# Patient Record
Sex: FEMALE | Race: ASIAN | NOT HISPANIC OR LATINO | Employment: UNEMPLOYED | URBAN - METROPOLITAN AREA
[De-identification: names, ages, dates, MRNs, and addresses within clinical notes are randomized per-mention and may not be internally consistent; named-entity substitution may affect disease eponyms.]

---

## 2017-01-01 ENCOUNTER — MEDICAL CORRESPONDENCE (OUTPATIENT)
Dept: HEALTH INFORMATION MANAGEMENT | Facility: CLINIC | Age: 52
End: 2017-01-01

## 2017-01-09 DIAGNOSIS — I10 ESSENTIAL HYPERTENSION, BENIGN: Primary | ICD-10-CM

## 2017-01-09 DIAGNOSIS — E11.9 TYPE 2 DIABETES MELLITUS WITHOUT COMPLICATION, WITHOUT LONG-TERM CURRENT USE OF INSULIN (H): ICD-10-CM

## 2017-01-09 DIAGNOSIS — R80.9 MICROALBUMINURIA: ICD-10-CM

## 2017-01-12 ENCOUNTER — OFFICE VISIT (OUTPATIENT)
Dept: PSYCHOLOGY | Facility: CLINIC | Age: 52
End: 2017-01-12

## 2017-01-12 ENCOUNTER — OFFICE VISIT (OUTPATIENT)
Dept: FAMILY MEDICINE | Facility: CLINIC | Age: 52
End: 2017-01-12

## 2017-01-12 VITALS
DIASTOLIC BLOOD PRESSURE: 75 MMHG | SYSTOLIC BLOOD PRESSURE: 124 MMHG | TEMPERATURE: 98.3 F | BODY MASS INDEX: 29.96 KG/M2 | HEART RATE: 68 BPM | OXYGEN SATURATION: 99 % | WEIGHT: 141 LBS

## 2017-01-12 DIAGNOSIS — R80.9 MICROALBUMINURIA: ICD-10-CM

## 2017-01-12 DIAGNOSIS — F33.1 MODERATE EPISODE OF RECURRENT MAJOR DEPRESSIVE DISORDER (H): Primary | ICD-10-CM

## 2017-01-12 DIAGNOSIS — E55.9 VITAMIN D DEFICIENCY: ICD-10-CM

## 2017-01-12 DIAGNOSIS — F43.10 PTSD (POST-TRAUMATIC STRESS DISORDER): ICD-10-CM

## 2017-01-12 DIAGNOSIS — E11.9 TYPE 2 DIABETES MELLITUS WITHOUT COMPLICATION, WITHOUT LONG-TERM CURRENT USE OF INSULIN (H): ICD-10-CM

## 2017-01-12 DIAGNOSIS — K21.9 GASTROESOPHAGEAL REFLUX DISEASE WITHOUT ESOPHAGITIS: ICD-10-CM

## 2017-01-12 DIAGNOSIS — Z12.11 ENCOUNTER FOR SCREENING COLONOSCOPY: Primary | ICD-10-CM

## 2017-01-12 DIAGNOSIS — I10 ESSENTIAL HYPERTENSION, BENIGN: ICD-10-CM

## 2017-01-12 DIAGNOSIS — F33.1 MODERATE EPISODE OF RECURRENT MAJOR DEPRESSIVE DISORDER (H): ICD-10-CM

## 2017-01-12 DIAGNOSIS — E11.65 TYPE 2 DIABETES MELLITUS WITH HYPERGLYCEMIA, WITHOUT LONG-TERM CURRENT USE OF INSULIN (H): ICD-10-CM

## 2017-01-12 LAB
BUN SERPL-MCNC: 16.9 MG/DL (ref 7–19)
CALCIUM SERPL-MCNC: 9.3 MG/DL (ref 8.5–10.1)
CHLORIDE SERPLBLD-SCNC: 104.2 MMOL/L (ref 98–110)
CHOLEST SERPL-MCNC: 124 MG/DL (ref 0–200)
CHOLEST/HDLC SERPL: 3.3 {RATIO} (ref 0–5)
CO2 SERPL-SCNC: 27.2 MMOL/L (ref 20–32)
CREAT SERPL-MCNC: 0.8 MG/DL (ref 0.5–1)
CREAT UR-MCNC: 61.5 MG/DL
GFR SERPL CREATININE-BSD FRML MDRD: 79.7 ML/MIN/1.7 M2
GLUCOSE SERPL-MCNC: 205.1 MG'DL (ref 70–99)
HBA1C MFR BLD: 6.1 % (ref 4.1–5.7)
HDLC SERPL-MCNC: 37.9 MG/DL
LDLC SERPL CALC-MCNC: 57 MG/DL (ref 0–129)
MICROALBUMIN UR-MCNC: 4.31 MG/DL (ref 0–1.99)
MICROALBUMIN/CREAT UR: 70.1 MG/G
POTASSIUM SERPL-SCNC: 3.5 MMOL/DL (ref 3.2–4.6)
SODIUM SERPL-SCNC: 139 MMOL/L (ref 132–142)
TRIGL SERPL-MCNC: 143.8 MG/DL (ref 0–150)
VLDL CHOLESTEROL: 28.8 MG/DL (ref 7–32)

## 2017-01-12 RX ORDER — METFORMIN HCL 500 MG
1000 TABLET, EXTENDED RELEASE 24 HR ORAL 2 TIMES DAILY WITH MEALS
Qty: 180 TABLET | Refills: 3 | Status: SHIPPED | OUTPATIENT
Start: 2017-01-12 | End: 2017-06-14

## 2017-01-12 RX ORDER — ATORVASTATIN CALCIUM 80 MG/1
80 TABLET, FILM COATED ORAL DAILY
Qty: 90 TABLET | Refills: 3 | Status: SHIPPED | OUTPATIENT
Start: 2017-01-12 | End: 2017-07-14

## 2017-01-12 RX ORDER — PIOGLITAZONEHYDROCHLORIDE 30 MG/1
30 TABLET ORAL DAILY
Qty: 1 TABLET | Refills: 0 | Status: SHIPPED | OUTPATIENT
Start: 2017-01-12 | End: 2017-02-27

## 2017-01-12 RX ORDER — VENLAFAXINE HYDROCHLORIDE 75 MG/1
75 CAPSULE, EXTENDED RELEASE ORAL DAILY
Qty: 90 CAPSULE | Refills: 0 | Status: SHIPPED | OUTPATIENT
Start: 2017-01-12 | End: 2017-03-17

## 2017-01-12 RX ORDER — GLIPIZIDE 10 MG/1
20 TABLET, FILM COATED, EXTENDED RELEASE ORAL DAILY
Qty: 180 TABLET | Refills: 3 | Status: SHIPPED | OUTPATIENT
Start: 2017-01-12 | End: 2017-07-14

## 2017-01-12 RX ORDER — LISINOPRIL 10 MG/1
10 TABLET ORAL DAILY
Qty: 90 TABLET | Refills: 3 | Status: SHIPPED | OUTPATIENT
Start: 2017-01-12 | End: 2017-02-27

## 2017-01-12 RX ORDER — PRAZOSIN HYDROCHLORIDE 2 MG/1
2 CAPSULE ORAL AT BEDTIME
Qty: 90 CAPSULE | Refills: 3 | Status: SHIPPED | OUTPATIENT
Start: 2017-01-12 | End: 2017-03-01

## 2017-01-12 NOTE — PATIENT INSTRUCTIONS
Stop taking the Pitoglitazone  Decrease the Effexor to 75mg.  Check blood sugars once per week when home nurse comes.      Come back in 6 weeks for a recheck.

## 2017-01-12 NOTE — MR AVS SNAPSHOT
After Visit Summary   1/12/2017    Rachael Ch    MRN: [id number]           Patient Information     Date Of Birth          1965        Visit Information        Provider Department      1/12/2017 9:00 AM Amrita Carballo MD [facility]        Today's Diagnoses     Encounter for screening colonoscopy    -  1     Essential hypertension, benign         Type 2 diabetes mellitus without complication, without long-term current use of insulin (H)         Microalbuminuria         Moderate episode of recurrent major depressive disorder (H)         Vitamin D deficiency         PTSD (post-traumatic stress disorder)         Gastroesophageal reflux disease without esophagitis         Type 2 diabetes mellitus with hyperglycemia, without long-term current use of insulin (H)           Care Instructions    Stop taking the Pitoglitazone  Decrease the Effexor to 75mg.  Check blood sugars once per week when home nurse comes.      Come back in 6 weeks for a recheck.          Follow-ups after your visit        Future tests that were ordered for you today     Open Future Orders        Priority Expected Expires Ordered    Fecal Occult Blood - FIT, iFOB ([facility]) Routine  1/19/2018 1/12/2017            Who to contact     Please call your clinic at [phone] to:    Ask questions about your health    Make or cancel appointments    Discuss your medicines    Learn about your test results    Speak to your doctor   If you have compliments or concerns about an experience at your clinic, or if you wish to file a complaint, please contact [facility] Patient Relations at [phone] or email us at [email or web address]         Additional Information About Your Visit        MyChart Information     [facility] is an electronic gateway that provides easy, online access to your medical records. With [facility], you can request a clinic appointment, read your test results, renew a prescription or  communicate with your care team.     To sign up for [facility] visit the website at [email or web address]   You will be asked to enter the access code listed below, as well as some personal information. Please follow the directions to create your username and password.     Your access code is: [email or web address]  Expires: 2017  7:44 AM     Your access code will  in 90 days. If you need help or a new code, please contact your [facility] or call [phone] for assistance.        Care EveryWhere ID     This is your Care EveryWhere ID. This could be used by other organizations to access your [place] medical records  [id number]        Your Vitals Were     Pulse Temperature Pulse Oximetry             68 98.3  F (36.8  C) (Oral) 99%          Blood Pressure from Last 3 Encounters:   17 124/75   16 115/67   10/18/16 92/61    Weight from Last 3 Encounters:   17 141 lb (63.957 kg)   16 137 lb 9.6 oz (62.415 kg)   10/18/16 135 lb 9.6 oz (61.508 kg)              We Performed the Following     Basic Metabolic Panel ([place])     Hemoglobin A1c ([facility])     Lipid Panel ([place])     Microalbumin Creatinine Ratio Random Ur ([facility])          Today's Medication Changes          These changes are accurate as of: 17 10:12 AM.  If you have any questions, ask your nurse or doctor.               These medicines have changed or have updated prescriptions.        Dose/Directions    cholecalciferol 2000 UNITS tablet   This may have changed:    - medication strength  - how much to take  - when to take this   Used for:  Vitamin D deficiency   Changed by:  Amrita Carballo MD        Dose:  2000 Units   Take 2,000 Units by mouth daily   Quantity:  100 tablet   Refills:  3       glipiZIDE 10 MG 24 hr tablet   Commonly known as:  GLUCOTROL XL   This may have changed:    - how much to take  - when to take this   Used for:  Type 2 diabetes mellitus without  complication, without long-term current use of insulin (H)   Changed by:  Amrita Carballo MD        Dose:  20 mg   Take 2 tablets (20 mg) by mouth daily   Quantity:  180 tablet   Refills:  3       venlafaxine 75 MG 24 hr capsule   Commonly known as:  EFFEXOR-XR   This may have changed:    - medication strength  - how much to take   Used for:  Moderate episode of recurrent major depressive disorder (H)   Changed by:  Amrita Carballo MD        Dose:  75 mg   Take 1 capsule (75 mg) by mouth daily   Quantity:  90 capsule   Refills:  0         Stop taking these medicines if you haven't already. Please contact your care team if you have questions.     order for DME   Stopped by:  Amrita Carballo MD                Where to get your medicines      These medications were sent to [facility] - [place], [place] - [address], [place] [address]     Phone:  [phone]    - atorvastatin 80 MG tablet  - cholecalciferol 2000 UNITS tablet  - glipiZIDE 10 MG 24 hr tablet  - lisinopril 10 MG tablet  - metFORMIN 500 MG 24 hr tablet  - pioglitazone 30 MG tablet  - prazosin 2 MG capsule  - ranitidine 300 MG tablet  - sitagliptin 100 MG tablet  - venlafaxine 75 MG 24 hr capsule             Primary Care Provider Office Phone # Fax #    Amrita Carballo [phone]       [facility] [address] [place] [address]        Thank you!     Thank you for choosing [facility]  for your care. Our goal is always to provide you with excellent care. Hearing back from our patients is one way we can continue to improve our services. Please take a few minutes to complete the written survey that you may receive in the mail after your visit with us. Thank you!             Your Updated Medication List - Protect others around you: Learn how to safely use, store and throw away your medicines at www.disposemymeds.org.          This list is accurate as of: 1/12/17 10:12 AM.  Always  use your most recent med list.                   Brand Name Dispense Instructions for use    atorvastatin 80 MG tablet    LIPITOR    90 tablet    Take 1 tablet (80 mg) by mouth daily       blood glucose lancets standard    no brand specified    1 Box    Use to test blood sugar 1 times daily or as directed.       blood glucose monitoring lancets     2 Box    1 each by In Vitro route daily       blood glucose monitoring meter device kit    no brand specified    1 kit    Use to test blood sugar 1 times daily or as directed.       blood glucose monitoring test strip    no brand specified    1 Box    Use to test blood sugars 1 times daily or as directed       calcium carbonate 500 MG chewable tablet    TUMS    100 tablet    Take 1 tablet (500 mg) by mouth 2 times daily       cholecalciferol 2000 UNITS tablet     100 tablet    Take 2,000 Units by mouth daily       glipiZIDE 10 MG 24 hr tablet    GLUCOTROL XL    180 tablet    Take 2 tablets (20 mg) by mouth daily       lisinopril 10 MG tablet    PRINIVIL/ZESTRIL    90 tablet    Take 1 tablet (10 mg) by mouth daily       metFORMIN 500 MG 24 hr tablet    GLUCOPHAGE-XR    180 tablet    Take 2 tablets (1,000 mg) by mouth 2 times daily (with meals)       pioglitazone 30 MG tablet    ACTOS    1 tablet    Take 1 tablet (30 mg) by mouth daily       prazosin 2 MG capsule    MINIPRESS    90 capsule    Take 1 capsule (2 mg) by mouth At Bedtime       ranitidine 300 MG tablet    ZANTAC    90 tablet    Take 1 tablet (300 mg) by mouth At Bedtime       sitagliptin 100 MG tablet    JANUVIA    90 tablet    Take 1 tablet (100 mg) by mouth daily       venlafaxine 75 MG 24 hr capsule    EFFEXOR-XR    90 capsule    Take 1 capsule (75 mg) by mouth daily

## 2017-01-12 NOTE — NURSING NOTE
name: Silvino (Cat) Darren  Language: Radha  Agency: [employer]  Phone number: [phone]

## 2017-01-12 NOTE — Clinical Note
January 13, 2017      Rachael [facility]  [address] [address]        Dear Rachael,    Please see below for your test results.    Resulted Orders   Basic Metabolic Panel ([place])   Result Value Ref Range    Urea Nitrogen 16.9 7.0 - 19.0 mg/dL    Calcium 9.3 8.5 - 10.1 mg/dL    Chloride 104.2 98.0 - 110.0 mmol/L    Carbon Dioxide 27.2 20.0 - 32.0 mmol/L    Creatinine 0.8 0.5 - 1.0 mg/dL    Glucose 205.1 (H) 70.0 - 99.0 mg'dL    Potassium 3.5 3.2 - 4.6 mmol/dL    Sodium 139.0 132.0 - 142.0 mmol/L    GFR Estimate 79.7 mL/min/1.7 m2    GFR Estimate If Black 96.5 mL/min/1.7 m2   Hemoglobin A1c ([facility])   Result Value Ref Range    Hemoglobin A1C 6.1 (H) 4.1 - 5.7 %   Lipid Panel ([place])   Result Value Ref Range    Cholesterol 124.0 0.0 - 200.0 mg/dL    Cholesterol/HDL Ratio 3.3 0.0 - 5.0    HDL Cholesterol 37.9 (L) >40.0 mg/dL    LDL Cholesterol Calculated 57 0 - 129 mg/dL    Triglycerides 143.8 0.0 - 150.0 mg/dL    VLDL Cholesterol 28.8 7.0 - 32.0 mg/dL   Microalbumin Creatinine Ratio Random Ur ([facility])   Result Value Ref Range    Microalbumin, Urine 4.31 (H) 0.00 - 1.99 mg/dL    Creatinine, Urine 61.5 mg/dL    Albumin Urine mg/g Cr 70.1 (H) <=19.9 mg/g    Narrative    Test performed by:  [facility]  [address] [place] [address]  Microalbumin, Random Urine  <2.0 mg/dL . . . . . . . . Normal  3.0-30.0 mg/dL . . . . . . Microalbuminuria  >30.0 mg/dL . . . . . .  . Clinical Proteinuria  Microalbumin/Creatinine Ratio, Random Urine  <20 mg/g . . . . .. . . . Normal   mg/g . . . . . . . Microalbuminuria  >300 mg/g . . . . . . . . Clinical Proteinuria       Microalbumin is still elevated.      Amrita Carballo

## 2017-01-12 NOTE — PROGRESS NOTES
Nursing Notes:   Khalif Valdivia, [facility]  1/12/2017  9:35 AM  Signed   name: Silvino Banks (Htoo)  Language: Radha  Agency: [employer]  Phone number: [phone]  Chief Complaint   Patient presents with     Diabetes     Depression     Recheck Medication     Blood pressure 124/75, pulse 68, temperature 98.3  F (36.8  C), temperature source Oral, weight 141 lb (63.957 kg), SpO2 99 %, not currently breastfeeding.    SUBJECTIVE:  This is a 51-year-old female, well-known to me, with past medical history significant for type II diabetes, hypertension, microalbuminuria, major depression, PTSD, and GERD, who presents today for followup visit.  Unfortunately, her insurance lapsed for a period of time, but now she is able to follow up.  She reports overall doing very well.  She continues to go to group therapy here with the [facility] group and finds quite a bit of benefit from that.  She said that she shares her thoughts in group and she feels better.  She feels that her energy is good and that overall things are going well.     She feels dizzy on occasion and sometimes it travels up into her head.  This happens most frequently when she isn't feeling well.  She takes her medications regularly and feels like things are going well with the home nurse, who comes to help set up medications.  They also check blood pressure and she reports that this is going well.  She doesn't check blood sugars at all.  She has occasional times when she feels that she gets sweaty and clammy, which she describes as hot flashes.  She denies any chest pain, shortness of breath, or headaches.  No lower extremity edema.  No numbness and tingling in the feet.       We discussed lab results from today.  This includes A1C of 6.1.  BMP showed normal renal function, with creatinine of 0.8 and potassium of 3.5.  LDL cholesterol is 57.  Random glucose is 205.     OBJECTIVE:   VITAL SIGNS:  Reviewed.  They are within the normal range.  Her blood pressure is  124/75.  Pulse is 68.  Weight is 141 pounds, which is up slightly from last visit.   GENERAL:  Comfortable-appearing, Radha female in no acute distress.   CARDIAC:  Heart has regular rate and rhythm with no murmurs, rubs, or gallops.   RESPIRATORY:  Lungs are clear to auscultation bilaterally with no crackles or wheezes.   EXTREMITIES:  Warm and well-perfused.  Pulses are strong and symmetric.  No lower extremity edema.  Monofilament testing was completed and was normal.   PSYCHIATRIC:  Mood and affect are bright.  She is engaged and answers questions appropriately.  Her PHQ-9 was completed today and showed total score of 10.  She continues to endorse some difficulty at home.       LABS:  Labs were discussed above.   Results for orders placed or performed in visit on 01/12/17   Basic Metabolic Panel ([facility])   Result Value Ref Range    Urea Nitrogen 16.9 7.0 - 19.0 mg/dL    Calcium 9.3 8.5 - 10.1 mg/dL    Chloride 104.2 98.0 - 110.0 mmol/L    Carbon Dioxide 27.2 20.0 - 32.0 mmol/L    Creatinine 0.8 0.5 - 1.0 mg/dL    Glucose 205.1 (H) 70.0 - 99.0 mg'dL    Potassium 3.5 3.2 - 4.6 mmol/dL    Sodium 139.0 132.0 - 142.0 mmol/L    GFR Estimate 79.7 mL/min/1.7 m2    GFR Estimate If Black 96.5 mL/min/1.7 m2   Hemoglobin A1c ([facility])   Result Value Ref Range    Hemoglobin A1C 6.1 (H) 4.1 - 5.7 %   Lipid Panel ([place])   Result Value Ref Range    Cholesterol 124.0 0.0 - 200.0 mg/dL    Cholesterol/HDL Ratio 3.3 0.0 - 5.0    HDL Cholesterol 37.9 (L) >40.0 mg/dL    LDL Cholesterol Calculated 57 0 - 129 mg/dL    Triglycerides 143.8 0.0 - 150.0 mg/dL    VLDL Cholesterol 28.8 7.0 - 32.0 mg/dL   Microalbumin Creatinine Ratio Random Ur ([facility])   Result Value Ref Range    Microalbumin, Urine 4.31 (H) 0.00 - 1.99 mg/dL    Creatinine, Urine 61.5 mg/dL    Albumin Urine mg/g Cr 70.1 (H) <=19.9 mg/g    Narrative    Test performed by:  [facility]  [address] [place] [address]  Microalbumin, Random Urine  <2.0  mg/dL . . . . . . . . Normal  3.0-30.0 mg/dL . . . . . . Microalbuminuria  >30.0 mg/dL . . . . . .  . Clinical Proteinuria  Microalbumin/Creatinine Ratio, Random Urine  <20 mg/g . . . . .. . . . Normal   mg/g . . . . . . . Microalbuminuria  >300 mg/g . . . . . . . . Clinical Proteinuria         ASSESSMENT AND PLAN:  This is a 50-year-old female presenting for followup of multiple issues.   1.  Type II diabetes.  I think the hot flashes are likely episodes of hypoglycemia.  We'll discontinue Actos.  We'll continue glipizide, Januvia, and metformin.  I congratulated her on the improvement in blood sugars.  I discussed that she should check blood sugars at minimum when nurse comes on weekly basis and if she is having symptoms.    2.  Hypertension.  Blood pressure is well-controlled.  We'll continue the small dose of lisinopril.   3.  Microalbuminuria.  Again, she is on lisinopril.   4.  Hyperlipidemia.  This is at goal.  We'll continue atorvastatin.   5.  Major depression and PTSD.  PHQ-9 is improving and she appears much brighter and more engaged today.  It is unclear whether or not she continues to take prazosin and Effexor.  Because of this, I'm going to decrease the dose from 150 mg of Effexor to 75 mg.  If she isn't taking it, this shouldn't be too great a jump.  Hopefully, this will prevent orthostatic or withdrawal symptoms.  I'll continue to adjust downward and eventually off if she continues to feel well.  She has had good benefit previouosly from prazosin, so we'll continue that for now.  She'll continue with group therapy here in the clinic.     I congratulated her on the excellent results today.  She'll follow up with me in six weeks for recheck.     Amrita Carballo        Patient Instructions   Stop taking the Pitoglitazone  Decrease the Effexor to 75mg.  Check blood sugars once per week when home nurse comes.      Come back in 6 weeks for a recheck.

## 2017-01-13 NOTE — PROGRESS NOTES
"[facility] Group Therapy Note    Meeting was: Scheduled  Others present: , Jyotsna Noguera ([facility], [phone])  Meeting lasted: 60 minutes (patient joined after first hour of group because of conflicting PCP appointment)  Patient was: On time  Mode of Treatment: Group Therapy  Group Session Number: 6  Number of Attendees: 5    Complexity: An  is used not only to interpret language, since the group members do not speak English, but also to help with the complexity of understandings across cultures, since the members are not well integrated in the larger [place] culture.    Topics Discussed: Helpful English Phrases and Using Local Resources    Check-in:  Group members had an opportunity to share updates from their life over the past month since last group session.    Presentation/Discussion: Discussed language barriers and elicited examples of how language has prevented group members from accessing care in the past (i.e., difficulties with arranging transportation, not understanding phone calls from providers/clinics, missing appointments).  Group members were introduced to 5 useful English phrases includin. I don't speak English  2. I speak Radha  3. I need a Radha   4. I don't understand  5. Thank you    Each of the phrases was explained in Radha. Group members took several minutes to practice saying these phrases out loud. Group members were also offered the opportunity to ask about other phrases they would like to learn in English (i.e., \"I have a problem with...\" \"Please help me with...\").     Group members were then educated about how to read and dial phone numbers and how to read and locate addresses.     They were then given a resource handout and provided overview of the domains of  for which assistance is available. Group members denied specific resource needs at this time. They were instructed to request help from clinic staff if needs were to arise in " the future.      Subjective: Ms. Ch shared that her mood has been quite good for the past month. When discussing barriers to medical treatment, Ms. Ch noted that her vision is affected by cataracts. She is fearful of getting the surgery because she has a friend who lost her vision after the surgery. Discussed how patients may have comorbid conditions that affect outcomes. She acknowledged that most people's vision probably improves after surgery. She is delaying making the decision because her vision is still good enough.     Objective: Ms. Ch appeared awake and alert for today's visit.     Assessment: Ms. Ch was an active, engaged participant throughout the meeting today. Ms. Ch appeared receptive to feedback and discussion during the visit.    Diagnosis: 296.32 Major Depressive Disorder, Recurrent Episode, Moderate  309.81 (F43.10) Posttraumatic Stress Disorder     Treatment Plan: Next treatment plan update due 2/3/17    Plan:    1. Attend next therapy group in 2 weeks.   2. Practice phrases learned in today's group.   3. Utilize crisis services if needed.      Jackie Kirkpatrick, PHD  Behavioral Health Postdoctoral Fellow

## 2017-01-14 NOTE — PROGRESS NOTES
I have reviewed and agree with the behavioral health fellow's documentation for this visit.  I did not personally see the patient.  Maria Moon, PhD., LP

## 2017-01-18 DIAGNOSIS — Z12.11 ENCOUNTER FOR SCREENING COLONOSCOPY: ICD-10-CM

## 2017-01-18 LAB — HEMOCCULT STL QL IA: NEGATIVE

## 2017-01-18 NOTE — PROGRESS NOTES
Quick Note:    Rachael Ch-    Your FIT testing is negative. This is good news. We should repeat it in 1 year.     Amrita Carballo    ______

## 2017-01-18 NOTE — Clinical Note
January 18, 2017      [facility]  [address] [address]        Dear Rachael,    Please see below for your test results.  Your FIT testing is negative.  This is good news.  We should repeat it in 1 year.      Resulted Orders   Fecal Occult Blood - FIT, iFOB ([facility])   Result Value Ref Range    Occult Blood Scn FIT NEGATIVE Negative       If you have any questions, please call the clinic to make an appointment.    Sincerely,    Amrita Carballo MD

## 2017-01-26 ENCOUNTER — OFFICE VISIT (OUTPATIENT)
Dept: PSYCHOLOGY | Facility: CLINIC | Age: 52
End: 2017-01-26

## 2017-01-26 DIAGNOSIS — F33.1 MODERATE EPISODE OF RECURRENT MAJOR DEPRESSIVE DISORDER (H): Primary | ICD-10-CM

## 2017-01-26 DIAGNOSIS — F43.10 PTSD (POST-TRAUMATIC STRESS DISORDER): ICD-10-CM

## 2017-01-26 NOTE — PROGRESS NOTES
[facility] Group Therapy Note    Meeting was: Scheduled  Others present: ,  Jyotsna Noguera (Radha, [facility], [phone])  Meeting lasted: 120 minutes  Patient was: on time  Mode of Treatment: Group Therapy  Group Session Number: 7  Number of Attendees: 4    Complexity: An  is used not only to interpret language, since the group members do not speak English, but also to help with the complexity of understandings across cultures, since the members are not well integrated in the larger [place] culture.      Topics Discussed: Long-Term Effects of War Stress, Part 1    Check-in: Elicited patient concerns that they wished to discuss with the group. Generally, we discussed methods to stay active in the winter, accessing resources for housing concerns, and how to elicit  help.    Psychoeducation/Discussion:     Elicited examples of traumatic events from group members, including: fleeing from enemies, life-threatening situations, and scary.    Provided psychoeducation about the fight/flight/freeze response and the physiological functions of the human body in the face of trauma. Provided information about the trauma response cycle.    Completed body map exercise where patients identified body areas in which they feel stress. Discussed commonality between their body maps.    Closing: Completed a present-moment mindfulness exercise in which group members each identified sounds and sights in the room. Encouraged them to try this exercise when fight/flight/freeze response activated.    Subjective: Rachael shared that she has been feeling happier during the past couple weeks. She enjoys walking and wishes she could walk outside more in the winter, but cold and ice are barriers. Group members offered the suggestions of attending a class at the local [employer] center or walking in an indoor mall to get more exercise in the winter. Rachael shared that she manages stress and body pain by drinking water and taking  medications.    Objective: Ms. Ch appeared awake and alert for today's visit.     Assessment: Ms. Ch was an active, engaged participant throughout the meeting today. Ms. Ch appeared receptive to feedback and discussion during the visit.    Diagnosis: 296.32 Major Depressive Disorder, Recurrent Episode, Moderate  309.81 (F43.10) Posttraumatic Stress Disorder     Treatment Plan: Next treatment plan update due 2/3/17    Plan: Attend next therapy group in 2 weeks.       Jackie Kirkpatrick, PHD  Behavioral Health Postdoctoral Fellow

## 2017-02-09 ENCOUNTER — OFFICE VISIT (OUTPATIENT)
Dept: PSYCHOLOGY | Facility: CLINIC | Age: 52
End: 2017-02-09

## 2017-02-09 DIAGNOSIS — F43.10 PTSD (POST-TRAUMATIC STRESS DISORDER): ICD-10-CM

## 2017-02-09 DIAGNOSIS — F33.1 MODERATE EPISODE OF RECURRENT MAJOR DEPRESSIVE DISORDER (H): Primary | ICD-10-CM

## 2017-02-09 NOTE — MR AVS SNAPSHOT
After Visit Summary   2017    Rachael Ch    MRN: [id number]           Patient Information     Date Of Birth          1965        Visit Information        Provider Department      2017 9:30 AM Jackie Kirkpatrick, PhD [facility]        Today's Diagnoses     Moderate episode of recurrent major depressive disorder (H)    -  1    PTSD (post-traumatic stress disorder)           Follow-ups after your visit        Follow-up notes from your care team     Return in about 2 weeks (around 2017).      Your next 10 appointments already scheduled     2017  8:00 AM CST   Return Visit with Amrita Carballo MD   [facility] ([facility])    [address] [place] [place] [address]   [phone]              Who to contact     Please call your clinic at [phone] to:    Ask questions about your health    Make or cancel appointments    Discuss your medicines    Learn about your test results    Speak to your doctor   If you have compliments or concerns about an experience at your clinic, or if you wish to file a complaint, please contact [facility] Patient Relations at [phone] or email us at [email or web address]         Additional Information About Your Visit        MyChart Information     [employer] Labshart is an electronic gateway that provides easy, online access to your medical records. With Notizza, you can request a clinic appointment, read your test results, renew a prescription or communicate with your care team.     To sign up for Camelot Information Systemst visit the website at [email or web address]   You will be asked to enter the access code listed below, as well as some personal information. Please follow the directions to create your username and password.     Your access code is: [email or web address]  Expires: 5/15/2017  7:15 AM     Your access code will  in 90 days. If you need help or a new code, please contact your [facility]  Clinic or call [phone] for assistance.        Care EveryWhere ID     This is your Care EveryWhere ID. This could be used by other organizations to access your [place] medical records  [id number]         Blood Pressure from Last 3 Encounters:   01/12/17 124/75   11/02/16 115/67   10/18/16 92/61    Weight from Last 3 Encounters:   01/12/17 141 lb (64 kg)   11/02/16 137 lb 9.6 oz (62.4 kg)   10/18/16 135 lb 9.6 oz (61.5 kg)              We Performed the Following     Interactive Complexity add-on (31889)        Primary Care Provider Office Phone # Fax #    Amrita Carballo [phone]       [facility] [address] [place] [address]        Thank you!     Thank you for choosing [facility]  for your care. Our goal is always to provide you with excellent care. Hearing back from our patients is one way we can continue to improve our services. Please take a few minutes to complete the written survey that you may receive in the mail after your visit with us. Thank you!             Your Updated Medication List - Protect others around you: Learn how to safely use, store and throw away your medicines at www.disposemymeds.org.          This list is accurate as of: 2/9/17 11:59 PM.  Always use your most recent med list.                   Brand Name Dispense Instructions for use    atorvastatin 80 MG tablet    LIPITOR    90 tablet    Take 1 tablet (80 mg) by mouth daily       blood glucose lancets standard    no brand specified    1 Box    Use to test blood sugar 1 times daily or as directed.       blood glucose monitoring lancets     2 Box    1 each by In Vitro route daily       blood glucose monitoring meter device kit    no brand specified    1 kit    Use to test blood sugar 1 times daily or as directed.       blood glucose monitoring test strip    no brand specified    1 Box    Use to test blood sugars 1 times daily or as directed       calcium carbonate 500 MG chewable tablet    TUMS     100 tablet    Take 1 tablet (500 mg) by mouth 2 times daily       cholecalciferol 2000 UNITS tablet     100 tablet    Take 2,000 Units by mouth daily       glipiZIDE 10 MG 24 hr tablet    GLUCOTROL XL    180 tablet    Take 2 tablets (20 mg) by mouth daily       lisinopril 10 MG tablet    PRINIVIL/ZESTRIL    90 tablet    Take 1 tablet (10 mg) by mouth daily       metFORMIN 500 MG 24 hr tablet    GLUCOPHAGE-XR    180 tablet    Take 2 tablets (1,000 mg) by mouth 2 times daily (with meals)       pioglitazone 30 MG tablet    ACTOS    1 tablet    Take 1 tablet (30 mg) by mouth daily       prazosin 2 MG capsule    MINIPRESS    90 capsule    Take 1 capsule (2 mg) by mouth At Bedtime       ranitidine 300 MG tablet    ZANTAC    90 tablet    Take 1 tablet (300 mg) by mouth At Bedtime       sitagliptin 100 MG tablet    JANUVIA    90 tablet    Take 1 tablet (100 mg) by mouth daily       venlafaxine 75 MG 24 hr capsule    EFFEXOR-XR    90 capsule    Take 1 capsule (75 mg) by mouth daily

## 2017-02-13 NOTE — PROGRESS NOTES
[facility] Group Therapy Note    Meeting was: scheduled  Others present: , Jyotsna Noguera (CAROL Garcia, [phone]); Pharmacy student, Pauline, observed the group with group's permission.  Meeting lasted: 120 minutes  Patient was: on time  Mode of Treatment: Group Therapy  Group Session Number: 8  Number of Attendees: 6  Complexity: An  is used not only to interpret language, since the group members do not speak English, but also to help with the complexity of understandings across cultures, since the members are not well integrated in the larger [place] culture.      Topics Discussed: Long-Term Effects of War Stress, Part 2    New Group member: One new group members joined the group today. Each group member introduced themselves to one another. Incorporated one new group member into bundle of sticks exercise. One group member who was present for the first group described the symbolism behind the bundle of sticks. Group members shared which refugee camp they were from and reflected on similarities in their background.    Presentation/Discussion: Provided psychoeducation about the symptoms of depression and PTSD and discussed these mental health conditions as outcomes of long term war stress.   Also provided information about recent immigration ban and ongoing legal disputes regarding this action.     Closing: This visit was one group member's last group, and the group members reflected on the supportive connection they developed during the last 7 sessions. They exchanged phone numbers to stay in touch, and the group member took pictures of the group to remind her once she is gone. Presented her with closing gift of small bundle of sticks as remembrance of her participation in group.          Subjective: Rachael provided feedback, encouragement, and support to another group member about fear related to citizenship. Rachael related that she needed to interview twice before she was approved, and another family  member of hers also had to repeat interviews. She also shared her own fear during the interviews and encouraged the group member to continue with the process despite that fear.     Objective: Ms. Ch appeared awake and alert for today's visit.     PHQ-9 Data:   PHQ-9 SCORE 6/22/2016 7/19/2016 11/3/2016   Total Score - - -   Total Score 7 9 15       Assessment: Ms. Ch was an active, engaged participant throughout the meeting today. Ms. Ch appeared receptive to feedback and discussion during the visit.    Diagnoses: 296.32 Major Depressive Disorder, Recurrent Episode, Moderate  309.81 (F43.10) Posttraumatic Stress Disorder     Treatment Plan: The TP dated 11/3/16 was reviewed with the patient at today s visit. The TP remains current based on the patient s status and progress to date. Next treatment plan update due 5/9/17    Plan: Attend next therapy group in 2 weeks.       Jackie Kirkpatrick, PhD  Behavioral Health Postdoctoral Fellow

## 2017-02-23 ENCOUNTER — OFFICE VISIT (OUTPATIENT)
Dept: PSYCHOLOGY | Facility: CLINIC | Age: 52
End: 2017-02-23

## 2017-02-23 DIAGNOSIS — F33.1 MODERATE EPISODE OF RECURRENT MAJOR DEPRESSIVE DISORDER (H): Primary | ICD-10-CM

## 2017-02-23 DIAGNOSIS — F43.10 PTSD (POST-TRAUMATIC STRESS DISORDER): ICD-10-CM

## 2017-02-23 DIAGNOSIS — E11.65 TYPE 2 DIABETES MELLITUS WITH HYPERGLYCEMIA, WITHOUT LONG-TERM CURRENT USE OF INSULIN (H): ICD-10-CM

## 2017-02-23 NOTE — PROGRESS NOTES
"[facility] Group Therapy Note    Meeting was: scheduled  Others present: , Jyotsna Noguera (Radha, [facility], [phone]), Pharmacy student Danika attended as an observer, with the group's permission  Meeting lasted: 120 minutes  Patient was: on time  Mode of Treatment: Group Therapy  Group Session Number: 9  Number of Attendees: 2    Complexity: An  is used not only to interpret language, since the group members do not speak English, but also to help with the complexity of understandings across cultures, since the members are not well integrated in the larger [place] culture.      Topics Discussed: Fear and Other Upsetting Emotions    Discussed examples of different unpleasant emotions. Elicited group members' current strategies to manage unpleasant emotions, including walking around, distraction, talking with friends and family, massage, and taking medications. Discussed how these strategies are often helpful, but sometimes unpleasant emotions remains.     Introduced the concepts of acceptance and letting go as alternatives to \"fighting with\" our emotions. Provided metaphor of strong emotions as waves in a broader ocean, and encouraged shifting focus to the ocean to promote acceptance. Provided exercise of Observe, Breathe, Expand, and Allow as method to practice acceptance.     Group members identified important values in their lives and whether they are capable of living by those values despite the painful emotions they experience. Group members agreed that this is possible and a good practice to follow.    Closing: Read \"The Guest House\" poem by Suzy to illustrate acceptance and hope.          Subjective: During the check-in portion of the group, Rachael shared that she has been feeling happy lately and wanting to visit friends more often. She offered help to other group member regarding citizenship process for her children. During topic discussion for group, Rachael shared that sometimes her hands and feet " become very cold and she is unable to stop feeling unpleasant emotions. She would like to return to work but does not feel able to with her current mental health symptoms. She noted that she is able to accept her feelings sometimes, but other times is harder.     Objective: Ms. Ch appeared awake and alert for today's visit.     Assessment: Ms. Ch was an active, engaged participant throughout the meeting today. Ms. Ch appeared receptive to feedback and discussion during the visit.    Diagnosis: 296.32 Major Depressive Disorder, Recurrent Episode, Moderate  309.81 (F43.10) Posttraumatic Stress Disorder     Treatment Plan: Next treatment plan update due 5/9/17    Plan:     Attend next therapy group in 2 weeks.    Practice acceptance principles learned in today's group      Jackie Kirkpatrick, PhD,   Behavioral Health Postdoctoral Fellow

## 2017-02-23 NOTE — MR AVS SNAPSHOT
After Visit Summary   2/23/2017    Rachael Ch    MRN: [id number]           Patient Information     Date Of Birth          1965        Visit Information        Provider Department      2/23/2017 9:30 AM Jackie Kirkpatrick, PhD [facility]        Today's Diagnoses     Moderate episode of recurrent major depressive disorder (H)    -  1    PTSD (post-traumatic stress disorder)           Follow-ups after your visit        Follow-up notes from your care team     Return in about 2 weeks (around 3/9/2017).      Your next 10 appointments already scheduled     Feb 27, 2017  8:00 AM CST   Return Visit with Anali Worthington MD   [facility] ([facility])    [address] [place] [address]   [phone]            Mar 09, 2017  9:30 AM CST   Group Education with Jackie Kirkpatrick PhD   [facility] ([facility])    [address] [place] [address]   [phone]              Who to contact     Please call your clinic at [phone] to:    Ask questions about your health    Make or cancel appointments    Discuss your medicines    Learn about your test results    Speak to your doctor   If you have compliments or concerns about an experience at your clinic, or if you wish to file a complaint, please contact [facility] Patient Relations at [phone] or email us at [email or web address]         Additional Information About Your Visit        MyChart Information     Appetas is an electronic gateway that provides easy, online access to your medical records. With Appetas, you can request a clinic appointment, read your test results, renew a prescription or communicate with your care team.     To sign up for PictureMenut visit the website at [email or web address]   You will be asked to enter the access code listed below, as well as some personal information. Please follow the directions to create your username and password.     Your access code is:  [email or web address]  Expires: 5/15/2017  7:15 AM     Your access code will  in 90 days. If you need help or a new code, please contact your [facility] or call [phone] for assistance.        Care EveryWhere ID     This is your Care EveryWhere ID. This could be used by other organizations to access your [place] medical records  [id number]         Blood Pressure from Last 3 Encounters:   17 124/75   16 115/67   10/18/16 92/61    Weight from Last 3 Encounters:   17 141 lb (64 kg)   16 137 lb 9.6 oz (62.4 kg)   10/18/16 135 lb 9.6 oz (61.5 kg)              We Performed the Following     Interactive Complexity add-on (64302)          Where to get your medicines      These medications were sent to [facility] - [place], [place] - [address], [place] [address]     Phone:  [phone]     sitagliptin 100 MG tablet          Primary Care Provider Office Phone # Fax #    Amrita Carballo [phone]       [facility] [address] [place] [address]        Thank you!     Thank you for choosing [facility]  for your care. Our goal is always to provide you with excellent care. Hearing back from our patients is one way we can continue to improve our services. Please take a few minutes to complete the written survey that you may receive in the mail after your visit with us. Thank you!             Your Updated Medication List - Protect others around you: Learn how to safely use, store and throw away your medicines at www.disposemymeds.org.          This list is accurate as of: 17 11:59 PM.  Always use your most recent med list.                   Brand Name Dispense Instructions for use    atorvastatin 80 MG tablet    LIPITOR    90 tablet    Take 1 tablet (80 mg) by mouth daily       blood glucose lancets standard    no brand specified    1 Box    Use to test blood sugar 1 times daily or as directed.        blood glucose monitoring lancets     2 Box    1 each by In Vitro route daily       blood glucose monitoring meter device kit    no brand specified    1 kit    Use to test blood sugar 1 times daily or as directed.       blood glucose monitoring test strip    no brand specified    1 Box    Use to test blood sugars 1 times daily or as directed       calcium carbonate 500 MG chewable tablet    TUMS    100 tablet    Take 1 tablet (500 mg) by mouth 2 times daily       cholecalciferol 2000 UNITS tablet     100 tablet    Take 2,000 Units by mouth daily       glipiZIDE 10 MG 24 hr tablet    GLUCOTROL XL    180 tablet    Take 2 tablets (20 mg) by mouth daily       lisinopril 10 MG tablet    PRINIVIL/ZESTRIL    90 tablet    Take 1 tablet (10 mg) by mouth daily       metFORMIN 500 MG 24 hr tablet    GLUCOPHAGE-XR    180 tablet    Take 2 tablets (1,000 mg) by mouth 2 times daily (with meals)       pioglitazone 30 MG tablet    ACTOS    1 tablet    Take 1 tablet (30 mg) by mouth daily       prazosin 2 MG capsule    MINIPRESS    90 capsule    Take 1 capsule (2 mg) by mouth At Bedtime       ranitidine 300 MG tablet    ZANTAC    90 tablet    Take 1 tablet (300 mg) by mouth At Bedtime       sitagliptin 100 MG tablet    JANUVIA    90 tablet    Take 1 tablet (100 mg) by mouth daily       venlafaxine 75 MG 24 hr capsule    EFFEXOR-XR    90 capsule    Take 1 capsule (75 mg) by mouth daily

## 2017-02-27 ENCOUNTER — OFFICE VISIT (OUTPATIENT)
Dept: FAMILY MEDICINE | Facility: CLINIC | Age: 52
End: 2017-02-27

## 2017-02-27 VITALS
SYSTOLIC BLOOD PRESSURE: 99 MMHG | DIASTOLIC BLOOD PRESSURE: 62 MMHG | TEMPERATURE: 98.4 F | BODY MASS INDEX: 29.39 KG/M2 | OXYGEN SATURATION: 98 % | WEIGHT: 138.2 LBS | HEART RATE: 81 BPM

## 2017-02-27 DIAGNOSIS — R80.9 MICROALBUMINURIA: ICD-10-CM

## 2017-02-27 DIAGNOSIS — E11.65 TYPE 2 DIABETES MELLITUS WITH HYPERGLYCEMIA, WITHOUT LONG-TERM CURRENT USE OF INSULIN (H): ICD-10-CM

## 2017-02-27 DIAGNOSIS — I95.9 HYPOTENSION, UNSPECIFIED HYPOTENSION TYPE: ICD-10-CM

## 2017-02-27 DIAGNOSIS — F33.1 MODERATE EPISODE OF RECURRENT MAJOR DEPRESSIVE DISORDER (H): ICD-10-CM

## 2017-02-27 DIAGNOSIS — F43.10 PTSD (POST-TRAUMATIC STRESS DISORDER): Primary | ICD-10-CM

## 2017-02-27 DIAGNOSIS — Z13.31 SCREENING FOR DEPRESSION: ICD-10-CM

## 2017-02-27 DIAGNOSIS — E11.9 TYPE 2 DIABETES MELLITUS WITHOUT COMPLICATION, WITHOUT LONG-TERM CURRENT USE OF INSULIN (H): ICD-10-CM

## 2017-02-27 RX ORDER — LISINOPRIL 5 MG/1
10 TABLET ORAL DAILY
Qty: 90 TABLET | Refills: 3 | Status: SHIPPED | OUTPATIENT
Start: 2017-02-27 | End: 2017-03-17

## 2017-02-27 RX ORDER — PRAZOSIN HYDROCHLORIDE 1 MG/1
1 CAPSULE ORAL AT BEDTIME
Qty: 90 CAPSULE | Refills: 3 | Status: SHIPPED | OUTPATIENT
Start: 2017-02-27 | End: 2017-03-01

## 2017-02-27 NOTE — Clinical Note
Tien Carballo,  I wanted to let you know I saw Rachael Ch today. She didn't have any specific complaints today. It looks like her BP was lower than usual so I decreased her Lisinopril. It was unclear if she had been taking Prazosin or Effexor b/c she didn't have them with her. Aundrea is going to call [facility] once they open today to see when the last refill was. I did re-order Prazosin b/c she still has frequent and bothersome nightmares but I am hoping the combination of the decreased Lisinopril with the small addition of Prazosin will not decrease her BP any more.

## 2017-02-27 NOTE — PATIENT INSTRUCTIONS
You should decrease the dose of Lisinopril that you take for blood pressure from 10 mg per day to 5 mg per day.    You should start taking Prazosin for nightmares. A new prescription will be at the pharmacy.    Our pharmacist will be checking if and when you refilled the medications for your depression because you were unsure if you had been taking it. I will not write a new prescription for this today because your mood seems to be stable from the last time you met with Dr. Carballo.    Please make an appointment with Dr. Carballo in 2-3 weeks to recheck your symptoms and medications.

## 2017-02-27 NOTE — PROGRESS NOTES
Preceptor attestation:  Patient seen and discussed with the resident. Assessment and plan reviewed with resident and agreed upon.  Supervising physician: Vitaliy Serrano  [facility]

## 2017-02-27 NOTE — PROGRESS NOTES
SUBJECTIVE       Rachael Ch is a 51 year old  female with a PMH significant for:     Patient Active Problem List   Diagnosis     Essential hypertension, benign     Diabetes mellitus, type 2 (H)     Hyperlipidemia     Health Care Home     Helicobacter pylori gastritis     PTSD (post-traumatic stress disorder)     Moderate episode of recurrent major depressive disorder (H)     Cognitive complaints     Screening for depression     Microalbuminuria     She presents for routine follow-up. Patient states she does not know why she is here and  states this was a regulalry scheduled appointment for follow-up of her medications and chronic medical conditions.    Medications were reviewed and patient is taking medications which she brought with her. A home nurse comes to set up her medications every week. She denies missing any of her medications. Reviewed her medication list and she is taking Lisinopril, Januvia, Glipizide, Metformin, Atorvastatin, Ranitidine, and vitamin D which are on her medication list. Prazosin 2 mg and Effexor 75 mg are on her list but she does not have any bottles for these medications with her. Actos is also listed but was discontinued at the last visit per review of notes. Effexor was decreased from 150mg to 75 mg d/t being unsure if patient was taking at the last visit. Patient's pharmacy is not yet open today to confirm the last filled prescription for the Prazosin and Effexor.    Patient reports that her mood is stable. She has been having nightmares nearly every night which keep her awake. She attends the [employer] women's group which she thinks is helpful but she wishes that there were more women in attendance because she feels it is more helpful when there are more people and she likes group interactions better than individual ones. She is not sure if she is taking any medications for her mood. She thinks her mood is aobut the same as it was at the last visit. She denies SI/HI.  Last visit, her PHQ-9 was reported as 10.     Patient denies problems with diabetes. Has not been having sweaty or weak episodes. Reviewed last labs and her A1c was 6.1, microalbumin was 4.31. Normal creatinine and GFR. Has been taking only her oral medications for this.     PMH, Medications and Allergies were reviewed and updated as needed.          OBJECTIVE     Vitals:    02/27/17 0814   BP: 99/62   Pulse: 81   Temp: 98.4  F (36.9  C)   TempSrc: Oral   SpO2: 98%   Weight: 138 lb 3.2 oz (62.7 kg)     Body mass index is 29.39 kg/(m^2).    General: Calm appearing female in no apparent distress  HEENT: Normocephalic, atraumatic, PERRL, sclera anicteric and not injected  Cardio: RRR, no murmurs, rubs or gallop  Respiratory: Clear breath sounds bilaterally without wheezes or rhonchi  Psych: PHQ-9 was 11, denies SI/HI. Makes eye contact with provider and  throughout exam and appears engaged in conversation.    Repeat manual BP reading 105/60    The ASCVD Risk score (Lance KOLTON Jr., et al., 2013) failed to calculate for the following reasons:    The valid total cholesterol range is 130 to 320 mg/dL   Manually calculated ASCVD risk score using total cholesterol of 130 (patient's last total cholesterol was 124) and risk was 2.1% in 10 years.     PHQ-9 SCORE 6/22/2016 7/19/2016 11/3/2016   Total Score - - -   Total Score 7 9 15    PHQ-9: 11 today      ASSESSMENT AND PLAN     1. Microalbuminuria: Currently taking 10 mg Lisinopril daily. Last urine microalbumin was January 2017 and was 4.31. Will decrease Lisinopril to 5 mg daily due to hypotension and additionally patient would like to be taking Prazosin again for PTSD. It is unclear if she has been taking this regularly.   - lisinopril (PRINIVIL/ZESTRIL) 5 MG tablet; Take 2 tablets (10 mg) by mouth daily  Dispense: 90 tablet; Refill: 3    2. PTSD (post-traumatic stress disorder): Patient still having nightmares nearly every night and does not sleep well because of  this. She has Prazosin on her medication list but did not bring this with her today and does not think she has other medications at home. Pharmacy not yet open. Pharmacy team here will contact her pharmacy to check on last refill of Prazosin and Effexor so that when patient follows up with Dr. Carballo in 2-3 weeks we can make a more informed decision about doses of both Prazosin and Effexor  - prazosin (MINIPRESS) 1 MG capsule; Take 1 capsule (1 mg) by mouth At Bedtime  Dispense: 90 capsule; Refill: 3    3. Type 2 diabetes mellitus without complication, without long-term current use of insulin (H): Reports no concerns today and needs test strips refilled. Last A1c was 6.2 BP is under goal, No Aspirin d/t ASCVD score <10% and no additional risk factors or known CVD, Currently taking Statin  - blood glucose monitoring (NO BRAND SPECIFIED) test strip; Use to test blood sugars 1 times daily or as directed  Dispense: 1 Box; Refill: prn    4. Moderate episode of recurrent major depressive disorder (H): PHQ-9 is 11 today which is about the same as her last visit with Dr. Carballo which was 10 per review of the note. Patient does not have Effexor with her and denies having other medication bottles at home. Pharmacy team will contact her pharmacy today to determine when her last refill was. Will not re-order this as her mood appears stable from last visit. Continue [facility], next visit is scheduled for next week.     5. Hypotension, unspecified hypotension type: Hypotensive initially and confirmed on recheck. Asymptomatic from this. See above, will decrease Lisinopril and re-ordered Prazosin at 1 mg dose. Asked patient to come back in 2-3 weeks for blood pressure check.      Anali Worthington MD - PGY-3  [facility]    Patient seen and plan of care discussed with preceptor, Dr. Serrano

## 2017-02-27 NOTE — MR AVS SNAPSHOT
After Visit Summary   2/27/2017    Rachael Ch    MRN: [id number]           Patient Information     Date Of Birth          1965        Visit Information        Provider Department      2/27/2017 8:00 AM Anali Worthington MD [facility]        Today's Diagnoses     PTSD (post-traumatic stress disorder)    -  1    Microalbuminuria          Care Instructions    You should decrease the dose of Lisinopril that you take for blood pressure from 10 mg per day to 5 mg per day.    You should start taking Prazosin for nightmares. A new prescription will be at the pharmacy.    Our pharmacist will be checking if and when you refilled the medications for your depression because you were unsure if you had been taking it. I will not write a new prescription for this today because your mood seems to be stable from the last time you met with Dr. Carballo.    Please make an appointment with Dr. Carballo in 2-3 weeks to recheck your symptoms and medications.        Follow-ups after your visit        Your next 10 appointments already scheduled     Mar 09, 2017  9:30 AM CST   Group Education with Jackie Kirkpatrick PhD   [facility] ([facility])    [address] [place] [address]   [phone]              Who to contact     Please call your clinic at [phone] to:    Ask questions about your health    Make or cancel appointments    Discuss your medicines    Learn about your test results    Speak to your doctor   If you have compliments or concerns about an experience at your clinic, or if you wish to file a complaint, please contact [facility] Patient Relations at [phone] or email us at [email or web address]         Additional Information About Your Visit        MyChart Information     The News Lens is an electronic gateway that provides easy, online access to your medical records. With The News Lens, you can request a clinic appointment, read your test results, renew a  prescription or communicate with your care team.     To sign up for [email or web address] visit the website at [email or web address]   You will be asked to enter the access code listed below, as well as some personal information. Please follow the directions to create your username and password.     Your access code is: [email or web address]  Expires: 5/15/2017  7:15 AM     Your access code will  in 90 days. If you need help or a new code, please contact your [facility] or call [phone] for assistance.        Care EveryWhere ID     This is your Care EveryWhere ID. This could be used by other organizations to access your [facility] medical records  [id number]        Your Vitals Were     Pulse Temperature Pulse Oximetry BMI (Body Mass Index)          81 98.4  F (36.9  C) (Oral) 98% 29.39 kg/m2         Blood Pressure from Last 3 Encounters:   17 99/62   17 124/75   16 115/67    Weight from Last 3 Encounters:   17 138 lb 3.2 oz (62.7 kg)   17 141 lb (64 kg)   16 137 lb 9.6 oz (62.4 kg)              Today, you had the following     No orders found for display         Today's Medication Changes          These changes are accurate as of: 17  8:45 AM.  If you have any questions, ask your nurse or doctor.               These medicines have changed or have updated prescriptions.        Dose/Directions    lisinopril 5 MG tablet   Commonly known as:  PRINIVIL/ZESTRIL   This may have changed:  medication strength   Used for:  Microalbuminuria   Changed by:  Anali Worthington MD        Dose:  10 mg   Take 2 tablets (10 mg) by mouth daily   Quantity:  90 tablet   Refills:  3       * prazosin 2 MG capsule   Commonly known as:  MINIPRESS   This may have changed:  Another medication with the same name was added. Make sure you understand how and when to take each.   Used for:  PTSD (post-traumatic stress disorder)   Changed by:  Amrita Carballo MD        Dose:  2 mg    Take 1 capsule (2 mg) by mouth At Bedtime   Quantity:  90 capsule   Refills:  3       * prazosin 1 MG capsule   Commonly known as:  MINIPRESS   This may have changed:  You were already taking a medication with the same name, and this prescription was added. Make sure you understand how and when to take each.   Used for:  PTSD (post-traumatic stress disorder)   Changed by:  Anali Worthington MD        Dose:  1 mg   Take 1 capsule (1 mg) by mouth At Bedtime   Quantity:  90 capsule   Refills:  3       * Notice:  This list has 2 medication(s) that are the same as other medications prescribed for you. Read the directions carefully, and ask your doctor or other care provider to review them with you.         Where to get your medicines      These medications were sent to [facility] - [place], [place] - [address], [place] [address]     Phone:  [phone]     lisinopril 5 MG tablet    prazosin 1 MG capsule                Primary Care Provider Office Phone # Fax #    Amrita Carballo [phone]       [facility] [address] [place] [address]        Thank you!     Thank you for choosing [facility]  for your care. Our goal is always to provide you with excellent care. Hearing back from our patients is one way we can continue to improve our services. Please take a few minutes to complete the written survey that you may receive in the mail after your visit with us. Thank you!             Your Updated Medication List - Protect others around you: Learn how to safely use, store and throw away your medicines at www.disposemymeds.org.          This list is accurate as of: 2/27/17  8:45 AM.  Always use your most recent med list.                   Brand Name Dispense Instructions for use    atorvastatin 80 MG tablet    LIPITOR    90 tablet    Take 1 tablet (80 mg) by mouth daily       blood glucose lancets standard    no brand specified    1 Box    Use to test  blood sugar 1 times daily or as directed.       blood glucose monitoring lancets     2 Box    1 each by In Vitro route daily       blood glucose monitoring meter device kit    no brand specified    1 kit    Use to test blood sugar 1 times daily or as directed.       blood glucose monitoring test strip    no brand specified    1 Box    Use to test blood sugars 1 times daily or as directed       calcium carbonate 500 MG chewable tablet    TUMS    100 tablet    Take 1 tablet (500 mg) by mouth 2 times daily       cholecalciferol 2000 UNITS tablet     100 tablet    Take 2,000 Units by mouth daily       glipiZIDE 10 MG 24 hr tablet    GLUCOTROL XL    180 tablet    Take 2 tablets (20 mg) by mouth daily       lisinopril 5 MG tablet    PRINIVIL/ZESTRIL    90 tablet    Take 2 tablets (10 mg) by mouth daily       metFORMIN 500 MG 24 hr tablet    GLUCOPHAGE-XR    180 tablet    Take 2 tablets (1,000 mg) by mouth 2 times daily (with meals)       pioglitazone 30 MG tablet    ACTOS    1 tablet    Take 1 tablet (30 mg) by mouth daily       * prazosin 2 MG capsule    MINIPRESS    90 capsule    Take 1 capsule (2 mg) by mouth At Bedtime       * prazosin 1 MG capsule    MINIPRESS    90 capsule    Take 1 capsule (1 mg) by mouth At Bedtime       ranitidine 300 MG tablet    ZANTAC    90 tablet    Take 1 tablet (300 mg) by mouth At Bedtime       sitagliptin 100 MG tablet    JANUVIA    90 tablet    Take 1 tablet (100 mg) by mouth daily       venlafaxine 75 MG 24 hr capsule    EFFEXOR-XR    90 capsule    Take 1 capsule (75 mg) by mouth daily       * Notice:  This list has 2 medication(s) that are the same as other medications prescribed for you. Read the directions carefully, and ask your doctor or other care provider to review them with you.

## 2017-02-28 ASSESSMENT — PATIENT HEALTH QUESTIONNAIRE - PHQ9: SUM OF ALL RESPONSES TO PHQ QUESTIONS 1-9: 11

## 2017-03-01 ENCOUNTER — TELEPHONE (OUTPATIENT)
Dept: FAMILY MEDICINE | Facility: CLINIC | Age: 52
End: 2017-03-01

## 2017-03-01 DIAGNOSIS — Z79.899 MEDICATION MANAGEMENT: Primary | ICD-10-CM

## 2017-03-01 DIAGNOSIS — F43.10 PTSD (POST-TRAUMATIC STRESS DISORDER): ICD-10-CM

## 2017-03-01 RX ORDER — PRAZOSIN HYDROCHLORIDE 2 MG/1
2 CAPSULE ORAL AT BEDTIME
Qty: 90 CAPSULE | Refills: 3 | Status: SHIPPED | OUTPATIENT
Start: 2017-03-01 | End: 2017-07-14

## 2017-03-01 RX ORDER — PRAZOSIN HYDROCHLORIDE 1 MG/1
1 CAPSULE ORAL AT BEDTIME
Qty: 90 CAPSULE | Refills: 3 | OUTPATIENT
Start: 2017-03-01

## 2017-03-01 NOTE — TELEPHONE ENCOUNTER
Attempted calling [facility], the patient's home care  to clarify patient's current medications.  Patient presented to clinic on Monday, 2/27, and was unsure if she was taking prazosin or venlafaxine.  Patient's medication list consisted of prazosin 2mg capsules and venlafaxine XR 75 mg.  Prazosin 2mg and venlafaxine XR 75 mg were last dispensed for 30 day supply per [facility] pharmacy on 1/12/17.      Patient was complaining of PTSD symptoms at 2/27 OV, so prazosin 1mg was started.  Left message x2 with home care  [facility] on 2/27 and 3/1 with no return call.  Trying to clarify what patient is taking at home.      Danika Estrada, PharmD student.    I have reviewed and verified the student s documentation and found it to be correct and complete.  Aundrea Bah, Pharm.D. Resident

## 2017-03-01 NOTE — TELEPHONE ENCOUNTER
Prescription sent to [facility] for 2mg prazosin only, with instructions to discontinue 1 mg tab.      I did send a message about actos discontinuation to [facility] at her last visit.  Can you call them and make sure they have discontinued all future refills.      Thank you!    Amrita Carballo    Routed to [facility]

## 2017-03-01 NOTE — TELEPHONE ENCOUNTER
Thanks for reaching out to try and clarify.  Perhaps we can confirm that we have the correct name and number when she comes in to see Dr. Kaya Garcia group on 3/9.      Amrita Carballo    ROuted to Aundrea Smith, Jackie Kirkpatrick

## 2017-03-01 NOTE — TELEPHONE ENCOUNTER
Please call [facility] to clarify if Peenu is taking 1mg and 2mg to equal 3 mg.  Also, the home nurse states Peenu is still taking Actos, but it was discontinued in our system.   Please advise [facility] if it should be filled.  Ginger

## 2017-03-02 ENCOUNTER — MEDICAL CORRESPONDENCE (OUTPATIENT)
Dept: HEALTH INFORMATION MANAGEMENT | Facility: CLINIC | Age: 52
End: 2017-03-02

## 2017-03-08 NOTE — TELEPHONE ENCOUNTER
I saw Gardeniayann today in group and she confirmed that her home care  is named [facility] and would be visiting her today at 1:30. I did not confirm the phone number because Gardeniayann was unable to locate it during our visit.    Jackie Kirkpatrick, PhD

## 2017-03-09 ENCOUNTER — OFFICE VISIT (OUTPATIENT)
Dept: PSYCHOLOGY | Facility: CLINIC | Age: 52
End: 2017-03-09

## 2017-03-09 DIAGNOSIS — F33.1 MODERATE EPISODE OF RECURRENT MAJOR DEPRESSIVE DISORDER (H): Primary | ICD-10-CM

## 2017-03-09 DIAGNOSIS — F43.10 PTSD (POST-TRAUMATIC STRESS DISORDER): ICD-10-CM

## 2017-03-09 NOTE — PROGRESS NOTES
[facility] Therapy Note    Meeting was: scheduled  Others present: , Jyotsna Noguera (Radha CAROL, [phone]) and Colt Henning of [facility]  Meeting lasted: 120 minutes  Patient was: on time  Mode of Treatment: Community Engagement Activity   Group Session Number: 10  Number of Attendees: 3      Topics Discussed: [employer] Field Trip    Check-in:   1. Reminded group members the plan for today was to participate in a community engagement field trip led by [employer] representative, Colt Juvencio.  2. Briefly reviewed the route and answered the group's questions.  3. Reviewed the release of liability [employer] form. Group members reviewed the form with the help of the  and signed the form.      Presentation/Discussion:   Colt Juvencio from [facility] [employer] gave a brief presentation to the group about how to read the map and provided each member with a ticket voucher. The group walked to the nearest train station and Mr. Henning educated the members on how to use the bus and train system in the [place]. He reviewed rules, routes, riding etiquette, services and amenities, payment, and safety. Members were invited to ask questions and were encouraged to share the information they learned with their family members.     Closing: Upon returning to the clinic, briefly discussed the members' reactions to the trip. Group members all agreed that the field trip was very helpful. Some group members stated their intention to ask their children assist them with navigating the map in order to travel to a new location.      Group members were reminded that the next group meeting would be in two weeks.    Subjective: Ms. Ch was very attentive during Mr. Henning's instruction about how to use the bus system and reported wanting to ride the bus with her daughter soon. She said that her children often drive her to appointments and she is not in need of alternative transportation options most of the time,  but thought it was helpful to know.    Objective: Ms. Ch appeared awake and alert for today's visit.     Assessment: Ms. Ch was an active, engaged participant throughout the meeting today. Ms. Ch appeared receptive to feedback and discussion during the visit.    Diagnosis: 296.32 Major Depressive Disorder, Recurrent Episode, Moderate  309.81 (F43.10) Posttraumatic Stress Disorder     Plan:    1. Attend next therapy group in 2 weeks.   2. Members were encouraged to practice riding the bus using the second ticket voucher provided to them by Mr. Henning.      Jackie Kirkpatrick, PhD  Behavioral Health Postdoctoral Fellow

## 2017-03-09 NOTE — MR AVS SNAPSHOT
After Visit Summary   3/9/2017    Rachael Ch    MRN: [id number]           Patient Information     Date Of Birth          1965        Visit Information        Provider Department      3/9/2017 9:30 AM Jackie Kirkpatrick, PhD [facility]        Today's Diagnoses     Moderate episode of recurrent major depressive disorder (H)    -  1    PTSD (post-traumatic stress disorder)           Follow-ups after your visit        Your next 10 appointments already scheduled     Mar 17, 2017  9:00 AM CDT   Return Visit with Amrita Carballo MD   [facility] ([facility])    [address] [place] [address]   [phone]              Who to contact     Please call your clinic at [phone] to:    Ask questions about your health    Make or cancel appointments    Discuss your medicines    Learn about your test results    Speak to your doctor   If you have compliments or concerns about an experience at your clinic, or if you wish to file a complaint, please contact [facility] Patient Relations at [phone] or email us at [email or web address]         Additional Information About Your Visit        MyChart Information     DVS Intelestream is an electronic gateway that provides easy, online access to your medical records. With [employer]ream, you can request a clinic appointment, read your test results, renew a prescription or communicate with your care team.     To sign up for DVS Intelestream visit the website at [email or web address]   You will be asked to enter the access code listed below, as well as some personal information. Please follow the directions to create your username and password.     Your access code is: [email or web address]  Expires: 5/15/2017  7:15 AM     Your access code will  in 90 days. If you need help or a new code, please contact your [facility] or call [phone] for assistance.        Care EveryWhere ID     This is your Care  EveryWhere ID. This could be used by other organizations to access your [facility] medical records  [id number]         Blood Pressure from Last 3 Encounters:   02/27/17 99/62   01/12/17 124/75   11/02/16 115/67    Weight from Last 3 Encounters:   02/27/17 138 lb 3.2 oz (62.7 kg)   01/12/17 141 lb (64 kg)   11/02/16 137 lb 9.6 oz (62.4 kg)              Today, you had the following     No orders found for display       Primary Care Provider Office Phone # Fax #    Amrita Carballo [phone]       [facility] [address] [place] [address]        Thank you!     Thank you for choosing [facility]  for your care. Our goal is always to provide you with excellent care. Hearing back from our patients is one way we can continue to improve our services. Please take a few minutes to complete the written survey that you may receive in the mail after your visit with us. Thank you!             Your Updated Medication List - Protect others around you: Learn how to safely use, store and throw away your medicines at www.disposemymeds.org.          This list is accurate as of: 3/9/17 11:59 PM.  Always use your most recent med list.                   Brand Name Dispense Instructions for use    atorvastatin 80 MG tablet    LIPITOR    90 tablet    Take 1 tablet (80 mg) by mouth daily       blood glucose lancets standard    no brand specified    1 Box    Use to test blood sugar 1 times daily or as directed.       blood glucose monitoring lancets     2 Box    1 each by In Vitro route daily       blood glucose monitoring meter device kit    no brand specified    1 kit    Use to test blood sugar 1 times daily or as directed.       blood glucose monitoring test strip    no brand specified    1 Box    Use to test blood sugars 1 times daily or as directed       calcium carbonate 500 MG chewable tablet    TUMS    100 tablet    Take 1 tablet (500 mg) by mouth 2 times daily       cholecalciferol 2000 UNITS  tablet     100 tablet    Take 2,000 Units by mouth daily       glipiZIDE 10 MG 24 hr tablet    GLUCOTROL XL    180 tablet    Take 2 tablets (20 mg) by mouth daily       lisinopril 5 MG tablet    PRINIVIL/ZESTRIL    90 tablet    Take 2 tablets (10 mg) by mouth daily       metFORMIN 500 MG 24 hr tablet    GLUCOPHAGE-XR    180 tablet    Take 2 tablets (1,000 mg) by mouth 2 times daily (with meals)       prazosin 2 MG capsule    MINIPRESS    90 capsule    Take 1 capsule (2 mg) by mouth At Bedtime       ranitidine 300 MG tablet    ZANTAC    90 tablet    Take 1 tablet (300 mg) by mouth At Bedtime       sitagliptin 100 MG tablet    JANUVIA    90 tablet    Take 1 tablet (100 mg) by mouth daily       venlafaxine 75 MG 24 hr capsule    EFFEXOR-XR    90 capsule    Take 1 capsule (75 mg) by mouth daily

## 2017-03-17 ENCOUNTER — OFFICE VISIT (OUTPATIENT)
Dept: FAMILY MEDICINE | Facility: CLINIC | Age: 52
End: 2017-03-17

## 2017-03-17 VITALS
BODY MASS INDEX: 29.22 KG/M2 | SYSTOLIC BLOOD PRESSURE: 114 MMHG | DIASTOLIC BLOOD PRESSURE: 73 MMHG | OXYGEN SATURATION: 98 % | TEMPERATURE: 98.2 F | HEART RATE: 86 BPM | WEIGHT: 137.4 LBS

## 2017-03-17 DIAGNOSIS — R80.9 MICROALBUMINURIA: ICD-10-CM

## 2017-03-17 DIAGNOSIS — F33.1 MODERATE EPISODE OF RECURRENT MAJOR DEPRESSIVE DISORDER (H): ICD-10-CM

## 2017-03-17 RX ORDER — VENLAFAXINE HYDROCHLORIDE 75 MG/1
75 CAPSULE, EXTENDED RELEASE ORAL DAILY
Qty: 1 CAPSULE | Refills: 0 | Status: SHIPPED | OUTPATIENT
Start: 2017-03-17 | End: 2017-07-14

## 2017-03-17 RX ORDER — LISINOPRIL 5 MG/1
5 TABLET ORAL DAILY
Qty: 90 TABLET | Refills: 3 | Status: SHIPPED | OUTPATIENT
Start: 2017-03-17 | End: 2017-07-14

## 2017-03-17 NOTE — PATIENT INSTRUCTIONS
Continue taking the lisinopril 5 mg  Patient should be taking 3mg daily of Prazosin  Discontinue the Venlafaxine  Check blood sugar and blood pressure with home nurse visit and write down for patient to bring in.   2 months.

## 2017-03-17 NOTE — PROGRESS NOTES
Nursing Notes:   Valdivia Khalif PALMER, [facility]  3/17/2017  9:30 AM  Signed   name: Silvino Banks (Htoo)  Language: Radha  Agency: [employer]  Phone number: [phone]    Chief Complaint   Patient presents with     Hypertension     Recheck Medication     Blood pressure 114/73, pulse 86, temperature 98.2  F (36.8  C), temperature source Oral, weight 137 lb 6.4 oz (62.3 kg), SpO2 98 %, not currently breastfeeding.    SUBJECTIVE:  This is a 51-year-old female presenting for followup today.  She has past medical history significant for PTSD, depression w/difficulty with memory, hypertension, hyperlipidemia, and type II diabetes.  She reports overall feeling well.  She has identified that venlafaxine makes her feel worse.  She reports side effects, such as feeling that her ear is outside when she is sleeping at night.  She notices that it affects her appetite and she isn't eating as well which takes the medication in that it causes her abdominal discomfort as well, as changes in her stools.  She has now been off of the medication for a while, and her appetite has started to come back.  She feels good and she doesn't want to take the medication any longer.  Overall, she reports that her mood has been going well.  She has been able to get outside and walk which is helpful.  She benefits from [facility].  She continues to take prazosin regularly at night (it looks like both the two mg capsule and the one mg capsule) and she finds that this is quite helpful.  She denies significant dizziness, lightheadedness, or symptoms of hypotension.     A nurse continues to come out to check her medications.  She reports that her last blood sugar was 100 in the mid-afternoon when the nurse came most recently.  She is eating better now that she stopped the venlafaxine, but she recalls some times when she doesn't eat for a while, and she'll get foggy and uncomfortable, and this is likely to hypoglycemia.  Again, this  has been better since discontinuing venlafaxine.     OBJECTIVE:   VITAL SIGNS:  Reviewed.  Her blood pressure is good.  Weight is stable.  She is afebrile.   GENERAL:  This is a comfortable-appearing, Radha female in no acute distress.  No further examination was completed today.   PSYCHE:   PHQ-9 was completed with total score of 9, which is at her baseline.     ASSESSMENT AND PLAN:  A 51-year-old female presenting for followup.   1.  Hypertension w/recent hypotensive episodes.  We'll continue lisinopril five mg.  We'll continue prazosin two mg at night, as she gets good relief from this.   2.  Major depression/PTSD.  We'll stop Effexor, as it causes side effects.  Mood overall is good.  We'll continue prazosin at night, as this is quite helpful for her.  She is finishing up with Radha dunne, so we'll need to find other options for her to get out and be active.   3.  Type II diabetes.  It appears that her sugars are overall well-controlled.  She is due in two months for a diabetes check.  We'll continue medications as they are.  I discussed signs of hypoglycemia and what things should be done.     Amrita Carballo      Patient Instructions   Continue taking the lisinopril 5 mg  Patient should be taking 3mg daily of Prazosin  Discontinue the Venlafaxine  Check blood sugar and blood pressure with home nurse visit and write down for patient to bring in.   2 months.

## 2017-03-17 NOTE — NURSING NOTE
name: Silvino (Cat) Darren  Language: Radha  Agency: [employer]  Phone number: [phone]

## 2017-03-17 NOTE — MR AVS SNAPSHOT
After Visit Summary   3/17/2017    Rachael Ch    MRN: [id number]           Patient Information     Date Of Birth          1965        Visit Information        Provider Department      3/17/2017 9:00 AM Amrita Carballo MD [facility]        Today's Diagnoses     Microalbuminuria        Moderate episode of recurrent major depressive disorder (H)          Care Instructions    Continue taking the lisinopril 5 mg  Patient should be taking 3mg daily of Prazosin  Discontinue the Venlafaxine  Check blood sugar and blood pressure with home nurse visit and write down for patient to bring in.   2 months.           Follow-ups after your visit        Your next 10 appointments already scheduled     Mar 23, 2017  9:30 AM CDT   Group Education with Jackie Kirkpatrick, PhD   [facility] ([facility])    [address] [place] [address]   [phone]              Who to contact     Please call your clinic at [phone] to:    Ask questions about your health    Make or cancel appointments    Discuss your medicines    Learn about your test results    Speak to your doctor   If you have compliments or concerns about an experience at your clinic, or if you wish to file a complaint, please contact [facility] Patient Relations at [phone] or email us at [email or web address]         Additional Information About Your Visit        MyChart Information     Silverskyt is an electronic gateway that provides easy, online access to your medical records. With imgix, you can request a clinic appointment, read your test results, renew a prescription or communicate with your care team.     To sign up for Silverskyt visit the website at [email or web address]   You will be asked to enter the access code listed below, as well as some personal information. Please follow the directions to create your username and password.     Your access code is: [email or web address]  Expires: 5/15/2017   8:15 AM     Your access code will  in 90 days. If you need help or a new code, please contact your [facility] or call [phone] for assistance.        Care EveryWhere ID     This is your Care EveryWhere ID. This could be used by other organizations to access your [facility] medical records  [id number]        Your Vitals Were     Pulse Temperature Pulse Oximetry BMI (Body Mass Index)          86 98.2  F (36.8  C) (Oral) 98% 29.22 kg/m2         Blood Pressure from Last 3 Encounters:   17 114/73   17 99/62   17 124/75    Weight from Last 3 Encounters:   17 137 lb 6.4 oz (62.3 kg)   17 138 lb 3.2 oz (62.7 kg)   17 141 lb (64 kg)              Today, you had the following     No orders found for display         Today's Medication Changes          These changes are accurate as of: 3/17/17  9:53 AM.  If you have any questions, ask your nurse or doctor.               These medicines have changed or have updated prescriptions.        Dose/Directions    lisinopril 5 MG tablet   Commonly known as:  PRINIVIL/ZESTRIL   This may have changed:  how much to take   Used for:  Microalbuminuria   Changed by:  Amrita Carballo MD        Dose:  5 mg   Take 1 tablet (5 mg) by mouth daily   Quantity:  90 tablet   Refills:  3            Where to get your medicines      These medications were sent to [facility] - [place], [place] - [address], [place] [address]     Phone:  [phone]     lisinopril 5 MG tablet    venlafaxine 75 MG 24 hr capsule                Primary Care Provider Office Phone # Fax #    Amrita Carballo [phone]       [facility] [address] [place] [address]        Thank you!     Thank you for choosing [facility]  for your care. Our goal is always to provide you with excellent care. Hearing back from our patients is one way we can continue to improve our services. Please  take a few minutes to complete the written survey that you may receive in the mail after your visit with us. Thank you!             Your Updated Medication List - Protect others around you: Learn how to safely use, store and throw away your medicines at www.disposemymeds.org.          This list is accurate as of: 3/17/17  9:53 AM.  Always use your most recent med list.                   Brand Name Dispense Instructions for use    atorvastatin 80 MG tablet    LIPITOR    90 tablet    Take 1 tablet (80 mg) by mouth daily       blood glucose lancets standard    no brand specified    1 Box    Use to test blood sugar 1 times daily or as directed.       blood glucose monitoring lancets     2 Box    1 each by In Vitro route daily       blood glucose monitoring meter device kit    no brand specified    1 kit    Use to test blood sugar 1 times daily or as directed.       blood glucose monitoring test strip    no brand specified    1 Box    Use to test blood sugars 1 times daily or as directed       calcium carbonate 500 MG chewable tablet    TUMS    100 tablet    Take 1 tablet (500 mg) by mouth 2 times daily       cholecalciferol 2000 UNITS tablet     100 tablet    Take 2,000 Units by mouth daily       glipiZIDE 10 MG 24 hr tablet    GLUCOTROL XL    180 tablet    Take 2 tablets (20 mg) by mouth daily       lisinopril 5 MG tablet    PRINIVIL/ZESTRIL    90 tablet    Take 1 tablet (5 mg) by mouth daily       metFORMIN 500 MG 24 hr tablet    GLUCOPHAGE-XR    180 tablet    Take 2 tablets (1,000 mg) by mouth 2 times daily (with meals)       prazosin 2 MG capsule    MINIPRESS    90 capsule    Take 1 capsule (2 mg) by mouth At Bedtime       ranitidine 300 MG tablet    ZANTAC    90 tablet    Take 1 tablet (300 mg) by mouth At Bedtime       sitagliptin 100 MG tablet    JANUVIA    90 tablet    Take 1 tablet (100 mg) by mouth daily       venlafaxine 75 MG 24 hr capsule    EFFEXOR-XR    1 capsule    Take 1 capsule (75 mg) by mouth daily

## 2017-03-18 ASSESSMENT — PATIENT HEALTH QUESTIONNAIRE - PHQ9: SUM OF ALL RESPONSES TO PHQ QUESTIONS 1-9: 10

## 2017-03-23 ENCOUNTER — OFFICE VISIT (OUTPATIENT)
Dept: PSYCHOLOGY | Facility: CLINIC | Age: 52
End: 2017-03-23

## 2017-03-23 DIAGNOSIS — F33.1 MODERATE EPISODE OF RECURRENT MAJOR DEPRESSIVE DISORDER (H): Primary | ICD-10-CM

## 2017-03-23 DIAGNOSIS — F43.10 PTSD (POST-TRAUMATIC STRESS DISORDER): ICD-10-CM

## 2017-03-23 NOTE — MR AVS SNAPSHOT
After Visit Summary   3/23/2017    Rachael Ch    MRN: [id number]           Patient Information     Date Of Birth          1965        Visit Information        Provider Department      3/23/2017 9:30 AM Jackie Kirkpatrick, PhD [facility]        Today's Diagnoses     Moderate episode of recurrent major depressive disorder (H)    -  1    PTSD (post-traumatic stress disorder)           Follow-ups after your visit        Follow-up notes from your care team     Return in about 2 weeks (around 2017).      Who to contact     Please call your clinic at [phone] to:    Ask questions about your health    Make or cancel appointments    Discuss your medicines    Learn about your test results    Speak to your doctor   If you have compliments or concerns about an experience at your clinic, or if you wish to file a complaint, please contact [facility] Patient Relations at [phone] or email us at [email or web address]         Additional Information About Your Visit        MyChart Information     Game Blisterst is an electronic gateway that provides easy, online access to your medical records. With Mode Analytics, you can request a clinic appointment, read your test results, renew a prescription or communicate with your care team.     To sign up for Game Blisterst visit the website at [email or web address]   You will be asked to enter the access code listed below, as well as some personal information. Please follow the directions to create your username and password.     Your access code is: [email or web address]  Expires: 5/15/2017  8:15 AM     Your access code will  in 90 days. If you need help or a new code, please contact your [facility] or call [phone] for assistance.        Care EveryWhere ID     This is your Care EveryWhere ID. This could be used by other organizations to access your [facility] medical records  [id number]         Blood  Pressure from Last 3 Encounters:   03/17/17 114/73   02/27/17 99/62   01/12/17 124/75    Weight from Last 3 Encounters:   03/17/17 137 lb 6.4 oz (62.3 kg)   02/27/17 138 lb 3.2 oz (62.7 kg)   01/12/17 141 lb (64 kg)              We Performed the Following     Interactive Complexity add-on ([id number])        Primary Care Provider Office Phone # Fax #    Amrita Carballo [phone]       [facility] [address] [place] [address]        Thank you!     Thank you for choosing [facility]  for your care. Our goal is always to provide you with excellent care. Hearing back from our patients is one way we can continue to improve our services. Please take a few minutes to complete the written survey that you may receive in the mail after your visit with us. Thank you!             Your Updated Medication List - Protect others around you: Learn how to safely use, store and throw away your medicines at www.disposemymeds.org.          This list is accurate as of: 3/23/17 11:59 PM.  Always use your most recent med list.                   Brand Name Dispense Instructions for use    atorvastatin 80 MG tablet    LIPITOR    90 tablet    Take 1 tablet (80 mg) by mouth daily       blood glucose lancets standard    no brand specified    1 Box    Use to test blood sugar 1 times daily or as directed.       blood glucose monitoring lancets     2 Box    1 each by In Vitro route daily       blood glucose monitoring meter device kit    no brand specified    1 kit    Use to test blood sugar 1 times daily or as directed.       blood glucose monitoring test strip    no brand specified    1 Box    Use to test blood sugars 1 times daily or as directed       calcium carbonate 500 MG chewable tablet    TUMS    100 tablet    Take 1 tablet (500 mg) by mouth 2 times daily       cholecalciferol 2000 UNITS tablet     100 tablet    Take 2,000 Units by mouth daily       glipiZIDE 10 MG 24 hr tablet    GLUCOTROL XL    180  tablet    Take 2 tablets (20 mg) by mouth daily       lisinopril 5 MG tablet    PRINIVIL/ZESTRIL    90 tablet    Take 1 tablet (5 mg) by mouth daily       metFORMIN 500 MG 24 hr tablet    GLUCOPHAGE-XR    180 tablet    Take 2 tablets (1,000 mg) by mouth 2 times daily (with meals)       prazosin 2 MG capsule    MINIPRESS    90 capsule    Take 1 capsule (2 mg) by mouth At Bedtime       ranitidine 300 MG tablet    ZANTAC    90 tablet    Take 1 tablet (300 mg) by mouth At Bedtime       sitagliptin 100 MG tablet    JANUVIA    90 tablet    Take 1 tablet (100 mg) by mouth daily       venlafaxine 75 MG 24 hr capsule    EFFEXOR-XR    1 capsule    Take 1 capsule (75 mg) by mouth daily

## 2017-03-24 NOTE — PROGRESS NOTES
Radha Group Therapy Note    Meeting was: scheduled  Others present: , Jyotsna Noguera (Radha, [facility], [phone])  Meeting lasted: 120 minutes  Patient was: on time  Mode of Treatment: Group Therapy  Group Session Number: 11  Number of Attendees: 5  Complexity: An  is used not only to interpret language, since the group members do not speak English, but also to help with the complexity of understandings across cultures, since the members are not well integrated in the larger [place] culture.      Topics Discussed: Community resources and healthy coping with medical and mental health symptoms    Check-in: Group members shared their reactions to last week's community engagement activity of taking [employer] bus and train. All participants in the activity felt that they were not sure how to independently navigate public transportation because of the language barrier, but would feel more comfortable trying it if accompanied by English-speaking children. A few group members shared experiences of getting lost while on public busses, and we discussed how to identify your location with street signs and addresses so that they can call for assistance from friends and family.     Community resource connection: One group member shared her experience of attending a community weaving group (possibly [employer]?). 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 Involvement in the group is based on an individual's skills, interests, and goals -- for example, one could attend just for the social connection and did not need to be a skilled collins to participate. All group members agreed that gathering with friends is very helpful for mental health, and that this group could be a way to continue this after our group finishes at the next session.     Healthy coping skills: A couple group  members discussed symptoms of pain, anxiety, and depression. We discussed positive coping strategies of reporting all new or worsening symptoms to their PCP, going for walks outside, and talking with friends about symptoms.     Closing: Reminded group members that next week will be our last group meeting. Encouraged group members to bring any community resources they had to assist in our discussion about how to connect with ongoing support networks.    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 Rachael may be interested in joining that group in the future. Rachael has noticed increased body pain and changes in her vision and she wonders if it is related to her blood sugar. We discussed healthy management of symptoms: taking medications, taking walks, and following up with her doctor about symptoms.    Objective: Ms. Ch appeared awake and alert for today's visit. Mood appeared to be good with congruent, full-range affect.     Assessment: Ms. Ch was an active, engaged participant throughout the meeting today. Ms. Ch appeared receptive to feedback and discussion during the visit.    Diagnosis: 296.32 Major Depressive Disorder, Recurrent Episode, Moderate  309.81 (F43.10) Posttraumatic Stress Disorder     Plan:     Attend next and final therapy group in 2 weeks.    Related Rachael's current symptoms to her PCP for increased continuity of care.    Utilize crisis services as needed.      Jackie Kirkpatrick, PhD  Behavioral Health Postdoctoral Fellow

## 2017-04-20 DIAGNOSIS — K21.9 GASTROESOPHAGEAL REFLUX DISEASE, ESOPHAGITIS PRESENCE NOT SPECIFIED: ICD-10-CM

## 2017-04-20 DIAGNOSIS — E11.9 DIABETES MELLITUS (H): Primary | ICD-10-CM

## 2017-04-20 RX ORDER — LANCETS
1 EACH MISCELLANEOUS DAILY
Qty: 100 EACH | Refills: 11 | Status: SHIPPED | OUTPATIENT
Start: 2017-04-20 | End: 2019-01-16

## 2017-04-21 RX ORDER — CALCIUM CARBONATE 500 MG/1
1 TABLET, CHEWABLE ORAL 2 TIMES DAILY
Qty: 100 TABLET | Refills: 3 | Status: SHIPPED | OUTPATIENT
Start: 2017-04-21 | End: 2017-12-04

## 2017-04-28 ENCOUNTER — MEDICAL CORRESPONDENCE (OUTPATIENT)
Dept: HEALTH INFORMATION MANAGEMENT | Facility: CLINIC | Age: 52
End: 2017-04-28

## 2017-05-10 DIAGNOSIS — I10 ESSENTIAL HYPERTENSION, BENIGN: ICD-10-CM

## 2017-05-10 DIAGNOSIS — E11.9 TYPE 2 DIABETES MELLITUS WITHOUT COMPLICATION, WITHOUT LONG-TERM CURRENT USE OF INSULIN (H): Primary | ICD-10-CM

## 2017-05-11 ENCOUNTER — OFFICE VISIT (OUTPATIENT)
Dept: FAMILY MEDICINE | Facility: CLINIC | Age: 52
End: 2017-05-11

## 2017-05-11 VITALS
TEMPERATURE: 98.1 F | WEIGHT: 136 LBS | DIASTOLIC BLOOD PRESSURE: 75 MMHG | BODY MASS INDEX: 29.34 KG/M2 | OXYGEN SATURATION: 98 % | HEIGHT: 57 IN | HEART RATE: 75 BPM | SYSTOLIC BLOOD PRESSURE: 116 MMHG

## 2017-05-11 DIAGNOSIS — E87.6 HYPOKALEMIA: Primary | ICD-10-CM

## 2017-05-11 DIAGNOSIS — E11.9 TYPE 2 DIABETES MELLITUS WITHOUT COMPLICATION, WITHOUT LONG-TERM CURRENT USE OF INSULIN (H): ICD-10-CM

## 2017-05-11 DIAGNOSIS — F33.9 RECURRENT MAJOR DEPRESSIVE DISORDER, REMISSION STATUS UNSPECIFIED (H): ICD-10-CM

## 2017-05-11 DIAGNOSIS — F43.10 PTSD (POST-TRAUMATIC STRESS DISORDER): ICD-10-CM

## 2017-05-11 DIAGNOSIS — I10 ESSENTIAL HYPERTENSION, BENIGN: ICD-10-CM

## 2017-05-11 DIAGNOSIS — R41.3 MEMORY LOSS: ICD-10-CM

## 2017-05-11 LAB
BASOPHILS # BLD AUTO: 0 THOU/UL (ref 0–0.2)
BASOPHILS NFR BLD AUTO: 0 % (ref 0–2)
BUN SERPL-MCNC: 10.6 MG/DL (ref 7–19)
CALCIUM SERPL-MCNC: 9.2 MG/DL (ref 8.5–10.1)
CHLORIDE SERPLBLD-SCNC: 104.2 MMOL/L (ref 98–110)
CO2 SERPL-SCNC: 21.8 MMOL/L (ref 20–32)
CREAT SERPL-MCNC: 0.6 MG/DL (ref 0.5–1)
EOSINOPHIL # BLD AUTO: 0.1 THOU/UL (ref 0–0.4)
EOSINOPHIL NFR BLD AUTO: 2 % (ref 0–6)
ERYTHROCYTE [DISTWIDTH] IN BLOOD BY AUTOMATED COUNT: 13 % (ref 11–14.5)
FOLATE SERPL-MCNC: 4.2 NG/ML
GFR SERPL CREATININE-BSD FRML MDRD: >90 ML/MIN/1.7 M2
GLUCOSE SERPL-MCNC: 312.3 MG'DL (ref 70–99)
HBA1C MFR BLD: 7.3 % (ref 4.1–5.7)
HCT VFR BLD AUTO: 36.2 % (ref 35–47)
HGB BLD-MCNC: 11.9 G/DL (ref 12–16)
LYMPHOCYTES # BLD AUTO: 1.5 THOU/UL (ref 0.8–4.4)
LYMPHOCYTES NFR BLD AUTO: 24 % (ref 20–40)
MAGNESIUM SERPL-MCNC: 1.9 MG/DL (ref 1.8–2.6)
MCH RBC QN AUTO: 28.2 PG (ref 27–34)
MCHC RBC AUTO-ENTMCNC: 32.9 G/DL (ref 32–36)
MCV RBC AUTO: 86 FL (ref 80–100)
MONOCYTES # BLD AUTO: 0.3 THOU/UL (ref 0–0.9)
MONOCYTES NFR BLD AUTO: 5 % (ref 2–10)
NEUTROPHILS # BLD AUTO: 4.2 THOU/UL (ref 2–7.7)
NEUTROPHILS NFR BLD AUTO: 69 % (ref 50–70)
PLATELET # BLD AUTO: 220 THOU/UL (ref 140–440)
PMV BLD AUTO: 10.7 FL (ref 8.5–12.5)
POTASSIUM SERPL-SCNC: 2.9 MMOL/DL (ref 3.2–4.6)
RBC # BLD AUTO: 4.22 MILL/UL (ref 3.8–5.4)
SODIUM SERPL-SCNC: 135.8 MMOL/L (ref 132–142)
TSH SERPL DL<=0.05 MIU/L-ACNC: 1.12 UIU/ML (ref 0.3–5)
VIT B12 SERPL-MCNC: 349 PG/ML (ref 213–816)
WBC # BLD AUTO: 6.2 THOU/UL (ref 4–11)

## 2017-05-11 RX ORDER — POTASSIUM CHLORIDE 1500 MG/1
20 TABLET, EXTENDED RELEASE ORAL 2 TIMES DAILY
Qty: 180 TABLET | Refills: 0 | Status: SHIPPED | OUTPATIENT
Start: 2017-05-11 | End: 2017-06-13

## 2017-05-11 ASSESSMENT — ANXIETY QUESTIONNAIRES
IF YOU CHECKED OFF ANY PROBLEMS ON THIS QUESTIONNAIRE, HOW DIFFICULT HAVE THESE PROBLEMS MADE IT FOR YOU TO DO YOUR WORK, TAKE CARE OF THINGS AT HOME, OR GET ALONG WITH OTHER PEOPLE: SOMEWHAT DIFFICULT
5. BEING SO RESTLESS THAT IT IS HARD TO SIT STILL: NOT AT ALL
GAD7 TOTAL SCORE: 6
3. WORRYING TOO MUCH ABOUT DIFFERENT THINGS: SEVERAL DAYS
7. FEELING AFRAID AS IF SOMETHING AWFUL MIGHT HAPPEN: SEVERAL DAYS
2. NOT BEING ABLE TO STOP OR CONTROL WORRYING: SEVERAL DAYS
1. FEELING NERVOUS, ANXIOUS, OR ON EDGE: SEVERAL DAYS
6. BECOMING EASILY ANNOYED OR IRRITABLE: SEVERAL DAYS

## 2017-05-11 ASSESSMENT — PATIENT HEALTH QUESTIONNAIRE - PHQ9: 5. POOR APPETITE OR OVEREATING: SEVERAL DAYS

## 2017-05-11 NOTE — MR AVS SNAPSHOT
After Visit Summary   2017    Rachael Ch    MRN: [id number]           Patient Information     Date Of Birth          1965        Visit Information        Provider Department      2017 9:40 AM Amrita Carballo MD [facility]        Today's Diagnoses     Hypokalemia    -  1    Type 2 diabetes mellitus without complication, without long-term current use of insulin (H)        Essential hypertension, benign        Memory loss          Care Instructions    Add on tests for memory.    Write down what type of things you are forgetting at home  Start taking the potassium, 1 pill 2x per day.    Follow up in 3 weeks.          Follow-ups after your visit        Who to contact     Please call your clinic at [phone] to:    Ask questions about your health    Make or cancel appointments    Discuss your medicines    Learn about your test results    Speak to your doctor   If you have compliments or concerns about an experience at your clinic, or if you wish to file a complaint, please contact [facility] Patient Relations at [phone] or email us at [email or web address]         Additional Information About Your Visit        MyChart Information     Riverfield is an electronic gateway that provides easy, online access to your medical records. With Riverfield, you can request a clinic appointment, read your test results, renew a prescription or communicate with your care team.     To sign up for Riverfield visit the website at [email or web address]   You will be asked to enter the access code listed below, as well as some personal information. Please follow the directions to create your username and password.     Your access code is: [email or web address]  Expires: 5/15/2017  8:15 AM     Your access code will  in 90 days. If you need help or a new code, please contact your [facility] or call [phone] for assistance.        Care  "EveryWhere ID     This is your Care EveryWhere ID. This could be used by other organizations to access your [facility] medical records  [id number]        Your Vitals Were     Pulse Temperature Height Pulse Oximetry Breastfeeding? BMI (Body Mass Index)    75 98.1  F (36.7  C) 4' 9\" (144.8 cm) 98% Yes 29.43 kg/m2       Blood Pressure from Last 3 Encounters:   05/11/17 116/75   03/17/17 114/73   02/27/17 99/62    Weight from Last 3 Encounters:   05/11/17 136 lb (61.7 kg)   03/17/17 137 lb 6.4 oz (62.3 kg)   02/27/17 138 lb 3.2 oz (62.7 kg)              We Performed the Following     Basic Metabolic Panel ([place])     CBC w/ Diff and Plt ([facility])     Folate  Serum ([facility])     Hemoglobin A1c ([facility])     Magnesium ([facility])     Thyroid Marin ([facility])     Vitamin B12 ([facility])     Vitamin D 25-Hydroxy ([facility])          Today's Medication Changes          These changes are accurate as of: 5/11/17 10:31 AM.  If you have any questions, ask your nurse or doctor.               Start taking these medicines.        Dose/Directions    potassium chloride SA 20 MEQ CR tablet   Commonly known as:  potassium chloride   Used for:  Hypokalemia   Started by:  Amrita Carballo MD        Dose:  20 mEq   Take 1 tablet (20 mEq) by mouth 2 times daily   Quantity:  180 tablet   Refills:  0            Where to get your medicines      These medications were sent to [facility] - [place], [place] - [address], [place] [address]     Phone:  [phone]     potassium chloride SA 20 MEQ CR tablet                Primary Care Provider Office Phone # Fax #    Amrita Carballo -[phone]       [facility] [address] [place] [address]        Thank you!     Thank you for choosing [facility]  for your care. Our goal is always to provide you with excellent care. Hearing back from our patients is one way we can continue to improve our services. Please take " a few minutes to complete the written survey that you may receive in the mail after your visit with us. Thank you!             Your Updated Medication List - Protect others around you: Learn how to safely use, store and throw away your medicines at www.disposemymeds.org.          This list is accurate as of: 5/11/17 10:31 AM.  Always use your most recent med list.                   Brand Name Dispense Instructions for use    atorvastatin 80 MG tablet    LIPITOR    90 tablet    Take 1 tablet (80 mg) by mouth daily       blood glucose lancets standard    no brand specified    1 Box    Use to test blood sugar 1 times daily or as directed.       * blood glucose monitoring lancets     2 Box    1 each by In Vitro route daily       * MICROLET LANCETS Misc     100 each    1 Box daily       blood glucose monitoring meter device kit    no brand specified    1 kit    Use to test blood sugar 1 times daily or as directed.       blood glucose monitoring test strip    no brand specified    1 Box    Use to test blood sugars 1 times daily or as directed       calcium carbonate 500 MG chewable tablet    TUMS    100 tablet    Take 1 tablet (500 mg) by mouth 2 times daily       cholecalciferol 2000 UNITS tablet     100 tablet    Take 2,000 Units by mouth daily       glipiZIDE 10 MG 24 hr tablet    GLUCOTROL XL    180 tablet    Take 2 tablets (20 mg) by mouth daily       lisinopril 5 MG tablet    PRINIVIL/ZESTRIL    90 tablet    Take 1 tablet (5 mg) by mouth daily       metFORMIN 500 MG 24 hr tablet    GLUCOPHAGE-XR    180 tablet    Take 2 tablets (1,000 mg) by mouth 2 times daily (with meals)       potassium chloride SA 20 MEQ CR tablet    potassium chloride    180 tablet    Take 1 tablet (20 mEq) by mouth 2 times daily       prazosin 2 MG capsule    MINIPRESS    90 capsule    Take 1 capsule (2 mg) by mouth At Bedtime       ranitidine 300 MG tablet    ZANTAC    90 tablet    Take 1 tablet (300 mg) by mouth At Bedtime       sitagliptin  100 MG tablet    JANUVIA    90 tablet    Take 1 tablet (100 mg) by mouth daily       venlafaxine 75 MG 24 hr capsule    EFFEXOR-XR    1 capsule    Take 1 capsule (75 mg) by mouth daily       * Notice:  This list has 2 medication(s) that are the same as other medications prescribed for you. Read the directions carefully, and ask your doctor or other care provider to review them with you.

## 2017-05-11 NOTE — LETTER
May 16, 2017      [address] [address]    Please see below for your test results.    Resulted Orders   Basic Metabolic Panel ([facility])   Result Value Ref Range    Urea Nitrogen 10.6 7.0 - 19.0 mg/dL    Calcium 9.2 8.5 - 10.1 mg/dL    Chloride 104.2 98.0 - 110.0 mmol/L    Carbon Dioxide 21.8 20.0 - 32.0 mmol/L    Creatinine 0.6 0.5 - 1.0 mg/dL    Glucose 312.3 (H) 70.0 - 99.0 mg'dL    Potassium 2.9 (LL) 3.2 - 4.6 mmol/dL    Sodium 135.8 132.0 - 142.0 mmol/L    GFR Estimate >90 >60.0 mL/min/1.7 m2    GFR Estimate If Black >90 >60.0 mL/min/1.7 m2    Narrative    Panic Value reported and repeated back at 5/11/2017 10:16 AM to SNEHAL Lewis  .by   CARTER  .   Hemoglobin A1c ([facility])   Result Value Ref Range    Hemoglobin A1C 7.3 (H) 4.1 - 5.7 %   Magnesium ([facility])   Result Value Ref Range    Magnesium 1.9 1.8 - 2.6 mg/dL    Narrative    Test performed by:  [facility] [address] [place] [address]   Thyroid Warren ([facility])   Result Value Ref Range    TSH 1.12 0.30 - 5.00 uIU/mL    Narrative    Test performed by:  [facility] [address] [place] [address]   CBC w/ Diff and Plt ([facility])   Result Value Ref Range    WBC 6.2 4.0 - 11.0 thou/uL    RBC 4.22 3.80 - 5.40 mill/uL    Hemoglobin 11.9 (L) 12.0 - 16.0 g/dL    Hematocrit 36.2 35.0 - 47.0 %    MCV 86 80 - 100 fL    MCH 28.2 27.0 - 34.0 pg    MCHC 32.9 32.0 - 36.0 g/dL    RDW 13.0 11.0 - 14.5 %    Platelets 220 140 - 440 thou/uL    Mean Platelet Volume 10.7 8.5 - 12.5 fL    % Neutrophils 69 50 - 70 %    % Lymphocytes 24 20 - 40 %    % Monocytes 5 2 - 10 %    % Eosinophils 2 0 - 6 %    % Basophils 0 0 - 2 %    Neutrophils (Absolute) 4.2 2.0 - 7.7 thou/uL    Lymphs (Absolute) 1.5 0.8 - 4.4 thou/uL    Monocytes(Absolute) 0.3 0.0 - 0.9 thou/uL    Eos (Absolute) 0.1 0.0 - 0.4 thou/uL    Baso (Absolute) 0.0 0.0 - 0.2 thou/uL    Narrative    Test performed by:  [address] [place] [address]   Vitamin B12 ([facility])   Result Value Ref Range    Vitamin B12 349 213 - 816 pg/mL    Narrative    Test performed by:  [facility] [address] [place] [address]   Folate  Serum ([facility])   Result Value Ref Range    Folate 4.2 >=3.5 ng/mL    Narrative    Test performed by:  [facility] [address] [place] [address]   Vitamin D 25-Hydroxy ([facility])   Result Value Ref Range    Vitamin D,25-Hydroxy 27.8 (L) 30.0 - 80.0 ng/mL    Narrative    Test performed by:  [facility] [address] [place] [address]  Deficiency <10.0 ng/mL  Insufficiency 10.0-29.9 ng/mL  Sufficiency 30.0-80.0 ng/mL  Toxicity (possible) >100.0 ng/mL                     Rachael Miller-    Here is a copy of your lab results.  As we discuss in clinic, your potassium level is low.  It is important that you take the potassium supplement regularly and follow up in clinic in 2-3 weeks to recheck your potassium levels.  Otherwise, your testing looks good.  Vitamin D level is good.  Folate and B12 are god.  Thyroid levels are good.  Blood counts are good.  Magnesium level is normal.  We'll discuss all of this more at your follow up visit.  Please call the clinic at [phone] if you have any questions.      Amrita Carballo

## 2017-05-11 NOTE — NURSING NOTE
name: Silvino (Cat) Darren  Language: Radha  Agency: [employer]  Phone number: [phone]

## 2017-05-11 NOTE — PATIENT INSTRUCTIONS
Add on tests for memory.    Write down what type of things you are forgetting at home  Start taking the potassium, 1 pill 2x per day.    Follow up in 3 weeks.      Documentation encounter sent to [facility] to advise.   Alexandria  5/22/17 05/30/17  Received a message from Dr. Moon that we will refer to the Social Healing Program.   I have sent this referral and they will contact patient to schedule.    Alexandria

## 2017-05-12 LAB — 25(OH)D3 SERPL-MCNC: 27.8 NG/ML (ref 30–80)

## 2017-05-12 NOTE — PROGRESS NOTES
"  Nursing Notes:   Ginger Leija CMA  5/11/2017 10:02 AM  Signed   name: Silvino Banks (Htoo)  Language: Radha  Agency: [employer]  Phone number: [phone]      SUBJECTIVE  Rachael Ch is a 51 year old female with past medical history significant for    Patient Active Problem List   Diagnosis     Essential hypertension, benign     Diabetes mellitus, type 2 (H)     Hyperlipidemia     Health Care Home     Helicobacter pylori gastritis     PTSD (post-traumatic stress disorder)     Moderate episode of recurrent major depressive disorder (H)     Cognitive complaints     Screening for depression     Microalbuminuria     Others present at the visit:   Silvino Banks.      Presents for   Chief Complaint   Patient presents with     Recheck Medication     follow up     MOOD CHANGES     follow up     1)  Biggest concern is memory.  Finds herself very forgetful.  Likes to cook, and recently forgot that she left the stove on and everything burned.  Her  jokes that sometimes she even \"forgets about him.\"  Has to take her grandchildren with her on walks because she is worried that she won't be able to find her way back.  Feels like this is getting worse.      Feels like mood is okay outside of memory.  Has completed Radha group here at the clinic--enjoyed this.  Does not want to do individual counseling--says she does not like to talk about herself that much and gets too shy.  But she is interested in continued group therapy, and enjoys spending time with others.  Would be interested in going to [place] for social healing.      2)  Diabetes.  Checks sugars only when her home nurse comes.  Cannot remember the numbers.  Says that occasionally she feels low, but it gets better when she drinks coffee with some cream and sugar.  LIkes instant coffee.  Appetite has been a little down, and she has lost some weight.  Feels better when she is able to get outside and walk, so she tries to do this regularly.  " "      OBJECTIVE:  Vitals: /75  Pulse 75  Temp 98.1  F (36.7  C)  Ht 4' 9\" (144.8 cm)  Wt 136 lb (61.7 kg)  SpO2 98%  Breastfeeding? Yes  BMI 29.43 kg/m2  BMI= Body mass index is 29.43 kg/(m^2).  Vitals:  Vitals are reviewed and are within the normal range  Gen:  Alert, pleasant, no acute distress  Cardiac:  Regular rate and rhythm, no murmurs, rubs or gallops  Respiratory:  Lungs clear to auscultation bilaterally  Abdomen:  Soft, non-tender, non-distended, bowel sounds positive  Extremities:  Warm, well-perfused, pulses 2+/4, no lower extremity edema.  Monofilament testing is normal.    Psyche:  Mood and affect are bright.  Tested memory with Animal naming.  She was able to name 6 animals in 1 minute.      PHQ-9 SCORE 11/3/2016 2/27/2017 3/17/2017   Total Score - - -   Total Score 15 11 10       Results for orders placed or performed in visit on 05/11/17   Basic Metabolic Panel ([facility])   Result Value Ref Range    Urea Nitrogen 10.6 7.0 - 19.0 mg/dL    Calcium 9.2 8.5 - 10.1 mg/dL    Chloride 104.2 98.0 - 110.0 mmol/L    Carbon Dioxide 21.8 20.0 - 32.0 mmol/L    Creatinine 0.6 0.5 - 1.0 mg/dL    Glucose 312.3 (H) 70.0 - 99.0 mg'dL    Potassium 2.9 (LL) 3.2 - 4.6 mmol/dL    Sodium 135.8 132.0 - 142.0 mmol/L    GFR Estimate >90 >60.0 mL/min/1.7 m2    GFR Estimate If Black >90 >60.0 mL/min/1.7 m2    Narrative    Panic Value reported and repeated back at 5/11/2017 10:16 AM to SNEHAL Lewis  .by   CARTER  .   Hemoglobin A1c ([facility])   Result Value Ref Range    Hemoglobin A1C 7.3 (H) 4.1 - 5.7 %   Magnesium ([facility])   Result Value Ref Range    Magnesium 1.9 1.8 - 2.6 mg/dL    Narrative    Test performed by:  [facility] [address] [place] [address]   Thyroid [place] ([facility])   Result Value Ref Range    TSH 1.12 0.30 - 5.00 uIU/mL    Narrative    Test performed by:  [facility] [address] [place] [address]   CBC w/ Diff and Plt ([facility])   Result Value Ref " Range    WBC 6.2 4.0 - 11.0 thou/uL    RBC 4.22 3.80 - 5.40 mill/uL    Hemoglobin 11.9 (L) 12.0 - 16.0 g/dL    Hematocrit 36.2 35.0 - 47.0 %    MCV 86 80 - 100 fL    MCH 28.2 27.0 - 34.0 pg    MCHC 32.9 32.0 - 36.0 g/dL    RDW 13.0 11.0 - 14.5 %    Platelets 220 140 - 440 thou/uL    Mean Platelet Volume 10.7 8.5 - 12.5 fL    % Neutrophils 69 50 - 70 %    % Lymphocytes 24 20 - 40 %    % Monocytes 5 2 - 10 %    % Eosinophils 2 0 - 6 %    % Basophils 0 0 - 2 %    Neutrophils (Absolute) 4.2 2.0 - 7.7 thou/uL    Lymphs (Absolute) 1.5 0.8 - 4.4 thou/uL    Monocytes(Absolute) 0.3 0.0 - 0.9 thou/uL    Eos (Absolute) 0.1 0.0 - 0.4 thou/uL    Baso (Absolute) 0.0 0.0 - 0.2 thou/uL    Narrative    Test performed by:  [facility] [address] [place] [address]   Vitamin B12 ([facility])   Result Value Ref Range    Vitamin B12 349 213 - 816 pg/mL    Narrative    Test performed by:  [facility] [address] [place] [address]   Folate  Serum ([facility])   Result Value Ref Range    Folate 4.2 >=3.5 ng/mL    Narrative    Test performed by:  [facility] [address] [place] [address]   Vitamin D 25-Hydroxy ([facility])   Result Value Ref Range    Vitamin D,25-Hydroxy 27.8 (L) 30.0 - 80.0 ng/mL    Narrative    Test performed by:  [facility] [address] [place] [address]  Deficiency <10.0 ng/mL  Insufficiency 10.0-29.9 ng/mL  Sufficiency 30.0-80.0 ng/mL  Toxicity (possible) >100.0 ng/mL       ASSESSMENT AND PLAN:      Rachael was seen today for recheck medication and mood changes.  Has had cognitive complaints for a while.  Seem to get better when she is engaged in more activities outside the home, r even when she can be more active and walk.  May benefit from further OT testing and support.  I think she would be a great fit for the center for social healing at [place] as well.  HypK culd be making this worse, so will replace.  Previously on venlafaxine for  mood, but this made her feel funny.  Could consider another antidepressant in the future.        Diagnoses and all orders for this visit:    Hypokalemia.  Quite low.  Will start replacement.  Recheck in 3 weeks.    -     Magnesium ([facility])  -     potassium chloride SA (POTASSIUM CHLORIDE) 20 MEQ CR tablet; Take 1 tablet (20 mEq) by mouth 2 times daily    Type 2 diabetes mellitus without complication, without long-term current use of insulin (H).  Well controlled.  No change in medications.    -     Basic Metabolic Panel ([facility])  -     Hemoglobin A1c ([facility] )    Essential hypertension, benign.  Well controlled.    -     Basic Metabolic Panel ([facility])  -     Hemoglobin A1c ([facility] )    Memory loss.  Check lab tests.  Consider OT evaluation in the future.  Referral for further mental health support- pt requests group, perhaps this is available through [facility]?      -     Thyroid [place] ([facility])  -     [facility]/ Diff and Plt [facility])  -     Vitamin B12 ([facility])  -     Folate  Serum ([facility])  -     Vitamin D 25-Hydroxy ([facility])      Patient Instructions   Add on tests for memory.    Write down what type of things you are forgetting at home  Start taking the potassium, 1 pill 2x per day.    Follow up in 3 weeks.        Follow up in 3 weeks for recheck potassium, mood, and memory.      Amrita Carballo

## 2017-05-16 NOTE — PROGRESS NOTES
Rachael Bhatt-    Here is a copy of your lab results.  As we discuss in clinic, your potassium level is low.  It is important that you take the potassium supplement regularly and follow up in clinic in 2-3 weeks to recheck your potassium levels.  Otherwise, your testing looks good.  Vitamin D level is good.  Folate and B12 are god.  Thyroid levels are good.  Blood counts are good.  Magnesium level is normal.  We'll discuss all of this more at your follow up visit.  Please call the clinic at [phone] if you have any questions.      Amrita Carballo    Please send results to patient.

## 2017-05-22 ENCOUNTER — DOCUMENTATION ONLY (OUTPATIENT)
Dept: PSYCHOLOGY | Facility: CLINIC | Age: 52
End: 2017-05-22

## 2017-05-22 NOTE — PROGRESS NOTES
Dr. Moon,    Patient has a referral for mental health. Please advise on appropriateness of options for her.   Thank you!  Alexandria  05/22/17

## 2017-05-24 ASSESSMENT — PATIENT HEALTH QUESTIONNAIRE - PHQ9: SUM OF ALL RESPONSES TO PHQ QUESTIONS 1-9: 9

## 2017-05-24 ASSESSMENT — ANXIETY QUESTIONNAIRES: GAD7 TOTAL SCORE: 6

## 2017-05-25 NOTE — PROGRESS NOTES
This is a referral to [facility] [place] [facility].  Please submit referral to [facility], but let patient know it may be several weeks to one month until they are able to set up intake for this (per last report from their ).  Let us know if any additional bridging services are needed in the interim.    [facility]  [address] [place] [id number]  [phone]    Thanks!  Maria Moon, Ph.D., LP

## 2017-05-30 NOTE — PROGRESS NOTES
Thank you for the follow up.     I have sent the referral to [facility] for [facility]. They will reach out the the patient and help her set up a time.     Alexandria  05/30/17

## 2017-06-12 DIAGNOSIS — E87.6 HYPOKALEMIA: Primary | ICD-10-CM

## 2017-06-13 ENCOUNTER — OFFICE VISIT (OUTPATIENT)
Dept: FAMILY MEDICINE | Facility: CLINIC | Age: 52
End: 2017-06-13

## 2017-06-13 VITALS
WEIGHT: 134.2 LBS | HEART RATE: 74 BPM | BODY MASS INDEX: 28.95 KG/M2 | TEMPERATURE: 98.4 F | OXYGEN SATURATION: 99 % | SYSTOLIC BLOOD PRESSURE: 138 MMHG | DIASTOLIC BLOOD PRESSURE: 81 MMHG | HEIGHT: 57 IN

## 2017-06-13 DIAGNOSIS — E87.6 HYPOKALEMIA: ICD-10-CM

## 2017-06-13 LAB
BUN SERPL-MCNC: 10.9 MG/DL (ref 7–19)
CALCIUM SERPL-MCNC: 9.3 MG/DL (ref 8.5–10.1)
CHLORIDE SERPLBLD-SCNC: 107 MMOL/L (ref 98–110)
CO2 SERPL-SCNC: 20.8 MMOL/L (ref 20–32)
CREAT SERPL-MCNC: 0.6 MG/DL (ref 0.5–1)
GFR SERPL CREATININE-BSD FRML MDRD: >90 ML/MIN/1.7 M2
GLUCOSE SERPL-MCNC: 349.7 MG'DL (ref 70–99)
POTASSIUM SERPL-SCNC: 3 MMOL/DL (ref 3.2–4.6)
SODIUM SERPL-SCNC: 137.2 MMOL/L (ref 132–142)

## 2017-06-13 RX ORDER — POTASSIUM CHLORIDE 1500 MG/1
20 TABLET, EXTENDED RELEASE ORAL 2 TIMES DAILY
Qty: 180 TABLET | Refills: 0 | Status: SHIPPED | OUTPATIENT
Start: 2017-06-13 | End: 2017-12-04

## 2017-06-13 ASSESSMENT — ANXIETY QUESTIONNAIRES
2. NOT BEING ABLE TO STOP OR CONTROL WORRYING: MORE THAN HALF THE DAYS
5. BEING SO RESTLESS THAT IT IS HARD TO SIT STILL: SEVERAL DAYS
7. FEELING AFRAID AS IF SOMETHING AWFUL MIGHT HAPPEN: NOT AT ALL
3. WORRYING TOO MUCH ABOUT DIFFERENT THINGS: SEVERAL DAYS
1. FEELING NERVOUS, ANXIOUS, OR ON EDGE: SEVERAL DAYS
GAD7 TOTAL SCORE: 8
IF YOU CHECKED OFF ANY PROBLEMS ON THIS QUESTIONNAIRE, HOW DIFFICULT HAVE THESE PROBLEMS MADE IT FOR YOU TO DO YOUR WORK, TAKE CARE OF THINGS AT HOME, OR GET ALONG WITH OTHER PEOPLE: SOMEWHAT DIFFICULT
6. BECOMING EASILY ANNOYED OR IRRITABLE: MORE THAN HALF THE DAYS

## 2017-06-13 ASSESSMENT — PATIENT HEALTH QUESTIONNAIRE - PHQ9: 5. POOR APPETITE OR OVEREATING: SEVERAL DAYS

## 2017-06-13 NOTE — PROGRESS NOTES
Touched base with Dr. Carballo regarding this referral during clinic ICC shift today.  Per patient, she has not yet heard from [facility] about connection to [place] for Social Healing program.  We were wondering if our referral team could reach out to [facility] to see where she might be on the wait list for this or to see if perhaps they have tried to contact her and been unsuccessful so that we can try to help facilitate this connection if needed.    Could you call [facility] to follow up on this?  Let me know if there are questions or if you need additional guidance from me on this.  Thanks!  Maria Moon, Ph.D.,LP

## 2017-06-13 NOTE — PATIENT INSTRUCTIONS
Take 1 potassium tab in morning and at night everyday to help improve levels and memory.    All other medications stay the same.

## 2017-06-13 NOTE — MR AVS SNAPSHOT
After Visit Summary   2017    Rachael Ch    MRN: [id number]           Patient Information     Date Of Birth          1965        Visit Information        Provider Department      2017 9:00 AM Amrita Carballo MD [facility]        Today's Diagnoses     Hypokalemia          Care Instructions    Take 1 potassium tab in morning and at night everyday to help improve levels and memory.    All other medications stay the same.            Follow-ups after your visit        Follow-up notes from your care team     Return in about 4 weeks (around 2017).      Who to contact     Please call your clinic at [phone] to:    Ask questions about your health    Make or cancel appointments    Discuss your medicines    Learn about your test results    Speak to your doctor   If you have compliments or concerns about an experience at your clinic, or if you wish to file a complaint, please contact [facility] Patient Relations at [phone] or email us at [email or web address]         Additional Information About Your Visit        MyChart Information     [employer] is an electronic gateway that provides easy, online access to your medical records. With Theorem, you can request a clinic appointment, read your test results, renew a prescription or communicate with your care team.     To sign up for Theorem visit the website at [email or web address]   You will be asked to enter the access code listed below, as well as some personal information. Please follow the directions to create your username and password.     Your access code is: [email or web address]  Expires: 2017 10:31 AM     Your access code will  in 90 days. If you need help or a new code, please contact your [facility] or call [phone] for assistance.        Care EveryWhere ID     This is your Care EveryWhere ID. This could be used by other organizations to  "access your [place] medical records  [id number]        Your Vitals Were     Pulse Temperature Height Pulse Oximetry BMI (Body Mass Index)       74 98.4  F (36.9  C) 4' 9.28\" (145.5 cm) 99% 28.75 kg/m2        Blood Pressure from Last 3 Encounters:   06/13/17 138/81   05/11/17 116/75   03/17/17 114/73    Weight from Last 3 Encounters:   06/13/17 134 lb 3.2 oz (60.9 kg)   05/11/17 136 lb (61.7 kg)   03/17/17 137 lb 6.4 oz (62.3 kg)              We Performed the Following     Basic Metabolic Panel ([place])          Where to get your medicines      These medications were sent to [facility] - [place], [place] - [address], [place] [address]     Phone:  [phone]     potassium chloride SA 20 MEQ CR tablet          Primary Care Provider Office Phone # Fax #    Amrita Carballo [phone]       [facility] [address] [place] [address]        Thank you!     Thank you for choosing [facility]  for your care. Our goal is always to provide you with excellent care. Hearing back from our patients is one way we can continue to improve our services. Please take a few minutes to complete the written survey that you may receive in the mail after your visit with us. Thank you!             Your Updated Medication List - Protect others around you: Learn how to safely use, store and throw away your medicines at www.disposemymeds.org.          This list is accurate as of: 6/13/17 10:31 AM.  Always use your most recent med list.                   Brand Name Dispense Instructions for use    atorvastatin 80 MG tablet    LIPITOR    90 tablet    Take 1 tablet (80 mg) by mouth daily       blood glucose lancets standard    no brand specified    1 Box    Use to test blood sugar 1 times daily or as directed.       * blood glucose monitoring lancets     2 Box    1 each by In Vitro route daily       * MICROLET LANCETS Misc     100 each    1 Box daily       blood glucose " monitoring meter device kit    no brand specified    1 kit    Use to test blood sugar 1 times daily or as directed.       blood glucose monitoring test strip    no brand specified    1 Box    Use to test blood sugars 1 times daily or as directed       calcium carbonate 500 MG chewable tablet    TUMS    100 tablet    Take 1 tablet (500 mg) by mouth 2 times daily       cholecalciferol 2000 UNITS tablet     100 tablet    Take 2,000 Units by mouth daily       glipiZIDE 10 MG 24 hr tablet    GLUCOTROL XL    180 tablet    Take 2 tablets (20 mg) by mouth daily       lisinopril 5 MG tablet    PRINIVIL/ZESTRIL    90 tablet    Take 1 tablet (5 mg) by mouth daily       metFORMIN 500 MG 24 hr tablet    GLUCOPHAGE-XR    180 tablet    Take 2 tablets (1,000 mg) by mouth 2 times daily (with meals)       potassium chloride SA 20 MEQ CR tablet    potassium chloride    180 tablet    Take 1 tablet (20 mEq) by mouth 2 times daily       prazosin 2 MG capsule    MINIPRESS    90 capsule    Take 1 capsule (2 mg) by mouth At Bedtime       ranitidine 300 MG tablet    ZANTAC    90 tablet    Take 1 tablet (300 mg) by mouth At Bedtime       sitagliptin 100 MG tablet    JANUVIA    90 tablet    Take 1 tablet (100 mg) by mouth daily       venlafaxine 75 MG 24 hr capsule    EFFEXOR-XR    1 capsule    Take 1 capsule (75 mg) by mouth daily       * Notice:  This list has 2 medication(s) that are the same as other medications prescribed for you. Read the directions carefully, and ask your doctor or other care provider to review them with you.

## 2017-06-13 NOTE — PROGRESS NOTES
Potassium is still low.  Patient had not picked up medication and is not taking it.  Discussed with patient in clinic, and will go down to pharmacy to  medication.      Amrita Carballo    No need to send.

## 2017-06-13 NOTE — PROGRESS NOTES
"    Nursing Notes:   Ginger Leija CMA  6/13/2017  9:36 AM  Signed   name: Silvino Banks (Htoo)  Language: Radha  Agency: [employer]  Phone number: [phone]    Chief Complaint   Patient presents with     MOOD CHANGES     follow up     Recheck Medication     Blood pressure 138/81, pulse 74, temperature 98.4  F (36.9  C), height 4' 9.28\" (145.5 cm), weight 134 lb 3.2 oz (60.9 kg), SpO2 99 %, currently breastfeeding.    SUBJECTIVE:  This is a 52-year-old female, well-known to me, w/past medical history significant for hypertension, type II diabetes, hyperlipidemia, PTSD, and major depression w/memory difficulties secondary to this.  She reports that she has been feeling a little bit down lately.  She is less energetic and excited about things.  No known recent triggers.  She was DCed from Radha group therapy back in March when the group  ended.  We've been waiting to try to get her into group therapy at [place], but she hasn't heard back from anyone about this. [sentence is unclear-bad audio]  Sometimes she gets lonely and sad when she stays at home, but she doesn't always like going out with her family because they stay out for too long and she gets worn out.  She does like to go walking, but she can't go too far because she sometimes get lost.  She doesn't always like to be doing other things at home; she does not enjoy reading or listening to music.  She did benefit from the group and she would like to return to that type of supportive environment.     She has her medications with her today.  She feels that she currently is taking too many of them.  She wonders if she needs all of them. Denies any side effects from the medications.  Of note, she was supposed to be taking potassium that was prescribed at last visit, but this isn't present and she hasn't taken it.  She does have a home nurse that sets up her medications.     Labs from last visit were reviewed with her.  These showed an A1C of 7.3.  " Potassium is 2.9.  Creatinine   is 0.6.  Magnesium is 1.9.  Vitamin D is 27.8.  Glucose is 350.  Recheck of potassium today is 3.0, but this isn't unexpected, as she hasn't been taking potassium supplements.     OBJECTIVE:   VITAL SIGNS:  Reviewed.  Blood pressure is well-controlled at 138/81.  Pulse is 74.  Weight is 134 pounds and stable.   GENERAL:  Comfortable-appearing, Radha female in no acute distress.   PSYCHIATRIC:  Mood and affect are appropriate.  She answers questions thoughtfully, but has difficulty making eye contact.     ASSESSMENT/PLAN:  This is a 52-year-old female presenting for followup today.   1.  Major depression, PTSD, and cognitive complaints.  She continues to have difficulty with memory.  This isn't new.  It does seem to improve when she is less depressed.  We'll check the status of her [facility] group.  She has had some side effects from SSRIs in the past, but if things aren't getting better by next visit we'll start one of those at that time.  She is taking prazosin and found that helpful.   2.  Hypertension.  Overall, it is well-controlled.   3.  Diabetes.  She isn't checking sugars right now.  She isn't due for A1C today, so we'll wait until our next visit to check that.   4.  Hypokalemia.  We'll restart potassium supplements as previously prescribed at 20 mg b.i.d.     She'll follow up with me in one month to recheck potassium.  We'll call to check on the status of her [facility] group.     Amrita Carballo    Addendum:  Spoke with Dr. Moon, and she will check on status of evaluations for [facility].      Results for orders placed or performed in visit on 06/13/17   Basic Metabolic Panel ([facility])   Result Value Ref Range    Urea Nitrogen 10.9 7.0 - 19.0 mg/dL    Calcium 9.3 8.5 - 10.1 mg/dL    Chloride 107.0 98.0 - 110.0 mmol/L    Carbon Dioxide 20.8 20.0 - 32.0 mmol/L    Creatinine 0.6 0.5 - 1.0 mg/dL    Glucose 349.7 (H) 70.0 - 99.0 mg'dL    Potassium 3.0 (L) 3.2 - 4.6  mmol/dL    Sodium 137.2 132.0 - 142.0 mmol/L    GFR Estimate >90 >60.0 mL/min/1.7 m2    GFR Estimate If Black >90 >60.0 mL/min/1.7 m2       Patient Instructions   Take 1 potassium tab in morning and at night everyday to help improve levels and memory.    All other medications stay the same.

## 2017-06-13 NOTE — NURSING NOTE
name: Silvino (Cat) Darren  Language: Radha  Agency: [employer]  Phone number: [phone]

## 2017-06-14 DIAGNOSIS — E11.9 TYPE 2 DIABETES MELLITUS WITHOUT COMPLICATION, WITHOUT LONG-TERM CURRENT USE OF INSULIN (H): ICD-10-CM

## 2017-06-14 RX ORDER — METFORMIN HCL 500 MG
1000 TABLET, EXTENDED RELEASE 24 HR ORAL 2 TIMES DAILY WITH MEALS
Qty: 180 TABLET | Refills: 3 | Status: SHIPPED | OUTPATIENT
Start: 2017-06-14 | End: 2017-07-14

## 2017-06-14 ASSESSMENT — PATIENT HEALTH QUESTIONNAIRE - PHQ9: SUM OF ALL RESPONSES TO PHQ QUESTIONS 1-9: 17

## 2017-06-14 ASSESSMENT — ANXIETY QUESTIONNAIRES: GAD7 TOTAL SCORE: 8

## 2017-06-14 NOTE — PROGRESS NOTES
I have called and left a message for [facility] [place] [facility] and asked them to call me back to follow up on the referral.   [place]and[place]  06/14/17

## 2017-06-19 NOTE — PROGRESS NOTES
We will be starting a new Radha group in September this year. Rachael has already participated in 2 rounds of the group, but if we have openings for the group we could think about offering this to her again. I'll route to Drs. Lombardi and [facility] for their input too.    Jackie Kirkpatrick, PhD, LP

## 2017-06-19 NOTE — PROGRESS NOTES
"I have received a message from [facility] about the Social Health Program. He says they are currently at capacity for this program and the wait list is \"very long\".     I am wondering what your thoughts are on this, shall we see if we can get her in for another service that would be more timely?   "

## 2017-06-27 NOTE — PROGRESS NOTES
I think it would be fine to offer this patient a spot in our Radha group at [place] if she is not connected to [facility] prior to that time and she is interested in doing this.  Alternatively, we could look into other community options for group therapy.  For example, I am wondering if [facility] may have a group that would be appropriate for her at their community venue.  Not sure if she would meet criteria for participation there however and this would need to be assessed by [facility].  Will route this message to Jacquie Ortiz to inquire about this possibility and perhaps to get recommendations about other Radha groups in the community (if neither [facility] or [facility] are currently options for her).      Thanks!  Maria

## 2017-06-30 ENCOUNTER — MEDICAL CORRESPONDENCE (OUTPATIENT)
Dept: HEALTH INFORMATION MANAGEMENT | Facility: CLINIC | Age: 52
End: 2017-06-30

## 2017-07-10 NOTE — PROGRESS NOTES
"Thanks to all who have been contributing to this conversation.  I see that the patient has an appointment scheduled with me on Friday, so I can talk through different options with her at that time.  What she has consistently shares with me is the followin)  She feels better when she gets out of the house.    2)  She feels better when she is around others who also struggle with sadness, memory issues, and understand where she is coming from.    3)  She doesn't like it to be focused on herself--better when she is able to engage with others than when she is just talking about herself all the time.      Her symptoms ebb and flow, and she has periods when she does well--especially in the summer time when family and friends can come and go easily, or if she can go on walks outside.      She may be well served by a community based program.  I wish she was old enough to qualify for adult day services--this would be great.  Is the \"Be Well\" programing currently offering any group sessions--this might be a good substitute too.  I could also reach out to [facility] and see if they have options for adults.  I suspect that transportation may be a barrier for some of these program, but [facility], so they may be able to help with this.      Dr. Kirkpatrick, what do you think?  I would love to have a couple of options to discuss with Rachael on Friday.      Thanks!    Dr. Carballo    Routed to referrals, Dr. Kirkpatrick, Dr. Moon  "

## 2017-07-10 NOTE — PROGRESS NOTES
I am in favor of offering her enrollment in the [facility] group again. Rachael seems to benefit from the relationships with other group members and being able to actively offer support to others. Even though she has participated in the group before, this new group will offer an opportunity to form new relationships (broadening her social support and [place] of friends), and the group content covered will serve as a good reminder for self-care.    Being well is no longer an option in the community, unfortunately. I am aware of one weaving group through [employer] -- it meets every Wednesday at [facility] ([address] [place] [id number]) from 9am - 1 pm. It is facilitated by Giovanny Shelton of [employer]. From my memory, Rachael does not weave as a pastime, so this may not be a great fit for her, but it is worth offering to her as an option if she declines enrollment with [facility] group (or in addition to [facility] group).

## 2017-07-11 NOTE — PROGRESS NOTES
Ok - Sounds like the best plan is for Dr. Carballo to discuss option of re-enrolling in [place] [facility] with Dr. Lombardi starting in September.  If she says yes to this, let's have her set up individual meeting to introduce themselves and update any diagnostic/treatment plan issues prior to start of group.  We can also see if she is interested in [facility] weaving group via [facility] - could always do both.  Will keep on the alert for other opportunities for community involvement as we become aware of them.  I don't recall her spiritual beliefs - is she involved in a [employer] or other community organization that may have less formal opportunities for socializing that we could capitalize on (e.g., classes, volunteer opportunities, etc?).  That could be something else to consider.    Thanks team!  Maria

## 2017-07-13 DIAGNOSIS — E87.6 HYPOKALEMIA: ICD-10-CM

## 2017-07-13 DIAGNOSIS — E11.9 TYPE 2 DIABETES MELLITUS WITHOUT COMPLICATION, WITHOUT LONG-TERM CURRENT USE OF INSULIN (H): Primary | ICD-10-CM

## 2017-07-14 ENCOUNTER — OFFICE VISIT (OUTPATIENT)
Dept: FAMILY MEDICINE | Facility: CLINIC | Age: 52
End: 2017-07-14

## 2017-07-14 VITALS
HEART RATE: 69 BPM | HEIGHT: 58 IN | TEMPERATURE: 97.9 F | BODY MASS INDEX: 27.58 KG/M2 | DIASTOLIC BLOOD PRESSURE: 78 MMHG | SYSTOLIC BLOOD PRESSURE: 122 MMHG | WEIGHT: 131.4 LBS

## 2017-07-14 DIAGNOSIS — E11.9 TYPE 2 DIABETES MELLITUS WITHOUT COMPLICATION, WITHOUT LONG-TERM CURRENT USE OF INSULIN (H): ICD-10-CM

## 2017-07-14 DIAGNOSIS — E87.6 HYPOKALEMIA: ICD-10-CM

## 2017-07-14 DIAGNOSIS — E55.9 VITAMIN D DEFICIENCY: ICD-10-CM

## 2017-07-14 DIAGNOSIS — F43.10 PTSD (POST-TRAUMATIC STRESS DISORDER): ICD-10-CM

## 2017-07-14 DIAGNOSIS — E11.65 TYPE 2 DIABETES MELLITUS WITH HYPERGLYCEMIA, WITHOUT LONG-TERM CURRENT USE OF INSULIN (H): ICD-10-CM

## 2017-07-14 DIAGNOSIS — K21.9 GASTROESOPHAGEAL REFLUX DISEASE WITHOUT ESOPHAGITIS: ICD-10-CM

## 2017-07-14 DIAGNOSIS — R80.9 MICROALBUMINURIA: ICD-10-CM

## 2017-07-14 DIAGNOSIS — F33.1 MODERATE EPISODE OF RECURRENT MAJOR DEPRESSIVE DISORDER (H): Primary | ICD-10-CM

## 2017-07-14 LAB
ANION GAP SERPL CALCULATED.3IONS-SCNC: 13 MMOL/L (ref 5–18)
BUN SERPL-MCNC: 9 MG/DL (ref 8–22)
CALCIUM SERPL-MCNC: 9 MG/DL (ref 8.5–10.5)
CHLORIDE SERPL-SCNC: 107 MMOL/L (ref 98–107)
CO2 SERPL-SCNC: 21 MMOL/L (ref 22–31)
CREAT SERPL-MCNC: 0.83 MG/DL (ref 0.6–1.1)
GLUCOSE SERPL-MCNC: 284 MG/DL (ref 70–125)
HBA1C MFR BLD: 8.4 % (ref 4.1–5.7)
POTASSIUM SERPL-SCNC: 3.7 MMOL/L (ref 3.5–5)
SODIUM SERPL-SCNC: 141 MMOL/L (ref 136–145)

## 2017-07-14 RX ORDER — LISINOPRIL 5 MG/1
5 TABLET ORAL DAILY
Qty: 90 TABLET | Refills: 3 | Status: SHIPPED | OUTPATIENT
Start: 2017-07-14 | End: 2018-01-22

## 2017-07-14 RX ORDER — METFORMIN HCL 500 MG
1000 TABLET, EXTENDED RELEASE 24 HR ORAL 2 TIMES DAILY WITH MEALS
Qty: 360 TABLET | Refills: 3 | Status: SHIPPED | OUTPATIENT
Start: 2017-07-14 | End: 2018-06-27

## 2017-07-14 RX ORDER — PRAZOSIN HYDROCHLORIDE 2 MG/1
2 CAPSULE ORAL AT BEDTIME
Qty: 90 CAPSULE | Refills: 3 | Status: SHIPPED | OUTPATIENT
Start: 2017-07-14 | End: 2018-06-27

## 2017-07-14 RX ORDER — PIOGLITAZONEHYDROCHLORIDE 15 MG/1
15 TABLET ORAL DAILY
Qty: 90 TABLET | Refills: 1 | Status: SHIPPED | OUTPATIENT
Start: 2017-07-14 | End: 2017-09-12

## 2017-07-14 RX ORDER — GLIPIZIDE 10 MG/1
10 TABLET, FILM COATED, EXTENDED RELEASE ORAL DAILY
Qty: 90 TABLET | Refills: 1 | Status: SHIPPED | OUTPATIENT
Start: 2017-07-14 | End: 2017-09-12

## 2017-07-14 RX ORDER — ATORVASTATIN CALCIUM 80 MG/1
80 TABLET, FILM COATED ORAL DAILY
Qty: 90 TABLET | Refills: 3 | Status: SHIPPED | OUTPATIENT
Start: 2017-07-14 | End: 2018-06-27

## 2017-07-14 NOTE — LETTER
July 17, 2017      [facility]  [address] [address]        Dear Rachael,    Please see below for your test results.    Resulted Orders   Hemoglobin A1c ([facility])   Result Value Ref Range    Hemoglobin A1C 8.4 (H) 4.1 - 5.7 %   Basic Metabolic Profile ([facility])   Result Value Ref Range    Sodium 141 136 - 145 mmol/L    Potassium 3.7 3.5 - 5.0 mmol/L    Chloride 107 98 - 107 mmol/L    CO2, Total 21 (L) 22 - 31 mmol/L    Anion Gap 13 5 - 18 mmol/L    Glucose 284 (H) 70 - 125 mg/dL    Calcium 9.0 8.5 - 10.5 mg/dL    Urea Nitrogen 9 8 - 22 mg/dL    Creatinine 0.83 0.60 - 1.10 mg/dL    GFR Estimate If Black >60 >60 mL/min/1.73m2    GFR Estimate >60 >60 mL/min/1.73m2    Narrative    Test performed by:  [facility] [address] [place] [address]  Fasting Glucose reference range is 70-99 mg/dL per  American Diabetes Association (ADA) guidelines.         Rachael Bhatt-    Your potassium is back to normal levels.   This is good!  I would like you to keep taking the potassium until our next visit to keep levels up.  Please call the clinic at [phone] if you have any questions.      Amrita Carballo

## 2017-07-14 NOTE — MR AVS SNAPSHOT
After Visit Summary   7/14/2017    Rachael hC    MRN: [id number]           Patient Information     Date Of Birth          1965        Visit Information        Provider Department      7/14/2017 9:00 AM Amrita Carballo MD [facility]        Today's Diagnoses     Moderate episode of recurrent major depressive disorder (H)    -  1    Hypokalemia        Type 2 diabetes mellitus without complication, without long-term current use of insulin (H)        PTSD (post-traumatic stress disorder)        Vitamin D deficiency        Type 2 diabetes mellitus with hyperglycemia, without long-term current use of insulin (H)        Gastroesophageal reflux disease without esophagitis        Microalbuminuria          Care Instructions    New Medications:    Take 1 pill of Actos daily  Take 1 pill of Sertraline daily    Decrease the dose of glipizide to 1 pill per day.  (10mg total)    Check blood sugars at home with home nurse and 1x per week fasting.      We will call to schedule the group therapy    Follow up in 6 weeks.      Pcik up medications from pharmacy            Follow-ups after your visit        Who to contact     Please call your clinic at [phone] to:    Ask questions about your health    Make or cancel appointments    Discuss your medicines    Learn about your test results    Speak to your doctor   If you have compliments or concerns about an experience at your clinic, or if you wish to file a complaint, please contact [facility] Patient Relations at [phone] or email us at [email or web address]         Additional Information About Your Visit        [employer] Information     [employer] is an electronic gateway that provides easy, online access to your medical records. With [employer], you can request a clinic appointment, read your test results, renew a prescription or communicate with your care team.     To sign up for [employer] visit the website at  "[email or web address]   You will be asked to enter the access code listed below, as well as some personal information. Please follow the directions to create your username and password.     Your access code is: [email or web address]  Expires: 2017 10:31 AM     Your access code will  in 90 days. If you need help or a new code, please contact your [facility] or call [phone] for assistance.        Care EveryWhere ID     This is your Care EveryWhere ID. This could be used by other organizations to access your [place] medical records  [id number]        Your Vitals Were     Pulse Temperature Height BMI (Body Mass Index)          69 97.9  F (36.6  C) (Oral) 4' 9.5\" (146.1 cm) 27.94 kg/m2         Blood Pressure from Last 3 Encounters:   17 122/78   17 138/81   17 116/75    Weight from Last 3 Encounters:   17 131 lb 6.4 oz (59.6 kg)   17 134 lb 3.2 oz (60.9 kg)   17 136 lb (61.7 kg)              We Performed the Following     Basic Metabolic Panel ([facility])     Hemoglobin A1c ([facility])          Today's Medication Changes          These changes are accurate as of: 17 10:14 AM.  If you have any questions, ask your nurse or doctor.               Start taking these medicines.        Dose/Directions    pioglitazone 15 MG tablet   Commonly known as:  ACTOS   Used for:  Type 2 diabetes mellitus without complication, without long-term current use of insulin (H)   Started by:  Amrita Carballo MD        Dose:  15 mg   Take 1 tablet (15 mg) by mouth daily   Quantity:  90 tablet   Refills:  1       sertraline 50 MG tablet   Commonly known as:  ZOLOFT   Used for:  Moderate episode of recurrent major depressive disorder (H)   Started by:  Amrita Carballo MD        Dose:  50 mg   Take 1 tablet (50 mg) by mouth daily   Quantity:  30 tablet   Refills:  1         These medicines have changed or have updated prescriptions.        Dose/Directions    " glipiZIDE 10 MG 24 hr tablet   Commonly known as:  GLUCOTROL XL   This may have changed:  how much to take   Used for:  Type 2 diabetes mellitus without complication, without long-term current use of insulin (H)   Changed by:  Amrita Carballo MD        Dose:  10 mg   Take 1 tablet (10 mg) by mouth daily   Quantity:  90 tablet   Refills:  1         Stop taking these medicines if you haven't already. Please contact your care team if you have questions.     venlafaxine 75 MG 24 hr capsule   Commonly known as:  EFFEXOR-XR   Stopped by:  Amrita Carballo MD                Where to get your medicines      These medications were sent to [facility] - [place], [place] - [address], [place] [address]     Phone:  [phone]     atorvastatin 80 MG tablet    cholecalciferol 2000 UNITS tablet    glipiZIDE 10 MG 24 hr tablet    lisinopril 5 MG tablet    metFORMIN 500 MG 24 hr tablet    pioglitazone 15 MG tablet    prazosin 2 MG capsule    ranitidine 300 MG tablet    sertraline 50 MG tablet    sitagliptin 100 MG tablet                Primary Care Provider Office Phone # Fax #    Amrita Carballo [phone]       [facility] [address] [place] [address]        Equal Access to Services     RIMMA COLE AH: Hadii sander ku hadasho Soomaali, waaxda luqadaha, qaybta kaalmada adeegyada, waxay idiin hayroxannan darshan hall. So [facility] [phone].    ATENCIÓN: Si habla español, tiene a farah disposición servicios gratuitos de asistencia lingüística. Llame al [phone].    We comply with applicable federal civil rights laws and [place] laws. We do not discriminate on the basis of race, color, national origin, age, disability sex, sexual orientation or gender identity.            Thank you!     Thank you for choosing [facility]  for your care. Our goal is always to provide you with excellent care. Hearing back from our patients is one way we can continue to  improve our services. Please take a few minutes to complete the written survey that you may receive in the mail after your visit with us. Thank you!             Your Updated Medication List - Protect others around you: Learn how to safely use, store and throw away your medicines at www.disposemymeds.org.          This list is accurate as of: 7/14/17 10:14 AM.  Always use your most recent med list.                   Brand Name Dispense Instructions for use Diagnosis    atorvastatin 80 MG tablet    LIPITOR    90 tablet    Take 1 tablet (80 mg) by mouth daily    Type 2 diabetes mellitus without complication, without long-term current use of insulin (H)       blood glucose lancets standard    no brand specified    1 Box    Use to test blood sugar 1 times daily or as directed.    Type 2 diabetes mellitus with hyperglycemia, without long-term current use of insulin (H)       * blood glucose monitoring lancets     2 Box    1 each by In Vitro route daily    Diabetes mellitus, type 2 (H)       * MICROLET LANCETS Misc     100 each    1 Box daily    Diabetes mellitus (H)       blood glucose monitoring meter device kit    no brand specified    1 kit    Use to test blood sugar 1 times daily or as directed.    Type 2 diabetes mellitus with hyperglycemia, without long-term current use of insulin (H)       blood glucose monitoring test strip    no brand specified    1 Box    Use to test blood sugars 1 times daily or as directed    Type 2 diabetes mellitus with hyperglycemia, without long-term current use of insulin (H)       calcium carbonate 500 MG chewable tablet    TUMS    100 tablet    Take 1 tablet (500 mg) by mouth 2 times daily    Gastroesophageal reflux disease, esophagitis presence not specified       cholecalciferol 2000 UNITS tablet     100 tablet    Take 2,000 Units by mouth daily    Vitamin D deficiency       glipiZIDE 10 MG 24 hr tablet    GLUCOTROL XL    90 tablet    Take 1 tablet (10 mg) by mouth daily    Type 2  diabetes mellitus without complication, without long-term current use of insulin (H)       lisinopril 5 MG tablet    PRINIVIL/ZESTRIL    90 tablet    Take 1 tablet (5 mg) by mouth daily    Microalbuminuria       metFORMIN 500 MG 24 hr tablet    GLUCOPHAGE-XR    360 tablet    Take 2 tablets (1,000 mg) by mouth 2 times daily (with meals)    Type 2 diabetes mellitus without complication, without long-term current use of insulin (H)       pioglitazone 15 MG tablet    ACTOS    90 tablet    Take 1 tablet (15 mg) by mouth daily    Type 2 diabetes mellitus without complication, without long-term current use of insulin (H)       potassium chloride SA 20 MEQ CR tablet    potassium chloride    180 tablet    Take 1 tablet (20 mEq) by mouth 2 times daily    Hypokalemia       prazosin 2 MG capsule    MINIPRESS    90 capsule    Take 1 capsule (2 mg) by mouth At Bedtime    PTSD (post-traumatic stress disorder)       ranitidine 300 MG tablet    ZANTAC    90 tablet    Take 1 tablet (300 mg) by mouth At Bedtime    Gastroesophageal reflux disease without esophagitis       sertraline 50 MG tablet    ZOLOFT    30 tablet    Take 1 tablet (50 mg) by mouth daily    Moderate episode of recurrent major depressive disorder (H)       sitagliptin 100 MG tablet    JANUVIA    90 tablet    Take 1 tablet (100 mg) by mouth daily    Type 2 diabetes mellitus with hyperglycemia, without long-term current use of insulin (H)       * Notice:  This list has 2 medication(s) that are the same as other medications prescribed for you. Read the directions carefully, and ask your doctor or other care provider to review them with you.

## 2017-07-14 NOTE — PATIENT INSTRUCTIONS
New Medications:    Take 1 pill of Actos daily  Take 1 pill of Sertraline daily    Decrease the dose of glipizide to 1 pill per day.  (10mg total)    Check blood sugars at home with home nurse and 1x per week fasting.      We will call to schedule the group therapy    Follow up in 6 weeks.      Pcik up medications from pharmacy      Appointment with Dr. Lombardi has been scheduled on:  8/1/17  9:30am  KTTS has been requested.   Gave info to Ronda Ibrahim  07/17/17

## 2017-07-15 NOTE — PROGRESS NOTES
There are no exam notes on file for this visit.    SUBJECTIVE  Rachael Ch is a 52 year old female with past medical history significant for    Patient Active Problem List   Diagnosis     Essential hypertension, benign     Diabetes mellitus, type 2 (H)     Hyperlipidemia     Health Care Home     Helicobacter pylori gastritis     PTSD (post-traumatic stress disorder)     Moderate episode of recurrent major depressive disorder (H)     Cognitive complaints     Screening for depression     Microalbuminuria     Others present at the visit:   Silvino Banks, medical student [employer].      Presents for   Chief Complaint   Patient presents with     RECHECK     Patient here to follow up mood and meds.      Rachael Ch is here for follow-up on her chronic medical problems.    1)  Diabetes.  Appetite has been good. Taking medications as set up by her home nurse. She is unsure about which medications to she's taking. Takes medications twice daily. Checks her sugars only when the home nurse comes by. Usually this is nonfasting. Typically has values in the 200s. Last A1c was 7.3 but today it is 8.4. Per review of the chart she is taking 2000 mg metformin 100 mg of Januvia and 20 mg of appetite. Is open to changing her medications but would like to limit the number if possible.    2)  Mood.  Feeling more down as of late. Would like to find another option for group sessions. Feels intimidated by talking to people one-on-one but enjoys a large enough group atmosphere. We discussed that [facility] [place]. Likely will not be openings for a while. Discussed trying the weaving group versus a group at her [employer] and she is not interested in either of these.  Is interested in re-joining the [facility] women's group this fall with Dr. Lombardi. Feels good about being able to share her experience with others. Is open to medications. Had side effects on Effexor. Is unsure if she's been on other  "medications.  Comments that her prazosin is a new color today. Does feel like this medicine continues to help her with sleep.    3)  We reviewed her lab results and potassium.  She recognizes the pill. Has been taking it daily.  It is a large pill and sometimes she has trouble swallowing it.      OBJECTIVE:  Vitals: /78  Pulse 69  Temp 97.9  F (36.6  C) (Oral)  Ht 4' 9.5\" (146.1 cm)  Wt 131 lb 6.4 oz (59.6 kg)  BMI 27.94 kg/m2  BMI= Body mass index is 27.94 kg/(m^2).  Vitals:  Vitals are reviewed and are within the normal range  Gen:  Alert, pleasant, no acute distress  Cardiac:  Regular rate and rhythm, no murmurs, rubs or gallops  Respiratory:  Lungs clear to auscultation bilaterally    Results for orders placed or performed in visit on 07/14/17   Hemoglobin A1c ([facility])   Result Value Ref Range    Hemoglobin A1C 8.4 (H) 4.1 - 5.7 %   Basic Metabolic Profile ([facility])   Result Value Ref Range    Sodium 141 136 - 145 mmol/L    Potassium 3.7 3.5 - 5.0 mmol/L    Chloride 107 98 - 107 mmol/L    CO2, Total 21 (L) 22 - 31 mmol/L    Anion Gap 13 5 - 18 mmol/L    Glucose 284 (H) 70 - 125 mg/dL    Calcium 9.0 8.5 - 10.5 mg/dL    Urea Nitrogen 9 8 - 22 mg/dL    Creatinine 0.83 0.60 - 1.10 mg/dL    GFR Estimate If Black >60 >60 mL/min/1.73m2    GFR Estimate >60 >60 mL/min/1.73m2    Narrative    Test performed by:  [facility]  [address] [place] [address]  Fasting Glucose reference range is 70-99 mg/dL per  American Diabetes Association (ADA) guidelines.         ASSESSMENT AND PLAN:      Rachael was seen today for recheck.    Diagnoses and all orders for this visit:    Moderate episode of recurrent major depressive disorder (H).  She is open to starting medications today. Will try Zoloft. Start with 50 mg daily. Follow-up in one month. Will place referral for [facility] women's group this fall.  -     sertraline (ZOLOFT) 50 MG tablet; Take 1 tablet (50 mg) by mouth daily    Hypokalemia.  Potassium " improved but still borderline low. We'll continue this supplementation for 1 more month. Unclear about etiology.  -     Cancel: Basic Metabolic Panel ([facility])  -     Basic Metabolic Profile ([facility])    Type 2 diabetes mellitus without complication, without long-term current use of insulin (H).  Will adjust medications today. I'm worried about her going low on 20 mg of glipizide. Will decrease to 10 mg of glipizide daily and start Actos again at 15 mg daily. Patient is willing to check her blood sugars occasionally and will check once in the morning per week fasting.  -     Hemoglobin A1c ([facility])  -     pioglitazone (ACTOS) 15 MG tablet; Take 1 tablet (15 mg) by mouth daily  -     glipiZIDE (GLUCOTROL XL) 10 MG 24 hr tablet; Take 1 tablet (10 mg) by mouth daily  -     metFORMIN (GLUCOPHAGE-XR) 500 MG 24 hr tablet; Take 2 tablets (1,000 mg) by mouth 2 times daily (with meals)  -     atorvastatin (LIPITOR) 80 MG tablet; Take 1 tablet (80 mg) by mouth daily    PTSD (post-traumatic stress disorder).  Discussed that this is the same medication even if it is a different color.  -     prazosin (MINIPRESS) 2 MG capsule; Take 1 capsule (2 mg) by mouth At Bedtime    Vitamin D deficiency.  Refilled.  -     cholecalciferol 2000 UNITS tablet; Take 2,000 Units by mouth daily    Type 2 diabetes mellitus with hyperglycemia, without long-term current use of insulin (H)  -     sitagliptin (JANUVIA) 100 MG tablet; Take 1 tablet (100 mg) by mouth daily    Gastroesophageal reflux disease without esophagitis  -     ranitidine (ZANTAC) 300 MG tablet; Take 1 tablet (300 mg) by mouth At Bedtime    Microalbuminuria. This was not in patients med box. No low BPs recently in clinic. We'll plan to restart this.  -     lisinopril (PRINIVIL/ZESTRIL) 5 MG tablet; Take 1 tablet (5 mg) by mouth daily        Patient Instructions   New Medications:    Take 1 pill of Actos daily  Take 1 pill of Sertraline daily    Decrease the dose of glipizide to  1 pill per day.  (10mg total)    Check blood sugars at home with home nurse and 1x per week fasting.      We will call to schedule the group therapy    Follow up in 6 weeks.      Pcik up medications from pharmacy        Follow up in 6 weeks. Recheck potassium again at that time.    Amrita Carballo

## 2017-07-15 NOTE — PROGRESS NOTES
Rachael Ch-    Your potassium is back to normal levels.   This is good!  I would like you to keep taking the potassium until our next visit to keep levels up.  Please call the clinic at [phone] if you have any questions.      Amrita Carballo    Please send results to patient.

## 2017-07-16 NOTE — PROGRESS NOTES
Spoke with patient Friday in clinic.  She would like to rejoin the [facility] Group this fall.  I have placed a new mental health referral.  Patient has looked into groups at 2 churches and has not found anything.  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      Amrita Carballo

## 2017-07-17 NOTE — PROGRESS NOTES
Appointment with Dr. Lombardi has been scheduled on:  8/1/17  9:30am  KTTS has been requested.   Gave info to Ronda Ibrahim  07/17/17

## 2017-07-17 NOTE — PROGRESS NOTES
Thanks for the follow up Dr. Carballo!    [place] - can you reach out to this patient to set up an initial visit with Dr. Lombardi for enrollment in the [facility] group which will be starting up in September. Thanks!  Maria

## 2017-08-01 ENCOUNTER — OFFICE VISIT (OUTPATIENT)
Dept: PSYCHOLOGY | Facility: CLINIC | Age: 52
End: 2017-08-01

## 2017-08-01 DIAGNOSIS — F33.1 MODERATE EPISODE OF RECURRENT MAJOR DEPRESSIVE DISORDER (H): Primary | ICD-10-CM

## 2017-08-01 DIAGNOSIS — F43.10 PTSD (POST-TRAUMATIC STRESS DISORDER): ICD-10-CM

## 2017-08-01 DIAGNOSIS — R41.9 COGNITIVE COMPLAINTS: ICD-10-CM

## 2017-08-01 ASSESSMENT — ANXIETY QUESTIONNAIRES
3. WORRYING TOO MUCH ABOUT DIFFERENT THINGS: MORE THAN HALF THE DAYS
2. NOT BEING ABLE TO STOP OR CONTROL WORRYING: NEARLY EVERY DAY
7. FEELING AFRAID AS IF SOMETHING AWFUL MIGHT HAPPEN: MORE THAN HALF THE DAYS
1. FEELING NERVOUS, ANXIOUS, OR ON EDGE: MORE THAN HALF THE DAYS
IF YOU CHECKED OFF ANY PROBLEMS ON THIS QUESTIONNAIRE, HOW DIFFICULT HAVE THESE PROBLEMS MADE IT FOR YOU TO DO YOUR WORK, TAKE CARE OF THINGS AT HOME, OR GET ALONG WITH OTHER PEOPLE: VERY DIFFICULT
5. BEING SO RESTLESS THAT IT IS HARD TO SIT STILL: MORE THAN HALF THE DAYS
GAD7 TOTAL SCORE: 16
6. BECOMING EASILY ANNOYED OR IRRITABLE: NEARLY EVERY DAY

## 2017-08-01 ASSESSMENT — PATIENT HEALTH QUESTIONNAIRE - PHQ9: 5. POOR APPETITE OR OVEREATING: MORE THAN HALF THE DAYS

## 2017-08-01 NOTE — MR AVS SNAPSHOT
After Visit Summary   8/1/2017    Rachael Ch    MRN: [id number]           Patient Information     Date Of Birth          1965        Visit Information        Provider Department      8/1/2017 9:30 AM Lombardi, Nathaniel, PhD [facility]        Care Instructions    Treatment Plan    Client's Name: Rachael Ch  YOB: 1965  Today's Date: 8/1/2017  Date Diagnostic Update Due: 8/1/2018    DSM-V Diagnoses:   296.32 Major Depressive Disorder, Recurrent Episode, Moderate  309.81 Posttraumatic Stress Disorder  799.59 Unspecified Neurocognitive Disorder    Psychosocial / Contextual Factors:   The patient feels well-supported by family and friends.  The patient is uninterested in getting established with individual psychotherapy at this time.    WHODAS 2.0 TOTAL SCORES 11/3/2016   Total Score 48     PC-PTSD: 0/4  CAGE AID: 0/4    PHQ-9 SCORE 3/17/2017 5/11/2017 6/13/2017   Total Score - - -   Total Score 10 9 17     LAYNE-7 SCORE 11/3/2016 5/11/2017 6/13/2017   Total Score - - -   Total Score 6 6 8     Collaboration: Amrita Carballo  (PCP)    Referral:  No referrals planned at this time.    Anticipated treatment duration: Unknown  Agreed upon meeting frequency: Biweekly    Long Term Treatment Goal(s) related to diagnosis/functional impairment(s)   Goal 1: Rachael will work to increase social support, increase self-care and functional independence, and maintain safety.    Steps we will take to achieve your goal:    Rachael will participate in group therapy via the [facility] at [facility].    Intervention(s)  Therapist will provide support, psychoeducation and homework assignments as needed.    If you need additional support and care during times that your therapist or PCP are not available, here are some additional resources for you:    Crisis Lines:    [facility] Adult Crisis:  [phone]  Crisis Connection:  [phone]  [facility] Multilingual Crisis Line:  [phone]    You can also consider going  to the [facility] at the following address.  Walk ins are welcome:    [address] [place] [address]   [phone] (for 24 hour crisis consultation)    Monday - Friday 8:00am - 7:00pm  Saturday:  11:00am - 3:00pm  Sunday and Holidays Closed    If you feel at risk of immediate harm, go directly to the Emergency Department.    Client has reviewed and agreed to the above plan.    Nathaniel Lombardi, PhD  August 1, 2017  Behavioral Health Fellow      ______________________________    ________  Patient Signature       Date    ______________________________    ________  Provider Signature       Date    ______________________________                ________  Supervisor Co-Signature      Date            Follow-ups after your visit        Your next 10 appointments already scheduled     Aug 14, 2017  9:00 AM CDT   Return Visit with Amrita Carballo MD   [facility])    [address] [place], [place] [id number] [phone]              Who to contact     Please call your clinic at [phone] to:    Ask questions about your health    Make or cancel appointments    Discuss your medicines    Learn about your test results    Speak to your doctor   If you have compliments or concerns about an experience at your clinic, or if you wish to file a complaint, please contact [facility] Patient Relations at [phone] or email us at [email or web address]         Additional Information About Your Visit        Vastarihart Information     Mengcao is an electronic gateway that provides easy, online access to your medical records. With Mengcao, you can request a clinic appointment, read your test results, renew a prescription or communicate with your care team.     To sign up for Mengcao visit the website at [email or web address]   You will be asked to enter the access code listed below, as well as some personal information. Please  follow the directions to create your username and password.     Your access code is: [email or web address]  Expires: 2017 10:31 AM     Your access code will  in 90 days. If you need help or a new code, please contact your [facility] or call [phone] for assistance.        Care EveryWhere ID     This is your Care EveryWhere ID. This could be used by other organizations to access your [facility] medical records  [id number]         Blood Pressure from Last 3 Encounters:   17 122/78   17 138/81   17 116/75    Weight from Last 3 Encounters:   17 131 lb 6.4 oz (59.6 kg)   17 134 lb 3.2 oz (60.9 kg)   17 136 lb (61.7 kg)              Today, you had the following     No orders found for display       Primary Care Provider Office Phone # Fax #    Amrita Carballo [phone]       [facility] [address] [place] [address]        Equal Access to Services     KRIS COLE : Hadii aad ku hadasho Soomaali, waaxda luqadaha, qaybta kaalmada adeericyabraeden, vincent hall. So [facility] [phone].    ATENCIÓN: Si habla español, tiene a farah disposición servicios gratuitos de asistencia lingüística. Anastasia al [phone].    We comply with applicable federal civil rights laws and [place] laws. We do not discriminate on the basis of race, color, national origin, age, disability sex, sexual orientation or gender identity.            Thank you!     Thank you for choosing [facility]  for your care. Our goal is always to provide you with excellent care. Hearing back from our patients is one way we can continue to improve our services. Please take a few minutes to complete the written survey that you may receive in the mail after your visit with us. Thank you!             Your Updated Medication List - Protect others around you: Learn how to safely use, store and throw away your medicines at www.disposemymeds.org.           This list is accurate as of: 8/1/17 10:28 AM.  Always use your most recent med list.                   Brand Name Dispense Instructions for use Diagnosis    atorvastatin 80 MG tablet    LIPITOR    90 tablet    Take 1 tablet (80 mg) by mouth daily    Type 2 diabetes mellitus without complication, without long-term current use of insulin (H)       blood glucose lancets standard    no brand specified    1 Box    Use to test blood sugar 1 times daily or as directed.    Type 2 diabetes mellitus with hyperglycemia, without long-term current use of insulin (H)       * blood glucose monitoring lancets     2 Box    1 each by In Vitro route daily    Diabetes mellitus, type 2 (H)       * MICROLET LANCETS Misc     100 each    1 Box daily    Diabetes mellitus (H)       blood glucose monitoring meter device kit    no brand specified    1 kit    Use to test blood sugar 1 times daily or as directed.    Type 2 diabetes mellitus with hyperglycemia, without long-term current use of insulin (H)       blood glucose monitoring test strip    no brand specified    1 Box    Use to test blood sugars 1 times daily or as directed    Type 2 diabetes mellitus with hyperglycemia, without long-term current use of insulin (H)       calcium carbonate 500 MG chewable tablet    TUMS    100 tablet    Take 1 tablet (500 mg) by mouth 2 times daily    Gastroesophageal reflux disease, esophagitis presence not specified       cholecalciferol 2000 UNITS tablet     100 tablet    Take 2,000 Units by mouth daily    Vitamin D deficiency       glipiZIDE 10 MG 24 hr tablet    GLUCOTROL XL    90 tablet    Take 1 tablet (10 mg) by mouth daily    Type 2 diabetes mellitus without complication, without long-term current use of insulin (H)       lisinopril 5 MG tablet    PRINIVIL/ZESTRIL    90 tablet    Take 1 tablet (5 mg) by mouth daily    Microalbuminuria       metFORMIN 500 MG 24 hr tablet    GLUCOPHAGE-XR    360 tablet    Take 2 tablets (1,000 mg) by mouth  2 times daily (with meals)    Type 2 diabetes mellitus without complication, without long-term current use of insulin (H)       pioglitazone 15 MG tablet    ACTOS    90 tablet    Take 1 tablet (15 mg) by mouth daily    Type 2 diabetes mellitus without complication, without long-term current use of insulin (H)       potassium chloride SA 20 MEQ CR tablet    potassium chloride    180 tablet    Take 1 tablet (20 mEq) by mouth 2 times daily    Hypokalemia       prazosin 2 MG capsule    MINIPRESS    90 capsule    Take 1 capsule (2 mg) by mouth At Bedtime    PTSD (post-traumatic stress disorder)       ranitidine 300 MG tablet    ZANTAC    90 tablet    Take 1 tablet (300 mg) by mouth At Bedtime    Gastroesophageal reflux disease without esophagitis       sertraline 50 MG tablet    ZOLOFT    30 tablet    Take 1 tablet (50 mg) by mouth daily    Moderate episode of recurrent major depressive disorder (H)       sitagliptin 100 MG tablet    JANUVIA    90 tablet    Take 1 tablet (100 mg) by mouth daily    Type 2 diabetes mellitus with hyperglycemia, without long-term current use of insulin (H)       * Notice:  This list has 2 medication(s) that are the same as other medications prescribed for you. Read the directions carefully, and ask your doctor or other care provider to review them with you.

## 2017-08-01 NOTE — PROGRESS NOTES
Behavioral Health Diagnostic Assessment Update:  Meeting was: Scheduled  Others present:  (CAROL)  Meeting lasted: 50 minutes  Client was: On time  Date of Initial Diagnostic Assessment: 7/29/2013 (Initial); 3/17/2014 (Update); 11/3/2016 (Update)  Complexity: An  is used not only to interpret language, since the patient does not speak English, but also to help with the complexity of understandings across cultures, since the patient is not well integrated in the larger [place] culture.    Assessment Summary:   Diagnostic assessment update completed today. The patient is a 52 year old [facility] female who was referred for mental health services to get established with the [facility] for help with ongoing depressive and posttraumatic stress symptoms. Based on the patient's report of symptoms and review of the patient's medical record, she continues to meet criteria for Major Depressive Disorder, Recurrent Episode, Moderate, as well as Posttraumatic Stress Disorder. Assessment of cognitive functioning in this interview was based on the self-reported difficulties, and supported the previously given diagnosis of Unspecified Neurocognitive Disorder. The patient's mental health concerns continue to affect her ability to function in interpersonal contexts/settings and have been causing clinically significant distress. She denies safety concerns. Based on the patient's reported symptoms and impact on functioning, as well as the patient's preference to participate solely within a group context/setting (Uninterested in getting established with individual psychotherapy at this time), the plan for the patient is to enroll her in the [facility].     Diagnosis (DSM-5):  296.32 Major Depressive Disorder, Recurrent Episode, Moderate  309.81 Posttraumatic Stress Disorder  799.59 Unspecified Neurocognitive Disorder    Orientation, Recommendations & Plan:     Rights to and limits to confidentiality were reviewed with  "patient.    Integrated care team and shared chart were reviewed with patient and agreed upon.    Role as post-doctoral fellow and supervision were discussed with patient.    Patient was given informational handout on postdoctoral fellow status and was invited to ask questions. Given the language barrier, the handout was reviewed with the patient during the current visit. The patient denied having questions.     Treatment plan updated today. Next treatment plan update due on 11/1/2017    Goals for therapy (Group): Increase social support; Increase self-care and functional independence; Maintain safety    Mental Health Screening Questionnaires:  PHQ-9 SCORE 5/11/2017 6/13/2017 8/1/2017   Total Score - - -   Total Score 9 17 18     LAYNE-7 SCORE 5/11/2017 6/13/2017 8/1/2017   Total Score - - -   Total Score 6 8 16     PTSD-PC: 4/4  CAGE AID: 0/4     WHODAS 2.0 TOTAL SCORES 11/3/2016 8/1/2017   Total Score 48 32   Complete responses are available for review in the flow-sheet.     Current Presenting Problems or Complaints (including patient perception of problem and external factors contributing to current dilemma):   The patient reports ongoing depressive and posttraumatic stress symptoms, and reports her symptoms continue to impact her ability to function in interpersonal contexts/settings and continue to cause clinically significant distress. Of note, regarding depressive symptoms, reports feelings her symptoms have been less severe recently (As compared to one-to-two months ago in early June 2017). Describes her current/recent depressive symptoms as \"a little bit lighter\" in comparison, which she attributes to feeling well-supported and engaged with her children and . The patient also reports continued difficulties with being forgetful, remembering conversations, and maintaining focus/concentration.     Review of Symptoms:  Depression: Depressed mood, Sleep, Anhedonia, Guilt, Energy, Concentration, Appetite, " Psychomotor slowing or agitation, Suicidal ideation, Worthlessness, Ruminations, Irritability  Mariajose: None   Psychosis: None  Anxiety: Worries  Post Traumatic Stress Disorder: Re-experiencing of trauma, Avoidance of traumatic stimuli, Alterations in cognitions/mood, Increased arousal    Updated Life History/Circumstances:   Continues to live with daughter and extended family. No significant changes regarding life circumstances/history.     Current Substance Use:   Denies past/current alcohol or drug use, or misuse of prescription drugs. Reports use of Betel Nut.    The CAGE screening questions (asking whether patients felt they should cut down on drinking, were annoyed by others criticizing her drinking, felt guilty about use, or ever had an eye opener) were asked of the patient to determine possible ETOH or chemical abuse issues.  None of the patient's responses to the CAGE screening were positive, suggesting a negative CAGE score.     Updated Health History/Family Health History:   No changes reported regarding health history of family health history. Continues to work with home health nurse who visits the patient at her residence weekly.    Patient Active Problem List   Diagnosis     Essential hypertension, benign     Diabetes mellitus, type 2 (H)     Hyperlipidemia     Health Care Home     Helicobacter pylori gastritis     PTSD (post-traumatic stress disorder)     Moderate episode of recurrent major depressive disorder (H)     Cognitive complaints     Screening for depression     Microalbuminuria     Current Outpatient Prescriptions   Medication     sertraline (ZOLOFT) 50 MG tablet     pioglitazone (ACTOS) 15 MG tablet     glipiZIDE (GLUCOTROL XL) 10 MG 24 hr tablet     metFORMIN (GLUCOPHAGE-XR) 500 MG 24 hr tablet     prazosin (MINIPRESS) 2 MG capsule     cholecalciferol 2000 UNITS tablet     sitagliptin (JANUVIA) 100 MG tablet     ranitidine (ZANTAC) 300 MG tablet     atorvastatin (LIPITOR) 80 MG tablet      lisinopril (PRINIVIL/ZESTRIL) 5 MG tablet     potassium chloride SA (POTASSIUM CHLORIDE) 20 MEQ CR tablet     calcium carbonate (TUMS) 500 MG chewable tablet     MICROLET LANCETS MISC     blood glucose monitoring (NO BRAND SPECIFIED) test strip     blood glucose monitoring (NO BRAND SPECIFIED) meter device kit     blood glucose (NO BRAND SPECIFIED) lancets standard     blood glucose monitoring (ACCU-CHEK MULTICLIX) lancets     No current facility-administered medications for this visit.      Cultural Factors:   The patient is a [place] refugee who [place] the [place] in [place]. She is interested in participating in the [facility] group.    Mental Status Exam:  Appearance:  No apparent distress, Casually dressed, Well groomed, Dressed appropriately for weather and Appears stated age  Behavior/Relationship to Examiner/Demeanor:  Cooperative, Passive, and Pleasant  Gait: Unremarkable  Psychomotor Activity: Unremarkable  Speech rate: Normal  Speech volume: Normal  Speech coherence: Normal  Speech spontaneity: Normal  Mood (subjective report): Neutral  Affect (objective appearance):  Appropriate/mood-congruent  Eye Contact: fair  Thought Process (Associations):  Logical, Linear, and Goal-oriented  Thought process (Rate):  Somewhat slowed  Abnormal Perception:  None  Sensorium:  Alert, Oriented to person, Oriented to place, Oriented to date/time and Oriented to situation  Attention/Concentration:  Fair  Insight:  Fair  Judgment:  Fair    Suicide Assessment:  Recent suicidal thoughts: Yes (Recent occasional passive suicidal ideation, though none current; Denies current/recent active suicidal ideation/plan/intent)  Past suicidal thoughts: Yes (History of passive suicidal ideation; History of active suicidal ideation with plan/intent [Hanging] with most recent occurrence in early June 2017)  Any attempts in the past: No completed attempts (History of two attempts to hang herself from the rafters in the garage [June 2015; June  2017], the first attempt not completed due to being unable to reach the rafters and the second attempt not completed due to the patient changing her mind after thinking of her daughter)  Any family/friends/loved ones commit suicide: No  Plan or considering various methods: None current/recent (History of active suicidal ideation with plan of hanging)  Access to guns: No; Also reports her family has removed all ropes and other materials that could be used to hang oneself from the residence after learning of the patient's most recent attempt  Protective factors: no current/recent plan or intent, h/o seeking help when needed, none to minimal alcohol use, commitment to family, good social support and stable housing  Verbal contract for safety: Yes    Non-Suicidal Self Injurious Behavior:   None    Violence/Homicide Risk Assessment:  Problems with anger management: No  History of violence: No  History of significant damage to property: No  Threat made to harm or kill someone: No  Verbal contract for safety: N/A    Safety Plan:   Rachael Ch was provided with the phone number for emergency mental health services (Included in the patient's treatment plan) and was encouraged to call these local crisis numbers, 911, or visit a local emergency room if thoughts of suicide or homicide were to arise and/or if she were to be in acute distress. The patient agreed to utilize these services as indicated if the need were to arise. Rachael Ch denied current suicidal ideation, denied current/recent active suicidal ideation/plan/intent, denied past or current homicidal ideation/plan/intent, denied a history of self-harming behaviors, identified her family as her primary protective factor to self-harm or harm to others, and denied any concerns regarding safety at this time. Based on these factors, Rachael Ch is considered to be sustainable as an outpatient at this time.     NOTE: Diagnostic assessment update complete.    Primary Care PTSD  "Screen  In your life, have you ever had any experience that was so frightening, horrible or upsetting that, in the past month, you...    1. Have had nightmares about it or thought about it when you did not want to? Yes  2. Tried hard not to think about it or went out of your way to avoid situations that remind you of it? Yes  3. Were constantly on guard, watchful, or easily startled? Yes  4. Felt numb or detached from others, activities, or your surroundings? Yes    Current research suggests that the results of the PC-PTSD should be considered \"positive\" if a patient answers \"yes\" to any (3) items.      Nathaniel Lombardi, Ph.D.  Behavioral Health Fellow      References    OSVALDO Leslie., MARIBETH Everett., Kimerling, R., LUANN Henriquez., RIKI Harrington., APARNA Flores., OSVALDO Davis., GABBIE Lazo., Peterson, J.I. (2004). The primary care PTSD screen (PC-PTSD): development and operating characteristics. Primary Care Psychiatry, 9, 9-14.  "

## 2017-08-01 NOTE — PATIENT INSTRUCTIONS
Treatment Plan    Client's Name: Rachael Ch  YOB: 1965  Today's Date: 8/1/2017  Date Diagnostic Update Due: 8/1/2018    DSM-V Diagnoses:   296.32 Major Depressive Disorder, Recurrent Episode, Moderate  309.81 Posttraumatic Stress Disorder  799.59 Unspecified Neurocognitive Disorder    Psychosocial / Contextual Factors:   The patient feels well-supported by family and friends.  The patient is uninterested in getting established with individual psychotherapy at this time.    WHODAS 2.0 TOTAL SCORES 11/3/2016   Total Score 48     PC-PTSD: 0/4  CAGE AID: 0/4    PHQ-9 SCORE 3/17/2017 5/11/2017 6/13/2017   Total Score - - -   Total Score 10 9 17     LAYNE-7 SCORE 11/3/2016 5/11/2017 6/13/2017   Total Score - - -   Total Score 6 6 8     Collaboration: Amriat Carballo  (PCP)    Referral:  No referrals planned at this time.    Anticipated treatment duration: Unknown  Agreed upon meeting frequency: Biweekly    Long Term Treatment Goal(s) related to diagnosis/functional impairment(s)   Goal 1: Rachael will work to increase social support, increase self-care and functional independence, and maintain safety.    Steps we will take to achieve your goal:    Rachael will participate in group therapy via the [facility] at [facility].    Intervention(s)  Therapist will provide support, psychoeducation and homework assignments as needed.    If you need additional support and care during times that your therapist or PCP are not available, here are some additional resources for you:    Crisis Lines:    [facility] Adult Crisis:  [phone]  Crisis Connection:  [phone]  [facility] Multilingual Crisis Line:  [phone]    You can also consider going to the [facility]gent [facility] at the following address.  Walk ins are welcome:    [address] [place] [address]   [phone] (for 24 hour crisis consultation)    Monday - Friday 8:00am - 7:00pm  Saturday:  11:00am - 3:00pm  Sunday and Holidays  Closed    If you feel at risk of immediate harm, go directly to the Emergency Department.    Client has reviewed and agreed to the above plan.    Nathaniel Lombardi, PhD  August 1, 2017  Behavioral Health Fellow      ______________________________    ________  Patient Signature       Date    ______________________________    ________  Provider Signature       Date    ______________________________                ________  Supervisor Co-Signature      Date

## 2017-08-02 ASSESSMENT — PATIENT HEALTH QUESTIONNAIRE - PHQ9: SUM OF ALL RESPONSES TO PHQ QUESTIONS 1-9: 18

## 2017-08-02 ASSESSMENT — ANXIETY QUESTIONNAIRES: GAD7 TOTAL SCORE: 16

## 2017-08-14 ENCOUNTER — OFFICE VISIT (OUTPATIENT)
Dept: FAMILY MEDICINE | Facility: CLINIC | Age: 52
End: 2017-08-14

## 2017-08-14 VITALS
WEIGHT: 132.2 LBS | TEMPERATURE: 98.4 F | OXYGEN SATURATION: 99 % | HEIGHT: 58 IN | BODY MASS INDEX: 27.75 KG/M2 | HEART RATE: 70 BPM | DIASTOLIC BLOOD PRESSURE: 76 MMHG | SYSTOLIC BLOOD PRESSURE: 122 MMHG

## 2017-08-14 DIAGNOSIS — R80.9 MICROALBUMINURIA: ICD-10-CM

## 2017-08-14 DIAGNOSIS — Z00.00 PREVENTATIVE HEALTH CARE: ICD-10-CM

## 2017-08-14 DIAGNOSIS — F33.1 MODERATE EPISODE OF RECURRENT MAJOR DEPRESSIVE DISORDER (H): ICD-10-CM

## 2017-08-14 DIAGNOSIS — I10 ESSENTIAL HYPERTENSION, BENIGN: Primary | ICD-10-CM

## 2017-08-14 DIAGNOSIS — E87.6 HYPOKALEMIA: Primary | ICD-10-CM

## 2017-08-14 DIAGNOSIS — R41.9 COGNITIVE COMPLAINTS: ICD-10-CM

## 2017-08-14 DIAGNOSIS — E87.6 HYPOKALEMIA: ICD-10-CM

## 2017-08-14 DIAGNOSIS — E11.9 TYPE 2 DIABETES MELLITUS WITHOUT COMPLICATION, WITHOUT LONG-TERM CURRENT USE OF INSULIN (H): ICD-10-CM

## 2017-08-14 LAB
BUN SERPL-MCNC: 13.5 MG/DL (ref 7–19)
CALCIUM SERPL-MCNC: 9.4 MG/DL (ref 8.5–10.1)
CHLORIDE SERPLBLD-SCNC: 103.7 MMOL/L (ref 98–110)
CO2 SERPL-SCNC: 28.3 MMOL/L (ref 20–32)
CREAT SERPL-MCNC: 0.6 MG/DL (ref 0.5–1)
GFR SERPL CREATININE-BSD FRML MDRD: >90 ML/MIN/1.7 M2
GLUCOSE SERPL-MCNC: 194.5 MG'DL (ref 70–99)
POTASSIUM SERPL-SCNC: 3.5 MMOL/DL (ref 3.2–4.6)
SODIUM SERPL-SCNC: 136.8 MMOL/L (ref 132–142)

## 2017-08-14 NOTE — PROGRESS NOTES
Rachael Ch-    Here is a copy of your lab test.  Everything came back looking good.  Please call the clinic at [phone] if you have any questions.      Amrita Carballo    Please send results to patient.

## 2017-08-14 NOTE — LETTER
August 15, 2017      Rachael [facility] [facility] [id number]        Dear Rachael,    Please see below for your test results.    Here is a copy of your lab test.  Everything came back looking good.  Please call the clinic at [phone] if you have any questions.      Resulted Orders   Basic Metabolic Panel ([place])   Result Value Ref Range    Urea Nitrogen 13.5 7.0 - 19.0 mg/dL    Calcium 9.4 8.5 - 10.1 mg/dL    Chloride 103.7 98.0 - 110.0 mmol/L    Carbon Dioxide 28.3 20.0 - 32.0 mmol/L    Creatinine 0.6 0.5 - 1.0 mg/dL    Glucose 194.5 (H) 70.0 - 99.0 mg'dL    Potassium 3.5 3.2 - 4.6 mmol/dL    Sodium 136.8 132.0 - 142.0 mmol/L    GFR Estimate >90 >60.0 mL/min/1.7 m2    GFR Estimate If Black >90 >60.0 mL/min/1.7 m2       If you have any questions, please call the clinic to make an appointment.    Sincerely,    Amrita Carballo MD

## 2017-08-14 NOTE — MR AVS SNAPSHOT
After Visit Summary   2017    Rachael Ch    MRN: [id number]           Patient Information     Date Of Birth          1965        Visit Information        Provider Department      2017 9:00 AM Amrita Carballo MD [facility]        Today's Diagnoses     Hypokalemia        Preventative health care          Care Instructions    No change in medications today.    STOP taking potassium medications.      Check blood sugar when you feel tired, so I know where your levels are.  Continue to check 2x in the week before you eat.      Come back in 6 weeks.            Follow-ups after your visit        Who to contact     Please call your clinic at [phone] to:    Ask questions about your health    Make or cancel appointments    Discuss your medicines    Learn about your test results    Speak to your doctor   If you have compliments or concerns about an experience at your clinic, or if you wish to file a complaint, please contact [facility] Patient Relations at [phone] or email us at [email or web address]         Additional Information About Your Visit        MyChart Information     AmpliSense is an electronic gateway that provides easy, online access to your medical records. With [employer], you can request a clinic appointment, read your test results, renew a prescription or communicate with your care team.     To sign up for [employer] visit the website at [email or web address]   You will be asked to enter the access code listed below, as well as some personal information. Please follow the directions to create your username and password.     Your access code is: [email or web address]  Expires: 2017 10:31 AM     Your access code will  in 90 days. If you need help or a new code, please contact your [facility] or call [phone] for assistance.        Care EveryWhere ID     This is your Care EveryWhere ID. This could  "be used by other organizations to access your [facility] medical records  [id number]        Your Vitals Were     Pulse Temperature Height Pulse Oximetry BMI (Body Mass Index)       70 98.4  F (36.9  C) (Oral) 4' 9.5\" (146.1 cm) 99% 28.11 kg/m2        Blood Pressure from Last 3 Encounters:   08/14/17 122/76   07/14/17 122/78   06/13/17 138/81    Weight from Last 3 Encounters:   08/14/17 132 lb 3.2 oz (60 kg)   07/14/17 131 lb 6.4 oz (59.6 kg)   06/13/17 134 lb 3.2 oz (60.9 kg)              We Performed the Following     Basic Metabolic Panel ([place])     Hepatitis C Antibody ([facility])        Primary Care Provider Office Phone # Fax #    Amrita Carballo [phone]       [facility] [address] [place] [address]        Equal Access to Services     RIMMA COLE : Hadii aad ku hadasho Soomaali, waaxda luqadaha, qaybta kaalmada adeegyada, waxay idiin hayroxannan darshan jeffrey . So [facility] [phone].    ATENCIÓN: Si ranjanla espnakul, tiene a farah disposición servicios gratuitos de asistencia lingüística. Llame al [phone].    We comply with applicable federal civil rights laws and [place] laws. We do not discriminate on the basis of race, color, national origin, age, disability sex, sexual orientation or gender identity.            Thank you!     Thank you for choosing [facility]  for your care. Our goal is always to provide you with excellent care. Hearing back from our patients is one way we can continue to improve our services. Please take a few minutes to complete the written survey that you may receive in the mail after your visit with us. Thank you!             Your Updated Medication List - Protect others around you: Learn how to safely use, store and throw away your medicines at www.disposemymeds.org.          This list is accurate as of: 8/14/17  9:51 AM.  Always use your most recent med list.                   Brand Name Dispense Instructions for use Diagnosis    " atorvastatin 80 MG tablet    LIPITOR    90 tablet    Take 1 tablet (80 mg) by mouth daily    Type 2 diabetes mellitus without complication, without long-term current use of insulin (H)       blood glucose lancets standard    no brand specified    1 Box    Use to test blood sugar 1 times daily or as directed.    Type 2 diabetes mellitus with hyperglycemia, without long-term current use of insulin (H)       * blood glucose monitoring lancets     2 Box    1 each by In Vitro route daily    Diabetes mellitus, type 2 (H)       * MICROLET LANCETS Misc     100 each    1 Box daily    Diabetes mellitus (H)       blood glucose monitoring meter device kit    no brand specified    1 kit    Use to test blood sugar 1 times daily or as directed.    Type 2 diabetes mellitus with hyperglycemia, without long-term current use of insulin (H)       blood glucose monitoring test strip    no brand specified    1 Box    Use to test blood sugars 1 times daily or as directed    Type 2 diabetes mellitus with hyperglycemia, without long-term current use of insulin (H)       calcium carbonate 500 MG chewable tablet    TUMS    100 tablet    Take 1 tablet (500 mg) by mouth 2 times daily    Gastroesophageal reflux disease, esophagitis presence not specified       cholecalciferol 2000 UNITS tablet     100 tablet    Take 2,000 Units by mouth daily    Vitamin D deficiency       glipiZIDE 10 MG 24 hr tablet    GLUCOTROL XL    90 tablet    Take 1 tablet (10 mg) by mouth daily    Type 2 diabetes mellitus without complication, without long-term current use of insulin (H)       lisinopril 5 MG tablet    PRINIVIL/ZESTRIL    90 tablet    Take 1 tablet (5 mg) by mouth daily    Microalbuminuria       metFORMIN 500 MG 24 hr tablet    GLUCOPHAGE-XR    360 tablet    Take 2 tablets (1,000 mg) by mouth 2 times daily (with meals)    Type 2 diabetes mellitus without complication, without long-term current use of insulin (H)       pioglitazone 15 MG tablet    ACTOS     90 tablet    Take 1 tablet (15 mg) by mouth daily    Type 2 diabetes mellitus without complication, without long-term current use of insulin (H)       potassium chloride SA 20 MEQ CR tablet    potassium chloride    180 tablet    Take 1 tablet (20 mEq) by mouth 2 times daily    Hypokalemia       prazosin 2 MG capsule    MINIPRESS    90 capsule    Take 1 capsule (2 mg) by mouth At Bedtime    PTSD (post-traumatic stress disorder)       ranitidine 300 MG tablet    ZANTAC    90 tablet    Take 1 tablet (300 mg) by mouth At Bedtime    Gastroesophageal reflux disease without esophagitis       sertraline 50 MG tablet    ZOLOFT    30 tablet    Take 1 tablet (50 mg) by mouth daily    Moderate episode of recurrent major depressive disorder (H)       sitagliptin 100 MG tablet    JANUVIA    90 tablet    Take 1 tablet (100 mg) by mouth daily    Type 2 diabetes mellitus with hyperglycemia, without long-term current use of insulin (H)       * Notice:  This list has 2 medication(s) that are the same as other medications prescribed for you. Read the directions carefully, and ask your doctor or other care provider to review them with you.

## 2017-08-14 NOTE — PATIENT INSTRUCTIONS
No change in medications today.    STOP taking potassium medications.      Check blood sugar when you feel tired, so I know where your levels are.  Continue to check 2x in the week before you eat.      Come back in 6 weeks.

## 2017-08-15 LAB — HCV AB SER QL: NEGATIVE

## 2017-08-15 ASSESSMENT — PATIENT HEALTH QUESTIONNAIRE - PHQ9: SUM OF ALL RESPONSES TO PHQ QUESTIONS 1-9: 9

## 2017-08-15 NOTE — PROGRESS NOTES
There are no exam notes on file for this visit.    RAYSA Ch is a 52 year old female with past medical history significant for    Patient Active Problem List   Diagnosis     Essential hypertension, benign     Diabetes mellitus, type 2 (H)     Hyperlipidemia     Health Care Home     Helicobacter pylori gastritis     PTSD (post-traumatic stress disorder)     Moderate episode of recurrent major depressive disorder (H)     Cognitive complaints     Screening for depression     Microalbuminuria     Others present at the visit:   Silvino Banks    Presents for   Chief Complaint   Patient presents with     Recheck Medication     Patient here for medication check and mood check      Patient is here today to follow-up on multiple issues.    1)  major depression.  Patient shares that her mood has been improving. She feels more bright and engaged. Memory is better. Sleep is better. Appetite is better. She still feels tired more often than she would like to. Is taking the Zoloft regularly per her report. The home nurse that helps set up her medications. Has been seen for evaluation for [facility] group and is looking forward to doing this again. She reports however that she will not participate in the group is too small. Is excited that there will be some new participants. Does not like talking in a very small group because she feels more exposed.  We discussed increasing the Zoloft and she would like to stay at the same dose currently. None of the side effects that she had on venlafaxine previously.    2)  she continues to have difficulties with fatigue. Feels like she is eating better but still not eating as well as she should. Describes the tiredness as episodes that last a few minutes couple of times a day. Sometimes she just doesn't feel hungry and will go an entire day without eating. If she is able to force herself to eat she will feel better. Also tries drinking juice and this helps her to feel  "better. She notices increased urination. Gets up 4-5 times at night to go to the bathroom and this has been getting worse. She reports taking her diabetes medications regularly. Wishes she did not have to take so many pills. Pulmonary checks her blood sugar when she comes but Rachael Ch has also been checking it on her own. Blood sugars have typically been between 150 and 200 over the last couple of weeks. The 14 day average is 165. She does not check when she has episodes of tiredness.    Denies chest pain no shortness of breath no lower extremity pain no numbness and tingling.  Reports taking her medications regularly. Again, wishes she didn't have to take so many.    We discussed her labs from last visit including her potassium that was normal, kidney tests that were normal,  and elevated A1c.    OBJECTIVE:  Vitals: /76  Pulse 70  Temp 98.4  F (36.9  C) (Oral)  Ht 4' 9.5\" (146.1 cm)  Wt 132 lb 3.2 oz (60 kg)  SpO2 99%  BMI 28.11 kg/m2  BMI= Body mass index is 28.11 kg/(m^2).  Vitals:  Vitals are reviewed and are within the normal range  Gen:  Alert, pleasant, no acute distress  Cardiac:  Regular rate and rhythm, no murmurs, rubs or gallops  Respiratory:  Lungs clear to auscultation bilaterally  Abdomen:  Soft, non-tender, non-distended, bowel sounds positive  Extremities:  Warm, well-perfused, pulses 2+/4, no lower extremity edema  Psyche:  Mood and affect are bright.  Engaged and participating today.        ASSESSMENT AND PLAN:      Rachael was seen today for recheck medication. Depression has been good. Enrolling in [facility] group. Doing well on Zoloft. Symptoms improving. Will continue with 50 mg of Zoloft.    Diabetes. It is not clear to me whether she is fatigued because she is hyperglycemic or hypoglycemic. Discussed checking blood sugars when she is symptomatic. She will do this. We'll also check twice weekly in the morning fasting. We will continue with the same medications. Ideally she will do well " on insulin and has declined this in the past. May be having episodes of hypoglycemia which are contributing. We will continue on the metformin glipizide Januvia and Actos.    Hypertension. Controlled today. Also lisinopril for microalbuminuria. Potassium is normal. We'll discontinue the supplements as these have been difficult for her to take anyway.    Diagnoses and all orders for this visit:    Essential hypertension, benign    Hypokalemia  -     Basic Metabolic Panel ([place])    Preventative health care  -     Hepatitis C Antibody ([facility])    Type 2 diabetes mellitus without complication, without long-term current use of insulin (H)    Moderate episode of recurrent major depressive disorder (H)    Cognitive complaints    Microalbuminuria      Patient Instructions   No change in medications today.    STOP taking potassium medications.      Check blood sugar when you feel tired, so I know where your levels are.  Continue to check 2x in the week before you eat.      Come back in 6 weeks.        Amrita Carballo

## 2017-08-29 ENCOUNTER — MEDICAL CORRESPONDENCE (OUTPATIENT)
Dept: HEALTH INFORMATION MANAGEMENT | Facility: CLINIC | Age: 52
End: 2017-08-29

## 2017-09-10 ENCOUNTER — HEALTH MAINTENANCE LETTER (OUTPATIENT)
Age: 52
End: 2017-09-10

## 2017-09-11 DIAGNOSIS — E11.8 TYPE 2 DIABETES MELLITUS WITH COMPLICATION, WITHOUT LONG-TERM CURRENT USE OF INSULIN (H): Primary | ICD-10-CM

## 2017-09-12 ENCOUNTER — OFFICE VISIT (OUTPATIENT)
Dept: FAMILY MEDICINE | Facility: CLINIC | Age: 52
End: 2017-09-12

## 2017-09-12 VITALS
TEMPERATURE: 98 F | OXYGEN SATURATION: 99 % | HEART RATE: 70 BPM | HEIGHT: 57 IN | SYSTOLIC BLOOD PRESSURE: 117 MMHG | BODY MASS INDEX: 28.05 KG/M2 | WEIGHT: 130 LBS | DIASTOLIC BLOOD PRESSURE: 77 MMHG

## 2017-09-12 DIAGNOSIS — F43.10 PTSD (POST-TRAUMATIC STRESS DISORDER): ICD-10-CM

## 2017-09-12 DIAGNOSIS — I10 ESSENTIAL HYPERTENSION, BENIGN: ICD-10-CM

## 2017-09-12 DIAGNOSIS — E11.8 TYPE 2 DIABETES MELLITUS WITH COMPLICATION, WITHOUT LONG-TERM CURRENT USE OF INSULIN (H): ICD-10-CM

## 2017-09-12 DIAGNOSIS — E11.9 TYPE 2 DIABETES MELLITUS WITHOUT COMPLICATION, WITHOUT LONG-TERM CURRENT USE OF INSULIN (H): ICD-10-CM

## 2017-09-12 DIAGNOSIS — F33.1 MODERATE EPISODE OF RECURRENT MAJOR DEPRESSIVE DISORDER (H): Primary | ICD-10-CM

## 2017-09-12 LAB
ANION GAP SERPL CALCULATED.3IONS-SCNC: 13 MMOL/L (ref 5–18)
BUN SERPL-MCNC: 11 MG/DL (ref 8–22)
CALCIUM SERPL-MCNC: 9.4 MG/DL (ref 8.5–10.5)
CHLORIDE SERPL-SCNC: 103 MMOL/L (ref 98–107)
CO2 SERPL-SCNC: 21 MMOL/L (ref 22–31)
CREAT SERPL-MCNC: 0.85 MG/DL (ref 0.6–1.1)
GLUCOSE SERPL-MCNC: 328 MG/DL (ref 70–125)
HBA1C MFR BLD: 9.3 % (ref 4.1–5.7)
POTASSIUM SERPL-SCNC: 4.1 MMOL/L (ref 3.5–5)
SODIUM SERPL-SCNC: 137 MMOL/L (ref 136–145)

## 2017-09-12 RX ORDER — PIOGLITAZONEHYDROCHLORIDE 30 MG/1
30 TABLET ORAL DAILY
Qty: 90 TABLET | Refills: 1 | Status: SHIPPED | OUTPATIENT
Start: 2017-09-12 | End: 2017-12-13

## 2017-09-12 RX ORDER — GLIPIZIDE 10 MG/1
10 TABLET, FILM COATED, EXTENDED RELEASE ORAL DAILY
Qty: 90 TABLET | Refills: 1 | Status: SHIPPED | OUTPATIENT
Start: 2017-09-12 | End: 2018-03-02

## 2017-09-12 ASSESSMENT — ANXIETY QUESTIONNAIRES
2. NOT BEING ABLE TO STOP OR CONTROL WORRYING: NOT AT ALL
6. BECOMING EASILY ANNOYED OR IRRITABLE: NOT AT ALL
1. FEELING NERVOUS, ANXIOUS, OR ON EDGE: NOT AT ALL
7. FEELING AFRAID AS IF SOMETHING AWFUL MIGHT HAPPEN: NOT AT ALL
5. BEING SO RESTLESS THAT IT IS HARD TO SIT STILL: NOT AT ALL
3. WORRYING TOO MUCH ABOUT DIFFERENT THINGS: NOT AT ALL
IF YOU CHECKED OFF ANY PROBLEMS ON THIS QUESTIONNAIRE, HOW DIFFICULT HAVE THESE PROBLEMS MADE IT FOR YOU TO DO YOUR WORK, TAKE CARE OF THINGS AT HOME, OR GET ALONG WITH OTHER PEOPLE: NOT DIFFICULT AT ALL
GAD7 TOTAL SCORE: 0

## 2017-09-12 ASSESSMENT — PATIENT HEALTH QUESTIONNAIRE - PHQ9: 5. POOR APPETITE OR OVEREATING: NOT AT ALL

## 2017-09-12 NOTE — Clinical Note
Pt interested in [facility] group but only if enough people.  Let me know if this is still an option and what else needs to be done to get her enrolled.

## 2017-09-12 NOTE — NURSING NOTE
name: Silvino (Cat) Darren  Language: Radha  Agency: "[employer]  Phone number: [phone]

## 2017-09-12 NOTE — MR AVS SNAPSHOT
After Visit Summary   2017    Rachael Ch    MRN: [id number]           Patient Information     Date Of Birth          1965        Visit Information        Provider Department      2017 10:00 AM Amrita Carballo MD [facility]        Today's Diagnoses     Type 2 diabetes mellitus with complication, without long-term current use of insulin (H)        Type 2 diabetes mellitus without complication, without long-term current use of insulin (H)        Moderate episode of recurrent major depressive disorder (H)          Care Instructions    Increase the pioglitazone to 30mg per day.    Continue rest of the medications the same.    Follow up in 6 weeks.            Follow-ups after your visit        Who to contact     Please call your clinic at [phone] to:    Ask questions about your health    Make or cancel appointments    Discuss your medicines    Learn about your test results    Speak to your doctor   If you have compliments or concerns about an experience at your clinic, or if you wish to file a complaint, please contact [facility] Patient Relations at [phone] or email us at [email or web address]         Additional Information About Your Visit        MyChart Information     [employer] is an electronic gateway that provides easy, online access to your medical records. With [employer], you can request a clinic appointment, read your test results, renew a prescription or communicate with your care team.     To sign up for [employer] visit the website at [email or web address]   You will be asked to enter the access code listed below, as well as some personal information. Please follow the directions to create your username and password.     Your access code is: [email or web address]  Expires: 2017 11:22 AM     Your access code will  in 90 days. If you need help or a new code, please contact your [facility] or call  "[phone] for assistance.        Care EveryWhere ID     This is your Care EveryWhere ID. This could be used by other organizations to access your [facility] medical records  [id number]        Your Vitals Were     Pulse Temperature Height Pulse Oximetry BMI (Body Mass Index)       70 98  F (36.7  C) 4' 9.09\" (145 cm) 99% 28.05 kg/m2        Blood Pressure from Last 3 Encounters:   09/12/17 117/77   08/14/17 122/76   07/14/17 122/78    Weight from Last 3 Encounters:   09/12/17 130 lb (59 kg)   08/14/17 132 lb 3.2 oz (60 kg)   07/14/17 131 lb 6.4 oz (59.6 kg)              We Performed the Following     Basic Metabolic Profile ([facility]) - Results > 1 hr     Hemoglobin A1c ([facility])          Today's Medication Changes          These changes are accurate as of: 9/12/17 11:23 AM.  If you have any questions, ask your nurse or doctor.               These medicines have changed or have updated prescriptions.        Dose/Directions    pioglitazone 30 MG tablet   Commonly known as:  ACTOS   This may have changed:    - medication strength  - how much to take   Used for:  Type 2 diabetes mellitus without complication, without long-term current use of insulin (H)   Changed by:  Amrita Carballo MD        Dose:  30 mg   Take 1 tablet (30 mg) by mouth daily   Quantity:  90 tablet   Refills:  1            Where to get your medicines      These medications were sent to [facility] - [place], [place] - [address], [place] [address]     Phone:  [phone]     glipiZIDE 10 MG 24 hr tablet    pioglitazone 30 MG tablet    sertraline 50 MG tablet                Primary Care Provider Office Phone # Fax #    Amrita Carballo [phone]       [facility] [address] [place] [address]        Equal Access to Services     RIMMA COLE AH: Soni hallo Somatt, waaxda luqadaha, qaybta kaalmada adeegyada, vincent hall. So wac " [phone].    ATENCIÓN: Si neena monae, tiene a farah disposición servicios gratuitos de asistencia lingüística. Anastasia al [phone].    We comply with applicable federal civil rights laws and [place] laws. We do not discriminate on the basis of race, color, national origin, age, disability sex, sexual orientation or gender identity.            Thank you!     Thank you for choosing [facility]  for your care. Our goal is always to provide you with excellent care. Hearing back from our patients is one way we can continue to improve our services. Please take a few minutes to complete the written survey that you may receive in the mail after your visit with us. Thank you!             Your Updated Medication List - Protect others around you: Learn how to safely use, store and throw away your medicines at www.disposemymeds.org.          This list is accurate as of: 9/12/17 11:23 AM.  Always use your most recent med list.                   Brand Name Dispense Instructions for use Diagnosis    atorvastatin 80 MG tablet    LIPITOR    90 tablet    Take 1 tablet (80 mg) by mouth daily    Type 2 diabetes mellitus without complication, without long-term current use of insulin (H)       blood glucose lancets standard    no brand specified    1 Box    Use to test blood sugar 1 times daily or as directed.    Type 2 diabetes mellitus with hyperglycemia, without long-term current use of insulin (H)       * blood glucose monitoring lancets     2 Box    1 each by In Vitro route daily    Diabetes mellitus, type 2 (H)       * MICROLET LANCETS Misc     100 each    1 Box daily    Diabetes mellitus (H)       blood glucose monitoring meter device kit    no brand specified    1 kit    Use to test blood sugar 1 times daily or as directed.    Type 2 diabetes mellitus with hyperglycemia, without long-term current use of insulin (H)       blood glucose monitoring test strip    no brand specified    1 Box    Use to test blood sugars 1  times daily or as directed    Type 2 diabetes mellitus with hyperglycemia, without long-term current use of insulin (H)       calcium carbonate 500 MG chewable tablet    TUMS    100 tablet    Take 1 tablet (500 mg) by mouth 2 times daily    Gastroesophageal reflux disease, esophagitis presence not specified       cholecalciferol 2000 UNITS tablet     100 tablet    Take 2,000 Units by mouth daily    Vitamin D deficiency       glipiZIDE 10 MG 24 hr tablet    GLUCOTROL XL    90 tablet    Take 1 tablet (10 mg) by mouth daily    Type 2 diabetes mellitus without complication, without long-term current use of insulin (H)       lisinopril 5 MG tablet    PRINIVIL/ZESTRIL    90 tablet    Take 1 tablet (5 mg) by mouth daily    Microalbuminuria       metFORMIN 500 MG 24 hr tablet    GLUCOPHAGE-XR    360 tablet    Take 2 tablets (1,000 mg) by mouth 2 times daily (with meals)    Type 2 diabetes mellitus without complication, without long-term current use of insulin (H)       pioglitazone 30 MG tablet    ACTOS    90 tablet    Take 1 tablet (30 mg) by mouth daily    Type 2 diabetes mellitus without complication, without long-term current use of insulin (H)       potassium chloride SA 20 MEQ CR tablet    potassium chloride    180 tablet    Take 1 tablet (20 mEq) by mouth 2 times daily    Hypokalemia       prazosin 2 MG capsule    MINIPRESS    90 capsule    Take 1 capsule (2 mg) by mouth At Bedtime    PTSD (post-traumatic stress disorder)       ranitidine 300 MG tablet    ZANTAC    90 tablet    Take 1 tablet (300 mg) by mouth At Bedtime    Gastroesophageal reflux disease without esophagitis       sertraline 50 MG tablet    ZOLOFT    90 tablet    Take 1 tablet (50 mg) by mouth daily    Moderate episode of recurrent major depressive disorder (H)       sitagliptin 100 MG tablet    JANUVIA    90 tablet    Take 1 tablet (100 mg) by mouth daily    Type 2 diabetes mellitus with hyperglycemia, without long-term current use of insulin (H)        * Notice:  This list has 2 medication(s) that are the same as other medications prescribed for you. Read the directions carefully, and ask your doctor or other care provider to review them with you.

## 2017-09-12 NOTE — PATIENT INSTRUCTIONS
Increase the pioglitazone to 30mg per day.    Continue rest of the medications the same.    Follow up in 6 weeks.

## 2017-09-14 ASSESSMENT — PATIENT HEALTH QUESTIONNAIRE - PHQ9: SUM OF ALL RESPONSES TO PHQ QUESTIONS 1-9: 7

## 2017-09-14 NOTE — PROGRESS NOTES
"  Nursing Notes:   Ginger Leija CMA  9/12/2017 10:25 AM  Signed   name: Silvino Banks (Htoo)  Language: Radha  Agency: [employer]  Phone number: [phone]      SUBJECTIVE  Rachael Ch is a 52 year old female with past medical history significant for    Patient Active Problem List   Diagnosis     Essential hypertension, benign     Diabetes mellitus, type 2 (H)     Hyperlipidemia     Health Care Home     Helicobacter pylori gastritis     PTSD (post-traumatic stress disorder)     Moderate episode of recurrent major depressive disorder (H)     Cognitive complaints     Screening for depression     Microalbuminuria     Others present at the visit:   Silvino Waldronluis    Presents for   Chief Complaint   Patient presents with     MOOD CHANGES     feels great     Recheck Medication     1)  Mood has been good.  Feeling well.  Thinks the medication is helping.  PHQ-9 improving.  Still interested in [facility] here at the clinic, but only if there are enough people participating (atleast 5 people).  Feels uncomfortable with a smaller group.  Memory is still an issue, but has been better as the mood is better.      2) Medications have been going well.  Home nurse still coming to set them up for her.  Takes meds 2x per day.  Only occasionally checking her blood sugars.  Brings in her meter with 3 BS readings on it--150, 185, 176.  Reports that all of these are fasting in the morning.  Home nurse also helps her check.  No side effects from the medications.  No low blood sugars.  We discussed her A1c which is elevated from last time to 9.3.  Has been trying to eat healthy.  Has lost weight since our last visit.      OBJECTIVE:  Vitals: /77  Pulse 70  Temp 98  F (36.7  C)  Ht 4' 9.09\" (145 cm)  Wt 130 lb (59 kg)  SpO2 99%  BMI 28.05 kg/m2  BMI= Body mass index is 28.05 kg/(m^2).  Vitals:  Vitals are reviewed and are within the normal range  Gen:  Alert, pleasant, no acute distress  Cardiac:  Regular rate " and rhythm, no murmurs, rubs or gallops  Respiratory:  Lungs clear to auscultation bilaterally  Extremities:  Warm, well-perfused, pulses 2+/4, no lower extremity edema.  Monofilament testing normal.      Results for orders placed or performed in visit on 09/12/17   Hemoglobin A1c (P )   Result Value Ref Range    Hemoglobin A1C 9.3 (H) 4.1 - 5.7 %   Basic Metabolic Profile ([facility]) - Results > 1 hr   Result Value Ref Range    Sodium 137 136 - 145 mmol/L    Potassium 4.1 3.5 - 5.0 mmol/L    Chloride 103 98 - 107 mmol/L    CO2, Total 21 (L) 22 - 31 mmol/L    Anion Gap 13 5 - 18 mmol/L    Glucose 328 (H) 70 - 125 mg/dL    Calcium 9.4 8.5 - 10.5 mg/dL    Urea Nitrogen 11 8 - 22 mg/dL    Creatinine 0.85 0.60 - 1.10 mg/dL    GFR Estimate If Black >60 >60 mL/min/1.73m2    GFR Estimate >60 >60 mL/min/1.73m2    Narrative    Test performed by:  [facility] [address] [place] [address]  Fasting Glucose reference range is 70-99 mg/dL per  American Diabetes Association (ADA) guidelines.       ASSESSMENT AND PLAN:      Rachael was seen today for mood changes and recheck medication.    Diagnoses and all orders for this visit:    Moderate episode of recurrent major depressive disorder (H)/PTSD (post-traumatic stress disorder)  -     sertraline (ZOLOFT) 50 MG tablet; Take 1 tablet (50 mg) by mouth daily. COntinue  -   Will send message to behavioral health team to ensure set up for [facility].  Patient wants to join only if there are enough people.      Type 2 diabetes mellitus with complication, without long-term current use of insulin (H).  Sugars okay at home, but A1c elevated.  Weight down.  Will increase Actos to 30mg.  Continue same glipizde, januvia, and metformin.  Will continue to discuss insulin with patient, as she may need to transition to this in the near future.    -     Cancel: Basic Metabolic Panel ([place])  -     Hemoglobin A1c (P FM)  -     Basic Metabolic Profile ([facility]) -  Results > 1 hr  -     pioglitazone (ACTOS) 30 MG tablet; Take 1 tablet (30 mg) by mouth daily  -     glipiZIDE (GLUCOTROL XL) 10 MG 24 hr tablet; Take 1 tablet (10 mg) by mouth daily    Essential hypertension, benign.  Well controlled.       Patient Instructions   Increase the pioglitazone to 30mg per day.    Continue rest of the medications the same.    Follow up in 6 weeks.        Amrita Carballo

## 2017-09-15 ASSESSMENT — ANXIETY QUESTIONNAIRES: GAD7 TOTAL SCORE: 0

## 2017-09-28 ENCOUNTER — TELEPHONE (OUTPATIENT)
Dept: PSYCHOLOGY | Facility: CLINIC | Age: 52
End: 2017-09-28

## 2017-09-28 NOTE — TELEPHONE ENCOUNTER
This provider placed an outreach call to the patient via the [employer] (ID #[id number]) in order to remind the patient the [facility] is scheduled to meet for the initial visit tomorrow (9/29/2017) at 9:30AM. Unable to reach the patient. Left a voicemail message and requested the patient contact the clinic with any questions or concerns.     Nathaniel Lombardi, PhD   Behavioral Health Fellow

## 2017-09-29 ENCOUNTER — OFFICE VISIT (OUTPATIENT)
Dept: PSYCHOLOGY | Facility: CLINIC | Age: 52
End: 2017-09-29

## 2017-09-29 DIAGNOSIS — F43.10 PTSD (POST-TRAUMATIC STRESS DISORDER): ICD-10-CM

## 2017-09-29 DIAGNOSIS — F33.1 MODERATE EPISODE OF RECURRENT MAJOR DEPRESSIVE DISORDER (H): Primary | ICD-10-CM

## 2017-09-29 DIAGNOSIS — R41.9 COGNITIVE COMPLAINTS: ICD-10-CM

## 2017-09-29 NOTE — MR AVS SNAPSHOT
After Visit Summary   2017    Rachael Ch    MRN: [id number]           Patient Information     Date Of Birth          1965        Visit Information        Provider Department      2017 9:30 AM Lombardi, Nathaniel, PhD [facility]        Today's Diagnoses     Moderate episode of recurrent major depressive disorder (H)    -  1    PTSD (post-traumatic stress disorder)        Cognitive complaints           Follow-ups after your visit        Follow-up notes from your care team     Return in about 2 weeks (around 10/13/2017).      Who to contact     Please call your clinic at [phone] to:    Ask questions about your health    Make or cancel appointments    Discuss your medicines    Learn about your test results    Speak to your doctor   If you have compliments or concerns about an experience at your clinic, or if you wish to file a complaint, please contact [facility] Patient Relations at [phone] or email us at [email or web address]         Additional Information About Your Visit        MyChart Information     [employer] is an electronic gateway that provides easy, online access to your medical records. With [employer], you can request a clinic appointment, read your test results, renew a prescription or communicate with your care team.     To sign up for [employer] visit the website at [email or web address]   You will be asked to enter the access code listed below, as well as some personal information. Please follow the directions to create your username and password.     Your access code is: [email or web address]  Expires: 2017 11:22 AM     Your access code will  in 90 days. If you need help or a new code, please contact your [facility] or call [phone] for assistance.        Care EveryWhere ID     This is your Care EveryWhere ID. This could be used by other organizations to access your [facility]  records  [id number]         Blood Pressure from Last 3 Encounters:   09/12/17 117/77   08/14/17 122/76   07/14/17 122/78    Weight from Last 3 Encounters:   09/12/17 130 lb (59 kg)   08/14/17 132 lb 3.2 oz (60 kg)   07/14/17 131 lb 6.4 oz (59.6 kg)              We Performed the Following     Interactive Complexity add-on (42800)        Primary Care Provider Office Phone # Fax #    Amrita Carballo [phone]       [facility] [address] [place] [address]        Equal Access to Services     [facility] DOUGLAS : Hadii aad gabby hadasho Somatt, waaxda luqadaha, qaybta kaalmada adeericyada, vincent hall. So [facility] [phone].    ATENCIÓN: Si habla español, tiene a farah disposición servicios gratuitos de asistencia lingüística. Llame al [phone].    We comply with applicable federal civil rights laws and [place] laws. We do not discriminate on the basis of race, color, national origin, age, disability, sex, sexual orientation, or gender identity.            Thank you!     Thank you for choosing [facility]  for your care. Our goal is always to provide you with excellent care. Hearing back from our patients is one way we can continue to improve our services. Please take a few minutes to complete the written survey that you may receive in the mail after your visit with us. Thank you!             Your Updated Medication List - Protect others around you: Learn how to safely use, store and throw away your medicines at www.disposemymeds.org.          This list is accurate as of: 9/29/17 11:59 PM.  Always use your most recent med list.                   Brand Name Dispense Instructions for use Diagnosis    atorvastatin 80 MG tablet    LIPITOR    90 tablet    Take 1 tablet (80 mg) by mouth daily    Type 2 diabetes mellitus without complication, without long-term current use of insulin (H)       blood glucose lancets standard    no brand specified    1 Box    Use to  test blood sugar 1 times daily or as directed.    Type 2 diabetes mellitus with hyperglycemia, without long-term current use of insulin (H)       * blood glucose monitoring lancets     2 Box    1 each by In Vitro route daily    Diabetes mellitus, type 2 (H)       * MICROLET LANCETS Misc     100 each    1 Box daily    Diabetes mellitus (H)       blood glucose monitoring meter device kit    no brand specified    1 kit    Use to test blood sugar 1 times daily or as directed.    Type 2 diabetes mellitus with hyperglycemia, without long-term current use of insulin (H)       blood glucose monitoring test strip    no brand specified    1 Box    Use to test blood sugars 1 times daily or as directed    Type 2 diabetes mellitus with hyperglycemia, without long-term current use of insulin (H)       calcium carbonate 500 MG chewable tablet    TUMS    100 tablet    Take 1 tablet (500 mg) by mouth 2 times daily    Gastroesophageal reflux disease, esophagitis presence not specified       cholecalciferol 2000 UNITS tablet     100 tablet    Take 2,000 Units by mouth daily    Vitamin D deficiency       glipiZIDE 10 MG 24 hr tablet    GLUCOTROL XL    90 tablet    Take 1 tablet (10 mg) by mouth daily    Type 2 diabetes mellitus without complication, without long-term current use of insulin (H)       lisinopril 5 MG tablet    PRINIVIL/ZESTRIL    90 tablet    Take 1 tablet (5 mg) by mouth daily    Microalbuminuria       metFORMIN 500 MG 24 hr tablet    GLUCOPHAGE-XR    360 tablet    Take 2 tablets (1,000 mg) by mouth 2 times daily (with meals)    Type 2 diabetes mellitus without complication, without long-term current use of insulin (H)       pioglitazone 30 MG tablet    ACTOS    90 tablet    Take 1 tablet (30 mg) by mouth daily    Type 2 diabetes mellitus without complication, without long-term current use of insulin (H)       potassium chloride SA 20 MEQ CR tablet    KLOR-CON    180 tablet    Take 1 tablet (20 mEq) by mouth 2 times  daily    Hypokalemia       prazosin 2 MG capsule    MINIPRESS    90 capsule    Take 1 capsule (2 mg) by mouth At Bedtime    PTSD (post-traumatic stress disorder)       ranitidine 300 MG tablet    ZANTAC    90 tablet    Take 1 tablet (300 mg) by mouth At Bedtime    Gastroesophageal reflux disease without esophagitis       sertraline 50 MG tablet    ZOLOFT    90 tablet    Take 1 tablet (50 mg) by mouth daily    Moderate episode of recurrent major depressive disorder (H)       sitagliptin 100 MG tablet    JANUVIA    90 tablet    Take 1 tablet (100 mg) by mouth daily    Type 2 diabetes mellitus with hyperglycemia, without long-term current use of insulin (H)       * Notice:  This list has 2 medication(s) that are the same as other medications prescribed for you. Read the directions carefully, and ask your doctor or other care provider to review them with you.

## 2017-10-03 NOTE — PROGRESS NOTES
[facility] Therapy Note  Meeting was: Scheduled  Others present: Jessica (Radha, [facility], [phone])  Meeting lasted: 100 minutes  Patient was: On time  Mode of Treatment: Group Therapy  Session Number: 1  Number of Attendees: 3    Topics Discussed: Introduction Session    Welcome and Introductions, including name, how long each person has lived in [place], and hopes for the group.    Reviewed privacy, confidentiality, and use of .    Postdoctoral status and supervision    Ice breaker exercise - bundle of sticks as metaphor for strength in numbers.    Discussion of group goals and objectives, rules, and session topics for each week.     Group members discussed the importance of sharing their experience. We also agreed that sharing is not mandatory, and that all group members are welcome to attend the group and listen to other's stories without the obligation of sharing.    Brief discussion of mental health crisis resources, of calling 911, and/or going to the hospital if having suicidal ideation or feeling in acute distress.     Subjective:   The patient shared that she first immigrated to the [place] in 2007, approximately 10 years ago. Shared that this is her third time participating in the [facility]. Expressed uncertainty about what she is most looking forward to about her participation in the group, though subsequently indicated looking forward to sharing with (and learning from) other group members and their respective experiences. Actively contributed to group discussion, and contributed to group's decision to incorporate the creation of name placards (to aid with the remembering of the names of other group members) as an activity in the next-scheduled meeting.     Risk assessment:   During the current meeting the patient shared a past experience with the group wherein she experienced active suicidal ideation with plan (Hanging via rope) and intent to act on her  ideation. Indicated this occurred several months ago, and that she changed her mind prior to attempting after thinking about her daughter. Today the patient denied current/recent suicidal ideation or concerns regarding safety. She has notable risk factors for self-harm including depression, however risk is mitigated by no current or recent ideation/plan/intent, no history of completed attempts, commitment to family, good social support, history of seeking help when needed, and being future-oriented. Therefore, based on all available evidence including the factors cited above, she does not appear to be at imminent risk for self-harm, does not meet criteria for a 72-hour hold, and therefore involuntary hospitalization will not be pursued at this time. She reported she feels confident in her ability to utilize crisis resources if needed.     Objective: Gardeniayann Bhattdang is awake and alert for today's visit.     Assessment: Gardeniayann Dima was an active and engaged participant throughout the meeting today. Rachael Ch appeared receptive to feedback and goal setting during the visit.    Diagnosis:   296.32 Major Depressive Disorder, Recurrent Episode, Moderate  309.81 Posttraumatic Stress Disorder  799.59 Unspecified Neurocognitive Disorder    Plan:  1. Attend next therapy group in 2 weeks.   2. Utilize crisis resources if needed.       Nathaniel Lombardi, PhD  Behavioral Health Fellow

## 2017-10-28 ENCOUNTER — MEDICAL CORRESPONDENCE (OUTPATIENT)
Dept: HEALTH INFORMATION MANAGEMENT | Facility: CLINIC | Age: 52
End: 2017-10-28

## 2017-11-03 ENCOUNTER — OFFICE VISIT (OUTPATIENT)
Dept: PSYCHOLOGY | Facility: CLINIC | Age: 52
End: 2017-11-03

## 2017-11-03 DIAGNOSIS — F43.10 PTSD (POST-TRAUMATIC STRESS DISORDER): ICD-10-CM

## 2017-11-03 DIAGNOSIS — F33.1 MODERATE EPISODE OF RECURRENT MAJOR DEPRESSIVE DISORDER (H): Primary | ICD-10-CM

## 2017-11-03 DIAGNOSIS — R41.9 COGNITIVE COMPLAINTS: ICD-10-CM

## 2017-11-03 NOTE — MR AVS SNAPSHOT
After Visit Summary   11/3/2017    Rachael Ch    MRN: [id number]           Patient Information     Date Of Birth          1965        Visit Information        Provider Department      11/3/2017 9:30 AM Lombardi, Nathaniel, PhD [facility]        Today's Diagnoses     Moderate episode of recurrent major depressive disorder (H)    -  1    PTSD (post-traumatic stress disorder)        Cognitive complaints           Follow-ups after your visit        Follow-up notes from your care team     Return in about 2 weeks (around 2017).      Who to contact     Please call your clinic at [phone] to:    Ask questions about your health    Make or cancel appointments    Discuss your medicines    Learn about your test results    Speak to your doctor   If you have compliments or concerns about an experience at your clinic, or if you wish to file a complaint, please contact [facility] Patient Relations at [phone] or email us at [email or web address]         Additional Information About Your Visit        MyChart Information     [employer] is an electronic gateway that provides easy, online access to your medical records. With [employer], you can request a clinic appointment, read your test results, renew a prescription or communicate with your care team.     To sign up for [employer] visit the website at [email or web address]   You will be asked to enter the access code listed below, as well as some personal information. Please follow the directions to create your username and password.     Your access code is: [email or web address]  Expires: 2017 10:22 AM     Your access code will  in 90 days. If you need help or a new code, please contact your [facility] or call [phone] for assistance.        Care EveryWhere ID     This is your Care EveryWhere ID. This could be used by other organizations to access your [facility]  records  [id number]         Blood Pressure from Last 3 Encounters:   09/12/17 117/77   08/14/17 122/76   07/14/17 122/78    Weight from Last 3 Encounters:   09/12/17 130 lb (59 kg)   08/14/17 132 lb 3.2 oz (60 kg)   07/14/17 131 lb 6.4 oz (59.6 kg)              We Performed the Following     Interactive Complexity add-on (20490)        Primary Care Provider Office Phone # Fax #    Amrita Carballo [phone]       [facility] [address] [place] [address]        Equal Access to Services     [facility] DOUGLAS : Hadii aad gabby hadasho Somatt, waaxda luqadaha, qaybta kaalmada adeericyada, vincent hall. So [facility] [phone].    ATENCIÓN: Si habla español, tiene a farah disposición servicios gratuitos de asistencia lingüística. Llame al [phone].    We comply with applicable federal civil rights laws and [place] laws. We do not discriminate on the basis of race, color, national origin, age, disability, sex, sexual orientation, or gender identity.            Thank you!     Thank you for choosing [facility]  for your care. Our goal is always to provide you with excellent care. Hearing back from our patients is one way we can continue to improve our services. Please take a few minutes to complete the written survey that you may receive in the mail after your visit with us. Thank you!             Your Updated Medication List - Protect others around you: Learn how to safely use, store and throw away your medicines at www.disposemymeds.org.          This list is accurate as of: 11/3/17 11:59 PM.  Always use your most recent med list.                   Brand Name Dispense Instructions for use Diagnosis    atorvastatin 80 MG tablet    LIPITOR    90 tablet    Take 1 tablet (80 mg) by mouth daily    Type 2 diabetes mellitus without complication, without long-term current use of insulin (H)       blood glucose lancets standard    no brand specified    1 Box    Use to  test blood sugar 1 times daily or as directed.    Type 2 diabetes mellitus with hyperglycemia, without long-term current use of insulin (H)       * blood glucose monitoring lancets     2 Box    1 each by In Vitro route daily    Diabetes mellitus, type 2 (H)       * MICROLET LANCETS Misc     100 each    1 Box daily    Diabetes mellitus (H)       blood glucose monitoring meter device kit    no brand specified    1 kit    Use to test blood sugar 1 times daily or as directed.    Type 2 diabetes mellitus with hyperglycemia, without long-term current use of insulin (H)       blood glucose monitoring test strip    no brand specified    1 Box    Use to test blood sugars 1 times daily or as directed    Type 2 diabetes mellitus with hyperglycemia, without long-term current use of insulin (H)       calcium carbonate 500 MG chewable tablet    TUMS    100 tablet    Take 1 tablet (500 mg) by mouth 2 times daily    Gastroesophageal reflux disease, esophagitis presence not specified       cholecalciferol 2000 UNITS tablet     100 tablet    Take 2,000 Units by mouth daily    Vitamin D deficiency       glipiZIDE 10 MG 24 hr tablet    GLUCOTROL XL    90 tablet    Take 1 tablet (10 mg) by mouth daily    Type 2 diabetes mellitus without complication, without long-term current use of insulin (H)       lisinopril 5 MG tablet    PRINIVIL/ZESTRIL    90 tablet    Take 1 tablet (5 mg) by mouth daily    Microalbuminuria       metFORMIN 500 MG 24 hr tablet    GLUCOPHAGE-XR    360 tablet    Take 2 tablets (1,000 mg) by mouth 2 times daily (with meals)    Type 2 diabetes mellitus without complication, without long-term current use of insulin (H)       pioglitazone 30 MG tablet    ACTOS    90 tablet    Take 1 tablet (30 mg) by mouth daily    Type 2 diabetes mellitus without complication, without long-term current use of insulin (H)       potassium chloride SA 20 MEQ CR tablet    KLOR-CON    180 tablet    Take 1 tablet (20 mEq) by mouth 2 times  daily    Hypokalemia       prazosin 2 MG capsule    MINIPRESS    90 capsule    Take 1 capsule (2 mg) by mouth At Bedtime    PTSD (post-traumatic stress disorder)       ranitidine 300 MG tablet    ZANTAC    90 tablet    Take 1 tablet (300 mg) by mouth At Bedtime    Gastroesophageal reflux disease without esophagitis       sertraline 50 MG tablet    ZOLOFT    90 tablet    Take 1 tablet (50 mg) by mouth daily    Moderate episode of recurrent major depressive disorder (H)       sitagliptin 100 MG tablet    JANUVIA    90 tablet    Take 1 tablet (100 mg) by mouth daily    Type 2 diabetes mellitus with hyperglycemia, without long-term current use of insulin (H)       * Notice:  This list has 2 medication(s) that are the same as other medications prescribed for you. Read the directions carefully, and ask your doctor or other care provider to review them with you.

## 2017-11-07 NOTE — PROGRESS NOTES
[facility] Group Therapy Note  Meeting was: Scheduled  Others present: , Jyotsna Noguera ([facility], [phone]); Medical resident and pharmacy student (Observed with group's permission)  Meeting lasted: 120 minutes  Patient was: On time  Mode of Treatment: Group Therapy  Session Number: 3  Number of Attendees: 5    Complexity: An  is used not only to interpret language, since the group members do not speak English, but also to help with the complexity of understandings across cultures, since the members are not well integrated in the larger [place] culture.    Topics Discussed: Taking Care of Yourself and Your Family    One new group member attended the group today. Introduced myself and provided general summary of the group topics.    Reviewed privacy, rights to/limits to confidentiality, and use of . Reviewed postdoctoral status and supervision. Reviewed group rules.    Each group member introduced themselves to one another and shared how long they had been in the [place].     Incorporated new group member into bundle of sticks exercise. One group member who was present for the first group described the symbolism behind the bundle of sticks and assisted the other member with adding her sticks to the bundle.    Discussed the definition and importance of self-care and how the acculturation process can lead to feeling unsafe.    Emphasized that self-care is one important way of helping cope with stress. Elicited and provided examples of self-care, including socialization, relaxing activities, physical activity, and good nutrition.     Provided psychoeducation regarding diaphragmatic breathing exercise, as well as a mindfulness exercise, as examples of relaxation exercises to aid with stress-management and self-care more broadly. Had group members practice during the current meeting.     Subjective:   The patient identified multiple areas of self-care she feels are of particular importance  "and that contribute to more positive mood, namely doing housework and cleaning her living space as well as participating in enjoyable activities. Additionally, identified socialization as an area of some importance and benefit as well. Noted \"[she likes] sitting with others\" to socialize and connect, even when not actively engaging in an activity (e.g., Fishing) that other people are engaging in at that time. During the discussion, shared that she has cultivated social connections with her neighbors, and identified them as a source of support and as people she can spend time with as a means of self-care. Of note, completed the diaphragmatic breathing and mindfulness exercises with the other group members during the current meeting. Indicated finding the exercises beneficial.      Objective: Ms. Ch appears awake and alert and oriented to time, place and person for today's visit.     Assessment: Ms. Ch was an active participant throughout the meeting today. Ms. Ch appeared receptive to feedback and discussion during the visit.    Diagnosis:   296.32 Major Depressive Disorder, Recurrent Episode, Moderate  309.81 Posttraumatic Stress Disorder  799.59 Unspecified Neurocognitive Disorder    Plan:  1. Attend next therapy group in 2 weeks.   2. Utilize crisis services as needed.  3. Plan to review treatment plan during next group.      Nathaniel Lombardi, PhD  Behavioral Health Fellow      Treatment Plan: Treatment plan to be reviewed/updated at next therapy group.  "

## 2017-11-15 ENCOUNTER — OFFICE VISIT (OUTPATIENT)
Dept: FAMILY MEDICINE | Facility: CLINIC | Age: 52
End: 2017-11-15

## 2017-11-15 VITALS
HEART RATE: 69 BPM | DIASTOLIC BLOOD PRESSURE: 80 MMHG | BODY MASS INDEX: 29.17 KG/M2 | SYSTOLIC BLOOD PRESSURE: 134 MMHG | TEMPERATURE: 98.4 F | WEIGHT: 135.2 LBS

## 2017-11-15 DIAGNOSIS — E11.65 TYPE 2 DIABETES MELLITUS WITH HYPERGLYCEMIA, WITH LONG-TERM CURRENT USE OF INSULIN (H): ICD-10-CM

## 2017-11-15 DIAGNOSIS — Z79.4 TYPE 2 DIABETES MELLITUS WITH HYPERGLYCEMIA, WITH LONG-TERM CURRENT USE OF INSULIN (H): ICD-10-CM

## 2017-11-15 DIAGNOSIS — G89.29 CHRONIC RIGHT SHOULDER PAIN: Primary | ICD-10-CM

## 2017-11-15 DIAGNOSIS — R00.2 PALPITATIONS: ICD-10-CM

## 2017-11-15 DIAGNOSIS — M25.511 CHRONIC RIGHT SHOULDER PAIN: Primary | ICD-10-CM

## 2017-11-15 LAB
% GRANULOCYTES: 69.1 %G (ref 40–75)
BUN SERPL-MCNC: 13.3 MG/DL (ref 7–19)
CALCIUM SERPL-MCNC: 10 MG/DL (ref 8.5–10.1)
CHLORIDE SERPLBLD-SCNC: 104.5 MMOL/L (ref 98–110)
CHOLEST SERPL-MCNC: 191.5 MG/DL (ref 0–200)
CHOLEST/HDLC SERPL: 4 {RATIO} (ref 0–5)
CO2 SERPL-SCNC: 28.2 MMOL/L (ref 20–32)
CREAT SERPL-MCNC: 0.6 MG/DL (ref 0.5–1)
CREAT UR-MCNC: 24.8 MG/DL
GFR SERPL CREATININE-BSD FRML MDRD: >90 ML/MIN/1.7 M2
GLUCOSE SERPL-MCNC: 147.4 MG'DL (ref 70–99)
GRANULOCYTES #: 4.8 K/UL (ref 1.6–8.3)
HBA1C MFR BLD: 8.9 % (ref 4.1–5.7)
HCT VFR BLD AUTO: 39.2 % (ref 35–47)
HDLC SERPL-MCNC: 48.2 MG/DL
HEMOGLOBIN: 12.5 G/DL (ref 11.7–15.7)
LDLC SERPL CALC-MCNC: 93 MG/DL (ref 0–129)
LYMPHOCYTES # BLD AUTO: 1.8 K/UL (ref 0.8–5.3)
LYMPHOCYTES NFR BLD AUTO: 25.8 %L (ref 20–48)
MCH RBC QN AUTO: 27.8 PG (ref 26.5–35)
MCHC RBC AUTO-ENTMCNC: 31.9 G/DL (ref 32–36)
MCV RBC AUTO: 87 FL (ref 78–100)
MICROALBUMIN UR-MCNC: 1.09 MG/DL (ref 0–1.99)
MICROALBUMIN/CREAT UR: 44 MG/G
MID #: 0.4 K/UL (ref 0–2.2)
MID %: 5.1 %M (ref 0–20)
PLATELET # BLD AUTO: 256 K/UL (ref 150–450)
POTASSIUM SERPL-SCNC: 3.5 MMOL/DL (ref 3.2–4.6)
RBC # BLD AUTO: 4.5 M/UL (ref 3.8–5.2)
SODIUM SERPL-SCNC: 142 MMOL/L (ref 132–142)
TRIGL SERPL-MCNC: 251.9 MG/DL (ref 0–150)
TSH SERPL DL<=0.05 MIU/L-ACNC: 1.01 UIU/ML (ref 0.3–5)
VLDL CHOLESTEROL: 50.4 MG/DL (ref 7–32)
WBC # BLD AUTO: 7 K/UL (ref 4–11)

## 2017-11-15 RX ORDER — GABAPENTIN 300 MG/1
300 CAPSULE ORAL AT BEDTIME
Qty: 90 CAPSULE | Refills: 0 | Status: SHIPPED | OUTPATIENT
Start: 2017-11-15 | End: 2017-12-13

## 2017-11-15 RX ORDER — NAPROXEN 500 MG/1
500 TABLET ORAL 2 TIMES DAILY PRN
Qty: 30 TABLET | Refills: 0 | Status: SHIPPED | OUTPATIENT
Start: 2017-11-15 | End: 2017-12-13

## 2017-11-15 ASSESSMENT — PATIENT HEALTH QUESTIONNAIRE - PHQ9: SUM OF ALL RESPONSES TO PHQ QUESTIONS 1-9: 14

## 2017-11-15 NOTE — LETTER
November 17, 2017      Rachael [facility]  [address] [address]        Dear Rachael,  Here is a copy of your lab results.  Your diabetes testing is improved, but still too high.  We'll discuss this at your next visit.  Please call the clinic at [phone] if you have any questions.     Please see below for your test results.    Resulted Orders   Lipid Panel ([place])   Result Value Ref Range    Cholesterol 191.5 0.0 - 200.0 mg/dL    Cholesterol/HDL Ratio 4.0 0.0 - 5.0    HDL Cholesterol 48.2 >40.0 mg/dL    LDL Cholesterol Calculated 93 0 - 129 mg/dL    Triglycerides 251.9 (H) 0.0 - 150.0 mg/dL    VLDL Cholesterol 50.4 (H) 7.0 - 32.0 mg/dL   Basic Metabolic Panel ([place])   Result Value Ref Range    Urea Nitrogen 13.3 7.0 - 19.0 mg/dL    Calcium 10.0 8.5 - 10.1 mg/dL    Chloride 104.5 98.0 - 110.0 mmol/L    Carbon Dioxide 28.2 20.0 - 32.0 mmol/L    Creatinine 0.6 0.5 - 1.0 mg/dL    Glucose 147.4 (H) 70.0 - 99.0 mg'dL    Potassium 3.5 3.2 - 4.6 mmol/dL    Sodium 142.0 132.0 - 142.0 mmol/L    GFR Estimate >90 >60.0 mL/min/1.7 m2    GFR Estimate If Black >90 >60.0 mL/min/1.7 m2   Hemoglobin A1c ([facility])   Result Value Ref Range    Hemoglobin A1C 8.9 (H) 4.1 - 5.7 %   Microalbumin Creatinine Ratio Random Ur ([facility])   Result Value Ref Range    Microalbumin, Urine 1.09 0.00 - 1.99 mg/dL    Creatinine, Urine 24.8 mg/dL    Albumin Urine mg/g Cr 44.0 (H) <=19.9 mg/g    Narrative    Test performed by:  [facility]  [address] [place] [address]  Microalbumin, Random Urine  <2.0 mg/dL . . . . . . . . Normal  3.0-30.0 mg/dL . . . . . . Microalbuminuria  >30.0 mg/dL . . . . . .  . Clinical Proteinuria  Microalbumin/Creatinine Ratio, Random Urine  <20 mg/g . . . . .. . . . Normal   mg/g . . . . . . . Microalbuminuria  >300 mg/g . . . . . . . . Clinical Proteinuria   Thyroid [place] ([facility])   Result Value Ref Range    TSH 1.01 0.30 - 5.00 uIU/mL    Narrative    Test performed by:  [facility]  [address] [place] [address]   CBC with Diff Plt ([facility])   Result Value Ref Range    WBC 7.0 4.0 - 11.0 K/uL    Lymphocytes # 1.8 0.8 - 5.3 K/uL    % Lymphocytes 25.8 20.0 - 48.0 %L    Mid # 0.4 0.0 - 2.2 K/uL    Mid % 5.1 0.0 - 20.0 %M    GRANULOCYTES # 4.8 1.6 - 8.3 K/uL    % Granulocytes 69.1 40.0 - 75.0 %G    RBC 4.5 3.8 - 5.2 M/uL    Hemoglobin 12.5 11.7 - 15.7 g/dL    Hematocrit 39.2 35.0 - 47.0 %    MCV 87.0 78.0 - 100.0 fL    MCH 27.8 26.5 - 35.0    MCHC 31.9 (L) 32.0 - 36.0 g/dL    Platelets 256.0 150.0 - 450.0 K/uL       If you have any questions, please call the clinic to make an appointment.    Sincerely,    Amrita Carballo MD

## 2017-11-15 NOTE — LETTER
November 17, 2017      Rachael [facility]  [address] [address]        Dear Rachael,  Here is a copy of your lab results.  Your diabetes testing is improved, but still too high.  We'll discuss this at your next visit.  Please call the clinic at [phone] if you have any questions.     Please see below for your test results.    Resulted Orders   Lipid Panel ([place])   Result Value Ref Range    Cholesterol 191.5 0.0 - 200.0 mg/dL    Cholesterol/HDL Ratio 4.0 0.0 - 5.0    HDL Cholesterol 48.2 >40.0 mg/dL    LDL Cholesterol Calculated 93 0 - 129 mg/dL    Triglycerides 251.9 (H) 0.0 - 150.0 mg/dL    VLDL Cholesterol 50.4 (H) 7.0 - 32.0 mg/dL   Basic Metabolic Panel ([place])   Result Value Ref Range    Urea Nitrogen 13.3 7.0 - 19.0 mg/dL    Calcium 10.0 8.5 - 10.1 mg/dL    Chloride 104.5 98.0 - 110.0 mmol/L    Carbon Dioxide 28.2 20.0 - 32.0 mmol/L    Creatinine 0.6 0.5 - 1.0 mg/dL    Glucose 147.4 (H) 70.0 - 99.0 mg'dL    Potassium 3.5 3.2 - 4.6 mmol/dL    Sodium 142.0 132.0 - 142.0 mmol/L    GFR Estimate >90 >60.0 mL/min/1.7 m2    GFR Estimate If Black >90 >60.0 mL/min/1.7 m2   Hemoglobin A1c ([facility])   Result Value Ref Range    Hemoglobin A1C 8.9 (H) 4.1 - 5.7 %   Microalbumin Creatinine Ratio Random Ur ([facility])   Result Value Ref Range    Microalbumin, Urine 1.09 0.00 - 1.99 mg/dL    Creatinine, Urine 24.8 mg/dL    Albumin Urine mg/g Cr 44.0 (H) <=19.9 mg/g    Narrative    Test performed by:  [facility]  [address] [place] [address]  Microalbumin, Random Urine  <2.0 mg/dL . . . . . . . . Normal  3.0-30.0 mg/dL . . . . . . Microalbuminuria  >30.0 mg/dL . . . . . .  . Clinical Proteinuria  Microalbumin/Creatinine Ratio, Random Urine  <20 mg/g . . . . .. . . . Normal   mg/g . . . . . . . Microalbuminuria  >300 mg/g . . . . . . . . Clinical Proteinuria   Thyroid [place] ([facility])   Result Value Ref Range    TSH 1.01 0.30 - 5.00 uIU/mL    Narrative    Test performed by:  [facility]  [address] [place] [address]   CBC with Diff Plt ([facility])   Result Value Ref Range    WBC 7.0 4.0 - 11.0 K/uL    Lymphocytes # 1.8 0.8 - 5.3 K/uL    % Lymphocytes 25.8 20.0 - 48.0 %L    Mid # 0.4 0.0 - 2.2 K/uL    Mid % 5.1 0.0 - 20.0 %M    GRANULOCYTES # 4.8 1.6 - 8.3 K/uL    % Granulocytes 69.1 40.0 - 75.0 %G    RBC 4.5 3.8 - 5.2 M/uL    Hemoglobin 12.5 11.7 - 15.7 g/dL    Hematocrit 39.2 35.0 - 47.0 %    MCV 87.0 78.0 - 100.0 fL    MCH 27.8 26.5 - 35.0    MCHC 31.9 (L) 32.0 - 36.0 g/dL    Platelets 256.0 150.0 - 450.0 K/uL       If you have any questions, please call the clinic to make an appointment.    Sincerely,    Amrita Carballo MD

## 2017-11-15 NOTE — PROGRESS NOTES
Nursing Notes:   Veronica Rosas, [facility]  11/15/2017  9:05 AM  Signed   name: Silvino Banks (Htoo)  Language: Radha  Agency: [employer]  Phone number: [phone]  Chief Complaint   Patient presents with     Dizziness     Pt is here for dizziness.     Neck Pain     Pt is here to discuss neck and shoulder pain.     Derm Problem     Pt states she thinks her skin is getting thicker becuase she doesn't feel cold.      Blood pressure 134/80, pulse 69, temperature 98.4  F (36.9  C), temperature source Oral, weight 135 lb 3.2 oz (61.3 kg), currently breastfeeding.      SUBJECTIVE:  This is a 52-year-old Radha female, well-known to me, with history of chronic severe recurrent major depression, PTSD, hypertension, type II diabetes, and hyperlipidemia who presents for a followup visit today as well as further evaluation of  right shoulder and upper back pain.     Her shoulder has been more bothersome for her.  She describes the pain as starting in her left upper shoulder and radiating down the right side of her hand.  Sometimes she sleeps on that side.  She gets numbness and tingling that is present throughout the forearm, extending down into all of her fingers.  She describes a sensation of feeling skin around her neck, as if the area is tight and inflexible.  She comments that it feels good for her go outside in the cold with this.  She has also been doing some massage and ice and it is somewhat helpful.  Additionally,  because she has not been able to get in to see me, she went to the [employer] and bought some capsules.  She said that they were yellow capsules in individual Silver foil packets.  She has been taking 1-2 of these only when the pain is quite severe and finds that they help.  She has also been taking Extra Strength Tylenol and would like a refill on this medication.  She didn't bring any of her medications nor the medication she is getting from the [employer] with her today.  She  may have gotten a phone call about PT.  She has been out of town and is unclear about this.  She would like to take pain medicine to help before she starts eating more regular therapy.       Otherwise, she is having no complications with her meds.  She feels good.  She is eating and drinking well. Her blood pressure is a little higher than normal today.  She is not well-versed on her medications, but there is a home nurse who sets up medications for her.  The home nurse will be coming out tomorrow to set up her pills.       She has noticed increased sensation of palpitations at night.  She describes getting up in the night to go to the bathroom and when she gets back into bed she notices that her heart races.  She feels anxious and worried.  Typically, the symptoms last about 10 minutes.  No chest pain.  No vision changes.  She sometimes tries taking deep breaths per the [facility], but she found them only mildly helpful.  She denies any chest pain or SOB with exertion.  She doesn't have palpitations during the day.  She doesn't check her pulse when this happens.  She has been having nightmares.  Specifically, she dreams that she is flying and then falling.  This seems to make her more anxious during the day.     OBJECTIVE:   VITAL SIGNS:  Reviewed.  Blood pressure is 134/80.  Pulse is 69.  Weight is 135 pounds, which is up some from her last visit.   GENERAL:  Pleasant, comfortable-appearing, Radha female in no acute distress.   HEENT:  Pupils are equal and symmetric.  Normal eye movements.     CARDIAC:  Heart is regular rate and rhythm with no murmurs, rubs, or gallops.  She is nontachycardic.  Pulses are strong and symmetric.  No lower extremity edema.   RESPIRATORY:  Lungs are clear to auscultation bilaterally with no crackles or wheezes.   NECK:  No palpable lymphadenopathy.  No elevated thyroid.     MUSCULOSKELETAL:  Examination of right neck and shoulder reveals no pain with palpation over the  vertebral bodies in her neck and no paraspinous muscle tenderness or paracervical muscle tenderness.  She has tenderness to palpation of her deltoid, extending down over the mid-portion of the clavicle.  She has pain at the AC joint, as well as over the bicipital groove.  No scapular pain.  She has limited ROM with full internal rotation when I place her hand behind her back, but normal external rotation.  She is unable to fully extend her right hip.  She has thumb pain with empty can test.  Negative crossed arm test.  Negative Neer's test.  Positive De-Gal.  Negative apprehension test.     ASSESSMENT/PLAN:  A 51-year-old female presenting today for evaluation of dizziness and palpitations at night, as well as pain in her right neck and shoulder.   1.  Right shoulder pain.  She is tender over the AC joint as well as on her rotator cuff testing.  I think she likely has some rotator cuff tendinopathy, with possible underlying osteoarthritis.  We'll check a shoulder x-ray today.  I recommended PT; however, she would like to wait on that.  I'm unsure about what medication is in the pills she is taking from the [employer] market, so I recommended that she d/c these.  I recommended Naproxen b.i.d. with food and gabapentin at night.  We'll do 300 mg of gabapentin and 500 mg b.i.d. of naproxen.  I recommended using ice, heat, and massage.  She'll return if symptoms aren't improving.   2.  Dizziness at night and palpitations.  Heart rate is regular today.  No murmurs, rubs, or gallops.  Normal pulses.  No edema.  Symptoms aren't associated with exercise.  They seem to happen at night when she is also having nightmares.  I think this is most likely anxiety vs PTSD response.  We'll see if 300 mg of gabapentin at night helps with this.  I also told her to take her pulse when these episodes happen.  We'll consider further cardiac workup if it isn't improving.  I think this is much more behavioral, so we also discussed doing  diaphragmatic breathing when the episodes occur.   3.  Hypertension and diabetes.  We'll recheck an A1C, BMP, lipid panel, and microalbumin today.  We'll discuss these results at her next visit.  Additionally, given symptoms of palpitations and dizziness.  we'll also check a CBC for hemoglobin and TSH to rule out these as possible etiology.     She'll follow up with me in 1 month for a recheck.     Amrita Carballo

## 2017-11-15 NOTE — NURSING NOTE
name: Silvino (Cat) Darren  Language: Radha  Agency: [employer]  Phone number: [phone]

## 2017-11-15 NOTE — MR AVS SNAPSHOT
After Visit Summary   11/15/2017    Rachael Ch    MRN: [id number]           Patient Information     Date Of Birth          1965        Visit Information        Provider Department      11/15/2017 8:40 AM Amrita Craballo MD [facility]        Today's Diagnoses     Chronic right shoulder pain    -  1    Palpitations        Type 2 diabetes mellitus with hyperglycemia, with long-term current use of insulin (H)          Care Instructions    Stop in Lab  Stop in xray  Follow up in 1 month.      Take the new medications:    Gabapentin take 1 pill daily at night.    Naproxen, 1 pill 2x daily with food.                Follow-ups after your visit        Your next 10 appointments already scheduled     Nov 16, 2017  9:30 AM CST   Group Education with Nathaniel Lombardi, PhD   [facility] ([facility])    [address] [place] [address]   [phone]              Future tests that were ordered for you today     Open Future Orders        Priority Expected Expires Ordered    XR Shoulder Right 2 Views Routine 11/15/2017 11/15/2018 11/15/2017            Who to contact     Please call your clinic at [phone] to:    Ask questions about your health    Make or cancel appointments    Discuss your medicines    Learn about your test results    Speak to your doctor   If you have compliments or concerns about an experience at your clinic, or if you wish to file a complaint, please contact [facility] Patient Relations at [phone] or email us at [email or web address]         Additional Information About Your Visit        MyChart Information     Adyent is an electronic gateway that provides easy, online access to your medical records. With Trivnet, you can request a clinic appointment, read your test results, renew a prescription or communicate with your care team.     To sign up for Adyent visit the website at [email or web address]   You will be asked  to enter the access code listed below, as well as some personal information. Please follow the directions to create your username and password.     Your access code is: [email or web address]  Expires: 2017 10:22 AM     Your access code will  in 90 days. If you need help or a new code, please contact your [facility] or call [phone] for assistance.        Care EveryWhere ID     This is your Care EveryWhere ID. This could be used by other organizations to access your [place] medical records  [id number]        Your Vitals Were     Pulse Temperature BMI (Body Mass Index)             69 98.4  F (36.9  C) (Oral) 29.17 kg/m2          Blood Pressure from Last 3 Encounters:   11/15/17 134/80   17 117/77   17 122/76    Weight from Last 3 Encounters:   11/15/17 135 lb 3.2 oz (61.3 kg)   17 130 lb (59 kg)   17 132 lb 3.2 oz (60 kg)              We Performed the Following     Basic Metabolic Panel ([facility])     CBC with Diff Plt ([facility])     Hemoglobin A1c ([facility])     Lipid Panel ([facility])     Microalbumin Creatinine Ratio Random Ur ([facility])     Thyroid Pickett ([facility])          Today's Medication Changes          These changes are accurate as of: 11/15/17  9:42 AM.  If you have any questions, ask your nurse or doctor.               Start taking these medicines.        Dose/Directions    gabapentin 300 MG capsule   Commonly known as:  NEURONTIN   Used for:  Chronic right shoulder pain   Started by:  Amrita Carballo MD        Dose:  300 mg   Take 1 capsule (300 mg) by mouth At Bedtime   Quantity:  90 capsule   Refills:  0       naproxen 500 MG tablet   Commonly known as:  NAPROSYN   Used for:  Chronic right shoulder pain   Started by:  Amrita Carballo MD        Dose:  500 mg   Take 1 tablet (500 mg) by mouth 2 times daily as needed for moderate pain   Quantity:  30 tablet   Refills:  0            Where to get your medicines      These medications  were sent to [facility] - [place], [place] - [address], [place] [address]     Phone:  [phone]     gabapentin 300 MG capsule    naproxen 500 MG tablet                Primary Care Provider Office Phone # Fax #    Amrita Carballo [phone]       [facility] [address] [place] [address]        Equal Access to Services     RIMMA COLE : Hadii aad ku hadasho Soomaali, waaxda luqadaha, qaybta kaalmada adeegyada, waxay idiin hayaan adeeg kharash la'aan ah. So [facility] [phone].    ATENCIÓN: Si neena monae, tiene a farah disposición servicios gratuitos de asistencia lingüística. Llame al [phone].    We comply with applicable federal civil rights laws and [place] laws. We do not discriminate on the basis of race, color, national origin, age, disability, sex, sexual orientation, or gender identity.            Thank you!     Thank you for choosing [facility]  for your care. Our goal is always to provide you with excellent care. Hearing back from our patients is one way we can continue to improve our services. Please take a few minutes to complete the written survey that you may receive in the mail after your visit with us. Thank you!             Your Updated Medication List - Protect others around you: Learn how to safely use, store and throw away your medicines at www.disposemymeds.org.          This list is accurate as of: 11/15/17  9:42 AM.  Always use your most recent med list.                   Brand Name Dispense Instructions for use Diagnosis    atorvastatin 80 MG tablet    LIPITOR    90 tablet    Take 1 tablet (80 mg) by mouth daily    Type 2 diabetes mellitus without complication, without long-term current use of insulin (H)       blood glucose lancets standard    no brand specified    1 Box    Use to test blood sugar 1 times daily or as directed.    Type 2 diabetes mellitus with hyperglycemia, without long-term current use of insulin  (H)       * blood glucose monitoring lancets     2 Box    1 each by In Vitro route daily    Diabetes mellitus, type 2 (H)       * MICROLET LANCETS Misc     100 each    1 Box daily    Diabetes mellitus (H)       blood glucose monitoring meter device kit    no brand specified    1 kit    Use to test blood sugar 1 times daily or as directed.    Type 2 diabetes mellitus with hyperglycemia, without long-term current use of insulin (H)       blood glucose monitoring test strip    no brand specified    1 Box    Use to test blood sugars 1 times daily or as directed    Type 2 diabetes mellitus with hyperglycemia, without long-term current use of insulin (H)       calcium carbonate 500 MG chewable tablet    TUMS    100 tablet    Take 1 tablet (500 mg) by mouth 2 times daily    Gastroesophageal reflux disease, esophagitis presence not specified       cholecalciferol 2000 UNITS tablet     100 tablet    Take 2,000 Units by mouth daily    Vitamin D deficiency       gabapentin 300 MG capsule    NEURONTIN    90 capsule    Take 1 capsule (300 mg) by mouth At Bedtime    Chronic right shoulder pain       glipiZIDE 10 MG 24 hr tablet    GLUCOTROL XL    90 tablet    Take 1 tablet (10 mg) by mouth daily    Type 2 diabetes mellitus without complication, without long-term current use of insulin (H)       lisinopril 5 MG tablet    PRINIVIL/ZESTRIL    90 tablet    Take 1 tablet (5 mg) by mouth daily    Microalbuminuria       metFORMIN 500 MG 24 hr tablet    GLUCOPHAGE-XR    360 tablet    Take 2 tablets (1,000 mg) by mouth 2 times daily (with meals)    Type 2 diabetes mellitus without complication, without long-term current use of insulin (H)       naproxen 500 MG tablet    NAPROSYN    30 tablet    Take 1 tablet (500 mg) by mouth 2 times daily as needed for moderate pain    Chronic right shoulder pain       pioglitazone 30 MG tablet    ACTOS    90 tablet    Take 1 tablet (30 mg) by mouth daily    Type 2 diabetes mellitus without complication,  without long-term current use of insulin (H)       potassium chloride SA 20 MEQ CR tablet    KLOR-CON    180 tablet    Take 1 tablet (20 mEq) by mouth 2 times daily    Hypokalemia       prazosin 2 MG capsule    MINIPRESS    90 capsule    Take 1 capsule (2 mg) by mouth At Bedtime    PTSD (post-traumatic stress disorder)       ranitidine 300 MG tablet    ZANTAC    90 tablet    Take 1 tablet (300 mg) by mouth At Bedtime    Gastroesophageal reflux disease without esophagitis       sertraline 50 MG tablet    ZOLOFT    90 tablet    Take 1 tablet (50 mg) by mouth daily    Moderate episode of recurrent major depressive disorder (H)       sitagliptin 100 MG tablet    JANUVIA    90 tablet    Take 1 tablet (100 mg) by mouth daily    Type 2 diabetes mellitus with hyperglycemia, without long-term current use of insulin (H)       * Notice:  This list has 2 medication(s) that are the same as other medications prescribed for you. Read the directions carefully, and ask your doctor or other care provider to review them with you.

## 2017-11-15 NOTE — PATIENT INSTRUCTIONS
Stop in Lab  Stop in xray  Follow up in 1 month.      Take the new medications:    Gabapentin take 1 pill daily at night.    Naproxen, 1 pill 2x daily with food.

## 2017-11-16 ENCOUNTER — OFFICE VISIT (OUTPATIENT)
Dept: PSYCHOLOGY | Facility: CLINIC | Age: 52
End: 2017-11-16

## 2017-11-16 DIAGNOSIS — F33.1 MODERATE EPISODE OF RECURRENT MAJOR DEPRESSIVE DISORDER (H): Primary | ICD-10-CM

## 2017-11-16 DIAGNOSIS — F43.10 PTSD (POST-TRAUMATIC STRESS DISORDER): ICD-10-CM

## 2017-11-16 DIAGNOSIS — M25.511 CHRONIC RIGHT SHOULDER PAIN: ICD-10-CM

## 2017-11-16 DIAGNOSIS — R41.9 COGNITIVE COMPLAINTS: ICD-10-CM

## 2017-11-16 DIAGNOSIS — G89.29 CHRONIC RIGHT SHOULDER PAIN: ICD-10-CM

## 2017-11-16 NOTE — MR AVS SNAPSHOT
After Visit Summary   2017    Rachael Ch    MRN: [id number]           Patient Information     Date Of Birth          1965        Visit Information        Provider Department      2017 9:30 AM Lombardi, Nathaniel, PhD [facility]        Today's Diagnoses     Moderate episode of recurrent major depressive disorder (H)    -  1    PTSD (post-traumatic stress disorder)        Cognitive complaints           Follow-ups after your visit        Follow-up notes from your care team     Return in about 2 weeks (around 2017).      Your next 10 appointments already scheduled     Dec 13, 2017  8:40 AM CST   Return Visit with Amrita Carballo MD   [facility] ([facility])    [address] [place] [address]   [phone]              Who to contact     Please call your clinic at [phone] to:    Ask questions about your health    Make or cancel appointments    Discuss your medicines    Learn about your test results    Speak to your doctor   If you have compliments or concerns about an experience at your clinic, or if you wish to file a complaint, please contact [facility] Patient Relations at [phone] or email us at [email or web address]         Additional Information About Your Visit        MyChart Information     Trekeahart is an electronic gateway that provides easy, online access to your medical records. With Numerify, you can request a clinic appointment, read your test results, renew a prescription or communicate with your care team.     To sign up for Jelly Button Gamest visit the website at [email or web address]   You will be asked to enter the access code listed below, as well as some personal information. Please follow the directions to create your username and password.     Your access code is: [email or web address]  Expires: 2017 10:22 AM     Your access code will  in 90 days. If you need help or a new code, please contact your  [facility] or call [phone] for assistance.        Care EveryWhere ID     This is your Care EveryWhere ID. This could be used by other organizations to access your [place] medical records  [id number]         Blood Pressure from Last 3 Encounters:   11/15/17 134/80   09/12/17 117/77   08/14/17 122/76    Weight from Last 3 Encounters:   11/15/17 135 lb 3.2 oz (61.3 kg)   09/12/17 130 lb (59 kg)   08/14/17 132 lb 3.2 oz (60 kg)              We Performed the Following     Interactive Complexity add-on (20578)        Primary Care Provider Office Phone # Fax #    Amrita Carballo -[phone]       [facility] [address] [place] [address]        Equal Access to Services     RIMMA COLE : Hadii sander ku hadasho Soomaali, waaxda luqadaha, qaybta kaalmada adeegyada, vincent figueroa hayannie jeffrey . So [facility] [phone].    ATENCIÓN: Si habla español, tiene a farah disposición servicios gratuitos de asistencia lingüística. Anastasia al [phone].    We comply with applicable federal civil rights laws and [place] laws. We do not discriminate on the basis of race, color, national origin, age, disability, sex, sexual orientation, or gender identity.            Thank you!     Thank you for choosing [facility]  for your care. Our goal is always to provide you with excellent care. Hearing back from our patients is one way we can continue to improve our services. Please take a few minutes to complete the written survey that you may receive in the mail after your visit with us. Thank you!             Your Updated Medication List - Protect others around you: Learn how to safely use, store and throw away your medicines at www.disposemymeds.org.          This list is accurate as of: 11/16/17 11:59 PM.  Always use your most recent med list.                   Brand Name Dispense Instructions for use Diagnosis    atorvastatin 80 MG tablet    LIPITOR    90  tablet    Take 1 tablet (80 mg) by mouth daily    Type 2 diabetes mellitus without complication, without long-term current use of insulin (H)       blood glucose lancets standard    no brand specified    1 Box    Use to test blood sugar 1 times daily or as directed.    Type 2 diabetes mellitus with hyperglycemia, without long-term current use of insulin (H)       * blood glucose monitoring lancets     2 Box    1 each by In Vitro route daily    Diabetes mellitus, type 2 (H)       * MICROLET LANCETS Misc     100 each    1 Box daily    Diabetes mellitus (H)       blood glucose monitoring meter device kit    no brand specified    1 kit    Use to test blood sugar 1 times daily or as directed.    Type 2 diabetes mellitus with hyperglycemia, without long-term current use of insulin (H)       blood glucose monitoring test strip    no brand specified    1 Box    Use to test blood sugars 1 times daily or as directed    Type 2 diabetes mellitus with hyperglycemia, without long-term current use of insulin (H)       calcium carbonate 500 MG chewable tablet    TUMS    100 tablet    Take 1 tablet (500 mg) by mouth 2 times daily    Gastroesophageal reflux disease, esophagitis presence not specified       cholecalciferol 2000 UNITS tablet     100 tablet    Take 2,000 Units by mouth daily    Vitamin D deficiency       gabapentin 300 MG capsule    NEURONTIN    90 capsule    Take 1 capsule (300 mg) by mouth At Bedtime    Chronic right shoulder pain       glipiZIDE 10 MG 24 hr tablet    GLUCOTROL XL    90 tablet    Take 1 tablet (10 mg) by mouth daily    Type 2 diabetes mellitus without complication, without long-term current use of insulin (H)       lisinopril 5 MG tablet    PRINIVIL/ZESTRIL    90 tablet    Take 1 tablet (5 mg) by mouth daily    Microalbuminuria       metFORMIN 500 MG 24 hr tablet    GLUCOPHAGE-XR    360 tablet    Take 2 tablets (1,000 mg) by mouth 2 times daily (with meals)    Type 2 diabetes mellitus without  complication, without long-term current use of insulin (H)       naproxen 500 MG tablet    NAPROSYN    30 tablet    Take 1 tablet (500 mg) by mouth 2 times daily as needed for moderate pain    Chronic right shoulder pain       pioglitazone 30 MG tablet    ACTOS    90 tablet    Take 1 tablet (30 mg) by mouth daily    Type 2 diabetes mellitus without complication, without long-term current use of insulin (H)       potassium chloride SA 20 MEQ CR tablet    KLOR-CON    180 tablet    Take 1 tablet (20 mEq) by mouth 2 times daily    Hypokalemia       prazosin 2 MG capsule    MINIPRESS    90 capsule    Take 1 capsule (2 mg) by mouth At Bedtime    PTSD (post-traumatic stress disorder)       ranitidine 300 MG tablet    ZANTAC    90 tablet    Take 1 tablet (300 mg) by mouth At Bedtime    Gastroesophageal reflux disease without esophagitis       sertraline 50 MG tablet    ZOLOFT    90 tablet    Take 1 tablet (50 mg) by mouth daily    Moderate episode of recurrent major depressive disorder (H)       sitagliptin 100 MG tablet    JANUVIA    90 tablet    Take 1 tablet (100 mg) by mouth daily    Type 2 diabetes mellitus with hyperglycemia, without long-term current use of insulin (H)       * Notice:  This list has 2 medication(s) that are the same as other medications prescribed for you. Read the directions carefully, and ask your doctor or other care provider to review them with you.

## 2017-11-16 NOTE — LETTER
November 17, 2017      Rachael [facility]  [address] [address]        Dear Rachael,    Please see below for your test results.    Your xray shows a likely rotator cuff injury.  We will talk about this and possible treatments at your next visit.  Please call the clinic at [phone] if you have any questions.       If you have any questions, please call the clinic to make an appointment.    Sincerely,    Amrita Carballo MD

## 2017-11-17 NOTE — PROGRESS NOTES
Rachael Ch-    Your xray shows a likely rotator cuff injury.  We will talk about this and possible treatments at your next visit.  Please call the clinic at [phone] if you have any questions.      Amrita Carballo    Please send results to patient.

## 2017-11-19 NOTE — PROGRESS NOTES
"[facility] Group Therapy Note  Meeting was: Scheduled  Others present: , Jyotsna Noguera (CAROL Garcia, [phone]); Pharmacy student (Observed with group members' permission)  Meeting lasted: 115 minutes  Patient was: On time  Mode of Treatment: Group Therapy  Group Session Number: 4  Number of Attendees: 3    Complexity: An  is used not only to interpret language, since the group members do not speak English, but also to help with the complexity of understandings across cultures, since the members are not well integrated in the larger [place] culture.    Topics Discussed: Memory Problems and Memory Aids    Checked-in with group members. Identified and reflected on self-care strategies used following previous group meeting.    Discussed the definition of memory. Provided education about how memory is stored, how memory works, factors that can affect memory, the impact of forgetting, and what can be done to help with memory. Used example stories to illustrate different causes of memory concerns (such as normative forgetfulness, depression, medication interactions, and dementia).     Identified and reflected on examples of forgetting from group members' lives, as well as strategies used to help them remember.    Identified one self-care strategy group members plan to use in the next two weeks to help with memory and assist with stress/mood management.    Subjective: The patient acknowledged experiencing some difficulties with memory, and noted \"[she keeps] forgetting things.\" Of note, reported she has discussed her difficulties with memory with her PCP. The patient appeared to have some insight into various factors impacting her memory (e.g., Medications, Stress), as well as strategies to aid with memory and stress-management (e.g., Including family and friends, Exercising and eating healthy). During the discussion, also identified keeping her medications in one place as one additional self-care " strategy she plans to use between group meetings. Of note, also reviewed the patient's treatment plan at today's visit (See below).     Objective: Ms. Ch appeared awake and alert and oriented to time, place and person for today's visit.     Assessment: Ms. Ch was an active participant throughout the meeting today. Ms. Ch appeared receptive to feedback and discussion during the visit.    Diagnosis:   296.32 Major Depressive Disorder, Recurrent Episode, Moderate  309.81 Posttraumatic Stress Disorder  799.59 Unspecified Neurocognitive Disorder    Plan:  1. Attend next therapy group in 2 weeks.   2. Practice one skill to help with memory between now and the next group.  3. Utilize crisis services as needed.      Nathaniel Lombardi, PhD  Behavioral Health Fellow      Treatment Plan: The Treatment Plan dated 8/1/2017 was reviewed with the patient at today s visit. The Treatment Plan remains current based on the patient s status and progress to date. Next treatment plan update due 2/16/2018.

## 2017-12-01 ENCOUNTER — MEDICAL CORRESPONDENCE (OUTPATIENT)
Dept: HEALTH INFORMATION MANAGEMENT | Facility: CLINIC | Age: 52
End: 2017-12-01

## 2017-12-01 ENCOUNTER — OFFICE VISIT (OUTPATIENT)
Dept: PSYCHOLOGY | Facility: CLINIC | Age: 52
End: 2017-12-01

## 2017-12-01 DIAGNOSIS — F43.10 PTSD (POST-TRAUMATIC STRESS DISORDER): ICD-10-CM

## 2017-12-01 DIAGNOSIS — F33.1 MODERATE EPISODE OF RECURRENT MAJOR DEPRESSIVE DISORDER (H): Primary | ICD-10-CM

## 2017-12-01 DIAGNOSIS — Z13.31 SCREENING FOR DEPRESSION: ICD-10-CM

## 2017-12-01 NOTE — MR AVS SNAPSHOT
After Visit Summary   12/1/2017    Rachael Ch    MRN: [id number]           Patient Information     Date Of Birth          1965        Visit Information        Provider Department      12/1/2017 9:30 AM Lombardi, Nathaniel, PhD [facility]        Today's Diagnoses     Moderate episode of recurrent major depressive disorder (H)    -  1    PTSD (post-traumatic stress disorder)        Screening for depression           Follow-ups after your visit        Follow-up notes from your care team     Return in about 2 weeks (around 12/15/2017).      Your next 10 appointments already scheduled     Dec 13, 2017  8:40 AM CST   Return Visit with Amrita Carballo MD   [facility] ([facility])    [address] [place] [address]   [phone]            Dec 15, 2017  9:30 AM CST   Group Education with Nathaniel Lombardi, PhD   [facility] ([facility])    [address] [place] [address]   [phone]              Who to contact     Please call your clinic at [phone] to:    Ask questions about your health    Make or cancel appointments    Discuss your medicines    Learn about your test results    Speak to your doctor   If you have compliments or concerns about an experience at your clinic, or if you wish to file a complaint, please contact [facility] Patient Relations at [phone] or email us at [email or web address]         Additional Information About Your Visit        MyChart Information     Global Roaming is an electronic gateway that provides easy, online access to your medical records. With [employer] Ro[employer], you can request a clinic appointment, read your test results, renew a prescription or communicate with your care team.     To sign up for [employer]t visit the website at [email or web address]   You will be asked to enter the access code listed below, as well as some personal information. Please follow the directions to create your username  and password.     Your access code is: [email or web address]  Expires: 2017 10:22 AM     Your access code will  in 90 days. If you need help or a new code, please contact your [facility] or call [phone] for assistance.        Care EveryWhere ID     This is your Care EveryWhere ID. This could be used by other organizations to access your [place] medical records  [id number]         Blood Pressure from Last 3 Encounters:   11/15/17 134/80   17 117/77   17 122/76    Weight from Last 3 Encounters:   11/15/17 135 lb 3.2 oz (61.3 kg)   17 130 lb (59 kg)   17 132 lb 3.2 oz (60 kg)              We Performed the Following     Interactive Complexity add-on (83689)        Primary Care Provider Office Phone # Fax #    Amrita Carballo [phone]       [facility] [address] [place] [address]        Equal Access to Services     [facility]: Hadii aad ku hadasho Soomaali, waaxda luqadaha, qaybta kaalmada adeegyada, waxay idiin hayaan darshan jeffrey . So [facility] [phone].    ATENCIÓN: Si habla español, tiene a farah disposición servicios gratuitos de asistencia lingüística. Llame al [phone].    We comply with applicable federal civil rights laws and [place] laws. We do not discriminate on the basis of race, color, national origin, age, disability, sex, sexual orientation, or gender identity.            Thank you!     Thank you for choosing [facility]  for your care. Our goal is always to provide you with excellent care. Hearing back from our patients is one way we can continue to improve our services. Please take a few minutes to complete the written survey that you may receive in the mail after your visit with us. Thank you!             Your Updated Medication List - Protect others around you: Learn how to safely use, store and throw away your medicines at www.disposemymeds.org.          This list is accurate as of:  12/1/17 11:59 PM.  Always use your most recent med list.                   Brand Name Dispense Instructions for use Diagnosis    atorvastatin 80 MG tablet    LIPITOR    90 tablet    Take 1 tablet (80 mg) by mouth daily    Type 2 diabetes mellitus without complication, without long-term current use of insulin (H)       blood glucose lancets standard    no brand specified    1 Box    Use to test blood sugar 1 times daily or as directed.    Type 2 diabetes mellitus with hyperglycemia, without long-term current use of insulin (H)       * blood glucose monitoring lancets     2 Box    1 each by In Vitro route daily    Diabetes mellitus, type 2 (H)       * MICROLET LANCETS Misc     100 each    1 Box daily    Diabetes mellitus (H)       blood glucose monitoring meter device kit    no brand specified    1 kit    Use to test blood sugar 1 times daily or as directed.    Type 2 diabetes mellitus with hyperglycemia, without long-term current use of insulin (H)       blood glucose monitoring test strip    no brand specified    1 Box    Use to test blood sugars 1 times daily or as directed    Type 2 diabetes mellitus with hyperglycemia, without long-term current use of insulin (H)       cholecalciferol 2000 UNITS tablet     100 tablet    Take 2,000 Units by mouth daily    Vitamin D deficiency       gabapentin 300 MG capsule    NEURONTIN    90 capsule    Take 1 capsule (300 mg) by mouth At Bedtime    Chronic right shoulder pain       glipiZIDE 10 MG 24 hr tablet    GLUCOTROL XL    90 tablet    Take 1 tablet (10 mg) by mouth daily    Type 2 diabetes mellitus without complication, without long-term current use of insulin (H)       lisinopril 5 MG tablet    PRINIVIL/ZESTRIL    90 tablet    Take 1 tablet (5 mg) by mouth daily    Microalbuminuria       metFORMIN 500 MG 24 hr tablet    GLUCOPHAGE-XR    360 tablet    Take 2 tablets (1,000 mg) by mouth 2 times daily (with meals)    Type 2 diabetes mellitus without complication, without  long-term current use of insulin (H)       naproxen 500 MG tablet    NAPROSYN    30 tablet    Take 1 tablet (500 mg) by mouth 2 times daily as needed for moderate pain    Chronic right shoulder pain       pioglitazone 30 MG tablet    ACTOS    90 tablet    Take 1 tablet (30 mg) by mouth daily    Type 2 diabetes mellitus without complication, without long-term current use of insulin (H)       prazosin 2 MG capsule    MINIPRESS    90 capsule    Take 1 capsule (2 mg) by mouth At Bedtime    PTSD (post-traumatic stress disorder)       ranitidine 300 MG tablet    ZANTAC    90 tablet    Take 1 tablet (300 mg) by mouth At Bedtime    Gastroesophageal reflux disease without esophagitis       sertraline 50 MG tablet    ZOLOFT    90 tablet    Take 1 tablet (50 mg) by mouth daily    Moderate episode of recurrent major depressive disorder (H)       sitagliptin 100 MG tablet    JANUVIA    90 tablet    Take 1 tablet (100 mg) by mouth daily    Type 2 diabetes mellitus with hyperglycemia, without long-term current use of insulin (H)       * Notice:  This list has 2 medication(s) that are the same as other medications prescribed for you. Read the directions carefully, and ask your doctor or other care provider to review them with you.

## 2017-12-04 DIAGNOSIS — K21.9 GASTROESOPHAGEAL REFLUX DISEASE, ESOPHAGITIS PRESENCE NOT SPECIFIED: ICD-10-CM

## 2017-12-04 DIAGNOSIS — E87.6 HYPOKALEMIA: ICD-10-CM

## 2017-12-04 RX ORDER — POTASSIUM CHLORIDE 1500 MG/1
20 TABLET, EXTENDED RELEASE ORAL 2 TIMES DAILY
Qty: 180 TABLET | Refills: 0 | Status: SHIPPED | OUTPATIENT
Start: 2017-12-04 | End: 2018-03-16

## 2017-12-04 RX ORDER — CALCIUM CARBONATE 500 MG/1
1 TABLET, CHEWABLE ORAL 2 TIMES DAILY
Qty: 100 TABLET | Refills: 3 | Status: SHIPPED | OUTPATIENT
Start: 2017-12-04 | End: 2018-01-08

## 2017-12-05 NOTE — PROGRESS NOTES
Radha Group Therapy Note  Meeting was: Scheduled  Others present: , Jyotsna Noguera (Radha CAROL, [phone]) and Colt Henning of [facility] [employer]  Meeting lasted: 120 minutes  Patient was: On time  Mode of Treatment: Community Engagement Activity   Group Session Number: 5  Number of Attendees: 4    Complexity: An  is used not only to interpret language, since the group members do not speak English, but also to help with the complexity of understandings across cultures, since the members are not well integrated in the larger [place] culture.    Topics Discussed: [employer] Field Trip    Check-in. Reminded group members that the plan for today was to participate in a community engagement field trip led by [employer] representative, Colt Henning. Briefly reviewed the route and answered the group's questions. Reviewed the release of liability [employer] form. Group members reviewed the form with the help of the  and signed the form.        Brief presentation on [employer] service and subsequent field trip. [facility] gave a brief presentation to the group about how to read the map and provided each member with a ticket voucher. Subsequently initiated experiential component of the meeting aimed at increasing familiarity and decreasing anxious symptoms associated with utilization of [employer] service. The group walked to the nearest train station and Bob Juvencio educated the members on how to use the bus and train system in the [place]. He reviewed rules, routes, riding etiquette, services and amenities, payment, and safety. Bob Juvencio then guided the members on an excursion incorporating both the train and bus systems. Members were invited to ask questions and were encouraged to share the information they learned with their family members.       Closing. Upon returning to the clinic, briefly discussed the members' reactions to the trip. Group members  all agreed that the field trip was very helpful. Some group members indicated they would be interested in continuing to increase the comfort/confidence utilizing the [employer] system moving forward. Current meeting concluded by reminding group members that the next group meeting would be in two weeks.    Subjective: Ms. Ch was attentive throughout Mr. Henning's instruction about how to use both the train and bus systems. She reported that, even though she had observed Mr. Henning's presentation and participated in the [employer] Field Trip previously, participating in the current trip had been beneficial.     Objective: Ms. Ch appeared awake and alert for today's visit.     Assessment: Ms. Ch was an active, engaged participant throughout the meeting today. Ms. Ch appeared receptive to feedback and discussion during the visit.    Diagnosis:  296.32 Major Depressive Disorder, Recurrent Episode, Moderate  309.81 Posttraumatic Stress Disorder  799.59 Unspecified Neurocognitive Disorder    Plan:  1. Attend next therapy group in two weeks.   2. Encouraged to practice riding the bus and/or train, potentially with a family member or friend, using two additional ticket vouchers provided to them by Mr. Henning.  3. Utilize crisis services as needed.      Nathaniel Lombardi, Ph.D.  Behavioral Health Fellow      Treatment Plan: Treatment plan update due 2/16/2018.

## 2017-12-13 ENCOUNTER — OFFICE VISIT (OUTPATIENT)
Dept: FAMILY MEDICINE | Facility: CLINIC | Age: 52
End: 2017-12-13
Payer: COMMERCIAL

## 2017-12-13 VITALS
SYSTOLIC BLOOD PRESSURE: 126 MMHG | HEART RATE: 78 BPM | TEMPERATURE: 98.3 F | WEIGHT: 135.4 LBS | DIASTOLIC BLOOD PRESSURE: 75 MMHG | BODY MASS INDEX: 29.21 KG/M2

## 2017-12-13 DIAGNOSIS — M19.011 PRIMARY OSTEOARTHRITIS OF RIGHT SHOULDER: Primary | ICD-10-CM

## 2017-12-13 DIAGNOSIS — R00.2 PALPITATIONS: ICD-10-CM

## 2017-12-13 DIAGNOSIS — R80.9 MICROALBUMINURIA: ICD-10-CM

## 2017-12-13 DIAGNOSIS — H04.123 DRY EYES: ICD-10-CM

## 2017-12-13 DIAGNOSIS — F33.1 MODERATE EPISODE OF RECURRENT MAJOR DEPRESSIVE DISORDER (H): ICD-10-CM

## 2017-12-13 DIAGNOSIS — E11.9 TYPE 2 DIABETES MELLITUS WITHOUT COMPLICATION, WITHOUT LONG-TERM CURRENT USE OF INSULIN (H): ICD-10-CM

## 2017-12-13 RX ORDER — ACETAMINOPHEN 500 MG
TABLET ORAL
Qty: 100 TABLET | Refills: 3 | Status: SHIPPED | OUTPATIENT
Start: 2017-12-13 | End: 2018-04-30

## 2017-12-13 RX ORDER — PIOGLITAZONEHYDROCHLORIDE 45 MG/1
45 TABLET ORAL DAILY
Qty: 90 TABLET | Refills: 1 | Status: SHIPPED | OUTPATIENT
Start: 2017-12-13 | End: 2018-05-03

## 2017-12-13 ASSESSMENT — PATIENT HEALTH QUESTIONNAIRE - PHQ9: SUM OF ALL RESPONSES TO PHQ QUESTIONS 1-9: 3

## 2017-12-13 NOTE — PATIENT INSTRUCTIONS
Stop taking the gabapentin- we will list this an an allergy.    Take tylenol, 1 pill 2x per day with your other pills, and can take 1 additional pill if needed.    Keep doing daily shoulder exercises--can do injection in the future if still having pain.    Continue to limit sugar!  Move around at home.    Use eye drops up to 3x per day to help with moisture.    Schedule appointment for eye doctor to check eyes and check with diabetes.    Increase dose of pioglitazone to 45mg per day.  New prescription sent.    Follow up in 6 weeks.

## 2017-12-13 NOTE — NURSING NOTE
name: Jyotsna Noguera  Language: Radha  Agency: [facility]  Phone number: [phone]

## 2017-12-13 NOTE — MR AVS SNAPSHOT
After Visit Summary   12/13/2017    Rachael Ch    MRN: [id number]           Patient Information     Date Of Birth          1965        Visit Information        Provider Department      12/13/2017 8:40 AM Amrita Carballo MD [facility]        Today's Diagnoses     Primary osteoarthritis of right shoulder    -  1    Dry eyes        Type 2 diabetes mellitus without complication, without long-term current use of insulin (H)          Care Instructions    Stop taking the gabapentin- we will list this an an allergy.    Take tylenol, 1 pill 2x per day with your other pills, and can take 1 additional pill if needed.    Keep doing daily shoulder exercises--can do injection in the future if still having pain.    Continue to limit sugar!  Move around at home.    Use eye drops up to 3x per day to help with moisture.    Schedule appointment for eye doctor to check eyes and check with diabetes.    Increase dose of pioglitazone to 45mg per day.  New prescription sent.    Follow up in 6 weeks.            Follow-ups after your visit        Your next 10 appointments already scheduled     Dec 15, 2017  9:30 AM CST   Group Education with Nathaniel Lombardi, PhD   [facility] ([facility])    [address] [place] [address]   [phone]              Who to contact     Please call your clinic at [phone] to:    Ask questions about your health    Make or cancel appointments    Discuss your medicines    Learn about your test results    Speak to your doctor   If you have compliments or concerns about an experience at your clinic, or if you wish to file a complaint, please contact [facility] Patient Relations at [phone] or email us at [email or web address]         Additional Information About Your Visit        ArticleAlley Information     ArticleAlley is an electronic gateway that provides easy, online access to your medical records. With [employer]ley, you can request a  clinic appointment, read your test results, renew a prescription or communicate with your care team.     To sign up for [employer] visit the website at [email or web address]   You will be asked to enter the access code listed below, as well as some personal information. Please follow the directions to create your username and password.     Your access code is: [email or web address]  Expires: 3/13/2018  9:40 AM     Your access code will  in 90 days. If you need help or a new code, please contact your [facility] or call [phone] for assistance.        Care EveryWhere ID     This is your Care EveryWhere ID. This could be used by other organizations to access your [place] medical records  [id number]        Your Vitals Were     Pulse Temperature BMI (Body Mass Index)             78 98.3  F (36.8  C) (Oral) 29.21 kg/m2          Blood Pressure from Last 3 Encounters:   17 126/75   11/15/17 134/80   17 117/77    Weight from Last 3 Encounters:   17 135 lb 6.4 oz (61.4 kg)   11/15/17 135 lb 3.2 oz (61.3 kg)   17 130 lb (59 kg)              Today, you had the following     No orders found for display         Today's Medication Changes          These changes are accurate as of: 17  9:40 AM.  If you have any questions, ask your nurse or doctor.               Start taking these medicines.        Dose/Directions    acetaminophen 500 MG tablet   Commonly known as:  TYLENOL   Used for:  Primary osteoarthritis of right shoulder   Started by:  Amrita Carballo MD        Take 1 pill in Am and 1 pill in   Quantity:  100 tablet   Refills:  3       carboxymethylcellul-glycerin 0.5-0.9 % Soln ophthalmic solution   Commonly known as:  OPTIVE/REFRESH OPTIVE   Used for:  Dry eyes   Started by:  Amrita Carballo MD        Dose:  1 drop   Place 1 drop into both eyes 3 times daily as needed   Quantity:  1 Bottle   Refills:  3         These medicines have changed or have  updated prescriptions.        Dose/Directions    pioglitazone 45 MG tablet   Commonly known as:  ACTOS   This may have changed:    - medication strength  - how much to take   Used for:  Type 2 diabetes mellitus without complication, without long-term current use of insulin (H)   Changed by:  Amrita Carballo MD        Dose:  45 mg   Take 1 tablet (45 mg) by mouth daily   Quantity:  90 tablet   Refills:  1         Stop taking these medicines if you haven't already. Please contact your care team if you have questions.     gabapentin 300 MG capsule   Commonly known as:  NEURONTIN   Stopped by:  Amrita Carballo MD           naproxen 500 MG tablet   Commonly known as:  NAPROSYN   Stopped by:  Amrita Carballo MD                Where to get your medicines      These medications were sent to [facility] - [place], [place] - [address], [place] [address]     Phone:  [phone]     acetaminophen 500 MG tablet    carboxymethylcellul-glycerin 0.5-0.9 % Soln ophthalmic solution    pioglitazone 45 MG tablet                Primary Care Provider Office Phone # Fax #    Amrita Carballo [phone]       [facility] [address] [place] [address]        Equal Access to Services     RIMMA COLE AH: Hadii aad ku hadasho Soomaali, waaxda luqadaha, qaybta kaalmada adeegyada, vin[facility]. So [facility] [phone].    ATENCIÓN: Si habla español, tiene a farah disposición servicios gratuitos de asistencia lingüística. Anastasia al [phone].    We comply with applicable federal civil rights laws and [place] laws. We do not discriminate on the basis of race, color, national origin, age, disability, sex, sexual orientation, or gender identity.            Thank you!     Thank you for choosing [facility]  for your care. Our goal is always to provide you with excellent care. Hearing back from our patients is one way we can continue to improve  our services. Please take a few minutes to complete the written survey that you may receive in the mail after your visit with us. Thank you!             Your Updated Medication List - Protect others around you: Learn how to safely use, store and throw away your medicines at www.disposemymeds.org.          This list is accurate as of: 12/13/17  9:40 AM.  Always use your most recent med list.                   Brand Name Dispense Instructions for use Diagnosis    acetaminophen 500 MG tablet    TYLENOL    100 tablet    Take 1 pill in Am and 1 pill in    Primary osteoarthritis of right shoulder       atorvastatin 80 MG tablet    LIPITOR    90 tablet    Take 1 tablet (80 mg) by mouth daily    Type 2 diabetes mellitus without complication, without long-term current use of insulin (H)       blood glucose lancets standard    no brand specified    1 Box    Use to test blood sugar 1 times daily or as directed.    Type 2 diabetes mellitus with hyperglycemia, without long-term current use of insulin (H)       * blood glucose monitoring lancets     2 Box    1 each by In Vitro route daily    Diabetes mellitus, type 2 (H)       * MICROLET LANCETS Misc     100 each    1 Box daily    Diabetes mellitus (H)       blood glucose monitoring meter device kit    no brand specified    1 kit    Use to test blood sugar 1 times daily or as directed.    Type 2 diabetes mellitus with hyperglycemia, without long-term current use of insulin (H)       blood glucose monitoring test strip    no brand specified    1 Box    Use to test blood sugars 1 times daily or as directed    Type 2 diabetes mellitus with hyperglycemia, without long-term current use of insulin (H)       calcium carbonate 500 MG chewable tablet    TUMS    100 tablet    Take 1 tablet (500 mg) by mouth 2 times daily    Gastroesophageal reflux disease, esophagitis presence not specified       carboxymethylcellul-glycerin 0.5-0.9 % Soln ophthalmic solution    OPTIVE/REFRESH OPTIVE    1  Bottle    Place 1 drop into both eyes 3 times daily as needed    Dry eyes       cholecalciferol 2000 UNITS tablet     100 tablet    Take 2,000 Units by mouth daily    Vitamin D deficiency       glipiZIDE 10 MG 24 hr tablet    GLUCOTROL XL    90 tablet    Take 1 tablet (10 mg) by mouth daily    Type 2 diabetes mellitus without complication, without long-term current use of insulin (H)       lisinopril 5 MG tablet    PRINIVIL/ZESTRIL    90 tablet    Take 1 tablet (5 mg) by mouth daily    Microalbuminuria       metFORMIN 500 MG 24 hr tablet    GLUCOPHAGE-XR    360 tablet    Take 2 tablets (1,000 mg) by mouth 2 times daily (with meals)    Type 2 diabetes mellitus without complication, without long-term current use of insulin (H)       pioglitazone 45 MG tablet    ACTOS    90 tablet    Take 1 tablet (45 mg) by mouth daily    Type 2 diabetes mellitus without complication, without long-term current use of insulin (H)       potassium chloride SA 20 MEQ CR tablet    KLOR-CON    180 tablet    Take 1 tablet (20 mEq) by mouth 2 times daily    Hypokalemia       prazosin 2 MG capsule    MINIPRESS    90 capsule    Take 1 capsule (2 mg) by mouth At Bedtime    PTSD (post-traumatic stress disorder)       ranitidine 300 MG tablet    ZANTAC    90 tablet    Take 1 tablet (300 mg) by mouth At Bedtime    Gastroesophageal reflux disease without esophagitis       sertraline 50 MG tablet    ZOLOFT    90 tablet    Take 1 tablet (50 mg) by mouth daily    Moderate episode of recurrent major depressive disorder (H)       sitagliptin 100 MG tablet    JANUVIA    90 tablet    Take 1 tablet (100 mg) by mouth daily    Type 2 diabetes mellitus with hyperglycemia, without long-term current use of insulin (H)       * Notice:  This list has 2 medication(s) that are the same as other medications prescribed for you. Read the directions carefully, and ask your doctor or other care provider to review them with you.

## 2017-12-13 NOTE — PROGRESS NOTES
Nursing Notes:   Veronica Rosas CMA  12/13/2017  9:14 AM  Signed   name: Jyotsna Noguera  Language: Radha  Agency: [facility]  Phone number: [phone]      RAYSA Ch is a 52 year old female with past medical history significant for    Patient Active Problem List   Diagnosis     Essential hypertension, benign     Diabetes mellitus, type 2 (H)     Hyperlipidemia     Health Care Home     Helicobacter pylori gastritis     PTSD (post-traumatic stress disorder)     Moderate episode of recurrent major depressive disorder (H)     Cognitive complaints     Screening for depression     Microalbuminuria     Others present at the visit:   Jyotsna Noguera    Presents for   Chief Complaint   Patient presents with     Results     Pt is here to discuss her labs from her last visit.     Here for follow-up from her visit a couple of weeks ago.    1) Shoulder pain.  she brought in her medications however forgot to bring in the unknown medication she had been taking from [place] [employer] that was helping with her pain.  Has been doing the exercises we discussed and is found this helpful.  Shoulder still bothers her sometimes.  The gabapentin that she was prescribed gave her side effects.  She noticed that her head and face started to swell.  She only took the medication once and has since discontinued it.  Did take naproxen for a while but was uncertain if this was helpful.  We discussed physical therapy and injections as options for treatment and she does not feel like the pain is bad enough at this time.  Would like to try some Tylenol as well as continuing with the exercises.    2) palpitations and racing heart.  She denies any current symptoms of this.  No concerns from her at this time.  No lightheadedness, episodes of passing out or loss of consciousness.    3) continues to go to [facility] and finds this helpful.  Which is at the group were larger.  There only 3 other participants.  She  shares that she is speaking up and sharing her expertise with the group.    4) noting some difficulty with watering in her eyes.  Sometimes it seems like her eyes are blurry.  Can be itchy.  She is using salt water to rinse them out.  Notices that they get scratching sometimes painful especially when she rubs them.  Has an appointment scheduled coming up with the eye doctor to review her prescription, have a diabetic eye exam, and will ask about this at that time.    5) we discussed her recent lab tests.  A1c remains high.  She checks her sugars only occasionally, and usually nonfasting when the home nurse comes to visit.  Reports taking medications regularly.  Sugars are somewhere in the 100s.  Denies low sugars or symptoms of hypoglycemia.  Has had some sugars above 200.  Weight has been stable.  She is trying to avoid sweet foods and rice.      OBJECTIVE:  Vitals: /75 (BP Location: Left arm, Patient Position: Sitting, Cuff Size: Adult Large)  Pulse 78  Temp 98.3  F (36.8  C) (Oral)  Wt 135 lb 6.4 oz (61.4 kg)  BMI 29.21 kg/m2  BMI= Body mass index is 29.21 kg/(m^2).  Vitals:  Vitals are reviewed and are within the normal range  Gen:  Alert, pleasant, no acute distress  Cardiac:  Regular rate and rhythm, no murmurs, rubs or gallops  Respiratory:  Lungs clear to auscultation bilaterally  Abdomen:  Soft, non-tender, non-distended, bowel sounds positive  Extremities:  Warm, well-perfused, pulses 2+/4, no lower extremity edema.  Monofilament testing is normal.    Results for orders placed or performed in visit on 11/15/17   Lipid Panel ([facility])   Result Value Ref Range    Cholesterol 191.5 0.0 - 200.0 mg/dL    Cholesterol/HDL Ratio 4.0 0.0 - 5.0    HDL Cholesterol 48.2 >40.0 mg/dL    LDL Cholesterol Calculated 93 0 - 129 mg/dL    Triglycerides 251.9 (H) 0.0 - 150.0 mg/dL    VLDL Cholesterol 50.4 (H) 7.0 - 32.0 mg/dL   Basic Metabolic Panel ([facility])   Result Value Ref Range    Urea Nitrogen 13.3 7.0 -  19.0 mg/dL    Calcium 10.0 8.5 - 10.1 mg/dL    Chloride 104.5 98.0 - 110.0 mmol/L    Carbon Dioxide 28.2 20.0 - 32.0 mmol/L    Creatinine 0.6 0.5 - 1.0 mg/dL    Glucose 147.4 (H) 70.0 - 99.0 mg'dL    Potassium 3.5 3.2 - 4.6 mmol/dL    Sodium 142.0 132.0 - 142.0 mmol/L    GFR Estimate >90 >60.0 mL/min/1.7 m2    GFR Estimate If Black >90 >60.0 mL/min/1.7 m2   Hemoglobin A1c ([facility])   Result Value Ref Range    Hemoglobin A1C 8.9 (H) 4.1 - 5.7 %   Microalbumin Creatinine Ratio Random Ur ([facility])   Result Value Ref Range    Microalbumin, Urine 1.09 0.00 - 1.99 mg/dL    Creatinine, Urine 24.8 mg/dL    Albumin Urine mg/g Cr 44.0 (H) <=19.9 mg/g    Narrative    Test performed by:  [facility]  [address] [place] [address]  Microalbumin, Random Urine  <2.0 mg/dL . . . . . . . . Normal  3.0-30.0 mg/dL . . . . . . Microalbuminuria  >30.0 mg/dL . . . . . .  . Clinical Proteinuria  Microalbumin/Creatinine Ratio, Random Urine  <20 mg/g . . . . .. . . . Normal   mg/g . . . . . . . Microalbuminuria  >300 mg/g . . . . . . . . Clinical Proteinuria   Thyroid Lutz ([facility])   Result Value Ref Range    TSH 1.01 0.30 - 5.00 uIU/mL    Narrative    Test performed by:  [facility]  [address] [place] [address]   CBC with Diff Plt ([facility])   Result Value Ref Range    WBC 7.0 4.0 - 11.0 K/uL    Lymphocytes # 1.8 0.8 - 5.3 K/uL    % Lymphocytes 25.8 20.0 - 48.0 %L    Mid # 0.4 0.0 - 2.2 K/uL    Mid % 5.1 0.0 - 20.0 %M    GRANULOCYTES # 4.8 1.6 - 8.3 K/uL    % Granulocytes 69.1 40.0 - 75.0 %G    RBC 4.5 3.8 - 5.2 M/uL    Hemoglobin 12.5 11.7 - 15.7 g/dL    Hematocrit 39.2 35.0 - 47.0 %    MCV 87.0 78.0 - 100.0 fL    MCH 27.8 26.5 - 35.0    MCHC 31.9 (L) 32.0 - 36.0 g/dL    Platelets 256.0 150.0 - 450.0 K/uL           ASSESSMENT AND PLAN:      Rachael was seen today for results.  Seen for follow-up of multiple issues today.    Diagnoses and all orders for this visit:    Primary  osteoarthritis of right shoulder.  Still having some pain but it is tolerable per patient.  Not interested in physical therapy or injection currently.  Naproxen did not seem to be significantly helpful.  Will try Tylenol instead and will do regular daily exercises at home.  -     acetaminophen (TYLENOL) 500 MG tablet; Take 1 pill in Am and 1 pill in    Dry eyes.  Prescribed moisturizing eyedrops.  Requested that she discuss this with the eye doctor at her follow-up there as well.  -     carboxymethylcellul-glycerin (OPTIVE/REFRESH OPTIVE) 0.5-0.9 % SOLN ophthalmic solution; Place 1 drop into both eyes 3 times daily as needed    Type 2 diabetes mellitus without complication, without long-term current use of insulin (H).  A1c remains elevated.  Will increase the pioglitazone from 30 mg to 45 mg.  I am okay with this slowly going into effect once the new prescription arrives.  Has had difficulties with hypoglycemia in the past so would want to be cautious.  -     pioglitazone (ACTOS) 45 MG tablet; Take 1 tablet (45 mg) by mouth daily    Palpitations.  No current symptoms.  I think this was likely secondary to mental health.    Moderate episode of recurrent major depressive disorder (H).  Continue with [facility].    Microalbuminuria.   Blood pressure is stable on lisinopril.  Continue.      Patient Instructions   Stop taking the gabapentin- we will list this an an allergy.    Take tylenol, 1 pill 2x per day with your other pills, and can take 1 additional pill if needed.    Keep doing daily shoulder exercises--can do injection in the future if still having pain.    Continue to limit sugar!  Move around at home.    Use eye drops up to 3x per day to help with moisture.    Schedule appointment for eye doctor to check eyes and check with diabetes.    Increase dose of pioglitazone to 45mg per day.  New prescription sent.    Follow up in 6 weeks.          Amrita Carballo

## 2017-12-15 ENCOUNTER — OFFICE VISIT (OUTPATIENT)
Dept: PSYCHOLOGY | Facility: CLINIC | Age: 52
End: 2017-12-15
Payer: COMMERCIAL

## 2017-12-15 DIAGNOSIS — F43.10 PTSD (POST-TRAUMATIC STRESS DISORDER): ICD-10-CM

## 2017-12-15 DIAGNOSIS — R41.9 COGNITIVE COMPLAINTS: ICD-10-CM

## 2017-12-15 DIAGNOSIS — F33.1 MODERATE EPISODE OF RECURRENT MAJOR DEPRESSIVE DISORDER (H): Primary | ICD-10-CM

## 2017-12-15 NOTE — Clinical Note
Racheal DiegoBob Araujo,  I met with this patient as part of my [facility] group last Friday, and thought it would be pertinent to message you to let you know that only two group members were able to attend this most recent group meeting. Dr. Moon and I weren't sure what the procedure was for this type of situation, so she recommended I document/bill as normal and reach out to you.  Luigi, blu

## 2017-12-15 NOTE — MR AVS SNAPSHOT
After Visit Summary   12/15/2017    Rachael Ch    MRN: [id number]           Patient Information     Date Of Birth          1965        Visit Information        Provider Department      12/15/2017 9:30 AM Lombardi, Nathaniel, PhD [facility]        Today's Diagnoses     Moderate episode of recurrent major depressive disorder (H)    -  1    PTSD (post-traumatic stress disorder)        Cognitive complaints           Follow-ups after your visit        Follow-up notes from your care team     Return in about 2 weeks (around 12/29/2017).      Your next 10 appointments already scheduled     Dec 29, 2017  9:30 AM CST   Group Education with Nathaniel Lombardi, PhD   [facility] ([facility])    [address] [place] [id number]   [phone]            Jan 22, 2018  8:40 AM CST   Return Visit with Amrita Carballo MD   [facility] ([facility])    [address] [place] [address]   [phone]              Who to contact     Please call your clinic at [phone] to:    Ask questions about your health    Make or cancel appointments    Discuss your medicines    Learn about your test results    Speak to your doctor   If you have compliments or concerns about an experience at your clinic, or if you wish to file a complaint, please contact [facility] Patient Relations at [phone] or email us at [email or web address]         Additional Information About Your Visit        MyChart Information     Flumes is an electronic gateway that provides easy, online access to your medical records. With Flumes, you can request a clinic appointment, read your test results, renew a prescription or communicate with your care team.     To sign up for [email or web address] visit the website at [email or web address]   You will be asked to enter the access code listed below, as well as some personal information. Please follow the directions to create your username and  password.     Your access code is: [email or web address]  Expires: 3/13/2018  9:40 AM     Your access code will  in 90 days. If you need help or a new code, please contact your [facility] or call [phone] for assistance.        Care EveryWhere ID     This is your Care EveryWhere ID. This could be used by other organizations to access your [facility] medical records  [id number]         Blood Pressure from Last 3 Encounters:   17 126/75   11/15/17 134/80   17 117/77    Weight from Last 3 Encounters:   17 135 lb 6.4 oz (61.4 kg)   11/15/17 135 lb 3.2 oz (61.3 kg)   17 130 lb (59 kg)              We Performed the Following     Interactive Complexity add-on (50223)        Primary Care Provider Office Phone # Fax #    Amrita Carballo [phone]       [facility] [address] [place] [address]        Equal Access to Services     [facility]: Hadii aad ku hadasho Soomaali, waaxda luqadaha, qaybta kaalmada adeegyada, waxay idiin hayaan darshan jeffrey . So [facility] [phone].    ATENCIÓN: Si habla español, tiene a farah disposición servicios gratuitos de asistencia lingüística. Llame al [phone].    We comply with applicable federal civil rights laws and [place] laws. We do not discriminate on the basis of race, color, national origin, age, disability, sex, sexual orientation, or gender identity.            Thank you!     Thank you for choosing [facility]  for your care. Our goal is always to provide you with excellent care. Hearing back from our patients is one way we can continue to improve our services. Please take a few minutes to complete the written survey that you may receive in the mail after your visit with us. Thank you!             Your Updated Medication List - Protect others around you: Learn how to safely use, store and throw away your medicines at www.disposemymeds.org.          This list is accurate as of:  12/15/17 11:59 PM.  Always use your most recent med list.                   Brand Name Dispense Instructions for use Diagnosis    acetaminophen 500 MG tablet    TYLENOL    100 tablet    Take 1 pill in Am and 1 pill in    Primary osteoarthritis of right shoulder       atorvastatin 80 MG tablet    LIPITOR    90 tablet    Take 1 tablet (80 mg) by mouth daily    Type 2 diabetes mellitus without complication, without long-term current use of insulin (H)       blood glucose lancets standard    no brand specified    1 Box    Use to test blood sugar 1 times daily or as directed.    Type 2 diabetes mellitus with hyperglycemia, without long-term current use of insulin (H)       * blood glucose monitoring lancets     2 Box    1 each by In Vitro route daily    Diabetes mellitus, type 2 (H)       * MICROLET LANCETS Misc     100 each    1 Box daily    Diabetes mellitus (H)       blood glucose monitoring meter device kit    no brand specified    1 kit    Use to test blood sugar 1 times daily or as directed.    Type 2 diabetes mellitus with hyperglycemia, without long-term current use of insulin (H)       blood glucose monitoring test strip    no brand specified    1 Box    Use to test blood sugars 1 times daily or as directed    Type 2 diabetes mellitus with hyperglycemia, without long-term current use of insulin (H)       calcium carbonate 500 MG chewable tablet    TUMS    100 tablet    Take 1 tablet (500 mg) by mouth 2 times daily    Gastroesophageal reflux disease, esophagitis presence not specified       carboxymethylcellul-glycerin 0.5-0.9 % Soln ophthalmic solution    OPTIVE/REFRESH OPTIVE    1 Bottle    Place 1 drop into both eyes 3 times daily as needed    Dry eyes       cholecalciferol 2000 UNITS tablet     100 tablet    Take 2,000 Units by mouth daily    Vitamin D deficiency       glipiZIDE 10 MG 24 hr tablet    GLUCOTROL XL    90 tablet    Take 1 tablet (10 mg) by mouth daily    Type 2 diabetes mellitus without  complication, without long-term current use of insulin (H)       lisinopril 5 MG tablet    PRINIVIL/ZESTRIL    90 tablet    Take 1 tablet (5 mg) by mouth daily    Microalbuminuria       metFORMIN 500 MG 24 hr tablet    GLUCOPHAGE-XR    360 tablet    Take 2 tablets (1,000 mg) by mouth 2 times daily (with meals)    Type 2 diabetes mellitus without complication, without long-term current use of insulin (H)       pioglitazone 45 MG tablet    ACTOS    90 tablet    Take 1 tablet (45 mg) by mouth daily    Type 2 diabetes mellitus without complication, without long-term current use of insulin (H)       potassium chloride SA 20 MEQ CR tablet    KLOR-CON    180 tablet    Take 1 tablet (20 mEq) by mouth 2 times daily    Hypokalemia       prazosin 2 MG capsule    MINIPRESS    90 capsule    Take 1 capsule (2 mg) by mouth At Bedtime    PTSD (post-traumatic stress disorder)       ranitidine 300 MG tablet    ZANTAC    90 tablet    Take 1 tablet (300 mg) by mouth At Bedtime    Gastroesophageal reflux disease without esophagitis       sertraline 50 MG tablet    ZOLOFT    90 tablet    Take 1 tablet (50 mg) by mouth daily    Moderate episode of recurrent major depressive disorder (H)       sitagliptin 100 MG tablet    JANUVIA    90 tablet    Take 1 tablet (100 mg) by mouth daily    Type 2 diabetes mellitus with hyperglycemia, without long-term current use of insulin (H)       * Notice:  This list has 2 medication(s) that are the same as other medications prescribed for you. Read the directions carefully, and ask your doctor or other care provider to review them with you.

## 2017-12-20 NOTE — PROGRESS NOTES
[facility] Group Therapy Note  Meeting was: Scheduled  Others present: , Jyotsna Noguera ([facility] RAJAT, [phone])  Meeting lasted: 110 minutes  Patient was: On time  Mode of Treatment: Group Therapy  Group Session Number: 6  Number of Attendees: 2     Complexity: An  is used not only to interpret language, since the group members do not speak English, but also to help with the complexity of understandings across cultures, since the members are not well integrated in the larger [place] culture.    Topics Discussed: Reflection on previous group meeting, The [facility] and Health Promotion    Checked-in: Each group member checked-in and shared any new updates from their lives since last group. Additionally, discussed possibility of devoting portion of current group meeting to reflection on previous group meeting ([employer] transit service presentation and field trip). Group members expressed interest in devoting initial portion to reflection.       Reflected on previous meeting: Group members reported finding the previous group meeting beneficial. Indicated the experience had increased their comfort[place]confidence with [place] metro transit [place], though indicated they would likely still experience difficulties utilizing the metro transit system on their own. Observed that additional instruction (e.g., Instruction and education about how to plan out a trip beforehand) would be helpful in this regard. Discussed possibility of having Colt Henning (Metro transit ) return during a future group meeting to provide additional instruction. Group members expressed interest. Agreed this provider would look into this moving forward.       [facility] and Health Promotion: Discussed healthcare in the [place] Group members identified differences between healthcare in the [place] and [place]. Provided psychoeducation about the different types of care in the [place] (primary care, urgent  care, and emergency care) and the appropriate use of these types of care. Discussed when and how to use 911 in the case of an emergency (physical or mental health emergency). Group members were also provided with the phone number for the crisis mental health line (multilingual line) and instructed on how and when it would be appropriate to use this number.     Subjective: The patient appeared to have good insight/understanding of the [place] Healthcare system, in particular regarding the focus on detection and prevention. Shared that since coming to the [place] in [place] she has been mindful about meeting with medical providers regularly, especially at [facility], for both routine checkups and health-related concerns when they arise. Contrasted her experiences in the [place] with her time in [facility], and shared that previously she would rarely meet with medical providers unless she were severely ill.     Objective: Ms. Ch appears awake and alert for today's visit.     Assessment: Ms. Ch was an active, engaged participant throughout the meeting today. Ms. Ch appeared receptive to feedback and discussion during the visit.    Diagnosis:   296.32 Major Depressive Disorder, Recurrent Episode, Moderate  309.81 Posttraumatic Stress Disorder  799.59 Unspecified Neurocognitive Disorder    Plan:  1. Attend next therapy group in 2 weeks.   2. Utilize emergency resources as needed.       Nathaniel Lombardi, Ph.D.  Behavioral Health Fellow      Treatment Plan: Treatment plan update due 2/16/2018.

## 2017-12-27 ENCOUNTER — MEDICAL CORRESPONDENCE (OUTPATIENT)
Dept: HEALTH INFORMATION MANAGEMENT | Facility: CLINIC | Age: 52
End: 2017-12-27

## 2018-01-08 DIAGNOSIS — K21.9 GASTROESOPHAGEAL REFLUX DISEASE, ESOPHAGITIS PRESENCE NOT SPECIFIED: ICD-10-CM

## 2018-01-08 RX ORDER — CALCIUM CARBONATE 500 MG/1
1 TABLET, CHEWABLE ORAL 2 TIMES DAILY
Qty: 100 TABLET | Refills: 3 | Status: SHIPPED | OUTPATIENT
Start: 2018-01-08 | End: 2018-05-03

## 2018-01-09 DIAGNOSIS — E11.65 TYPE 2 DIABETES MELLITUS WITH HYPERGLYCEMIA, WITHOUT LONG-TERM CURRENT USE OF INSULIN (H): ICD-10-CM

## 2018-01-09 RX ORDER — BLOOD-GLUCOSE METER
EACH MISCELLANEOUS
Qty: 1 KIT | Refills: 0 | Status: SHIPPED | OUTPATIENT
Start: 2018-01-09 | End: 2019-01-25

## 2018-01-19 DIAGNOSIS — E11.9 TYPE 2 DIABETES MELLITUS WITHOUT COMPLICATION, WITHOUT LONG-TERM CURRENT USE OF INSULIN (H): Primary | ICD-10-CM

## 2018-01-22 ENCOUNTER — OFFICE VISIT (OUTPATIENT)
Dept: FAMILY MEDICINE | Facility: CLINIC | Age: 53
End: 2018-01-22
Payer: COMMERCIAL

## 2018-01-22 VITALS
HEART RATE: 85 BPM | DIASTOLIC BLOOD PRESSURE: 74 MMHG | TEMPERATURE: 98.2 F | WEIGHT: 137.4 LBS | SYSTOLIC BLOOD PRESSURE: 139 MMHG | BODY MASS INDEX: 29.64 KG/M2

## 2018-01-22 DIAGNOSIS — F33.1 MODERATE EPISODE OF RECURRENT MAJOR DEPRESSIVE DISORDER (H): ICD-10-CM

## 2018-01-22 DIAGNOSIS — E11.9 TYPE 2 DIABETES MELLITUS WITHOUT COMPLICATION, WITHOUT LONG-TERM CURRENT USE OF INSULIN (H): Primary | ICD-10-CM

## 2018-01-22 DIAGNOSIS — G89.29 CHRONIC RIGHT SHOULDER PAIN: ICD-10-CM

## 2018-01-22 DIAGNOSIS — M25.511 CHRONIC RIGHT SHOULDER PAIN: ICD-10-CM

## 2018-01-22 DIAGNOSIS — H93.8X3 EAR FULLNESS, BILATERAL: ICD-10-CM

## 2018-01-22 DIAGNOSIS — R80.9 MICROALBUMINURIA: ICD-10-CM

## 2018-01-22 LAB — HBA1C MFR BLD: 7.4 % (ref 4.1–5.7)

## 2018-01-22 RX ORDER — LISINOPRIL 10 MG/1
10 TABLET ORAL DAILY
Qty: 90 TABLET | Refills: 1 | Status: SHIPPED | OUTPATIENT
Start: 2018-01-22 | End: 2018-06-27

## 2018-01-22 NOTE — NURSING NOTE
name: Jyotsna Noguera  Language: Radha  Agency: [facility]  Phone number: [phone]

## 2018-01-22 NOTE — PATIENT INSTRUCTIONS
Results for orders placed or performed in visit on 01/22/18   Hemoglobin A1c ([facility])   Result Value Ref Range    Hemoglobin A1C 7.4 (H) 4.1 - 5.7 %     A1c is excellent!  No change in you blood sugar medications  Blood pressure is a little high.  Increase lisinopril to 10mg daily.  New prescription sent in today.      Pay attention to when the ears and head feel scratchy or something.  If things are getting worse, we can send you to ENT, but we don't have to do anything right now.      Bring all your medicines when you come back next time.

## 2018-01-22 NOTE — MR AVS SNAPSHOT
After Visit Summary   1/22/2018    Rachael hC    MRN: [id number]           Patient Information     Date Of Birth          1965        Visit Information        Provider Department      1/22/2018 8:40 AM Amrita Carballo MD [facility]        Today's Diagnoses     Type 2 diabetes mellitus without complication, without long-term current use of insulin (H)        Microalbuminuria          Care Instructions    Results for orders placed or performed in visit on 01/22/18   Hemoglobin A1c ([facility])   Result Value Ref Range    Hemoglobin A1C 7.4 (H) 4.1 - 5.7 %     A1c is excellent!  No change in you blood sugar medications  Blood pressure is a little high.  Increase lisinopril to 10mg daily.  New prescription sent in today.      Pay attention to when the ears and head feel scratchy or something.  If things are getting worse, we can send you to ENT, but we don't have to do anything right now.      Bring all your medicines when you come back next time.            Follow-ups after your visit        Your next 10 appointments already scheduled     Jan 26, 2018  9:30 AM CST   Group Education with Nathaniel Lombardi, PhD   [facility] ([facility])    [address] [place] [address]   [phone]              Who to contact     Please call your clinic at [phone] to:    Ask questions about your health    Make or cancel appointments    Discuss your medicines    Learn about your test results    Speak to your doctor   If you have compliments or concerns about an experience at your clinic, or if you wish to file a complaint, please contact [facility] Patient Relations at [phone] or email us at [email or web address]         Additional Information About Your Visit        MyChart Information     BlackArrow is an electronic gateway that provides easy, online access to your medical records. With BlackArrow, you can request a clinic appointment, read your test  results, renew a prescription or communicate with your care team.     To sign up for MyChart visit the website at [email or web address]   You will be asked to enter the access code listed below, as well as some personal information. Please follow the directions to create your username and password.     Your access code is: [email or web address]  Expires: 3/13/2018  9:40 AM     Your access code will  in 90 days. If you need help or a new code, please contact your [facility] or call [phone] for assistance.        Care EveryWhere ID     This is your Care EveryWhere ID. This could be used by other organizations to access your [facility] medical records  [id number]        Your Vitals Were     Pulse Temperature BMI (Body Mass Index)             85 98.2  F (36.8  C) (Oral) 29.64 kg/m2          Blood Pressure from Last 3 Encounters:   18 139/74   17 126/75   11/15/17 134/80    Weight from Last 3 Encounters:   18 137 lb 6.4 oz (62.3 kg)   17 135 lb 6.4 oz (61.4 kg)   11/15/17 135 lb 3.2 oz (61.3 kg)              We Performed the Following     Hemoglobin A1c ([email or web address])          Today's Medication Changes          These changes are accurate as of: 18  9:28 AM.  If you have any questions, ask your nurse or doctor.               These medicines have changed or have updated prescriptions.        Dose/Directions    lisinopril 10 MG tablet   Commonly known as:  PRINIVIL/ZESTRIL   This may have changed:    - medication strength  - how much to take   Used for:  Microalbuminuria   Changed by:  Amrita Carballo MD        Dose:  10 mg   Take 1 tablet (10 mg) by mouth daily   Quantity:  90 tablet   Refills:  1            Where to get your medicines      [facility] [place], [place] - [facility] [address], [place] [address]     Phone:  [phone]     lisinopril 10 MG tablet                Primary Care Provider  Office Phone # Fax #    Amrita Carballo [phone]       [facility] [address] [place] [address]        Equal Access to Services     RIMMA COLE : Hadii aad ku hadjoryshawna Haile, wapanfiloda luqadaha, qaybta kaalmada baljeet, vincent garvinflavia ellisoneric gaviotacloversamina hall. So [facility] [phone].    ATENCIÓN: Si habla español, tiene a farah disposición servicios gratuitos de asistencia lingüística. Llame al [phone].    We comply with applicable federal civil rights laws and [place] laws. We do not discriminate on the basis of race, color, national origin, age, disability, sex, sexual orientation, or gender identity.            Thank you!     Thank you for choosing [facility]  for your care. Our goal is always to provide you with excellent care. Hearing back from our patients is one way we can continue to improve our services. Please take a few minutes to complete the written survey that you may receive in the mail after your visit with us. Thank you!             Your Updated Medication List - Protect others around you: Learn how to safely use, store and throw away your medicines at www.disposemymeds.org.          This list is accurate as of: 1/22/18  9:28 AM.  Always use your most recent med list.                   Brand Name Dispense Instructions for use Diagnosis    acetaminophen 500 MG tablet    TYLENOL    100 tablet    Take 1 pill in Am and 1 pill in    Primary osteoarthritis of right shoulder       atorvastatin 80 MG tablet    LIPITOR    90 tablet    Take 1 tablet (80 mg) by mouth daily    Type 2 diabetes mellitus without complication, without long-term current use of insulin (H)       blood glucose monitoring meter device kit     1 kit    Use to test blood sugars 1 times daily or as directed.    Type 2 diabetes mellitus with hyperglycemia, without long-term current use of insulin (H)       blood glucose monitoring test strip    no brand specified    1 Box    Use to test blood  sugars 1 times daily or as directed    Type 2 diabetes mellitus with hyperglycemia, without long-term current use of insulin (H)       calcium carbonate 500 MG chewable tablet    TUMS    100 tablet    Take 1 tablet (500 mg) by mouth 2 times daily    Gastroesophageal reflux disease, esophagitis presence not specified       carboxymethylcellul-glycerin 0.5-0.9 % Soln ophthalmic solution    OPTIVE/REFRESH OPTIVE    1 Bottle    Place 1 drop into both eyes 3 times daily as needed    Dry eyes       cholecalciferol 2000 UNITS tablet     100 tablet    Take 2,000 Units by mouth daily    Vitamin D deficiency       glipiZIDE 10 MG 24 hr tablet    GLUCOTROL XL    90 tablet    Take 1 tablet (10 mg) by mouth daily    Type 2 diabetes mellitus without complication, without long-term current use of insulin (H)       lisinopril 10 MG tablet    PRINIVIL/ZESTRIL    90 tablet    Take 1 tablet (10 mg) by mouth daily    Microalbuminuria       metFORMIN 500 MG 24 hr tablet    GLUCOPHAGE-XR    360 tablet    Take 2 tablets (1,000 mg) by mouth 2 times daily (with meals)    Type 2 diabetes mellitus without complication, without long-term current use of insulin (H)       * MICROLET LANCETS Misc     100 each    1 Box daily    Diabetes mellitus (H)       * blood glucose monitoring lancets     100 each    Use to test blood sugars 1 times daily or as directed.    Type 2 diabetes mellitus with hyperglycemia, without long-term current use of insulin (H)       pioglitazone 45 MG tablet    ACTOS    90 tablet    Take 1 tablet (45 mg) by mouth daily    Type 2 diabetes mellitus without complication, without long-term current use of insulin (H)       potassium chloride SA 20 MEQ CR tablet    KLOR-CON    180 tablet    Take 1 tablet (20 mEq) by mouth 2 times daily    Hypokalemia       prazosin 2 MG capsule    MINIPRESS    90 capsule    Take 1 capsule (2 mg) by mouth At Bedtime    PTSD (post-traumatic stress disorder)       ranitidine 300 MG tablet    ZANTAC     90 tablet    Take 1 tablet (300 mg) by mouth At Bedtime    Gastroesophageal reflux disease without esophagitis       sertraline 50 MG tablet    ZOLOFT    90 tablet    Take 1 tablet (50 mg) by mouth daily    Moderate episode of recurrent major depressive disorder (H)       sitagliptin 100 MG tablet    JANUVIA    90 tablet    Take 1 tablet (100 mg) by mouth daily    Type 2 diabetes mellitus with hyperglycemia, without long-term current use of insulin (H)       * Notice:  This list has 2 medication(s) that are the same as other medications prescribed for you. Read the directions carefully, and ask your doctor or other care provider to review them with you.

## 2018-02-07 ENCOUNTER — TELEPHONE (OUTPATIENT)
Dept: PSYCHOLOGY | Facility: CLINIC | Age: 53
End: 2018-02-07

## 2018-02-07 NOTE — TELEPHONE ENCOUNTER
This provider placed outreach call to the patient via the [email or web address] Language Line (ID #[id number]) to remind her about the scheduled Radha Group meeting this Friday (2/9/2018). Talked with the patient. Reported she received a reminder call earlier in the day. Confirmed the date/time of the group meeting. Reported she would be in attendance.     Nathaniel Lombardi, Ph.D.  Behavioral Health Fellow

## 2018-02-09 ENCOUNTER — OFFICE VISIT (OUTPATIENT)
Dept: PSYCHOLOGY | Facility: CLINIC | Age: 53
End: 2018-02-09
Payer: COMMERCIAL

## 2018-02-09 DIAGNOSIS — R41.9 COGNITIVE COMPLAINTS: ICD-10-CM

## 2018-02-09 DIAGNOSIS — F43.10 PTSD (POST-TRAUMATIC STRESS DISORDER): ICD-10-CM

## 2018-02-09 DIAGNOSIS — F33.1 MODERATE EPISODE OF RECURRENT MAJOR DEPRESSIVE DISORDER (H): Primary | ICD-10-CM

## 2018-02-09 NOTE — PROGRESS NOTES
Radha Group Therapy Note  Meeting was: Scheduled  Others present: Jyotsna (CAROL Garcia, [phone])  Meeting lasted: 60 minutes  Patient was: On time  Mode of Treatment: Group Therapy  Group Session Number: 7  Number of Attendees: 2    Complexity: An  is used not only to interpret language, since the group members do not speak English, but also to help with the complexity of understandings across cultures, since the members are not well integrated in the larger [place] culture.    Topics Discussed: Helpful English Phrases and Using Local Resources    Checked-in. Group members shared updates from their lives between previous and current group meetings. Briefly discussed the recent changes in weather and their impact on group members' activities/endeavors.      Presentation/Discussion: Discussed language barriers and elicited examples of how language has prevented group members from accessing care in the past (e.g., Difficulties with arranging transportation; Not understanding phone calls from providers/clinics; Missing appointments; Difficulties with managing bills/mail). Group members were introduced to 5 useful English phrases (i.e., I don't speak English; I speak Radha; I need a radha ; I don't understand; Thank you). Each of the phrases was explained in Radha. Group members took several minutes to practice saying these phrases out loud. Group members were also offered the opportunity to ask about other phrases they would like to learn in English (i.e., Please help me I'm lost; I'm sorry). Group members were also educated about how to read and dial phone numbers and how to read and locate addresses. Last, they were given a resource handout and provided an overview of the domains of  for which assistance is available. Group members denied specific resource needs at this time. They were instructed to request help from clinic staff if needs were to arise in the  future.     Subjective: The patient endorsed experiencing language-related barriers/difficulties within the context of accessing care, as well as within other contexts and settings (e.g., Asking for assistance when uncertain where to go). During the current meeting expressed interest in learning a helpful English phrase concerning requesting assistance with directions/navigation. Reported feeling the phrase discussed during the current group meeting would be beneficial. Of note, during the current meeting observed that she also occasionally experiences difficulties with comprehension with other Radha-speaking individuals due to differences in dialects/tones associated with having been living in different areas/locations.     Objective: Ms. Ch appears awake and alert for today's visit.     Assessment: Ms. Ch was an active, engaged participant throughout the meeting today. Ms. Ch appeared receptive to feedback and discussion during the visit.    Diagnosis:   296.32 Major Depressive Disorder, Recurrent Episode, Moderate  309.81 Posttraumatic Stress Disorder  799.59 Unspecified Neurocognitive Disorder    Plan:  1. Attend next therapy group in 2 weeks.   2. Utilize emergency resources as needed.   3. Treatment plan to be reviewed/updated during next group meeting.       Nathaniel Lombardi, Ph.D.  Behavioral Health Fellow      Treatment Plan: Treatment plan update due 2/16/2018.

## 2018-02-09 NOTE — MR AVS SNAPSHOT
After Visit Summary   2018    Rachael Ch    MRN: [id number]           Patient Information     Date Of Birth          1965        Visit Information        Provider Department      2018 9:30 AM Lombardi, Nathaniel, PhD [facility]        Today's Diagnoses     Moderate episode of recurrent major depressive disorder (H)    -  1    PTSD (post-traumatic stress disorder)        Cognitive complaints           Follow-ups after your visit        Follow-up notes from your care team     Return in about 2 weeks (around 2018).      Your next 10 appointments already scheduled     Mar 21, 2018  1:10 PM CDT   Return Visit with Amrita Carballo MD   [facility] ([facility])    [address] [place] [id number]   [phone]              Who to contact     Please call your clinic at [phone] to:    Ask questions about your health    Make or cancel appointments    Discuss your medicines    Learn about your test results    Speak to your doctor            Additional Information About Your Visit        MyChart Information     Eltechst is an electronic gateway that provides easy, online access to your medical records. With [employer], you can request a clinic appointment, read your test results, renew a prescription or communicate with your care team.     To sign up for Eltechst visit the website at [email or web address]   You will be asked to enter the access code listed below, as well as some personal information. Please follow the directions to create your username and password.     Your access code is: [email or web address]  Expires: 3/13/2018  9:40 AM     Your access code will  in 90 days. If you need help or a new code, please contact your [facility] or call [phone] for assistance.        Care EveryWhere ID     This is your Care EveryWhere ID. This could be used by other organizations to access your [facility] medical records  [id number]          Blood Pressure from Last 3 Encounters:   01/22/18 139/74   12/13/17 126/75   11/15/17 134/80    Weight from Last 3 Encounters:   01/22/18 137 lb 6.4 oz (62.3 kg)   12/13/17 135 lb 6.4 oz (61.4 kg)   11/15/17 135 lb 3.2 oz (61.3 kg)              We Performed the Following     Interactive Complexity add-on (00536)        Primary Care Provider Office Phone # Fax #    Amrita Carballo [phone]       [facility] [address] [place] [address]        Equal Access to Services     [facility] DOUGLAS : Hadii sander pierre hadmiranda Somatt, waaxda luyamiladaha, qaybta kaalmada baljeet, vincent jeffrey . So [facility] [phone].    ATENCIÓN: Si habla español, tiene a farah disposición servicios gratuitos de asistencia lingüística. Llame al [phone].    We comply with applicable federal civil rights laws and [place] laws. We do not discriminate on the basis of race, color, national origin, age, disability, sex, sexual orientation, or gender identity.            Thank you!     Thank you for choosing [facility]  for your care. Our goal is always to provide you with excellent care. Hearing back from our patients is one way we can continue to improve our services. Please take a few minutes to complete the written survey that you may receive in the mail after your visit with us. Thank you!             Your Updated Medication List - Protect others around you: Learn how to safely use, store and throw away your medicines at www.disposemymeds.org.          This list is accurate as of 2/9/18  1:26 PM.  Always use your most recent med list.                   Brand Name Dispense Instructions for use Diagnosis    acetaminophen 500 MG tablet    TYLENOL    100 tablet    Take 1 pill in Am and 1 pill in    Primary osteoarthritis of right shoulder       atorvastatin 80 MG tablet    LIPITOR    90 tablet    Take 1 tablet (80 mg) by mouth daily    Type 2 diabetes mellitus without complication, without  long-term current use of insulin (H)       blood glucose monitoring meter device kit     1 kit    Use to test blood sugars 1 times daily or as directed.    Type 2 diabetes mellitus with hyperglycemia, without long-term current use of insulin (H)       blood glucose monitoring test strip    no brand specified    1 Box    Use to test blood sugars 1 times daily or as directed    Type 2 diabetes mellitus with hyperglycemia, without long-term current use of insulin (H)       calcium carbonate 500 MG chewable tablet    TUMS    100 tablet    Take 1 tablet (500 mg) by mouth 2 times daily    Gastroesophageal reflux disease, esophagitis presence not specified       carboxymethylcellul-glycerin 0.5-0.9 % Soln ophthalmic solution    OPTIVE/REFRESH OPTIVE    1 Bottle    Place 1 drop into both eyes 3 times daily as needed    Dry eyes       cholecalciferol 2000 UNITS tablet     100 tablet    Take 2,000 Units by mouth daily    Vitamin D deficiency       glipiZIDE 10 MG 24 hr tablet    GLUCOTROL XL    90 tablet    Take 1 tablet (10 mg) by mouth daily    Type 2 diabetes mellitus without complication, without long-term current use of insulin (H)       lisinopril 10 MG tablet    PRINIVIL/ZESTRIL    90 tablet    Take 1 tablet (10 mg) by mouth daily    Microalbuminuria       metFORMIN 500 MG 24 hr tablet    GLUCOPHAGE-XR    360 tablet    Take 2 tablets (1,000 mg) by mouth 2 times daily (with meals)    Type 2 diabetes mellitus without complication, without long-term current use of insulin (H)       * MICROLET LANCETS Misc     100 each    1 Box daily    Diabetes mellitus (H)       * blood glucose monitoring lancets     100 each    Use to test blood sugars 1 times daily or as directed.    Type 2 diabetes mellitus with hyperglycemia, without long-term current use of insulin (H)       pioglitazone 45 MG tablet    ACTOS    90 tablet    Take 1 tablet (45 mg) by mouth daily    Type 2 diabetes mellitus without complication, without long-term  current use of insulin (H)       potassium chloride SA 20 MEQ CR tablet    KLOR-CON    180 tablet    Take 1 tablet (20 mEq) by mouth 2 times daily    Hypokalemia       prazosin 2 MG capsule    MINIPRESS    90 capsule    Take 1 capsule (2 mg) by mouth At Bedtime    PTSD (post-traumatic stress disorder)       ranitidine 300 MG tablet    ZANTAC    90 tablet    Take 1 tablet (300 mg) by mouth At Bedtime    Gastroesophageal reflux disease without esophagitis       sertraline 50 MG tablet    ZOLOFT    90 tablet    Take 1 tablet (50 mg) by mouth daily    Moderate episode of recurrent major depressive disorder (H)       sitagliptin 100 MG tablet    JANUVIA    90 tablet    Take 1 tablet (100 mg) by mouth daily    Type 2 diabetes mellitus with hyperglycemia, without long-term current use of insulin (H)       * Notice:  This list has 2 medication(s) that are the same as other medications prescribed for you. Read the directions carefully, and ask your doctor or other care provider to review them with you.

## 2018-02-25 ENCOUNTER — MEDICAL CORRESPONDENCE (OUTPATIENT)
Dept: HEALTH INFORMATION MANAGEMENT | Facility: CLINIC | Age: 53
End: 2018-02-25

## 2018-03-02 ENCOUNTER — TELEPHONE (OUTPATIENT)
Dept: FAMILY MEDICINE | Facility: CLINIC | Age: 53
End: 2018-03-02

## 2018-03-02 DIAGNOSIS — E11.9 TYPE 2 DIABETES MELLITUS WITHOUT COMPLICATION, WITHOUT LONG-TERM CURRENT USE OF INSULIN (H): ICD-10-CM

## 2018-03-02 DIAGNOSIS — F33.1 MODERATE EPISODE OF RECURRENT MAJOR DEPRESSIVE DISORDER (H): ICD-10-CM

## 2018-03-02 NOTE — TELEPHONE ENCOUNTER
This provider placed outreach call to the patient via the [employer] Language Line (ID#[id number]) to check-in regarding canceled group meeting last week (2/23/2018). Talked with the patient. Reported she was unable to attend group meeting due to a scheduling conflict (Had a home nurse visiting at the time of most recent group). Expressed continued interest in participating in the Radha Group moving forward. Confirmed the date/time of the next-scheduled group meeting (3/9/2018 at 9:30AM). Reported she has an eye appointment scheduled for next Friday but was under the impression the appointment was scheduled for the afternoon. Agreed she would confirm the time of the eye appointment and that this provider would place outreach call to the patient on Monday (3/5/2018) to check-in and follow-up on this. Briefly discussed the importance of consistent attendance and that nonattendance moving forward may result in the dissolution of the group in the future. Expressed understanding and expressed confidence in her ability to attend the next group meeting.    Nathaniel Lombardi, PhD, LP  Behavioral Health Fellow

## 2018-03-05 RX ORDER — GLIPIZIDE 10 MG/1
10 TABLET, FILM COATED, EXTENDED RELEASE ORAL DAILY
Qty: 90 TABLET | Refills: 1 | Status: SHIPPED | OUTPATIENT
Start: 2018-03-05 | End: 2018-03-21

## 2018-03-09 ENCOUNTER — OFFICE VISIT (OUTPATIENT)
Dept: PSYCHOLOGY | Facility: CLINIC | Age: 53
End: 2018-03-09
Payer: COMMERCIAL

## 2018-03-09 DIAGNOSIS — R41.9 COGNITIVE COMPLAINTS: ICD-10-CM

## 2018-03-09 DIAGNOSIS — F43.10 PTSD (POST-TRAUMATIC STRESS DISORDER): ICD-10-CM

## 2018-03-09 DIAGNOSIS — F33.1 MODERATE EPISODE OF RECURRENT MAJOR DEPRESSIVE DISORDER (H): Primary | ICD-10-CM

## 2018-03-09 NOTE — Clinical Note
Hi Dr. Carballo,  Just wanted to let you know that Rachael mentioned she canceled her eye surgery and was thinking about holding off on having the surgery until next winter. Reported she would be discussing this with you moving forward, but thought it would be prudent to pass it along all the same.  Luigi, blu

## 2018-03-09 NOTE — MR AVS SNAPSHOT
After Visit Summary   3/9/2018    Rachael Ch    MRN: [id number]           Patient Information     Date Of Birth          1965        Visit Information        Provider Department      3/9/2018 9:30 AM Lombardi, Nathaniel, PhD [facility]        Today's Diagnoses     Moderate episode of recurrent major depressive disorder (H)    -  1    PTSD (post-traumatic stress disorder)        Cognitive complaints           Follow-ups after your visit        Follow-up notes from your care team     Return in about 2 weeks (around 3/23/2018).      Your next 10 appointments already scheduled     Mar 21, 2018  1:10 PM CDT   Return Visit with Amrita Carballo MD   [facility] ([facility])    [address] [place] [id number]   [phone]            Mar 23, 2018  9:30 AM CDT   Group Education with Nathaniel Lombardi, PhD   [facility] ([facility])    [address] [place] [address]   [phone]              Who to contact     Please call your clinic at [phone] to:    Ask questions about your health    Make or cancel appointments    Discuss your medicines    Learn about your test results    Speak to your doctor            Additional Information About Your Visit        MyChart Information     FoodShootrt is an electronic gateway that provides easy, online access to your medical records. With ReachForce, you can request a clinic appointment, read your test results, renew a prescription or communicate with your care team.     To sign up for FoodShootrt visit the website at [email or web address]   You will be asked to enter the access code listed below, as well as some personal information. Please follow the directions to create your username and password.     Your access code is: [email or web address]  Expires: 2018 11:51 AM     Your access code will  in 90 days. If you need help or a new code, please contact your [facility] or call [phone] for  assistance.        Care EveryWhere ID     This is your Care EveryWhere ID. This could be used by other organizations to access your [place] medical records  [id number]         Blood Pressure from Last 3 Encounters:   01/22/18 139/74   12/13/17 126/75   11/15/17 134/80    Weight from Last 3 Encounters:   01/22/18 137 lb 6.4 oz (62.3 kg)   12/13/17 135 lb 6.4 oz (61.4 kg)   11/15/17 135 lb 3.2 oz (61.3 kg)              We Performed the Following     Interactive Complexity add-on (53608)        Primary Care Provider Office Phone # Fax #    Amrita Carballo [phone]       [facility] [address] [place] [address]        Equal Access to Services     RIMMA COLE : Hadii sander pierre hadasho Soomaali, waaxda luqadaha, qaybta kaalmada adeegyada, vincent jeffrey . So [facility] [phone].    ATENCIÓN: Si habla español, tiene a farah disposición servicios gratuitos de asistencia lingüística. Llame al [phone].    We comply with applicable federal civil rights laws and [place] laws. We do not discriminate on the basis of race, color, national origin, age, disability, sex, sexual orientation, or gender identity.            Thank you!     Thank you for choosing [facility]  for your care. Our goal is always to provide you with excellent care. Hearing back from our patients is one way we can continue to improve our services. Please take a few minutes to complete the written survey that you may receive in the mail after your visit with us. Thank you!             Your Updated Medication List - Protect others around you: Learn how to safely use, store and throw away your medicines at www.disposemymeds.org.          This list is accurate as of 3/9/18 11:59 PM.  Always use your most recent med list.                   Brand Name Dispense Instructions for use Diagnosis    acetaminophen 500 MG tablet    TYLENOL    100 tablet    Take 1 pill in Am and 1 pill in    Primary  osteoarthritis of right shoulder       atorvastatin 80 MG tablet    LIPITOR    90 tablet    Take 1 tablet (80 mg) by mouth daily    Type 2 diabetes mellitus without complication, without long-term current use of insulin (H)       blood glucose monitoring meter device kit     1 kit    Use to test blood sugars 1 times daily or as directed.    Type 2 diabetes mellitus with hyperglycemia, without long-term current use of insulin (H)       blood glucose monitoring test strip    no brand specified    1 Box    Use to test blood sugars 1 times daily or as directed    Type 2 diabetes mellitus with hyperglycemia, without long-term current use of insulin (H)       calcium carbonate 500 MG chewable tablet    TUMS    100 tablet    Take 1 tablet (500 mg) by mouth 2 times daily    Gastroesophageal reflux disease, esophagitis presence not specified       carboxymethylcellul-glycerin 0.5-0.9 % Soln ophthalmic solution    OPTIVE/REFRESH OPTIVE    1 Bottle    Place 1 drop into both eyes 3 times daily as needed    Dry eyes       cholecalciferol 2000 UNITS tablet     100 tablet    Take 2,000 Units by mouth daily    Vitamin D deficiency       glipiZIDE 10 MG 24 hr tablet    GLUCOTROL XL    90 tablet    Take 1 tablet (10 mg) by mouth daily    Type 2 diabetes mellitus without complication, without long-term current use of insulin (H)       lisinopril 10 MG tablet    PRINIVIL/ZESTRIL    90 tablet    Take 1 tablet (10 mg) by mouth daily    Microalbuminuria       metFORMIN 500 MG 24 hr tablet    GLUCOPHAGE-XR    360 tablet    Take 2 tablets (1,000 mg) by mouth 2 times daily (with meals)    Type 2 diabetes mellitus without complication, without long-term current use of insulin (H)       * MICROLET LANCETS Misc     100 each    1 Box daily    Diabetes mellitus (H)       * blood glucose monitoring lancets     100 each    Use to test blood sugars 1 times daily or as directed.    Type 2 diabetes mellitus with hyperglycemia, without long-term  current use of insulin (H)       pioglitazone 45 MG tablet    ACTOS    90 tablet    Take 1 tablet (45 mg) by mouth daily    Type 2 diabetes mellitus without complication, without long-term current use of insulin (H)       potassium chloride SA 20 MEQ CR tablet    KLOR-CON    180 tablet    Take 1 tablet (20 mEq) by mouth 2 times daily    Hypokalemia       prazosin 2 MG capsule    MINIPRESS    90 capsule    Take 1 capsule (2 mg) by mouth At Bedtime    PTSD (post-traumatic stress disorder)       ranitidine 300 MG tablet    ZANTAC    90 tablet    Take 1 tablet (300 mg) by mouth At Bedtime    Gastroesophageal reflux disease without esophagitis       sertraline 50 MG tablet    ZOLOFT    90 tablet    Take 1 tablet (50 mg) by mouth daily    Moderate episode of recurrent major depressive disorder (H)       sitagliptin 100 MG tablet    JANUVIA    90 tablet    Take 1 tablet (100 mg) by mouth daily    Type 2 diabetes mellitus with hyperglycemia, without long-term current use of insulin (H)       * Notice:  This list has 2 medication(s) that are the same as other medications prescribed for you. Read the directions carefully, and ask your doctor or other care provider to review them with you.

## 2018-03-09 NOTE — Clinical Note
Hello,  I was instructed by Ms. Araujo to direct this message your way, specifically to let you know that my current group was limited to 2 members in case that would impact billing procedures for the visit. If this would be better directed elsewhere, please just let me know.  Luigi, -Adithya

## 2018-03-14 NOTE — PROGRESS NOTES
[facility] Group Therapy Note  Meeting was: Scheduled  Others present: [employer]  (ID #[id number]); Pharmacy student (Observed with permission from group members)  Meeting lasted: 60 minutes  Patient was: On time  Mode of Treatment: Group Therapy  Group Session Number: 8  Number of Attendees: 2  Complexity: An  is used not only to interpret language, since the group members do not speak English, but also to help with the complexity of understandings across cultures, since the members are not well integrated in the larger [place] culture.    Topics Discussed: Long-Term Effects of War Stress, Part 1    Checked-in. Group members shared updates from their lives between previous and current group meetings. Elicited group members' perspectives regarding things they wished to discuss with the group. Generally, discussed methods for staying active in winter, recent group member health/medical concerns and the importance of communication with medical providers, and the various roles of interpreters.      Presentation/Discussion. Elicited examples of traumatic events from group members, such as: fleeing from enemies, life-threatening situations, and frightening/scary situations. Provided psychoeducation about the fight/flight/freeze response and the physiological functions of the human body in the face of trauma. Provided information about the trauma response cycle. Completed body map exercise where patients identified body areas in which they feel stress. Discussed commonality between their body maps.      Closing. Completed mindful breathing and present-moment mindfulness exercises, the latter in which group members identified/observed sounds and sights in the room. Encouraged them to utilize these exercises when fight/flight/freeze response activated.    Subjective: The patient expressed a desire to discuss her recent health/medical concerns, in particular her concerns regarding her vision, during  "check-in today. Shared that she was scheduled to undergo eye surgery within the next few weeks but had elected to cancel the surgery. Reported she wanted to hold off on the surgery until next winter due to concerns that the recent warm weather would result in increased pain/physical discomfort. Hadn't discussed her concerns with her medical providers, though acknowledged the benefits and importance of communicating with them moving forward. Noted she plans to discuss her concerns moving forward. During presentation/discussion, observed that her feelings of stress associated with history of traumatic events were ongoing and that \"[she doesn't] know if that kind of feeling can go away.\" Observed that her medication regimen has been helpful for managing her stress, however, and indicated feeling the exercises discussed during the current group meeting may be beneficial.      Objective: Ms. Ch appeared awake and alert for today's visit.     Assessment: Ms. Ch was an active, engaged participant throughout the meeting today. Ms. Ch appeared receptive to feedback and discussion during the visit.    Diagnosis:  296.32 Major Depressive Disorder, Recurrent Episode, Moderate  309.81 Posttraumatic Stress Disorder  799.59 Unspecified Neurocognitive Disorder    Plan:  1. Attend next therapy group in 2 weeks.   2. Utilize emergency resources as needed.       Nathaniel Lombardi, PhD,   Behavioral Health Fellow      Treatment plan: The Treatment Plan dated 8/1/2017 was reviewed with the patient at today s visit. The Treatment Plan remains current based on the patient s status and progress to date. Next treatment plan update due 6/9/2018.  "

## 2018-03-16 DIAGNOSIS — E87.6 HYPOKALEMIA: ICD-10-CM

## 2018-03-19 RX ORDER — POTASSIUM CHLORIDE 1500 MG/1
20 TABLET, EXTENDED RELEASE ORAL 2 TIMES DAILY
Qty: 180 TABLET | Refills: 0 | Status: SHIPPED | OUTPATIENT
Start: 2018-03-19 | End: 2018-05-30

## 2018-03-21 ENCOUNTER — OFFICE VISIT (OUTPATIENT)
Dept: FAMILY MEDICINE | Facility: CLINIC | Age: 53
End: 2018-03-21
Payer: COMMERCIAL

## 2018-03-21 VITALS
OXYGEN SATURATION: 98 % | HEIGHT: 58 IN | TEMPERATURE: 97.8 F | SYSTOLIC BLOOD PRESSURE: 111 MMHG | RESPIRATION RATE: 20 BRPM | HEART RATE: 80 BPM | WEIGHT: 135 LBS | BODY MASS INDEX: 28.34 KG/M2 | DIASTOLIC BLOOD PRESSURE: 73 MMHG

## 2018-03-21 DIAGNOSIS — R80.9 TYPE 2 DIABETES MELLITUS WITH MICROALBUMINURIA, WITHOUT LONG-TERM CURRENT USE OF INSULIN (H): ICD-10-CM

## 2018-03-21 DIAGNOSIS — F41.9 ANXIETY: Primary | ICD-10-CM

## 2018-03-21 DIAGNOSIS — F33.1 MODERATE EPISODE OF RECURRENT MAJOR DEPRESSIVE DISORDER (H): ICD-10-CM

## 2018-03-21 DIAGNOSIS — E11.29 TYPE 2 DIABETES MELLITUS WITH MICROALBUMINURIA, WITHOUT LONG-TERM CURRENT USE OF INSULIN (H): ICD-10-CM

## 2018-03-21 RX ORDER — HYDROXYZINE HYDROCHLORIDE 25 MG/1
50 TABLET, FILM COATED ORAL EVERY 6 HOURS PRN
Qty: 60 TABLET | Refills: 1 | Status: SHIPPED | OUTPATIENT
Start: 2018-03-21 | End: 2018-05-03

## 2018-03-21 RX ORDER — GLIPIZIDE 10 MG/1
10 TABLET, FILM COATED, EXTENDED RELEASE ORAL DAILY
Qty: 90 TABLET | Refills: 1 | Status: SHIPPED | OUTPATIENT
Start: 2018-03-21 | End: 2018-08-20

## 2018-03-21 NOTE — NURSING NOTE
name: Silvino (Cat) Darren  Language: Radha  Agency: [employer]  Phone number: [phone]

## 2018-03-21 NOTE — MR AVS SNAPSHOT
After Visit Summary   3/21/2018    Rachael Ch    MRN: [id number]           Patient Information     Date Of Birth          1965        Visit Information        Provider Department      3/21/2018 1:10 PM Darcy Watt MD [facility]        Today's Diagnoses     Anxiety    -  1    Type 2 diabetes mellitus without complication, without long-term current use of insulin (H)        Moderate episode of recurrent major depressive disorder (H)          Care Instructions    To do list:  1) Continue with your current diabetes medications, you're doing great!  2) Continue to take sertraline and prazosin every day as prescribed, no matter how you feel  3) Start new medication, hydroxyzine, as needed for anxiety    Follow-up with Dr. Carballo in 6 weeks          Follow-ups after your visit        Your next 10 appointments already scheduled     Mar 23, 2018  9:30 AM CDT   Group Education with Nathaniel Lombardi, PhD   [facility] ([facility])    [address] [place] [address]   [phone]              Who to contact     Please call your clinic at [phone] to:    Ask questions about your health    Make or cancel appointments    Discuss your medicines    Learn about your test results    Speak to your doctor            Additional Information About Your Visit        MyChart Information     dINKt is an electronic gateway that provides easy, online access to your medical records. With [employer], you can request a clinic appointment, read your test results, renew a prescription or communicate with your care team.     To sign up for dINKt visit the website at [email or web address]   You will be asked to enter the access code listed below, as well as some personal information. Please follow the directions to create your username and password.     Your access code is: [email or web address]  Expires: 2018 11:51 AM     Your access code will  in 90 days. If you need help or a new code,  "please contact your [facility] or call [phone] for assistance.        Care EveryWhere ID     This is your Care EveryWhere ID. This could be used by other organizations to access your [place] medical records  [id number]        Your Vitals Were     Pulse Temperature Respirations Height Pulse Oximetry BMI (Body Mass Index)    80 97.8  F (36.6  C) (Oral) 20 4' 9.5\" (146.1 cm) 98% 28.71 kg/m2       Blood Pressure from Last 3 Encounters:   03/21/18 111/73   01/22/18 139/74   12/13/17 126/75    Weight from Last 3 Encounters:   03/21/18 135 lb (61.2 kg)   01/22/18 137 lb 6.4 oz (62.3 kg)   12/13/17 135 lb 6.4 oz (61.4 kg)              Today, you had the following     No orders found for display         Today's Medication Changes          These changes are accurate as of 3/21/18  2:00 PM.  If you have any questions, ask your nurse or doctor.               Start taking these medicines.        Dose/Directions    hydrOXYzine 25 MG tablet   Commonly known as:  ATARAX   Used for:  Anxiety   Started by:  Darcy Watt MD        Dose:  50 mg   Take 2 tablets (50 mg) by mouth every 6 hours as needed for anxiety   Quantity:  60 tablet   Refills:  1            Where to get your medicines      These medications were sent to [facility] - [place], [place] - [address], [place] [address]     Phone:  [phone]     glipiZIDE 10 MG 24 hr tablet    hydrOXYzine 25 MG tablet    sertraline 50 MG tablet                Primary Care Provider Office Phone # Fax #    Amrita Carballo [phone]       [facility] [address] [place] [address]        Equal Access to Services     RIMMA COLE : Soni Haile, lisa cornelius, qabam kaalmada vincent delong. So [facility] [phone].    ATENCIÓN: Si habla español, tiene a farah disposición servicios gratuitos de asistencia lingüística. Llame al " [phone].    We comply with applicable federal civil rights laws and [place] laws. We do not discriminate on the basis of race, color, national origin, age, disability, sex, sexual orientation, or gender identity.            Thank you!     Thank you for choosing [facility]  for your care. Our goal is always to provide you with excellent care. Hearing back from our patients is one way we can continue to improve our services. Please take a few minutes to complete the written survey that you may receive in the mail after your visit with us. Thank you!             Your Updated Medication List - Protect others around you: Learn how to safely use, store and throw away your medicines at www.disposemymeds.org.          This list is accurate as of 3/21/18  2:00 PM.  Always use your most recent med list.                   Brand Name Dispense Instructions for use Diagnosis    acetaminophen 500 MG tablet    TYLENOL    100 tablet    Take 1 pill in Am and 1 pill in    Primary osteoarthritis of right shoulder       atorvastatin 80 MG tablet    LIPITOR    90 tablet    Take 1 tablet (80 mg) by mouth daily    Type 2 diabetes mellitus without complication, without long-term current use of insulin (H)       blood glucose monitoring meter device kit     1 kit    Use to test blood sugars 1 times daily or as directed.    Type 2 diabetes mellitus with hyperglycemia, without long-term current use of insulin (H)       blood glucose monitoring test strip    no brand specified    1 Box    Use to test blood sugars 1 times daily or as directed    Type 2 diabetes mellitus with hyperglycemia, without long-term current use of insulin (H)       calcium carbonate 500 MG chewable tablet    TUMS    100 tablet    Take 1 tablet (500 mg) by mouth 2 times daily    Gastroesophageal reflux disease, esophagitis presence not specified       carboxymethylcellul-glycerin 0.5-0.9 % Soln ophthalmic solution    OPTIVE/REFRESH OPTIVE    1 Bottle    Place  1 drop into both eyes 3 times daily as needed    Dry eyes       cholecalciferol 2000 UNITS tablet     100 tablet    Take 2,000 Units by mouth daily    Vitamin D deficiency       glipiZIDE 10 MG 24 hr tablet    GLUCOTROL XL    90 tablet    Take 1 tablet (10 mg) by mouth daily    Type 2 diabetes mellitus without complication, without long-term current use of insulin (H)       hydrOXYzine 25 MG tablet    ATARAX    60 tablet    Take 2 tablets (50 mg) by mouth every 6 hours as needed for anxiety    Anxiety       lisinopril 10 MG tablet    PRINIVIL/ZESTRIL    90 tablet    Take 1 tablet (10 mg) by mouth daily    Microalbuminuria       metFORMIN 500 MG 24 hr tablet    GLUCOPHAGE-XR    360 tablet    Take 2 tablets (1,000 mg) by mouth 2 times daily (with meals)    Type 2 diabetes mellitus without complication, without long-term current use of insulin (H)       * MICROLET LANCETS Misc     100 each    1 Box daily    Diabetes mellitus (H)       * blood glucose monitoring lancets     100 each    Use to test blood sugars 1 times daily or as directed.    Type 2 diabetes mellitus with hyperglycemia, without long-term current use of insulin (H)       pioglitazone 45 MG tablet    ACTOS    90 tablet    Take 1 tablet (45 mg) by mouth daily    Type 2 diabetes mellitus without complication, without long-term current use of insulin (H)       potassium chloride SA 20 MEQ CR tablet    KLOR-CON    180 tablet    Take 1 tablet (20 mEq) by mouth 2 times daily    Hypokalemia       prazosin 2 MG capsule    MINIPRESS    90 capsule    Take 1 capsule (2 mg) by mouth At Bedtime    PTSD (post-traumatic stress disorder)       ranitidine 300 MG tablet    ZANTAC    90 tablet    Take 1 tablet (300 mg) by mouth At Bedtime    Gastroesophageal reflux disease without esophagitis       sertraline 50 MG tablet    ZOLOFT    90 tablet    Take 1 tablet (50 mg) by mouth daily    Moderate episode of recurrent major depressive disorder (H)       sitagliptin 100 MG  tablet    JANUVIA    90 tablet    Take 1 tablet (100 mg) by mouth daily    Type 2 diabetes mellitus with hyperglycemia, without long-term current use of insulin (H)       * Notice:  This list has 2 medication(s) that are the same as other medications prescribed for you. Read the directions carefully, and ask your doctor or other care provider to review them with you.

## 2018-03-21 NOTE — PATIENT INSTRUCTIONS
To do list:  1) Continue with your current diabetes medications, you're doing great!  2) Continue to take sertraline and prazosin every day as prescribed, no matter how you feel  3) Start new medication, hydroxyzine, as needed for anxiety    Follow-up with Dr. Carballo in 6 weeks

## 2018-03-21 NOTE — PROGRESS NOTES
Preceptor attestation:  Patient seen and discussed with the resident. Assessment and plan reviewed with resident and agreed upon.  Supervising physician: Roshan Teran  [facility]

## 2018-03-21 NOTE — PROGRESS NOTES
"       SUBJECTIVE       Rachael Ch is a 52 year old  female with a PMH significant for:     Patient Active Problem List   Diagnosis     Essential hypertension, benign     Diabetes mellitus, type 2 (H)     Hyperlipidemia     Health Care Home     Helicobacter pylori gastritis     PTSD (post-traumatic stress disorder)     Moderate episode of recurrent major depressive disorder (H)     Cognitive complaints     Screening for depression     Microalbuminuria     She presents to follow-up for diabetes. Most recent DM2 visit was 1/22/18 with Dr. Carballo, at which time no changes were made. Requests refill of glipizide today. Metformin, januvia and pioglitazone all have several refills remaining. Currently checking sugars 2-3 times per week. Numbers are typically 140-150. Hgb A1c was 7.4 on 1/22/2018. Decreased from 8.9 on 11/5/2017.     Patient has scheduled cataract surgery for 11/12/18 with Dr. Meza of [facility]. Scheduled so far out because she wants to have the surgery when it's cold out. Eye doctor did not find any diabetic eye disease per patient.     States that she does feel sad at times and sometimes cries. Takes her sertraline and prazosin as prescribed. Sometimes also takes prazosin as needed for anxiety. Has not tried anything else as needed in the past.     PMH, Medications and Allergies were reviewed and updated as needed.        REVIEW OF SYSTEMS     See HPI      OBJECTIVE     Vitals:    03/21/18 1321 03/21/18 1323   BP: (!) 156/92 111/73   BP Location: Left arm Left arm   Patient Position: Sitting Sitting   Cuff Size: Adult Regular Adult Regular   Pulse: 80    Resp: 20    Temp: 97.8  F (36.6  C)    TempSrc: Oral    SpO2: 98%    Weight: 135 lb (61.2 kg)    Height: 4' 9.5\" (1.461 m)      Body mass index is 28.71 kg/(m^2).    Gen: Well-appearing elderly female. Alert, oriented and appropriate. NAD  HEENT: MMM  CV: RRR, no rubs, murmurs or extra heart sounds  Pulm: CTAB, no wheezes, rales or " rhonchi  Ext: Warm and well-perfused. No LE edema  MENTAL STATUS EXAM  Appearance: appropriate  Attitude: cooperative  Behavior: normal  Eye Contact: normal  Speech: normal  Orientation: oriented to person , place, time and situation  Mood: Sometimes sad  Affect: Mood Congruent  Thought Process: clear  Suicidal Ideation: denies  Hallucination: no    No results found for this or any previous visit (from the past 24 hour(s)).    ASSESSMENT AND PLAN     1. Type 2 diabetes mellitus with microalbuminuria, without long-term current use of insulin (H)  A1c three months ago was excellent at 7.4. Continue current medications, glipizide 10 QD, pioglitazone 45 QD, metformin XR 1000 BID, januvia 100 QD as no evidence of lows. Encouraged patient to check her blood sugars fasting at least a few times per week and post-prandial a few times per week. Refill of glipizide provided.   - glipiZIDE (GLUCOTROL XL) 10 MG 24 hr tablet; Take 1 tablet (10 mg) by mouth daily  Dispense: 90 tablet; Refill: 1    2. Moderate episode of recurrent major depressive disorder (H)  Continue with sertraline, refill provided. Also enrolled in [facility] group with Dr. Lombardi.   - sertraline (ZOLOFT) 50 MG tablet; Take 1 tablet (50 mg) by mouth daily  Dispense: 90 tablet; Refill: 1    3. Anxiety  Discussed with patient that prazosin is not intended for prn use for anxiety symptoms. Reviewed proper use. She would like a PRN medication to try for anxiety. We will initiate hydroxyzine today.   - hydrOXYzine (ATARAX) 25 MG tablet; Take 2 tablets (50 mg) by mouth every 6 hours as needed for anxiety  Dispense: 60 tablet; Refill: 1      RTC in 6 weeks for follow up of depression and anxiety with Dr. Carballo or sooner if develops new or worsening symptoms.    Darcy Watt

## 2018-03-22 ASSESSMENT — PATIENT HEALTH QUESTIONNAIRE - PHQ9: SUM OF ALL RESPONSES TO PHQ QUESTIONS 1-9: 15

## 2018-03-23 ENCOUNTER — MEDICAL CORRESPONDENCE (OUTPATIENT)
Dept: HEALTH INFORMATION MANAGEMENT | Facility: CLINIC | Age: 53
End: 2018-03-23

## 2018-04-10 ENCOUNTER — MEDICAL CORRESPONDENCE (OUTPATIENT)
Dept: HEALTH INFORMATION MANAGEMENT | Facility: CLINIC | Age: 53
End: 2018-04-10

## 2018-04-16 ENCOUNTER — TELEPHONE (OUTPATIENT)
Dept: PSYCHOLOGY | Facility: CLINIC | Age: 53
End: 2018-04-16

## 2018-04-16 NOTE — TELEPHONE ENCOUNTER
This provider placed outreach call to the patient via the [employer] (ID #[id number]) to remind the patient about the upcoming group meeting scheduled for Friday 4/20/2018 and to discuss that this would be the final group meeting. Talked with the patient. Expressed understanding and confirmed she plans to attend the upcoming group meeting.    Nathaniel Lombardi, ,   Behavioral Health Fellow

## 2018-04-20 ENCOUNTER — OFFICE VISIT (OUTPATIENT)
Dept: PSYCHOLOGY | Facility: CLINIC | Age: 53
End: 2018-04-20
Payer: COMMERCIAL

## 2018-04-20 DIAGNOSIS — F43.10 PTSD (POST-TRAUMATIC STRESS DISORDER): ICD-10-CM

## 2018-04-20 DIAGNOSIS — F33.1 MODERATE EPISODE OF RECURRENT MAJOR DEPRESSIVE DISORDER (H): Primary | ICD-10-CM

## 2018-04-20 DIAGNOSIS — R41.9 COGNITIVE COMPLAINTS: ICD-10-CM

## 2018-04-20 NOTE — MR AVS SNAPSHOT
After Visit Summary   2018    Rachael Ch    MRN: [id number]           Patient Information     Date Of Birth          1965        Visit Information        Provider Department      2018 9:30 AM Lombardi, Nathaniel, PhD [facility]        Today's Diagnoses     Moderate episode of recurrent major depressive disorder (H)    -  1    PTSD (post-traumatic stress disorder)        Cognitive complaints           Follow-ups after your visit        Your next 10 appointments already scheduled     2018  1:10 PM CDT   Return Visit with Amrita Carballo MD   [facility] ([facility])    [address] [place] [id number]   [phone]              Who to contact     Please call your clinic at [phone] to:    Ask questions about your health    Make or cancel appointments    Discuss your medicines    Learn about your test results    Speak to your doctor            Additional Information About Your Visit        MyChart Information     [employer] is an electronic gateway that provides easy, online access to your medical records. With [employer], you can request a clinic appointment, read your test results, renew a prescription or communicate with your care team.     To sign up for [employer] visit the website at [email or web address]   You will be asked to enter the access code listed below, as well as some personal information. Please follow the directions to create your username and password.     Your access code is: [email or web address]  Expires: 2018 11:51 AM     Your access code will  in 90 days. If you need help or a new code, please contact your [facility] or call [phone] for assistance.        Care EveryWhere ID     This is your Care EveryWhere ID. This could be used by other organizations to access your [place] medical records  [id number]         Blood Pressure from Last 3 Encounters:   18 111/73   18 139/74   17  126/75    Weight from Last 3 Encounters:   03/21/18 135 lb (61.2 kg)   01/22/18 137 lb 6.4 oz (62.3 kg)   12/13/17 135 lb 6.4 oz (61.4 kg)              We Performed the Following     Interactive Complexity add-on (45373)        Primary Care Provider Office Phone # Fax #    Amrita Carballo [phone]       [facility] [address] [place] [address]        Equal Access to Services     RIMMA COLE : Hadii aad ku hadasho Soomaali, waaxda luqadaha, qaybta kaalmada adeegyada, waxay idiin hayaan darshan jeffrey . So [facility] [phone].    ATENCIÓN: Si habla español, tiene a farah disposición servicios gratuitos de asistencia lingüística. Llame al [phone].    We comply with applicable federal civil rights laws and [place] laws. We do not discriminate on the basis of race, color, national origin, age, disability, sex, sexual orientation, or gender identity.            Thank you!     Thank you for choosing [facility]  for your care. Our goal is always to provide you with excellent care. Hearing back from our patients is one way we can continue to improve our services. Please take a few minutes to complete the written survey that you may receive in the mail after your visit with us. Thank you!             Your Updated Medication List - Protect others around you: Learn how to safely use, store and throw away your medicines at www.disposemymeds.org.          This list is accurate as of 4/20/18 11:59 PM.  Always use your most recent med list.                   Brand Name Dispense Instructions for use Diagnosis    acetaminophen 500 MG tablet    TYLENOL    100 tablet    Take 1 pill in Am and 1 pill in    Primary osteoarthritis of right shoulder       atorvastatin 80 MG tablet    LIPITOR    90 tablet    Take 1 tablet (80 mg) by mouth daily    Type 2 diabetes mellitus without complication, without long-term current use of insulin (H)       blood glucose monitoring meter device kit     1  kit    Use to test blood sugars 1 times daily or as directed.    Type 2 diabetes mellitus with hyperglycemia, without long-term current use of insulin (H)       blood glucose monitoring test strip    no brand specified    1 Box    Use to test blood sugars 1 times daily or as directed    Type 2 diabetes mellitus with hyperglycemia, without long-term current use of insulin (H)       calcium carbonate 500 MG chewable tablet    TUMS    100 tablet    Take 1 tablet (500 mg) by mouth 2 times daily    Gastroesophageal reflux disease, esophagitis presence not specified       carboxymethylcellul-glycerin 0.5-0.9 % Soln ophthalmic solution    OPTIVE/REFRESH OPTIVE    1 Bottle    Place 1 drop into both eyes 3 times daily as needed    Dry eyes       cholecalciferol 2000 units tablet     100 tablet    Take 2,000 Units by mouth daily    Vitamin D deficiency       glipiZIDE 10 MG 24 hr tablet    GLUCOTROL XL    90 tablet    Take 1 tablet (10 mg) by mouth daily    Type 2 diabetes mellitus with microalbuminuria, without long-term current use of insulin (H)       hydrOXYzine 25 MG tablet    ATARAX    60 tablet    Take 2 tablets (50 mg) by mouth every 6 hours as needed for anxiety    Anxiety       lisinopril 10 MG tablet    PRINIVIL/ZESTRIL    90 tablet    Take 1 tablet (10 mg) by mouth daily    Microalbuminuria       metFORMIN 500 MG 24 hr tablet    GLUCOPHAGE-XR    360 tablet    Take 2 tablets (1,000 mg) by mouth 2 times daily (with meals)    Type 2 diabetes mellitus without complication, without long-term current use of insulin (H)       * MICROLET LANCETS Misc     100 each    1 Box daily    Diabetes mellitus (H)       * blood glucose monitoring lancets     100 each    Use to test blood sugars 1 times daily or as directed.    Type 2 diabetes mellitus with hyperglycemia, without long-term current use of insulin (H)       pioglitazone 45 MG tablet    ACTOS    90 tablet    Take 1 tablet (45 mg) by mouth daily    Type 2 diabetes  mellitus without complication, without long-term current use of insulin (H)       potassium chloride SA 20 MEQ CR tablet    KLOR-CON    180 tablet    Take 1 tablet (20 mEq) by mouth 2 times daily    Hypokalemia       prazosin 2 MG capsule    MINIPRESS    90 capsule    Take 1 capsule (2 mg) by mouth At Bedtime    PTSD (post-traumatic stress disorder)       ranitidine 300 MG tablet    ZANTAC    90 tablet    Take 1 tablet (300 mg) by mouth At Bedtime    Gastroesophageal reflux disease without esophagitis       sertraline 50 MG tablet    ZOLOFT    90 tablet    Take 1 tablet (50 mg) by mouth daily    Moderate episode of recurrent major depressive disorder (H)       sitagliptin 100 MG tablet    JANUVIA    90 tablet    Take 1 tablet (100 mg) by mouth daily    Type 2 diabetes mellitus with hyperglycemia, without long-term current use of insulin (H)       * Notice:  This list has 2 medication(s) that are the same as other medications prescribed for you. Read the directions carefully, and ask your doctor or other care provider to review them with you.

## 2018-04-25 NOTE — PROGRESS NOTES
"Radha Group Therapy Note  Meeting was: Scheduled  Others present: , Jyotsna Noguera (CAROL Garcia, [phone])  Meeting lasted: 100 minutes  Patient was: On time  Mode of Treatment: Group Therapy  Group Session Number: 9  Number of Attendees: 2    Complexity: An  is used not only to interpret language, since the group members do not speak English, but also to help with the complexity of understandings across cultures, since the members are not well integrated in the larger [place] culture.    Topics Discussed: Loss/Grief and the Healing Process; Relationships    Checked-in. Group members shared updates from their lives between previous and current group meetings, with discussion primarily focusing on experiences with recent inclement weather and efforts to remain active. Reminded group members that today would be the final group.       Presentation/Discussion: Read a folk story about the concept of suffering from loss and how loss can impact one's life. Defined and discussed the concepts of grief and healing, as well as discussed an adapted iteration of the three stages of healing originally developed by Edna Mason (i.e., Being safe; Remembering one's families; Starting over again). Group members agreed that they could relate the story to their own experiences, and appeared receptive to discussion of the stages of healing. Also read a folk story about a family and their reaction to losing their home in their village and how war stress can impact relationships. Defined and discussed the different types of relationships. Group members were asked to identify why relationships are important. Completed an activity to illustrate how an individual's hurting can have a \"ripple effect\" and can extend to those outside the individual. Identified components of healthy relationships and how to begin trusting and creating healthy relationships to promote the healing process.      Closing. Reviewed takeaways " "from the group and reflected on aspects of the group experience group members found to be most helpful/valuable. Discussed what is next for group members in their recovery journey, and discussed individual plans for continued care after group termination. Group members were thanked for their participation, were invited to contact the clinic if they need assistance with future mental health needs, and were informed that should they be interested in participating in potential future groups that someone from the behavioral health team would plan to reach out in the future.     Subjective: During presentation/discussion, the patient observed that connecting with others and speaking with family/friends has had a positive impact on how she manages her on loss/grief. 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 Also noted she would potentially be interested in participating in future Radha Groups, pending the recruitment of a sufficient number of group members.     Objective: Ms. Ch appeared awake and alert for today's visit.     PHQ-9 Data:   PHQ-9 SCORE 11/15/2017 12/13/2017 3/21/2018   Total Score - - -   Total Score 14 3 15     Assessment: Ms. Ch was an active, engaged participant throughout the meeting today. Ms. Ch appeared receptive to feedback and discussion during the visit.    Diagnosis:   296.32 Major Depressive Disorder, Recurrent Episode, Moderate  309.81 Posttraumatic Stress Disorder  799.59 Unspecified " Neurocognitive Disorder    Discharge Summary:   Rachael is a 52 year old Radha female who was seen for problematic depressive and posttraumatic stress symptoms. She received a course of group therapy to address her symptoms. Rachael participated in 8 sessions of group therapy and her treatment was concluded today. Her PHQ-9 score went from 18 at treatment onset to 15 at the most recent administration (3/21/2018). Her LAYNE-7 score was 16 at treatment onset and was 0 at the most recent administration (9/12/2017). She has maintained her therapeutic gains over the last several months and feels comfortable discontinuing therapy at this time. Rachael reported feeling as though group therapy was beneficial and expressed interest in participating in potential future groups pending the recruitment of a sufficient number of group members. She was invited to return in the future if her symptoms worsen or she requires psychological services at a later time.    Plan:     Patient to continue building and strengthening relationships.    Patient to follow-up with PCP as needed.    Patient to contact the clinic for assistance with future mental health concerns as needed.    Patient to utilize crisis resources as needed.    Behavioral health will continue to be available for consultation as needed.    Member of behavioral health team to reach out in the future pending the scheduling of future Radha Group.      Nathaniel Lombardi, PhD, LP  Behavioral Health Fellow      Treatment plan: Treatment plan update due 6/9/2018.

## 2018-04-26 ENCOUNTER — MEDICAL CORRESPONDENCE (OUTPATIENT)
Dept: HEALTH INFORMATION MANAGEMENT | Facility: CLINIC | Age: 53
End: 2018-04-26

## 2018-04-27 ENCOUNTER — MEDICAL CORRESPONDENCE (OUTPATIENT)
Dept: HEALTH INFORMATION MANAGEMENT | Facility: CLINIC | Age: 53
End: 2018-04-27

## 2018-04-30 ENCOUNTER — OFFICE VISIT (OUTPATIENT)
Dept: FAMILY MEDICINE | Facility: CLINIC | Age: 53
End: 2018-04-30
Payer: COMMERCIAL

## 2018-04-30 VITALS
HEART RATE: 72 BPM | BODY MASS INDEX: 29.05 KG/M2 | TEMPERATURE: 97.8 F | WEIGHT: 136.6 LBS | SYSTOLIC BLOOD PRESSURE: 122 MMHG | RESPIRATION RATE: 16 BRPM | OXYGEN SATURATION: 99 % | DIASTOLIC BLOOD PRESSURE: 77 MMHG

## 2018-04-30 DIAGNOSIS — I10 ESSENTIAL HYPERTENSION, BENIGN: ICD-10-CM

## 2018-04-30 DIAGNOSIS — E11.29 TYPE 2 DIABETES MELLITUS WITH MICROALBUMINURIA, WITHOUT LONG-TERM CURRENT USE OF INSULIN (H): ICD-10-CM

## 2018-04-30 DIAGNOSIS — Z00.00 PREVENTATIVE HEALTH CARE: ICD-10-CM

## 2018-04-30 DIAGNOSIS — R80.9 TYPE 2 DIABETES MELLITUS WITH MICROALBUMINURIA, WITHOUT LONG-TERM CURRENT USE OF INSULIN (H): ICD-10-CM

## 2018-04-30 DIAGNOSIS — I10 ESSENTIAL HYPERTENSION, BENIGN: Primary | ICD-10-CM

## 2018-04-30 DIAGNOSIS — M19.011 PRIMARY OSTEOARTHRITIS OF RIGHT SHOULDER: ICD-10-CM

## 2018-04-30 DIAGNOSIS — F33.1 MODERATE EPISODE OF RECURRENT MAJOR DEPRESSIVE DISORDER (H): ICD-10-CM

## 2018-04-30 LAB
BUN SERPL-MCNC: 13.8 MG/DL (ref 7–19)
CALCIUM SERPL-MCNC: 9.9 MG/DL (ref 8.5–10.1)
CHLORIDE SERPLBLD-SCNC: 106.3 MMOL/L (ref 98–110)
CO2 SERPL-SCNC: 24.5 MMOL/L (ref 20–32)
CREAT SERPL-MCNC: 0.8 MG/DL (ref 0.5–1)
GFR SERPL CREATININE-BSD FRML MDRD: 79.4 ML/MIN/1.7 M2
GLUCOSE SERPL-MCNC: 141.6 MG'DL (ref 70–99)
HBA1C MFR BLD: 7.1 % (ref 4.1–5.7)
HIV 1+2 AB+HIV1 P24 AG SERPL QL IA: NEGATIVE
POTASSIUM SERPL-SCNC: 4 MMOL/DL (ref 3.2–4.6)
SODIUM SERPL-SCNC: 141.7 MMOL/L (ref 132–142)

## 2018-04-30 RX ORDER — ACETAMINOPHEN 500 MG
TABLET ORAL
Qty: 100 TABLET | Refills: 3 | Status: SHIPPED | OUTPATIENT
Start: 2018-04-30 | End: 2018-04-30

## 2018-04-30 RX ORDER — ACETAMINOPHEN 500 MG
TABLET ORAL
Qty: 100 TABLET | Refills: 3 | Status: SHIPPED | OUTPATIENT
Start: 2018-04-30 | End: 2018-08-23

## 2018-04-30 RX ORDER — SERTRALINE HYDROCHLORIDE 100 MG/1
100 TABLET, FILM COATED ORAL DAILY
Qty: 90 TABLET | Refills: 1 | Status: SHIPPED | OUTPATIENT
Start: 2018-04-30 | End: 2018-08-23

## 2018-04-30 RX ORDER — SERTRALINE HYDROCHLORIDE 100 MG/1
50 TABLET, FILM COATED ORAL DAILY
Qty: 90 TABLET | Refills: 1 | Status: SHIPPED | OUTPATIENT
Start: 2018-04-30 | End: 2018-04-30

## 2018-04-30 NOTE — LETTER
May 1, 2018      Rachael [facility]  [address], [place] [address]        Dear Petatianayann,  Your kidney and diabetes tests all came back looking good.  I think you will feel better when you eat something in the morning.  Please call the clinic at [phone] if you have any questions.     Please see below for your test results.    Resulted Orders   Hemoglobin A1c (P )   Result Value Ref Range    Hemoglobin A1C 7.1 (H) 4.1 - 5.7 %   Basic Metabolic Panel ([place])   Result Value Ref Range    Urea Nitrogen 13.8 7.0 - 19.0 mg/dL    Calcium 9.9 8.5 - 10.1 mg/dL    Chloride 106.3 98.0 - 110.0 mmol/L    Carbon Dioxide 24.5 20.0 - 32.0 mmol/L    Creatinine 0.8 0.5 - 1.0 mg/dL    Glucose 141.6 (H) 70.0 - 99.0 mg'dL    Potassium 4.0 3.2 - 4.6 mmol/dL    Sodium 141.7 132.0 - 142.0 mmol/L    GFR Estimate 79.4 >60.0 mL/min/1.7 m2    GFR Estimate If Black >90 >60.0 mL/min/1.7 m2       If you have any questions, please call the clinic to make an appointment.    Sincerely,    Amrita Carballo MD

## 2018-04-30 NOTE — MR AVS SNAPSHOT
After Visit Summary   2018    Rachael Ch    MRN: [id number]           Patient Information     Date Of Birth          1965        Visit Information        Provider Department      2018 1:10 PM Amrita Carblalo MD [facility]        Today's Diagnoses     Type 2 diabetes mellitus with microalbuminuria, without long-term current use of insulin (H)        Essential hypertension, benign        Preventative health care        Primary osteoarthritis of right shoulder        Moderate episode of recurrent major depressive disorder (H)          Care Instructions    Try eating green vegetable and half a banana when you take your medications.    Eat something with sugar when you get hot flashes.    Get outside when you can.  Go to [employer]!  Increase your depression medication:  Still take 1 pill per day.  We will send a new prescription for 100mg.            Follow-ups after your visit        Who to contact     Please call your clinic at [phone] to:    Ask questions about your health    Make or cancel appointments    Discuss your medicines    Learn about your test results    Speak to your doctor            Additional Information About Your Visit        MyChart Information     [employer] (formerly [employer]) is an electronic gateway that provides easy, online access to your medical records. With [employer]), you can request a clinic appointment, read your test results, renew a prescription or communicate with your care team.     To sign up for [employer] visit the website at [email or web address]   You will be asked to enter the access code listed below, as well as some personal information. Please follow the directions to create your username and password.     Your access code is: [email or web address]  Expires: 2018 11:51 AM     Your access code will  in 90 days. If you need help or a new code, please contact your [facility] or call [phone] for assistance.        Care EveryWhere  ID     This is your Care EveryWhere ID. This could be used by other organizations to access your [place] medical records  [id number]        Your Vitals Were     Pulse Temperature Respirations Pulse Oximetry BMI (Body Mass Index)       72 97.8  F (36.6  C) (Oral) 16 99% 29.05 kg/m2        Blood Pressure from Last 3 Encounters:   04/30/18 122/77   03/21/18 111/73   01/22/18 139/74    Weight from Last 3 Encounters:   04/30/18 136 lb 9.6 oz (62 kg)   03/21/18 135 lb (61.2 kg)   01/22/18 137 lb 6.4 oz (62.3 kg)              We Performed the Following     Basic Metabolic Panel ([place])     Hemoglobin A1c ([facility])     HIV Ag/Ab Screen [place] ([facility])          Today's Medication Changes          These changes are accurate as of 4/30/18  2:08 PM.  If you have any questions, ask your nurse or doctor.               These medicines have changed or have updated prescriptions.        Dose/Directions    sertraline 100 MG tablet   Commonly known as:  ZOLOFT   This may have changed:  medication strength   Used for:  Moderate episode of recurrent major depressive disorder (H)   Changed by:  Amrita Carballo MD        Dose:  50 mg   Take 0.5 tablets (50 mg) by mouth daily   Quantity:  90 tablet   Refills:  1            Where to get your medicines      These medications were sent to [facility] - [place], [place] - [address], [place] [address]     Phone:  [phone]     acetaminophen 500 MG tablet    sertraline 100 MG tablet                Primary Care Provider Office Phone # Fax #    Amrita Carballo [phone]       [facility] [address] [place] [address]        Equal Access to Services     [facility] Hadii sander blanchard Somatt, waaxda luqadaha, qaybta kaalmada adeegyada, vincent hall. So [facility] [phone].    ATENCIÓN: Si habla español, tiene a farah disposición servicios gratuitos de asistencia lingüística. Llame al  [phone].    We comply with applicable federal civil rights laws and [place] laws. We do not discriminate on the basis of race, color, national origin, age, disability, sex, sexual orientation, or gender identity.            Thank you!     Thank you for choosing [facility]  for your care. Our goal is always to provide you with excellent care. Hearing back from our patients is one way we can continue to improve our services. Please take a few minutes to complete the written survey that you may receive in the mail after your visit with us. Thank you!             Your Updated Medication List - Protect others around you: Learn how to safely use, store and throw away your medicines at www.disposemymeds.org.          This list is accurate as of 4/30/18  2:08 PM.  Always use your most recent med list.                   Brand Name Dispense Instructions for use Diagnosis    acetaminophen 500 MG tablet    TYLENOL    100 tablet    Take 1 pill in Am and 1 pill in    Primary osteoarthritis of right shoulder       atorvastatin 80 MG tablet    LIPITOR    90 tablet    Take 1 tablet (80 mg) by mouth daily    Type 2 diabetes mellitus without complication, without long-term current use of insulin (H)       blood glucose monitoring meter device kit     1 kit    Use to test blood sugars 1 times daily or as directed.    Type 2 diabetes mellitus with hyperglycemia, without long-term current use of insulin (H)       blood glucose monitoring test strip    no brand specified    1 Box    Use to test blood sugars 1 times daily or as directed    Type 2 diabetes mellitus with hyperglycemia, without long-term current use of insulin (H)       calcium carbonate 500 MG chewable tablet    TUMS    100 tablet    Take 1 tablet (500 mg) by mouth 2 times daily    Gastroesophageal reflux disease, esophagitis presence not specified       carboxymethylcellul-glycerin 0.5-0.9 % Soln ophthalmic solution    OPTIVE/REFRESH OPTIVE    1 Bottle    Place  1 drop into both eyes 3 times daily as needed    Dry eyes       cholecalciferol 2000 units tablet     100 tablet    Take 2,000 Units by mouth daily    Vitamin D deficiency       glipiZIDE 10 MG 24 hr tablet    GLUCOTROL XL    90 tablet    Take 1 tablet (10 mg) by mouth daily    Type 2 diabetes mellitus with microalbuminuria, without long-term current use of insulin (H)       hydrOXYzine 25 MG tablet    ATARAX    60 tablet    Take 2 tablets (50 mg) by mouth every 6 hours as needed for anxiety    Anxiety       lisinopril 10 MG tablet    PRINIVIL/ZESTRIL    90 tablet    Take 1 tablet (10 mg) by mouth daily    Microalbuminuria       metFORMIN 500 MG 24 hr tablet    GLUCOPHAGE-XR    360 tablet    Take 2 tablets (1,000 mg) by mouth 2 times daily (with meals)    Type 2 diabetes mellitus without complication, without long-term current use of insulin (H)       * MICROLET LANCETS Misc     100 each    1 Box daily    Diabetes mellitus (H)       * blood glucose monitoring lancets     100 each    Use to test blood sugars 1 times daily or as directed.    Type 2 diabetes mellitus with hyperglycemia, without long-term current use of insulin (H)       pioglitazone 45 MG tablet    ACTOS    90 tablet    Take 1 tablet (45 mg) by mouth daily    Type 2 diabetes mellitus without complication, without long-term current use of insulin (H)       potassium chloride SA 20 MEQ CR tablet    KLOR-CON    180 tablet    Take 1 tablet (20 mEq) by mouth 2 times daily    Hypokalemia       prazosin 2 MG capsule    MINIPRESS    90 capsule    Take 1 capsule (2 mg) by mouth At Bedtime    PTSD (post-traumatic stress disorder)       ranitidine 300 MG tablet    ZANTAC    90 tablet    Take 1 tablet (300 mg) by mouth At Bedtime    Gastroesophageal reflux disease without esophagitis       sertraline 100 MG tablet    ZOLOFT    90 tablet    Take 0.5 tablets (50 mg) by mouth daily    Moderate episode of recurrent major depressive disorder (H)       sitagliptin 100  MG tablet    JANUVIA    90 tablet    Take 1 tablet (100 mg) by mouth daily    Type 2 diabetes mellitus with hyperglycemia, without long-term current use of insulin (H)       * Notice:  This list has 2 medication(s) that are the same as other medications prescribed for you. Read the directions carefully, and ask your doctor or other care provider to review them with you.

## 2018-04-30 NOTE — PATIENT INSTRUCTIONS
Try eating green vegetable and half a banana when you take your medications.    Eat something with sugar when you get hot flashes.    Get outside when you can.  Go to [employer]!  Increase your depression medication:  Still take 1 pill per day.  We will send a new prescription for 100mg.

## 2018-05-01 ASSESSMENT — PATIENT HEALTH QUESTIONNAIRE - PHQ9: SUM OF ALL RESPONSES TO PHQ QUESTIONS 1-9: 12

## 2018-05-01 NOTE — PROGRESS NOTES
There are no exam notes on file for this visit.    SUBJECTIVE  Rachael Ch is a 52 year old female with past medical history significant for    Patient Active Problem List   Diagnosis     Essential hypertension, benign     Diabetes mellitus, type 2 (H)     Hyperlipidemia     Health Care Home     Helicobacter pylori gastritis     PTSD (post-traumatic stress disorder)     Moderate episode of recurrent major depressive disorder (H)     Cognitive complaints     Screening for depression     Microalbuminuria     Others present at the visit:   Silvino Banks    Presents for   Chief Complaint   Patient presents with     Recheck Medication     Pt is here for a medication check today.     Diabetes     Pt is here to follow up on diabetes.     Medication Reconciliation     Complete.      Patient is here for follow-up of diabetes and review of depression symptoms today.  She notes increased sensation of heaviness and fatigue.  This has been worse over the last month.  Did feel better this past weekend when she went to visit her son's home.  [place]  It is beautiful, and peaceful and close to nature.  Her face lights up when she speaks of this place.    Also notes feeling dizzy and lightheaded frequently.  This is worse in the morning.  Gets sweaty and clammy, with hot flashes.  Most frequently this happens around noon.  She does not check her blood sugars regularly, and rarely eats anything before noon.  Says she eats typically 1-2 meals a day, but only when she is hungry.  Is not hungry until later in the day.  Reports that her energy, her dizziness, and her sweats feel better once she has eaten.    Has a home nurse that sets of medication for her.  The nurse will set her medications up later this week.    She denies chest pain, no palpitations, no lower extremity edema, no numbness and tingling, no dysuria.  Does get headaches.    OBJECTIVE:  Vitals: /77 (BP  Location: Left arm, Patient Position: Sitting, Cuff Size: Adult Regular)  Pulse 72  Temp 97.8  F (36.6  C) (Oral)  Resp 16  Wt 136 lb 9.6 oz (62 kg)  SpO2 99%  BMI 29.05 kg/m2  BMI= Body mass index is 29.05 kg/(m^2).  Vitals:  Vitals are reviewed and are within the normal range  Gen:  Alert, pleasant, no acute distress  Cardiac:  Regular rate and rhythm, no murmurs, rubs or gallops  Respiratory:  Lungs clear to auscultation bilaterally  Extremities:  Warm, well-perfused, pulses 2+/4, no lower extremity edema    Results for orders placed or performed in visit on 04/30/18   Hemoglobin A1c ([facility])   Result Value Ref Range    Hemoglobin A1C 7.1 (H) 4.1 - 5.7 %   Basic Metabolic Panel ([facility])   Result Value Ref Range    Urea Nitrogen 13.8 7.0 - 19.0 mg/dL    Calcium 9.9 8.5 - 10.1 mg/dL    Chloride 106.3 98.0 - 110.0 mmol/L    Carbon Dioxide 24.5 20.0 - 32.0 mmol/L    Creatinine 0.8 0.5 - 1.0 mg/dL    Glucose 141.6 (H) 70.0 - 99.0 mg'dL    Potassium 4.0 3.2 - 4.6 mmol/dL    Sodium 141.7 132.0 - 142.0 mmol/L    GFR Estimate 79.4 >60.0 mL/min/1.7 m2    GFR Estimate If Black >90 >60.0 mL/min/1.7 m2       PHQ-9 SCORE 12/13/2017 3/21/2018 4/30/2018   Total Score - - -   Total Score 3 15 12         ASSESSMENT AND PLAN:      Rachael was seen today for recheck medication, diabetes and medication reconciliation.    Diagnoses and all orders for this visit:    Type 2 diabetes mellitus with microalbuminuria, without long-term current use of insulin (H).  A1c is well controlled, but I suspect she is having hypoglycemia in the morning because she does not eat breakfast.  Recommended and discussed foods to eat in the morning and the importance of doing this on a daily basis.  I did consider decreasing her glipizide, however regular intake should fix this problem and her A1c is well controlled.  -     Hemoglobin A1c (P )    Essential hypertension, benign.  Blood pressure well controlled today.  -     Basic Metabolic Panel  ([facility])    Preventative health care  -     HIV Ag/Ab Screen [place] ([facility])    Primary osteoarthritis of right shoulder.  Requesting refill of this medication.  Was refilled today.  -     acetaminophen (TYLENOL) 500 MG tablet; Take 1 pill in Am and 1 pill in    Moderate episode of recurrent major depressive disorder (H).  Given persisting symptoms, will increase her Zoloft today.  Recommended that she is spend as much time outside as possible, and encouraged her to visit her son in the country when she is able.  -     sertraline (ZOLOFT) 100 MG tablet; Take 1 tablets (100 mg) by mouth daily        Patient Instructions   Try eating green vegetable and half a banana when you take your medications.    Eat something with sugar when you get hot flashes.    Get outside when you can.  Go to [place]!  Increase your depression medication:  Still take 1 pill per day.  We will send a new prescription for 100mg.      Follow up in 6 weeks for recheck depression and diabetes.      Amrita Carballo

## 2018-05-01 NOTE — PROGRESS NOTES
Rachael Ch-    Your kidney and diabetes tests all came back looking good.  I think you will feel better when you eat something in the morning.  Please call the clinic at [phone] if you have any questions.      Amrita Carballo    Please send results to patient.

## 2018-05-03 DIAGNOSIS — E11.9 TYPE 2 DIABETES MELLITUS WITHOUT COMPLICATION, WITHOUT LONG-TERM CURRENT USE OF INSULIN (H): ICD-10-CM

## 2018-05-03 DIAGNOSIS — F41.9 ANXIETY: ICD-10-CM

## 2018-05-03 DIAGNOSIS — K21.9 GASTROESOPHAGEAL REFLUX DISEASE, ESOPHAGITIS PRESENCE NOT SPECIFIED: ICD-10-CM

## 2018-05-03 RX ORDER — HYDROXYZINE HYDROCHLORIDE 25 MG/1
50 TABLET, FILM COATED ORAL EVERY 6 HOURS PRN
Qty: 60 TABLET | Refills: 1 | Status: SHIPPED | OUTPATIENT
Start: 2018-05-03 | End: 2018-06-26

## 2018-05-03 RX ORDER — CALCIUM CARBONATE 500 MG/1
1 TABLET, CHEWABLE ORAL 2 TIMES DAILY
Qty: 100 TABLET | Refills: 3 | Status: SHIPPED | OUTPATIENT
Start: 2018-05-03 | End: 2018-08-23

## 2018-05-03 RX ORDER — PIOGLITAZONEHYDROCHLORIDE 45 MG/1
45 TABLET ORAL DAILY
Qty: 90 TABLET | Refills: 1 | Status: SHIPPED | OUTPATIENT
Start: 2018-05-03 | End: 2018-08-23

## 2018-05-17 ENCOUNTER — MEDICAL CORRESPONDENCE (OUTPATIENT)
Dept: HEALTH INFORMATION MANAGEMENT | Facility: CLINIC | Age: 53
End: 2018-05-17

## 2018-05-30 DIAGNOSIS — E87.6 HYPOKALEMIA: ICD-10-CM

## 2018-05-30 RX ORDER — POTASSIUM CHLORIDE 1500 MG/1
20 TABLET, EXTENDED RELEASE ORAL 2 TIMES DAILY
Qty: 180 TABLET | Refills: 0 | Status: SHIPPED | OUTPATIENT
Start: 2018-05-30 | End: 2018-08-23

## 2018-06-26 DIAGNOSIS — F41.9 ANXIETY: ICD-10-CM

## 2018-06-26 RX ORDER — HYDROXYZINE HYDROCHLORIDE 25 MG/1
50 TABLET, FILM COATED ORAL EVERY 6 HOURS PRN
Qty: 60 TABLET | Refills: 1 | Status: SHIPPED | OUTPATIENT
Start: 2018-06-26 | End: 2018-08-20

## 2018-06-27 DIAGNOSIS — R80.9 MICROALBUMINURIA: ICD-10-CM

## 2018-06-27 DIAGNOSIS — E11.9 TYPE 2 DIABETES MELLITUS WITHOUT COMPLICATION, WITHOUT LONG-TERM CURRENT USE OF INSULIN (H): ICD-10-CM

## 2018-06-27 DIAGNOSIS — E11.65 TYPE 2 DIABETES MELLITUS WITH HYPERGLYCEMIA, WITHOUT LONG-TERM CURRENT USE OF INSULIN (H): ICD-10-CM

## 2018-06-27 DIAGNOSIS — F43.10 PTSD (POST-TRAUMATIC STRESS DISORDER): ICD-10-CM

## 2018-06-28 RX ORDER — METFORMIN HCL 500 MG
1000 TABLET, EXTENDED RELEASE 24 HR ORAL 2 TIMES DAILY WITH MEALS
Qty: 360 TABLET | Refills: 3 | Status: SHIPPED | OUTPATIENT
Start: 2018-06-28 | End: 2018-08-23

## 2018-06-28 RX ORDER — PRAZOSIN HYDROCHLORIDE 2 MG/1
2 CAPSULE ORAL AT BEDTIME
Qty: 90 CAPSULE | Refills: 3 | Status: SHIPPED | OUTPATIENT
Start: 2018-06-28 | End: 2018-08-23

## 2018-06-28 RX ORDER — LISINOPRIL 10 MG/1
10 TABLET ORAL DAILY
Qty: 90 TABLET | Refills: 1 | Status: SHIPPED | OUTPATIENT
Start: 2018-06-28 | End: 2018-07-11

## 2018-06-28 RX ORDER — ATORVASTATIN CALCIUM 80 MG/1
80 TABLET, FILM COATED ORAL DAILY
Qty: 90 TABLET | Refills: 3 | Status: SHIPPED | OUTPATIENT
Start: 2018-06-28 | End: 2018-08-23

## 2018-07-10 DIAGNOSIS — E11.29 TYPE 2 DIABETES MELLITUS WITH MICROALBUMINURIA, WITHOUT LONG-TERM CURRENT USE OF INSULIN (H): Primary | ICD-10-CM

## 2018-07-10 DIAGNOSIS — R80.9 TYPE 2 DIABETES MELLITUS WITH MICROALBUMINURIA, WITHOUT LONG-TERM CURRENT USE OF INSULIN (H): Primary | ICD-10-CM

## 2018-07-11 ENCOUNTER — OFFICE VISIT (OUTPATIENT)
Dept: FAMILY MEDICINE | Facility: CLINIC | Age: 53
End: 2018-07-11
Payer: COMMERCIAL

## 2018-07-11 ENCOUNTER — TRANSFERRED RECORDS (OUTPATIENT)
Dept: HEALTH INFORMATION MANAGEMENT | Facility: CLINIC | Age: 53
End: 2018-07-11

## 2018-07-11 VITALS
BODY MASS INDEX: 27.86 KG/M2 | WEIGHT: 131 LBS | TEMPERATURE: 98 F | RESPIRATION RATE: 16 BRPM | OXYGEN SATURATION: 99 % | DIASTOLIC BLOOD PRESSURE: 79 MMHG | HEART RATE: 70 BPM | SYSTOLIC BLOOD PRESSURE: 155 MMHG

## 2018-07-11 DIAGNOSIS — E78.5 HYPERLIPIDEMIA, UNSPECIFIED HYPERLIPIDEMIA TYPE: ICD-10-CM

## 2018-07-11 DIAGNOSIS — F33.1 MODERATE EPISODE OF RECURRENT MAJOR DEPRESSIVE DISORDER (H): ICD-10-CM

## 2018-07-11 DIAGNOSIS — I10 ESSENTIAL HYPERTENSION, BENIGN: Primary | ICD-10-CM

## 2018-07-11 DIAGNOSIS — R80.9 TYPE 2 DIABETES MELLITUS WITH MICROALBUMINURIA, WITHOUT LONG-TERM CURRENT USE OF INSULIN (H): ICD-10-CM

## 2018-07-11 DIAGNOSIS — F43.10 PTSD (POST-TRAUMATIC STRESS DISORDER): ICD-10-CM

## 2018-07-11 DIAGNOSIS — E11.29 TYPE 2 DIABETES MELLITUS WITH MICROALBUMINURIA, WITHOUT LONG-TERM CURRENT USE OF INSULIN (H): ICD-10-CM

## 2018-07-11 DIAGNOSIS — R80.9 MICROALBUMINURIA: ICD-10-CM

## 2018-07-11 RX ORDER — LISINOPRIL 20 MG/1
20 TABLET ORAL DAILY
Qty: 90 TABLET | Refills: 1 | Status: SHIPPED | OUTPATIENT
Start: 2018-07-11 | End: 2018-07-26

## 2018-07-11 NOTE — NURSING NOTE
Due to patient being non-English speaking/uses sign language, an  was used for this visit. Only for face-to-face interpretation by an external agency, date and length of interpretation can be found on the scanned worksheet.     name: Silvino Banks  Agency: [employer]  Language: Radha   Telephone number: [phone]  Type of interpretation: Face-to-face, spoken

## 2018-07-11 NOTE — MR AVS SNAPSHOT
After Visit Summary   2018    Rachael Ch    MRN: [id number]           Patient Information     Date Of Birth          1965        Visit Information        Provider Department      2018 11:00 AM Amrita Carballo MD [facility]        Today's Diagnoses     Essential hypertension, benign    -  1    Microalbuminuria        Type 2 diabetes mellitus with microalbuminuria, without long-term current use of insulin (H)        Hyperlipidemia, unspecified hyperlipidemia type        PTSD (post-traumatic stress disorder)        Moderate episode of recurrent major depressive disorder (H)           Follow-ups after your visit        Follow-up notes from your care team     Return in about 6 weeks (around 2018).      Future tests that were ordered for you today     Open Future Orders        Priority Expected Expires Ordered    Basic Metabolic Panel ([place]) Routine 2018 9/10/2018 7/10/2018    Hemoglobin A1c ([facility]) Routine 2018 9/10/2018 7/10/2018            Who to contact     Please call your clinic at [phone] to:    Ask questions about your health    Make or cancel appointments    Discuss your medicines    Learn about your test results    Speak to your doctor            Additional Information About Your Visit        MyChart Information     [employer] is an electronic gateway that provides easy, online access to your medical records. With [employer], you can request a clinic appointment, read your test results, renew a prescription or communicate with your care team.     To sign up for [email or web address] visit the website at [email or web address]   You will be asked to enter the access code listed below, as well as some personal information. Please follow the directions to create your username and password.     Your access code is: [email or web address]  Expires: 10/9/2018  7:00 PM     Your access code will  in 90 days. If you need help or a new code, please contact your [facility] or call [phone] for assistance.        Care EveryWhere ID     This is your Care EveryWhere ID. This could be used by other organizations to access your [facility] medical records  [id number]        Your Vitals Were     Pulse Temperature Respirations Pulse Oximetry BMI (Body Mass Index)       70 98  F (36.7  C) (Oral) 16 99% 27.86 kg/m2        Blood Pressure from Last 3 Encounters:   07/11/18 155/79   04/30/18 122/77   03/21/18 111/73    Weight from Last 3 Encounters:   07/11/18 131 lb (59.4 kg)   04/30/18 136 lb 9.6 oz (62 kg)   03/21/18 135 lb (61.2 kg)              Today, you had the following     No orders found for display         Today's Medication Changes          These changes are accurate as of 7/11/18  7:00 PM.  If you have any questions, ask your nurse or doctor.               These medicines have changed or have updated prescriptions.        Dose/Directions    lisinopril 20 MG tablet   Commonly known as:  PRINIVIL/ZESTRIL   This may have changed:    - medication strength  - how much to take   Used for:  Microalbuminuria   Changed by:  Amrita Carballo MD        Dose:  20 mg   Take 1 tablet (20 mg) by mouth daily   Quantity:  90 tablet   Refills:  1            Where to get your medicines      These medications were sent to [facility] - [place], [place] - [address], [place] [address]     Phone:  [phone]     lisinopril 20 MG tablet                Primary Care Provider Office Phone # Fax #    Amrita Carballo [phone]       [address] [place] [address]        Equal Access to Services     KRIS [facility]portREMIGIO AH: Hadii sander blanchard Somatt, waaxda luqadaha, qaybta kaalmada vincent delong. So [facility] [phone].    ATENCIÓN: Si habla español, tiene a farah disposición servicios gratuitos de asistencia lingüística. Llame al [phone].    We comply with applicable [facility] civil  [employer] laws and [place] laws. We do not discriminate on the basis of race, color, national origin, age, disability, sex, sexual orientation, or gender identity.            Thank you!     Thank you for choosing [facility]  for your care. Our goal is always to provide you with excellent care. Hearing back from our patients is one way we can continue to improve our services. Please take a few minutes to complete the written survey that you may receive in the mail after your visit with us. Thank you!             Your Updated Medication List - Protect others around you: Learn how to safely use, store and throw away your medicines at www.disposemymeds.org.          This list is accurate as of 7/11/18  7:00 PM.  Always use your most recent med list.                   Brand Name Dispense Instructions for use Diagnosis    acetaminophen 500 MG tablet    TYLENOL    100 tablet    Take 1 pill in Am, 1 pill in PM and 1 additional pill PRN    Primary osteoarthritis of right shoulder       atorvastatin 80 MG tablet    LIPITOR    90 tablet    Take 1 tablet (80 mg) by mouth daily    Type 2 diabetes mellitus without complication, without long-term current use of insulin (H)       blood glucose monitoring meter device kit     1 kit    Use to test blood sugars 1 times daily or as directed.    Type 2 diabetes mellitus with hyperglycemia, without long-term current use of insulin (H)       blood glucose monitoring test strip    no brand specified    1 Box    Use to test blood sugars 1 times daily or as directed    Type 2 diabetes mellitus with hyperglycemia, without long-term current use of insulin (H)       calcium carbonate 500 MG chewable tablet    TUMS    100 tablet    Take 1 tablet (500 mg) by mouth 2 times daily    Gastroesophageal reflux disease, esophagitis presence not specified       carboxymethylcellul-glycerin 0.5-0.9 % Soln ophthalmic solution    OPTIVE/REFRESH OPTIVE    1 Bottle    Place 1 drop into both eyes 3 times  daily as needed    Dry eyes       cholecalciferol 2000 units tablet     100 tablet    Take 2,000 Units by mouth daily    Vitamin D deficiency       glipiZIDE 10 MG 24 hr tablet    GLUCOTROL XL    90 tablet    Take 1 tablet (10 mg) by mouth daily    Type 2 diabetes mellitus with microalbuminuria, without long-term current use of insulin (H)       hydrOXYzine 25 MG tablet    ATARAX    60 tablet    Take 2 tablets (50 mg) by mouth every 6 hours as needed for anxiety    Anxiety       lisinopril 20 MG tablet    PRINIVIL/ZESTRIL    90 tablet    Take 1 tablet (20 mg) by mouth daily    Microalbuminuria       metFORMIN 500 MG 24 hr tablet    GLUCOPHAGE-XR    360 tablet    Take 2 tablets (1,000 mg) by mouth 2 times daily (with meals)    Type 2 diabetes mellitus without complication, without long-term current use of insulin (H)       * MICROLET LANCETS Misc     100 each    1 Box daily    Diabetes mellitus (H)       * blood glucose monitoring lancets     100 each    Use to test blood sugars 1 times daily or as directed.    Type 2 diabetes mellitus with hyperglycemia, without long-term current use of insulin (H)       pioglitazone 45 MG tablet    ACTOS    90 tablet    Take 1 tablet (45 mg) by mouth daily    Type 2 diabetes mellitus without complication, without long-term current use of insulin (H)       potassium chloride SA 20 MEQ CR tablet    KLOR-CON    180 tablet    Take 1 tablet (20 mEq) by mouth 2 times daily    Hypokalemia       prazosin 2 MG capsule    MINIPRESS    90 capsule    Take 1 capsule (2 mg) by mouth At Bedtime    PTSD (post-traumatic stress disorder)       ranitidine 300 MG tablet    ZANTAC    90 tablet    Take 1 tablet (300 mg) by mouth At Bedtime    Gastroesophageal reflux disease without esophagitis       sertraline 100 MG tablet    ZOLOFT    90 tablet    Take 1 tablet (100 mg) by mouth daily    Moderate episode of recurrent major depressive disorder (H)       sitagliptin 100 MG tablet    JANUVIA    90 tablet     Take 1 tablet (100 mg) by mouth daily    Type 2 diabetes mellitus with hyperglycemia, without long-term current use of insulin (H)       * Notice:  This list has 2 medication(s) that are the same as other medications prescribed for you. Read the directions carefully, and ask your doctor or other care provider to review them with you.

## 2018-07-11 NOTE — PROGRESS NOTES
Nursing Notes:   MarieCharu alvaradoAL  7/11/2018  3:09 PM  Signed  Due to patient being non-English speaking/uses sign language, an  was used for this visit. Only for face-to-face interpretation by an external agency, date and length of interpretation can be found on the scanned worksheet.     name: Silvino Banks  Agency: [employer]  Language: Radha   Telephone number: [phone]  Type of interpretation: Face-to-face, spoken        SUBJECTIVE  Rachael Ch is a 53 year old female with past medical history significant for    Patient Active Problem List   Diagnosis     Essential hypertension, benign     Diabetes mellitus, type 2 (H)     Hyperlipidemia     Health Care Home     Helicobacter pylori gastritis     PTSD (post-traumatic stress disorder)     Moderate episode of recurrent major depressive disorder (H)     Cognitive complaints     Screening for depression     Microalbuminuria     Others present at the visit:   Silvino Banks    Presents for   Chief Complaint   Patient presents with     Follow Up For     Medication, patient forgot to bring her medication with     Diabetes     Patient is here for follow-up.  Unfortunately, I did not have her go to lab first, and she forgot her medications.  She does not know them off the top of her head, but endorses taking them without difficulty.    Mood overall has been mostly good.  She is [place] own property in [place].  Shares that the view out the window is beautiful, and empty and full nature.  She likes to be there.  Goes out for walks.  Much of the family likes to go to the river on the edge of the property and fish, but she usually stays closer to home.  Has been more active since spending time up there.  She brings her medications with and endorses taking them regularly.  She took her medications this morning.    Has been checking her blood sugars at home.  Says that typically fasting in the morning  between 101 120.  Denies any low sugars.  No sugars over 200.  He is good with the medication she is taking.  Has been trying to eat more regularly.  She has lost some weight.  Feels like this was because she has been eating better.    Blood pressure is high today.  She does not check her blood pressure at home.  Has not noticed any chest pain, shortness of breath, headaches, or dizziness.  Took her lisinopril this morning.    She is not interested in doing a mammogram at this time.    OBJECTIVE:  Vitals: /79 (BP Location: Left arm, Patient Position: Sitting, Cuff Size: Adult Regular)  Pulse 70  Temp 98  F (36.7  C) (Oral)  Resp 16  Wt 131 lb (59.4 kg)  SpO2 99%  BMI 27.86 kg/m2  BMI= Body mass index is 27.86 kg/(m^2).  Objective:    Vitals:  Vitals are reviewed and are within the normal range  Gen:  Alert, pleasant, no acute distress  Cardiac:  Regular rate and rhythm, no murmurs, rubs or gallops  Respiratory:  Lungs clear to auscultation bilaterally  Abdomen:  Soft, non-tender, non-distended, bowel sounds positive  Extremities:  Warm, well-perfused, pulses 2+/4, no lower extremity edema      ASSESSMENT AND PLAN:      Rachael was seen today for follow up for and diabetes.  Patient is doing well.  Unfortunately she does not have meds with me and we did not get labs before the visit.  We will have her follow-up in 6 weeks and bring her medications so we can review them.  Blood pressure is elevated today, so I will increase her lisinopril from 10-20 mg.  She denied needed needing refills at this time, but I will refill them as requested by the pharmacy.  Mood has been good, mostly which is due to [place].  Encouraged her to continue to do this and to be outside as much as possible.    Diagnoses and all orders for this visit:    Essential hypertension, benign    Microalbuminuria  -     lisinopril (PRINIVIL/ZESTRIL) 20 MG tablet; Take 1 tablet (20 mg) by mouth daily    Type 2  diabetes mellitus with microalbuminuria, without long-term current use of insulin (H)    Hyperlipidemia, unspecified hyperlipidemia type    PTSD (post-traumatic stress disorder)    Moderate episode of recurrent major depressive disorder (H)      Amrita Carballo

## 2018-07-13 ENCOUNTER — MEDICAL CORRESPONDENCE (OUTPATIENT)
Dept: HEALTH INFORMATION MANAGEMENT | Facility: CLINIC | Age: 53
End: 2018-07-13

## 2018-07-25 ENCOUNTER — MEDICAL CORRESPONDENCE (OUTPATIENT)
Dept: HEALTH INFORMATION MANAGEMENT | Facility: CLINIC | Age: 53
End: 2018-07-25

## 2018-07-26 ENCOUNTER — OFFICE VISIT (OUTPATIENT)
Dept: FAMILY MEDICINE | Facility: CLINIC | Age: 53
End: 2018-07-26
Payer: COMMERCIAL

## 2018-07-26 VITALS
HEART RATE: 65 BPM | SYSTOLIC BLOOD PRESSURE: 161 MMHG | OXYGEN SATURATION: 100 % | RESPIRATION RATE: 20 BRPM | BODY MASS INDEX: 27.81 KG/M2 | DIASTOLIC BLOOD PRESSURE: 89 MMHG | WEIGHT: 130.8 LBS

## 2018-07-26 DIAGNOSIS — I10 ESSENTIAL HYPERTENSION, BENIGN: ICD-10-CM

## 2018-07-26 DIAGNOSIS — R80.9 MICROALBUMINURIA: ICD-10-CM

## 2018-07-26 DIAGNOSIS — R07.89 LEFT-SIDED CHEST WALL PAIN: Primary | ICD-10-CM

## 2018-07-26 RX ORDER — LISINOPRIL 20 MG/1
20 TABLET ORAL DAILY
Qty: 90 TABLET | Refills: 1 | Status: SHIPPED | OUTPATIENT
Start: 2018-07-26 | End: 2018-08-23

## 2018-07-26 NOTE — PROGRESS NOTES
"       RAYSA Ch is a 53 year old  female with a PMH significant for:     Patient Active Problem List   Diagnosis     Essential hypertension, benign     Diabetes mellitus, type 2 (H)     Hyperlipidemia     Health Care Home     Helicobacter pylori gastritis     PTSD (post-traumatic stress disorder)     Moderate episode of recurrent major depressive disorder (H)     Cognitive complaints     Screening for depression     Microalbuminuria     She presents with a few complaints. Reports \"heavy head,\" posterior neck pain, \"punching\" side pain. Also notes fatigue. Most of these symptoms have been present for a long time, 10 years at least, ever since [place] came [place] the [place]. Side pain has been hurting her for 2 weeks. This pain is exacerbated by twisting/turning and lying on that side. Typically when the symptoms get worse, she avoids some foods and they get better. Also uses tylenol, which is helpful for a while but wears off. Tums can be helpful as well. Denies N/V, diarrhea or constipation, fever or chills. No pain with urination.     BP is elevated today. Lisinopril was increased to 20 mg at the last visit with Dr Carballo. States that she is taking a tablet in the morning and in the evening.     PMH, Medications and Allergies were reviewed and updated as needed.        REVIEW OF SYSTEMS             OBJECTIVE   See HPI    Vitals:    07/26/18 0822   BP: 161/89   Pulse: 65   Resp: 20   SpO2: 100%   Weight: 130 lb 12.8 oz (59.3 kg)     Body mass index is 27.81 kg/(m^2).    Gen: Well-appearing adult female. Alert, oriented and appropriate. NAD  HEENT: MMM  CV: RRR, no rubs, murmurs or extra heart sounds  Chest: Patient endorses reproduction of her side pain with light palpation over the L later ribs. There is no ecchymosis or deformity noted. No rash is present  Pulm: CTAB, no wheezes, rales or rhonchi  Ext: Warm and well-perfused. No LE edema      No results found for this or any previous visit (from the past 24 " hour(s)).        ASSESSMENT AND PLAN     1. Left-sided chest wall pain  Unclear etiology. Atypical history and exam for CAD. Did obtain EKG today and this showed t-wave inversions in leads V4 and V5, otherwise normal. Suspect that this is musculoskeletal in nature. The patinet has a long history of somatic complaints that seem to be associated with depression. Another possibility is shingles prior to the appearance of the rash, though the time course would be long for this. Recommend heat and tylenol PRN. We will also try topical voltaren gel. Recheck in 1 month when she follows-up as below.  - diclofenac (VOLTAREN) 1 % GEL topical gel; Place 2 g onto the skin 4 times daily  Dispense: 100 g; Refill: 0  - EKG 12-lead complete w/read - Clinics  - If future concern for CAD, could consider stress test given the t-wave inversions on EKG    2. Essential hypertension, benign  BP elevated to 161/89 today. The patient had been instructed to increase her lisinopril to 20 mg daily, but has been taking it in divided doses. Discussed that she should take 20 mg once daily and this was prescribed.   - lisinopril (PRINIVIL/ZESTRIL) 20 MG tablet; Take 1 tablet (20 mg) by mouth daily  Dispense: 90 tablet; Refill: 1  - RTC in 1 month to recheck BP    Darcy Watt

## 2018-07-26 NOTE — MR AVS SNAPSHOT
After Visit Summary   2018    Rachael Ch    MRN: [id number]           Patient Information     Date Of Birth          1965        Visit Information        Provider Department      2018 8:20 AM Darcy Watt MD [facility]        Today's Diagnoses     Left-sided chest wall pain    -  1    Essential hypertension, benign        Microalbuminuria          Care Instructions    To do list:  1) Take lisinopril 20 mg daily all at once (not divided)  2) Try voltaren gel for side pain  3) Use heat and tylenol as needed    Return to clinic to see Dr. Carballo in 1 month to recheck blood pressure              Follow-ups after your visit        Your next 10 appointments already scheduled     Aug 23, 2018 10:40 AM CDT   PHYSICAL with Amrita Carballo MD   [facility] ([facility])    [address] [place] [address]   [phone]              Who to contact     Please call your clinic at [phone] to:    Ask questions about your health    Make or cancel appointments    Discuss your medicines    Learn about your test results    Speak to your doctor            Additional Information About Your Visit        MyChart Information     MDJunction is an electronic gateway that provides easy, online access to your medical records. With MDJunction, you can request a clinic appointment, read your test results, renew a prescription or communicate with your care team.     To sign up for MDJunction visit the website at [email or web address]   You will be asked to enter the access code listed below, as well as some personal information. Please follow the directions to create your username and password.     Your access code is: [email or web address]  Expires: 10/9/2018  7:00 PM     Your access code will  in 90 days. If you need help or a new code, please contact your [facility] or call [phone] for assistance.        Care EveryWhere ID     This is your Care  EveryWhere ID. This could be used by other organizations to access your [place] medical records  [id number]        Your Vitals Were     Pulse Respirations Pulse Oximetry BMI (Body Mass Index)          65 20 100% 27.81 kg/m2         Blood Pressure from Last 3 Encounters:   07/26/18 161/89   07/11/18 155/79   04/30/18 122/77    Weight from Last 3 Encounters:   07/26/18 130 lb 12.8 oz (59.3 kg)   07/11/18 131 lb (59.4 kg)   04/30/18 136 lb 9.6 oz (62 kg)              We Performed the Following     EKG 12-lead complete w/read - Clinics          Today's Medication Changes          These changes are accurate as of 7/26/18  9:12 AM.  If you have any questions, ask your nurse or doctor.               Start taking these medicines.        Dose/Directions    diclofenac 1 % Gel topical gel   Commonly known as:  VOLTAREN   Used for:  Left-sided chest wall pain   Started by:  Darcy Watt MD        Dose:  2 g   Place 2 g onto the skin 4 times daily   Quantity:  100 g   Refills:  0            Where to get your medicines      These medications were sent to [facility] - [place], [place] - [address], [place] [address]     Phone:  [phone]     diclofenac 1 % Gel topical gel    lisinopril 20 MG tablet                Primary Care Provider Office Phone # Fax #    Amrita Carballo [phone]       [address] [place] [address]        Equal Access to Services     RIMMA COLE AH: Hadii aad ku hadasho Sodanitaali, waaxda luqadaha, qaybta kaalmada adeegyada, wax henry llanes [facility]. So [facility] [phone].    ATENCIÓN: Si habla jayañol, tiene a farah disposición servicios gratuitos de asistencia lingüística. Llame al [phone].    We comply with applicable federal civil rights laws and [place] laws. We do not discriminate on the basis of race, color, national origin, age, disability, sex, sexual orientation, or gender identity.            Thank you!      Thank you for choosing [facility]  for your care. Our goal is always to provide you with excellent care. Hearing back from our patients is one way we can continue to improve our services. Please take a few minutes to complete the written survey that you may receive in the mail after your visit with us. Thank you!             Your Updated Medication List - Protect others around you: Learn how to safely use, store and throw away your medicines at www.disposemymeds.org.          This list is accurate as of 7/26/18  9:12 AM.  Always use your most recent med list.                   Brand Name Dispense Instructions for use Diagnosis    acetaminophen 500 MG tablet    TYLENOL    100 tablet    Take 1 pill in Am, 1 pill in PM and 1 additional pill PRN    Primary osteoarthritis of right shoulder       atorvastatin 80 MG tablet    LIPITOR    90 tablet    Take 1 tablet (80 mg) by mouth daily    Type 2 diabetes mellitus without complication, without long-term current use of insulin (H)       blood glucose monitoring meter device kit     1 kit    Use to test blood sugars 1 times daily or as directed.    Type 2 diabetes mellitus with hyperglycemia, without long-term current use of insulin (H)       blood glucose monitoring test strip    no brand specified    1 Box    Use to test blood sugars 1 times daily or as directed    Type 2 diabetes mellitus with hyperglycemia, without long-term current use of insulin (H)       calcium carbonate 500 MG chewable tablet    TUMS    100 tablet    Take 1 tablet (500 mg) by mouth 2 times daily    Gastroesophageal reflux disease, esophagitis presence not specified       carboxymethylcellul-glycerin 0.5-0.9 % Soln ophthalmic solution    OPTIVE/REFRESH OPTIVE    1 Bottle    Place 1 drop into both eyes 3 times daily as needed    Dry eyes       cholecalciferol 2000 units tablet     100 tablet    Take 2,000 Units by mouth daily    Vitamin D deficiency       diclofenac 1 % Gel topical gel    VOLTAREN     100 g    Place 2 g onto the skin 4 times daily    Left-sided chest wall pain       glipiZIDE 10 MG 24 hr tablet    GLUCOTROL XL    90 tablet    Take 1 tablet (10 mg) by mouth daily    Type 2 diabetes mellitus with microalbuminuria, without long-term current use of insulin (H)       hydrOXYzine 25 MG tablet    ATARAX    60 tablet    Take 2 tablets (50 mg) by mouth every 6 hours as needed for anxiety    Anxiety       lisinopril 20 MG tablet    PRINIVIL/ZESTRIL    90 tablet    Take 1 tablet (20 mg) by mouth daily    Microalbuminuria       metFORMIN 500 MG 24 hr tablet    GLUCOPHAGE-XR    360 tablet    Take 2 tablets (1,000 mg) by mouth 2 times daily (with meals)    Type 2 diabetes mellitus without complication, without long-term current use of insulin (H)       * MICROLET LANCETS Misc     100 each    1 Box daily    Diabetes mellitus (H)       * blood glucose monitoring lancets     100 each    Use to test blood sugars 1 times daily or as directed.    Type 2 diabetes mellitus with hyperglycemia, without long-term current use of insulin (H)       pioglitazone 45 MG tablet    ACTOS    90 tablet    Take 1 tablet (45 mg) by mouth daily    Type 2 diabetes mellitus without complication, without long-term current use of insulin (H)       potassium chloride SA 20 MEQ CR tablet    KLOR-CON    180 tablet    Take 1 tablet (20 mEq) by mouth 2 times daily    Hypokalemia       prazosin 2 MG capsule    MINIPRESS    90 capsule    Take 1 capsule (2 mg) by mouth At Bedtime    PTSD (post-traumatic stress disorder)       ranitidine 300 MG tablet    ZANTAC    90 tablet    Take 1 tablet (300 mg) by mouth At Bedtime    Gastroesophageal reflux disease without esophagitis       sertraline 100 MG tablet    ZOLOFT    90 tablet    Take 1 tablet (100 mg) by mouth daily    Moderate episode of recurrent major depressive disorder (H)       sitagliptin 100 MG tablet    JANUVIA    90 tablet    Take 1 tablet (100 mg) by mouth daily    Type 2 diabetes  mellitus with hyperglycemia, without long-term current use of insulin (H)       * Notice:  This list has 2 medication(s) that are the same as other medications prescribed for you. Read the directions carefully, and ask your doctor or other care provider to review them with you.

## 2018-07-26 NOTE — NURSING NOTE
Due to patient being non-English speaking/uses sign language, an  was used for this visit. Only for face-to-face interpretation by an external agency, date and length of interpretation can be found on the scanned worksheet.     name: Thao Adame  Agency: [employer]  Language: Radha   Telephone number: [phone]  Type of interpretation: Face-to-face, spoken

## 2018-07-26 NOTE — PATIENT INSTRUCTIONS
To do list:  1) Take lisinopril 20 mg daily all at once (not divided)  2) Try voltaren gel for side pain  3) Use heat and tylenol as needed    Return to clinic to see Dr. Carballo in 1 month to recheck blood pressure

## 2018-07-31 ENCOUNTER — TELEPHONE (OUTPATIENT)
Dept: FAMILY MEDICINE | Facility: CLINIC | Age: 53
End: 2018-07-31

## 2018-07-31 NOTE — TELEPHONE ENCOUNTER
[facility] phone call message- general phone call:    Reason for call: the Home care provider would like a written letter stating the Pt does not have bed bugs and orders for skilled nursing for eval and treat. They would like the note and an H and P and med list faxed to [phone]     Return call needed: Yes    OK to leave a message on voice mail? Yes    Primary language: Radha      needed? Yes    Call taken on July 31, 2018 at 10:05 AM by Fritz Arechiga

## 2018-07-31 NOTE — LETTER
July 31, 2018      [address] [address]      To whom it may concern,    Patient was seen by me on 7/11/2018.  There was no signs of bed bugs on history of physical exam at that time. Also patient was seen by Dr. Watt on 7/26/2018 with no documentation of bed bugs on history or exam.      In the last 5 years of me caring for this patient I have never seen her for a complaint of bed bugs.       There is no evidence that this patient has bed bugs.      Sincerely,    Amrita Carballo MD

## 2018-07-31 NOTE — TELEPHONE ENCOUNTER
N is requesting a letter from patient's pcp stating that to there knowledge the patient does not have bed bugs.     Verbal orders given for skilled nursing eval and treat.     Med records will fax over H&P and med list to the fax number listed below. /FILOMENA Antunez  Routed to Dr. Carballo

## 2018-07-31 NOTE — TELEPHONE ENCOUNTER
Do they need a formal letter or can I just write it here?    Patient was seen by me on 7/11.  There was no signs of bed bugs on history of physical exam at that time.      Patient was seen by Dr. Watt on 7/26 with no documentation of bed bugs on history or exam.      I have never seen this patient with ANY complaint since I have been her physician (5 years) where the complaint has been about potential bed bugs.      There is no evidence that this patient has bed bugs.      It is inappropriate for home health care to decline services even if their are bed bugs.      Will forward this message to Pari Tyler, as she is collecting instances of home health services refusing necessary care due to bed bugs.      Amrita Carballo

## 2018-08-03 ENCOUNTER — MEDICAL CORRESPONDENCE (OUTPATIENT)
Dept: HEALTH INFORMATION MANAGEMENT | Facility: CLINIC | Age: 53
End: 2018-08-03

## 2018-08-05 ENCOUNTER — MEDICAL CORRESPONDENCE (OUTPATIENT)
Dept: HEALTH INFORMATION MANAGEMENT | Facility: CLINIC | Age: 53
End: 2018-08-05

## 2018-08-07 ENCOUNTER — TRANSFERRED RECORDS (OUTPATIENT)
Dept: HEALTH INFORMATION MANAGEMENT | Facility: CLINIC | Age: 53
End: 2018-08-07

## 2018-08-08 ENCOUNTER — MEDICAL CORRESPONDENCE (OUTPATIENT)
Dept: HEALTH INFORMATION MANAGEMENT | Facility: CLINIC | Age: 53
End: 2018-08-08

## 2018-08-10 ENCOUNTER — MEDICAL CORRESPONDENCE (OUTPATIENT)
Dept: HEALTH INFORMATION MANAGEMENT | Facility: CLINIC | Age: 53
End: 2018-08-10

## 2018-08-20 DIAGNOSIS — E11.29 TYPE 2 DIABETES MELLITUS WITH MICROALBUMINURIA, WITHOUT LONG-TERM CURRENT USE OF INSULIN (H): ICD-10-CM

## 2018-08-20 DIAGNOSIS — R80.9 TYPE 2 DIABETES MELLITUS WITH MICROALBUMINURIA, WITHOUT LONG-TERM CURRENT USE OF INSULIN (H): ICD-10-CM

## 2018-08-20 DIAGNOSIS — F41.9 ANXIETY: ICD-10-CM

## 2018-08-21 RX ORDER — HYDROXYZINE HYDROCHLORIDE 25 MG/1
25-50 TABLET, FILM COATED ORAL EVERY 6 HOURS PRN
Qty: 60 TABLET | Refills: 3 | Status: SHIPPED | OUTPATIENT
Start: 2018-08-21 | End: 2018-08-23

## 2018-08-21 RX ORDER — GLIPIZIDE 10 MG/1
10 TABLET, FILM COATED, EXTENDED RELEASE ORAL DAILY
Qty: 90 TABLET | Refills: 1 | Status: SHIPPED | OUTPATIENT
Start: 2018-08-21 | End: 2018-08-23

## 2018-08-22 DIAGNOSIS — E11.29 TYPE 2 DIABETES MELLITUS WITH MICROALBUMINURIA, WITHOUT LONG-TERM CURRENT USE OF INSULIN (H): Primary | ICD-10-CM

## 2018-08-22 DIAGNOSIS — R80.9 TYPE 2 DIABETES MELLITUS WITH MICROALBUMINURIA, WITHOUT LONG-TERM CURRENT USE OF INSULIN (H): Primary | ICD-10-CM

## 2018-08-23 ENCOUNTER — OFFICE VISIT (OUTPATIENT)
Dept: FAMILY MEDICINE | Facility: CLINIC | Age: 53
End: 2018-08-23
Payer: COMMERCIAL

## 2018-08-23 VITALS
TEMPERATURE: 98.1 F | SYSTOLIC BLOOD PRESSURE: 161 MMHG | RESPIRATION RATE: 20 BRPM | OXYGEN SATURATION: 98 % | HEART RATE: 66 BPM | DIASTOLIC BLOOD PRESSURE: 76 MMHG | BODY MASS INDEX: 27.83 KG/M2 | HEIGHT: 57 IN | WEIGHT: 129 LBS

## 2018-08-23 DIAGNOSIS — E55.9 VITAMIN D DEFICIENCY: ICD-10-CM

## 2018-08-23 DIAGNOSIS — E11.9 TYPE 2 DIABETES MELLITUS WITHOUT COMPLICATION, WITHOUT LONG-TERM CURRENT USE OF INSULIN (H): ICD-10-CM

## 2018-08-23 DIAGNOSIS — Z00.00 PREVENTATIVE HEALTH CARE: ICD-10-CM

## 2018-08-23 DIAGNOSIS — M19.011 PRIMARY OSTEOARTHRITIS OF RIGHT SHOULDER: ICD-10-CM

## 2018-08-23 DIAGNOSIS — F33.1 MODERATE EPISODE OF RECURRENT MAJOR DEPRESSIVE DISORDER (H): ICD-10-CM

## 2018-08-23 DIAGNOSIS — E11.29 TYPE 2 DIABETES MELLITUS WITH MICROALBUMINURIA, WITHOUT LONG-TERM CURRENT USE OF INSULIN (H): ICD-10-CM

## 2018-08-23 DIAGNOSIS — K21.9 GASTROESOPHAGEAL REFLUX DISEASE WITHOUT ESOPHAGITIS: ICD-10-CM

## 2018-08-23 DIAGNOSIS — F43.10 PTSD (POST-TRAUMATIC STRESS DISORDER): ICD-10-CM

## 2018-08-23 DIAGNOSIS — Z23 NEED FOR VACCINATION: Primary | ICD-10-CM

## 2018-08-23 DIAGNOSIS — R80.9 MICROALBUMINURIA: ICD-10-CM

## 2018-08-23 DIAGNOSIS — E11.65 TYPE 2 DIABETES MELLITUS WITH HYPERGLYCEMIA, WITHOUT LONG-TERM CURRENT USE OF INSULIN (H): ICD-10-CM

## 2018-08-23 DIAGNOSIS — R80.9 TYPE 2 DIABETES MELLITUS WITH MICROALBUMINURIA, WITHOUT LONG-TERM CURRENT USE OF INSULIN (H): ICD-10-CM

## 2018-08-23 LAB
BUN SERPL-MCNC: 9.3 MG/DL (ref 7–19)
CALCIUM SERPL-MCNC: 9.6 MG/DL (ref 8.5–10.1)
CHLORIDE SERPLBLD-SCNC: 99.6 MMOL/L (ref 98–110)
CO2 SERPL-SCNC: 31.1 MMOL/L (ref 20–32)
CREAT SERPL-MCNC: 0.7 MG/DL (ref 0.5–1)
GFR SERPL CREATININE-BSD FRML MDRD: >90 ML/MIN/1.7 M2
GLUCOSE SERPL-MCNC: 236.6 MG'DL (ref 70–99)
HBA1C MFR BLD: 8 % (ref 4.1–5.7)
POTASSIUM SERPL-SCNC: 4 MMOL/DL (ref 3.2–4.6)
SODIUM SERPL-SCNC: 135.8 MMOL/L (ref 132–142)

## 2018-08-23 RX ORDER — GLIPIZIDE 10 MG/1
10 TABLET, FILM COATED, EXTENDED RELEASE ORAL DAILY
Qty: 90 TABLET | Refills: 1 | Status: SHIPPED | OUTPATIENT
Start: 2018-08-23 | End: 2019-01-25

## 2018-08-23 RX ORDER — LISINOPRIL 20 MG/1
20 TABLET ORAL DAILY
Qty: 90 TABLET | Refills: 1 | Status: SHIPPED | OUTPATIENT
Start: 2018-08-23 | End: 2018-10-02

## 2018-08-23 RX ORDER — SERTRALINE HYDROCHLORIDE 100 MG/1
100 TABLET, FILM COATED ORAL DAILY
Qty: 90 TABLET | Refills: 1 | Status: SHIPPED | OUTPATIENT
Start: 2018-08-23 | End: 2019-01-25

## 2018-08-23 RX ORDER — ATORVASTATIN CALCIUM 80 MG/1
80 TABLET, FILM COATED ORAL DAILY
Qty: 90 TABLET | Refills: 3 | Status: SHIPPED | OUTPATIENT
Start: 2018-08-23 | End: 2019-01-25

## 2018-08-23 RX ORDER — PRAZOSIN HYDROCHLORIDE 2 MG/1
2 CAPSULE ORAL AT BEDTIME
Qty: 90 CAPSULE | Refills: 3 | Status: SHIPPED | OUTPATIENT
Start: 2018-08-23 | End: 2019-01-25

## 2018-08-23 RX ORDER — METFORMIN HCL 500 MG
1000 TABLET, EXTENDED RELEASE 24 HR ORAL 2 TIMES DAILY WITH MEALS
Qty: 360 TABLET | Refills: 3 | Status: SHIPPED | OUTPATIENT
Start: 2018-08-23 | End: 2019-01-25

## 2018-08-23 RX ORDER — PIOGLITAZONEHYDROCHLORIDE 45 MG/1
45 TABLET ORAL DAILY
Qty: 90 TABLET | Refills: 1 | Status: SHIPPED | OUTPATIENT
Start: 2018-08-23 | End: 2019-01-25

## 2018-08-23 RX ORDER — ACETAMINOPHEN 500 MG
TABLET ORAL
Qty: 100 TABLET | Refills: 3 | Status: SHIPPED | OUTPATIENT
Start: 2018-08-23 | End: 2019-01-14

## 2018-08-23 ASSESSMENT — PAIN SCALES - GENERAL: PAINLEVEL: NO PAIN (0)

## 2018-08-23 NOTE — MR AVS SNAPSHOT
After Visit Summary   8/23/2018    Rachael Ch    MRN: [id number]           Patient Information     Date Of Birth          1965        Visit Information        Provider Department      8/23/2018 10:40 AM Amrita Carballo MD [facility]        Today's Diagnoses     Need for vaccination    -  1    Type 2 diabetes mellitus with microalbuminuria, without long-term current use of insulin (H)        Preventative health care        Microalbuminuria        Type 2 diabetes mellitus without complication, without long-term current use of insulin (H)        PTSD (post-traumatic stress disorder)        Moderate episode of recurrent major depressive disorder (H)        Gastroesophageal reflux disease without esophagitis        Type 2 diabetes mellitus with hyperglycemia, without long-term current use of insulin (H)        Vitamin D deficiency        Primary osteoarthritis of right shoulder          Care Instructions    We will call to schedule mammogram.    I will call and connect with home nurse.  Please show them the medication list from today's visit, as this should be correct.      Follow up in 1 month for recheck and bring in all of your medications as well as your pill box with any pills still inside.            Follow-ups after your visit        Future tests that were ordered for you today     Open Future Orders        Priority Expected Expires Ordered    MA SCREENING DIGITAL BILAT Routine  8/23/2019 8/23/2018            Who to contact     Please call your clinic at [phone] to:    Ask questions about your health    Make or cancel appointments    Discuss your medicines    Learn about your test results    Speak to your doctor            Additional Information About Your Visit        MyChart Information     GINKGOTREE is an electronic gateway that provides easy, online access to your medical records. With GINKGOTREE, you can request a clinic appointment, read your test results, renew a prescription or  "communicate with your care team.     To sign up for [employer] visit the website at [email or web address]   You will be asked to enter the access code listed below, as well as some personal information. Please follow the directions to create your username and password.     Your access code is: [email or web address]  Expires: 10/9/2018  7:00 PM     Your access code will  in 90 days. If you need help or a new code, please contact your [facility] or call [phone] for assistance.        Care EveryWhere ID     This is your Care EveryWhere ID. This could be used by other organizations to access your [place] medical records  [id number]        Your Vitals Were     Pulse Temperature Respirations Height Pulse Oximetry Breastfeeding?    66 98.1  F (36.7  C) (Oral) 20 4' 9.28\" (145.5 cm) 98% No    BMI (Body Mass Index)                   27.64 kg/m2            Blood Pressure from Last 3 Encounters:   18 161/76   18 161/89   18 155/79    Weight from Last 3 Encounters:   18 129 lb (58.5 kg)   18 130 lb 12.8 oz (59.3 kg)   18 131 lb (59.4 kg)              We Performed the Following     ADMIN VACCINE, INITIAL     Basic Metabolic Panel ([place])     GYN Cytology ([facility])     Hemoglobin A1c ([facility])     TDAP VACCINE ([facility])          Today's Medication Changes          These changes are accurate as of 18 12:36 PM.  If you have any questions, ask your nurse or doctor.               Stop taking these medicines if you haven't already. Please contact your care team if you have questions.     calcium carbonate 500 MG chewable tablet   Commonly known as:  TUMS   Stopped by:  Amrita Carballo MD           carboxymethylcellul-glycerin 0.5-0.9 % Soln ophthalmic solution   Commonly known as:  OPTIVE/REFRESH OPTIVE   Stopped by:  Amrita Carballo MD           diclofenac 1 % Gel topical gel   Commonly known as:  VOLTAREN   Stopped by:  Amrita Carballo" MD           hydrOXYzine 25 MG tablet   Commonly known as:  ATARAX   Stopped by:  Amrita Carballo MD           potassium chloride SA 20 MEQ CR tablet   Commonly known as:  KLOR-CON   Stopped by:  Amrita Carballo MD                Where to get your medicines      These medications were sent to [facility] - [place], [place] - [address], [place] [address]     Phone:  [phone]     acetaminophen 500 MG tablet    atorvastatin 80 MG tablet    cholecalciferol 2000 units tablet    glipiZIDE 10 MG 24 hr tablet    lisinopril 20 MG tablet    metFORMIN 500 MG 24 hr tablet    pioglitazone 45 MG tablet    prazosin 2 MG capsule    ranitidine 300 MG tablet    sertraline 100 MG tablet    sitagliptin 100 MG tablet                Primary Care Provider Office Phone # Fax #    Amrita Carballo -[phone]       [address] [place] [address]        Equal Access to Services     [facility]: Hadii sander ku hadasho Soomaali, waaxda luqadaha, qaybta kaalmada adeegyada, waxay idiin hayaan adeeric jeffrey . So [facility] [phone].    ATENCIÓN: Si habla español, tiene a farah disposición servicios gratuitos de asistencia lingüística. [facility] [phone].    We comply with applicable federal civil rights laws and [place] laws. We do not discriminate on the basis of race, color, national origin, age, disability, sex, sexual orientation, or gender identity.            Thank you!     Thank you for choosing [facility]  for your care. Our goal is always to provide you with excellent care. Hearing back from our patients is one way we can continue to improve our services. Please take a few minutes to complete the written survey that you may receive in the mail after your visit with us. Thank you!             Your Updated Medication List - Protect others around you: Learn how to safely use, store and throw away your medicines at www.disposemymeds.org.          This list  is accurate as of 8/23/18 12:36 PM.  Always use your most recent med list.                   Brand Name Dispense Instructions for use Diagnosis    acetaminophen 500 MG tablet    TYLENOL    100 tablet    Take 1 pill in Am, 1 pill in PM and 1 additional pill PRN    Primary osteoarthritis of right shoulder       alum & mag hydroxide-simethicone 200-200-25 MG Chew chewable tablet    MYLANTA/MAALOX     Take 1 tablet by mouth 2 times daily        atorvastatin 80 MG tablet    LIPITOR    90 tablet    Take 1 tablet (80 mg) by mouth daily    Type 2 diabetes mellitus without complication, without long-term current use of insulin (H)       blood glucose monitoring meter device kit     1 kit    Use to test blood sugars 1 times daily or as directed.    Type 2 diabetes mellitus with hyperglycemia, without long-term current use of insulin (H)       blood glucose monitoring test strip    no brand specified    1 Box    Use to test blood sugars 1 times daily or as directed    Type 2 diabetes mellitus with hyperglycemia, without long-term current use of insulin (H)       cholecalciferol 2000 units tablet     100 tablet    Take 2,000 Units by mouth daily    Vitamin D deficiency       glipiZIDE 10 MG 24 hr tablet    GLUCOTROL XL    90 tablet    Take 1 tablet (10 mg) by mouth daily    Type 2 diabetes mellitus with microalbuminuria, without long-term current use of insulin (H)       lisinopril 20 MG tablet    PRINIVIL/ZESTRIL    90 tablet    Take 1 tablet (20 mg) by mouth daily    Microalbuminuria       metFORMIN 500 MG 24 hr tablet    GLUCOPHAGE-XR    360 tablet    Take 2 tablets (1,000 mg) by mouth 2 times daily (with meals)    Type 2 diabetes mellitus without complication, without long-term current use of insulin (H)       * MICROLET LANCETS Misc     100 each    1 Box daily    Diabetes mellitus (H)       * blood glucose monitoring lancets     100 each    Use to test blood sugars 1 times daily or as directed.    Type 2 diabetes mellitus  with hyperglycemia, without long-term current use of insulin (H)       pioglitazone 45 MG tablet    ACTOS    90 tablet    Take 1 tablet (45 mg) by mouth daily    Type 2 diabetes mellitus without complication, without long-term current use of insulin (H)       prazosin 2 MG capsule    MINIPRESS    90 capsule    Take 1 capsule (2 mg) by mouth At Bedtime    PTSD (post-traumatic stress disorder)       ranitidine 300 MG tablet    ZANTAC    90 tablet    Take 1 tablet (300 mg) by mouth At Bedtime    Gastroesophageal reflux disease without esophagitis       sertraline 100 MG tablet    ZOLOFT    90 tablet    Take 1 tablet (100 mg) by mouth daily    Moderate episode of recurrent major depressive disorder (H)       sitagliptin 100 MG tablet    JANUVIA    90 tablet    Take 1 tablet (100 mg) by mouth daily    Type 2 diabetes mellitus with hyperglycemia, without long-term current use of insulin (H)       * Notice:  This list has 2 medication(s) that are the same as other medications prescribed for you. Read the directions carefully, and ask your doctor or other care provider to review them with you.

## 2018-08-23 NOTE — PROGRESS NOTES
The medical student acted as a scribe and the encounter documented about was performed completely by me.  The documentation accurately reflects the work I have performed today.  Amrita Carballo MD    Documentation of Face to Face and Certification for Home Health Services    I certify that patient: Rachael Ch is under my care and that I, or a nurse practitioner or physician's assistant working with me, had a face-to-face encounter that meets the physician face-to-face encounter requirements with this patient on: 8/23/2018.    This encounter with the patient was in whole, or in part, for the following medical condition, which is the primary reason for home health care: confusion, major depression, type 2 diabetes, hypertension with language barrier.    I certify that, based on my findings, the following services are medically necessary home health services: Nursing.  Patient requests every 2 week visits for med set up, as she is unable to safely manage her medications on her own.      My clinical findings support the need for the above services because: Nurse is needed: To assess patient after changes in medications or other medical regimen..    Further, I certify that my clinical findings support that this patient is homebound (i.e. absences from home require considerable and taxing effort and are for medical reasons or [employer] services or infrequently or of short duration when for other reasons) because: Mental health symptoms including: Other: significant depression with intermittent confusion. and Patient gets lost or wanders to due to cognitive impairments...    Based on the above findings. I certify that this patient is confined to the home and needs intermittent skilled nursing care, physical therapy and/or speech therapy.  The patient is under my care, and I have initiated the establishment of the plan of care.  This patient will be followed by a physician who will periodically review the plan of  care.  Physician/Provider to provide follow up care: Amrita Carballo

## 2018-08-23 NOTE — PROGRESS NOTES
SUBJECTIVE       Rachael Ch is a 53 year old  female with a PMH significant for:     Patient Active Problem List   Diagnosis     Essential hypertension, benign     Diabetes mellitus, type 2 (H)     Hyperlipidemia     Health Care Home     Helicobacter pylori gastritis     PTSD (post-traumatic stress disorder)     Moderate episode of recurrent major depressive disorder (H)     Cognitive complaints     Screening for depression     Microalbuminuria     She presents today for routine health exam. She has no major health concerns. She would, however, like to discuss the frequency her home health aid visits. Currently, her home health aid comes to fill her medications once a week. She would like the health aid to come once every 2 weeks because she goes to [facility]. She has a couple pill boxes at home that would make this possible. Her health aid has showed her how to use it.     Rachael has brought her medications. Does not have metformin or lisinopril today. Not sure if she is currently taking those medications. She does not check her blood pressure at home. Rachael's home fasting blood sugars have been 199 - she checks once a day. She shares that she takes all the medications that are in the box and is unsure if she has discontinued taking metformin or lisinopril. We will send home with her a list of medications so that she can confirm with the nurse that she is taking the medications correctly. Will also contact home nurse to change frequency of visits.     Rachael is due for preventative mammogram, Pap, and a diabetic foot check. Patient is in agreement with proceeding. Order will be placed for mammogram and pt will schedule. Shared with patient that Pap is recommended every 5 years. Last pap was 8 years ago. Patient is in agreement with doing Pap today.       PMH, Medications and Allergies were reviewed and updated as needed.        REVIEW OF SYSTEMS     CONSTITUTIONAL: Occasional fevers: once a week that she feels, no  "thermometer. Unintentional weight loss but she doesn't know how much.   INTEGUMENTARY/SKIN: NEGATIVE for worrisome rashes, moles or lesions  EYES: Blurry eyes: follows with opthalmology - will be having surgery but not sure for what.   ENT/MOUTH: NEGATIVE for ear, mouth and throat problems  RESP: NEGATIVE for significant cough or SOB  BREAST: NEGATIVE for masses, tenderness or discharge  CV: NEGATIVE for chest pain, palpitations or peripheral edema   GI: NEGATIVE for nausea, abdominal pain, or change in bowel habits. Occasional heartburn  : NEGATIVE for frequency, dysuria, or hematuria  MUSCULOSKELETAL: Left arm has been hurting for a long time: heavy arm when sleeping. No numbness or tingling. No change in pain.  No ROM limitations.  NEURO: NEGATIVE for weakness, dizziness or paresthesias  ENDOCRINE: NEGATIVE for temperature intolerance, skin/hair changes  HEME/ALLERGY: NEGATIVE for bleeding problems  PSYCHIATRIC: NEGATIVE for changes in mood or affect        OBJECTIVE     Vitals:    08/23/18 1039   BP: 161/76   Pulse: 66   Resp: 20   Temp: 98.1  F (36.7  C)   TempSrc: Oral   SpO2: 98%   Weight: 129 lb (58.5 kg)   Height: 4' 9.28\" (145.5 cm)     Body mass index is 27.64 kg/(m^2).    Constitutional: Awake, alert, cooperative, no apparent distress, and appears stated age.  Lungs: No increased work of breathing, good air exchange, clear to auscultation bilaterally, no crackles or wheezing.  Cardiovascular: Regular rate and rhythm, normal S1 and S2, no S3 or S4, and no murmur noted.  Chest / Breast: Breasts symmetrical, skin without lesion(s), no nipple retraction or dimpling, no nipple discharge, no masses palpated, no axillary or supraclavicular adenopathy.  Genitourinary: No urethral discharge, normal external genitalia  Neurologic: Awake, alert, oriented to name, place and time. Filament test normal   Neuropsychiatric: Normal affect, mood, orientation, memory and insight.      Results for orders placed or " performed in visit on 08/23/18 (from the past 24 hour(s))   Hemoglobin A1c (P )   Result Value Ref Range    Hemoglobin A1C 8.0 (H) 4.1 - 5.7 %   Basic Metabolic Panel ([place])   Result Value Ref Range    Urea Nitrogen 9.3 7.0 - 19.0 mg/dL    Calcium 9.6 8.5 - 10.1 mg/dL    Chloride 99.6 98.0 - 110.0 mmol/L    Carbon Dioxide 31.1 20.0 - 32.0 mmol/L    Creatinine 0.7 0.5 - 1.0 mg/dL    Glucose 236.6 (H) 70.0 - 99.0 mg'dL    Potassium 4.0 3.2 - 4.6 mmol/dL    Sodium 135.8 132.0 - 142.0 mmol/L    GFR Estimate >90 >60.0 mL/min/1.7 m2    GFR Estimate If Black >90 >60.0 mL/min/1.7 m2           ASSESSMENT AND PLAN     Rachael is here today for a health maintenance visit. She has had no major health concerns, however, discussed with us that she would like to change the frequency in which her home health nurse visits. We will reach out to her home nurse to arrange home visits once every two weeks. Rachael was due for preventative screening: pap, breast exam, and diabetic foot exam done today. Also due for Tdap which was given today. She will schedule mammogram. Medication list was updated and appropriate medications were refilled. She was given her updated medication list so that she can share that with her nurse.     1. Type 2 diabetes mellitus with microalbuminuria, without long-term current use of insulin (H)  - Hemoglobin A1c ([facility])  - Basic Metabolic Panel ([place])  - glipiZIDE (GLUCOTROL XL) 10 MG 24 hr tablet; Take 1 tablet (10 mg) by mouth daily  Dispense: 90 tablet; Refill: 1    2. Need for vaccination  - TDAP VACCINE (BOOSTRIX)  - ADMIN VACCINE, INITIAL    3. Preventative health care  - GYN Cytology ([facility])  - MA SCREENING DIGITAL BILAT; Future    4. Microalbuminuria  - lisinopril (PRINIVIL/ZESTRIL) 20 MG tablet; Take 1 tablet (20 mg) by mouth daily  Dispense: 90 tablet; Refill: 1    5. Type 2 diabetes mellitus without complication, without long-term current use of insulin (H)  - metFORMIN (GLUCOPHAGE-XR)  500 MG 24 hr tablet; Take 2 tablets (1,000 mg) by mouth 2 times daily (with meals)  Dispense: 360 tablet; Refill: 3  - pioglitazone (ACTOS) 45 MG tablet; Take 1 tablet (45 mg) by mouth daily  Dispense: 90 tablet; Refill: 1  - atorvastatin (LIPITOR) 80 MG tablet; Take 1 tablet (80 mg) by mouth daily  Dispense: 90 tablet; Refill: 3    6. PTSD (post-traumatic stress disorder)  - prazosin (MINIPRESS) 2 MG capsule; Take 1 capsule (2 mg) by mouth At Bedtime  Dispense: 90 capsule; Refill: 3    7. Moderate episode of recurrent major depressive disorder (H)  - sertraline (ZOLOFT) 100 MG tablet; Take 1 tablet (100 mg) by mouth daily  Dispense: 90 tablet; Refill: 1    8. Gastroesophageal reflux disease without esophagitis  - ranitidine (ZANTAC) 300 MG tablet; Take 1 tablet (300 mg) by mouth At Bedtime  Dispense: 90 tablet; Refill: 3    9. Type 2 diabetes mellitus with hyperglycemia, without long-term current use of insulin (H)  - sitagliptin (JANUVIA) 100 MG tablet; Take 1 tablet (100 mg) by mouth daily  Dispense: 90 tablet; Refill: 3    10. Vitamin D deficiency  - cholecalciferol 2000 units tablet; Take 2,000 Units by mouth daily  Dispense: 100 tablet; Refill: 3    11. Primary osteoarthritis of right shoulder  - acetaminophen (TYLENOL) 500 MG tablet; Take 1 pill in Am, 1 pill in PM and 1 additional pill PRN  Dispense: 100 tablet; Refill: 3      RTC in 4-6 weeks for follow up of medications and pill box, or sooner if develops new or worsening symptoms.    Tara Lopez, M3  '

## 2018-08-23 NOTE — LETTER
August 29, 2018      [facility]  [address] [address]        Dear Rachale,  Your pap smear is normal.  HPV testing is normal.  Blood sugars are just a little high, and kidney tests look good.  Please call the clinic at [phone] if you have any questions.     Please see below for your test results.    Resulted Orders   Hemoglobin A1c ([facility])   Result Value Ref Range    Hemoglobin A1C 8.0 (H) 4.1 - 5.7 %   Basic Metabolic Panel ([place])   Result Value Ref Range    Urea Nitrogen 9.3 7.0 - 19.0 mg/dL    Calcium 9.6 8.5 - 10.1 mg/dL    Chloride 99.6 98.0 - 110.0 mmol/L    Carbon Dioxide 31.1 20.0 - 32.0 mmol/L    Creatinine 0.7 0.5 - 1.0 mg/dL    Glucose 236.6 (H) 70.0 - 99.0 mg'dL    Potassium 4.0 3.2 - 4.6 mmol/dL    Sodium 135.8 132.0 - 142.0 mmol/L    GFR Estimate >90 >60.0 mL/min/1.7 m2    GFR Estimate If Black >90 >60.0 mL/min/1.7 m2   GYN Cytology ([facility])   Result Value Ref Range    Lab AP Case Report SEE RESULTS BELOW       Comment:      Gynecologic Cytology Report                       Case: [id number]                                     Authorizing Provider:  Amrita Carballo MD      Collected:             08/23/2018 1332              First Screen:          GEORGIANA Manuel    Received:              08/24/2018 0828                                     (ASCP)                                                                         Specimen:    SUREPATH PAP, SCREENING, Endocervical/cervical                                               Lab AP Gyn Interpretation SEE RESULTS BELOW       Comment:      Negative for squamous intraepithelial lesion or malignancy  Electronically signed by GEORGIANA Manuel (ASCP) on 8/28/2018 at  4:28   PM      Lab AP Malignant? Normal Normal    Specimen adequacy:       Satisfactory for evaluation, endocervical/transformation zone component present    HPV Reflex? Yes regardless of result     High Risk? No     Last Mens Period menopausal     Lab AP  Abnormal Bleeding No     Lab AP Patient Status NA     Lab AP Birth Control/Hormones None     Lab AP Previous Normal 2011     Lab AP Previous Abnl none     Lab AP Cervical Appearance normal     Narrative    Test performed by:  [facility]  [address] [place] [address]   HPV [place] PCR ([facility])   Result Value Ref Range    HPV Source SurePath     HPV 16 DNA Negative NEG    HPV 18 DNA Negative NEG    Other HR HPV Negative NEG    Final Diagnosis SEE NOTES       Comment:      This patient's sample is negative for HPV DNA.  This test was developed and its performance characteristics determined by the  [facility], [facility] Laboratory. It  has not been cleared or approved by the FDA. The laboratory is regulated under  CLIA as qualified to perform high-complexity testing. This test is used for  clinical purposes. It should not be regarded as investigational or for  research.  (Note)  METHODOLOGY:  The Roche octaviano 4800 system uses automated extraction,  simultaneous amplification of HPV (L1 region) and beta-globin,  followed by  real time detection of fluorescent labeled HPV and beta  globin using specific oligonucleotide probes . The test specifically  identifies types HPV 16 DNA and HPV 18 DNA while concurrently  detecting the rest of the high risk types (31, 33, 35, 39, 45, 51,  52, 56, 58, 59, 66 or 68).     COMMENTS:  This test is not intended for use as a screening device  for women under age 30 with normal cervical   cytology.  Results should  be correlated with cytologic and histologic findings. Close clinical  followup is recommended.         Specimen Description Cervical Cells       Comment:      PERFORMED AT  [address] [address]       Narrative    Test performed by:  [facility]  [address], [place] [address]       If you have any questions, please call  the clinic to make an appointment.    Sincerely,    Amrita Carballo MD

## 2018-08-23 NOTE — PATIENT INSTRUCTIONS
We will call to schedule mammogram.    I will call and connect with home nurse.  Please show them the medication list from today's visit, as this should be correct.      Follow up in 1 month for recheck and bring in all of your medications as well as your pill box with any pills still inside.      MAMMOGRAM SCREENING:  [facility]  [address] [place] [id number]  [phone]  Date:  Thursday August 30, 2018  Time:  9:10 AM  No lotion, powder, deodorant or perfume under the arms and around the breast area.   has been requested for this appointment.  Given to Silvino Ryan  8/24/18

## 2018-08-23 NOTE — NURSING NOTE
Chief Complaint   Patient presents with     RECHECK     [facility]- ANNUAL PHYSICAL EXAM      [place]      No  today. Her Grandson is interpreting today.

## 2018-08-24 LAB
FINAL DIAGNOSIS: NORMAL
HPV 16 DNA: NEGATIVE
HPV 18 DNA: NEGATIVE
HPV SOURCE: NORMAL
OTHER HR HPV: NEGATIVE
SPECIMEN DESCRIPTION: NORMAL

## 2018-08-28 ENCOUNTER — RECORDS - HEALTHEAST (OUTPATIENT)
Dept: ADMINISTRATIVE | Facility: OTHER | Age: 53
End: 2018-08-28

## 2018-08-28 LAB
CYTOLOGY CVX/VAG DOC THIN PREP: NORMAL
HIGH RISK?: NO
HPV REFLEX?: NORMAL
LAB AP ABNORMAL BLEEDING: NO
LAB AP BIRTH CONTROL/HORMONES: NORMAL
LAB AP CASE REPORT: NORMAL
LAB AP CERVICAL APPEARANCE: NORMAL
LAB AP MALIGNANT?: NORMAL
LAB AP PATIENT STATUS: NORMAL
LAB AP PREVIOUS ABNL: NORMAL
LAB AP PREVIOUS NORMAL: 2011
LAST MENS PERIOD: NORMAL
SPECIMEN ADEQUACY:: NORMAL

## 2018-08-28 NOTE — PROGRESS NOTES
Rachael Ch-    Your pap smear is normal.  HPV testing is normal.  Blood sugars are just a little high, and kidney tests look good.  Please call the clinic at [phone] if you have any questions.      Amrita Carballo    Please send results to patient.

## 2018-08-30 DIAGNOSIS — Z00.00 PREVENTATIVE HEALTH CARE: ICD-10-CM

## 2018-08-30 LAB — MAMMOGRAM: NORMAL

## 2018-08-30 NOTE — LETTER
August 31, 2018      Rachael [facility] [facility] [address]        Dear Rachael,  Your mammogram looks good.  No concerns.  We should repeat this in 2 years.  Please call the clinic at [phone] if you have any questions.       Please see below for your test results.    Resulted Orders   MA SCREENING DIGITAL BILAT   Result Value Ref Range    MAMMOGRAM         If you have any questions, please call the clinic to make an appointment.    Sincerely,    Veronica Rosas, CMA

## 2018-08-31 NOTE — PROGRESS NOTES
Rachael Ch-    Your mammogram looks good.  No concerns.  We should repeat this in 2 years.  Please call the clinic at [phone] if you have any questions.      Amrita Carballo    Please send results to patient.

## 2018-10-02 ENCOUNTER — OFFICE VISIT (OUTPATIENT)
Dept: FAMILY MEDICINE | Facility: CLINIC | Age: 53
End: 2018-10-02
Payer: COMMERCIAL

## 2018-10-02 VITALS
RESPIRATION RATE: 16 BRPM | OXYGEN SATURATION: 99 % | BODY MASS INDEX: 28 KG/M2 | HEART RATE: 68 BPM | HEIGHT: 57 IN | WEIGHT: 129.8 LBS | DIASTOLIC BLOOD PRESSURE: 88 MMHG | SYSTOLIC BLOOD PRESSURE: 143 MMHG | TEMPERATURE: 98 F

## 2018-10-02 DIAGNOSIS — Z01.818 PRE-OPERATIVE EXAMINATION: Primary | ICD-10-CM

## 2018-10-02 DIAGNOSIS — I10 ESSENTIAL HYPERTENSION, BENIGN: ICD-10-CM

## 2018-10-02 DIAGNOSIS — R80.9 TYPE 2 DIABETES MELLITUS WITH MICROALBUMINURIA, WITHOUT LONG-TERM CURRENT USE OF INSULIN (H): ICD-10-CM

## 2018-10-02 DIAGNOSIS — E11.29 TYPE 2 DIABETES MELLITUS WITH MICROALBUMINURIA, WITHOUT LONG-TERM CURRENT USE OF INSULIN (H): ICD-10-CM

## 2018-10-02 DIAGNOSIS — F41.9 ANXIETY: ICD-10-CM

## 2018-10-02 DIAGNOSIS — R80.9 MICROALBUMINURIA: ICD-10-CM

## 2018-10-02 LAB
BUN SERPL-MCNC: 11.3 MG/DL (ref 7–19)
CALCIUM SERPL-MCNC: 9.8 MG/DL (ref 8.5–10.1)
CHLORIDE SERPLBLD-SCNC: 99.8 MMOL/L (ref 98–110)
CO2 SERPL-SCNC: 27 MMOL/L (ref 20–32)
CREAT SERPL-MCNC: 0.7 MG/DL (ref 0.5–1)
GFR SERPL CREATININE-BSD FRML MDRD: >90 ML/MIN/1.7 M2
GLUCOSE SERPL-MCNC: 151.7 MG'DL (ref 70–99)
HBA1C MFR BLD: 8.3 % (ref 4.1–5.7)
POTASSIUM SERPL-SCNC: 4 MMOL/DL (ref 3.2–4.6)
SODIUM SERPL-SCNC: 140.1 MMOL/L (ref 132–142)

## 2018-10-02 RX ORDER — HYDROXYZINE HYDROCHLORIDE 25 MG/1
25-75 TABLET, FILM COATED ORAL EVERY 6 HOURS PRN
Qty: 60 TABLET | Refills: 0 | Status: SHIPPED | OUTPATIENT
Start: 2018-10-02 | End: 2019-01-14

## 2018-10-02 RX ORDER — LISINOPRIL 30 MG/1
30 TABLET ORAL DAILY
Qty: 90 TABLET | Refills: 1 | Status: SHIPPED | OUTPATIENT
Start: 2018-10-02 | End: 2018-10-29

## 2018-10-02 ASSESSMENT — ANXIETY QUESTIONNAIRES
2. NOT BEING ABLE TO STOP OR CONTROL WORRYING: SEVERAL DAYS
GAD7 TOTAL SCORE: 5
5. BEING SO RESTLESS THAT IT IS HARD TO SIT STILL: NOT AT ALL
1. FEELING NERVOUS, ANXIOUS, OR ON EDGE: SEVERAL DAYS
6. BECOMING EASILY ANNOYED OR IRRITABLE: MORE THAN HALF THE DAYS
3. WORRYING TOO MUCH ABOUT DIFFERENT THINGS: SEVERAL DAYS
IF YOU CHECKED OFF ANY PROBLEMS ON THIS QUESTIONNAIRE, HOW DIFFICULT HAVE THESE PROBLEMS MADE IT FOR YOU TO DO YOUR WORK, TAKE CARE OF THINGS AT HOME, OR GET ALONG WITH OTHER PEOPLE: NOT DIFFICULT AT ALL
7. FEELING AFRAID AS IF SOMETHING AWFUL MIGHT HAPPEN: NOT AT ALL

## 2018-10-02 ASSESSMENT — PATIENT HEALTH QUESTIONNAIRE - PHQ9: 5. POOR APPETITE OR OVEREATING: NOT AT ALL

## 2018-10-02 NOTE — PROGRESS NOTES
Nursing Notes:   Barak Valdivia Mai  10/2/2018  1:40 PM  Signed  Due to patient being non-English speaking/uses sign language, an  was used for this visit. Only for face-to-face interpretation by an external agency, date and length of interpretation can be found on the scanned worksheet.     name: Silvino NadjaCat) Darren  Agency: [employer]  Language: Radha   Telephone number: [phone]  Type of interpretation: Face-to-face, spoken        House, [place]  10/2/2018  1:43 PM  Signed  Called [facility] at [phone] - surgery is scheduled on 11/4/18 with Dr. Hillary Meza fax [phone]    SUBJECTIVE  Rachael Ch is a 53 year old female with past medical history significant for    Patient Active Problem List   Diagnosis     Essential hypertension, benign     Diabetes mellitus, type 2 (H)     Hyperlipidemia     Health Care Home     Helicobacter pylori gastritis     PTSD (post-traumatic stress disorder)     Moderate episode of recurrent major depressive disorder (H)     Cognitive complaints     Screening for depression     Microalbuminuria     Others present at the visit:   Silvino Banks    Presents for   Chief Complaint   Patient presents with     MOOD CHANGES     medication, mood and don't know about the pre-op exam per patient      Pre-Op Exam     Patient presents today for a preoperative exam prior to cataract surgery.  Unfortunately, her date of surgery was switched to November 4, and so today's too early to complete her preop.  Surgery will be done by Dr. Hillary Grey with [facility] at their location in [place] on November 4.  Appreciate assistance of referral coordinators to determine location and timing of the surgery.  This visit was then changed to an acute care visit to discuss current issues.  She does feel like her glasses are only mildly helping with her vision at this time.  Has difficulty seeing especially at night.  Wears her glasses regularly.  Is  hopeful that the surgery will help with this.    Mood has overall been pretty good.  Occasionally she has episodes where she begins to cry uncontrollably and worries significantly about things.  She has been taking the acute medicine for anxiety, and this helps.  After reviewing her list is not clear to me what medication this is.  She was prescribed hydroxyzine by Dr. Watt back in March, and this is likely the medication as it helpful.  She takes it only occasionally.  Has had only 2 episodes in the last couple of months.  Finds a 3 pills works better than taking just 1 or 2.  Is taking her sertraline regularly, and is feeling good as long [place].  Spends most of her days there and then comes back weekly to get her medication set up by her home nurse.  This is been going well and she endorses taking her medications regularly.    Blood pressure is elevated today.  She did take her medications this morning.  Is okay with increasing her blood pressure medicine.  Has been having some neck tension and shoulder tightness, which she thinks may be due to the blood pressure.  No dizziness or night lightheadedness.  She reports that her diabetes    Testing at home has been good.  Blood sugars are typically between 110 and 140.  [place] in the upper 150s, and lows have been as low as 53.  She does feel symptomatic when she is hypoglycemic, and has been drinking juice when this happens.  Finds this quite helpful.  Tries to stop drinking the juice once her blood sugar comes back up.  She has been eating 2 meals a day regularly and is eating healthy food.  Continues to exercise.  Weight is stable.  Gets his episodes only occasionally.  We discussed that her A1c today is 8.3 which is up from 8.0 previous.  Looking at her daily sugars however she does seem to be appropriately controlled.  She is taking 2000 mg of metformin, 100 mg of Januvia, and 10 mg of  "glipizide XL daily.  Checks her blood sugars morning fasting.    OBJECTIVE:  Vitals: /88  Pulse 68  Temp 98  F (36.7  C) (Oral)  Resp 16  Ht 4' 9.4\" (1.458 m)  Wt 129 lb 12.8 oz (58.9 kg)  SpO2 99%  BMI 27.7 kg/m2  BMI= Body mass index is 27.7 kg/(m^2).   Vitals:  Vitals are reviewed and are within the normal range  Gen:  Alert, pleasant, no acute distress  Cardiac:  Regular rate and rhythm, no murmurs, rubs or gallops  Respiratory:  Lungs clear to auscultation bilaterally  Extremities:  Warm, well-perfused, pulses 2+/4, no lower extremity edema  Psych: Mood and affect are bright.  She giggles when we discussed funny things in the office, and get engages appropriately.  Good eye contact.  This is the best she is looked in a long time.  PHQ 9 today is a total score of 8 and LAYNE 7 is 5.    PHQ-9 SCORE 12/13/2017 3/21/2018 4/30/2018   Total Score - - -   Total Score 3 15 12     Results for orders placed or performed in visit on 10/02/18   Basic Metabolic Panel ([place])   Result Value Ref Range    Urea Nitrogen 11.3 7.0 - 19.0 mg/dL    Calcium 9.8 8.5 - 10.1 mg/dL    Chloride 99.8 98.0 - 110.0 mmol/L    Carbon Dioxide 27.0 20.0 - 32.0 mmol/L    Creatinine 0.7 0.5 - 1.0 mg/dL    Glucose 151.7 (H) 70.0 - 99.0 mg'dL    Potassium 4.0 3.2 - 4.6 mmol/dL    Sodium 140.1 132.0 - 142.0 mmol/L    GFR Estimate >90 >60.0 mL/min/1.7 m2    GFR Estimate If Black >90 >60.0 mL/min/1.7 m2   Hemoglobin A1c ([facility])   Result Value Ref Range    Hemoglobin A1C 8.3 (H) 4.1 - 5.7 %       ASSESSMENT AND PLAN:      Rachael was seen today for mood changes and pre-op exam.    Diagnoses and all orders for this visit:    Pre-operative examination.  Unfortunately, we are out of range for her preop exam.  Acute visit was completed to assess her chronic diseases instead today.  She will schedule a follow-up for preoperative exam in the next 4 weeks prior to the date of her upcoming cataract surgery.  -     Basic Metabolic Panel " ([place])  -     Hemoglobin A1c ([facility])    Type 2 diabetes mellitus with microalbuminuria, without long-term current use of insulin (H).  Blood sugars overall look good at home.  Her A1c is increasing.  I do worry however with her occasional episodes of hypoglycemia.  We will continue with the 2000 units of metformin, 100 mg of Januvia, and 10 mg of glipizide.  She will bring her sugars and her meter to her follow-up visit.  -     Basic Metabolic Panel ([place])  -     Hemoglobin A1c ([facility]); Future  -     EKG 12-lead complete w/read - Clinics  -     Hemoglobin A1c ([facility])    Essential hypertension, benign.  Blood pressure is elevated today.  It has been elevated her last 2 visits as well.  We will increase her lisinopril from 20 mg to 30 mg.  Patient is in agreement with this.  We will recheck blood pressure at her upcoming preoperative check.  -     Basic Metabolic Panel ([place])  -     Hemoglobin A1c ([facility]); Future  -     EKG 12-lead complete w/read - Clinics  -     Hemoglobin A1c ([facility])   -     lisinopril (PRINIVIL,ZESTRIL) 30 MG tablet; Take 1 tablet (30 mg) by mouth daily    Anxiety.  Depression, mood, and anxiety have been good.  Biggest contributor is her access to nature when [place].  She gets good effect from taking the as needed medication, which I believe is the hydroxyzine.  Discussed how she can take this medication appropriately and have available for as needed.  We will continue on the Zoloft as well.  -     hydrOXYzine (ATARAX) 25 MG tablet; Take 1-3 tablets (25-75 mg) by mouth every 6 hours as needed for itching      Patient Instructions   Schedule a pre-operative exam before November 4th.    Increase the strength of your blood pressure medication (Take 30mg daily of Lisinopril)  Keep diabetes medications the same, but bring in your meter next visit.    Refill HYDROXYZINE to take if becoming worried or anxious.        Follow up in 1-3 weeks for  pre-operative physical.  His groin pain    Amrita Carballo

## 2018-10-02 NOTE — MR AVS SNAPSHOT
After Visit Summary   10/2/2018    Rachael Ch    MRN: [id number]           Patient Information     Date Of Birth          1965        Visit Information        Provider Department      10/2/2018 1:10 PM Amrita Carballo MD [facility]        Today's Diagnoses     Pre-operative examination    -  1    Type 2 diabetes mellitus with microalbuminuria, without long-term current use of insulin (H)        Essential hypertension, benign        Anxiety        Microalbuminuria          Care Instructions    Schedule a pre-operative exam before .    Increase the strength of your blood pressure medication (Take 30mg daily of Lisinopril)  Keep diabetes medications the same, but bring in your meter next visit.    Refill HYDROXYZINE to take if becoming worried or anxious.              Follow-ups after your visit        Who to contact     Please call your clinic at [phone] to:    Ask questions about your health    Make or cancel appointments    Discuss your medicines    Learn about your test results    Speak to your doctor            Additional Information About Your Visit        MyChart Information     [employer] is an electronic gateway that provides easy, online access to your medical records. With [employer], you can request a clinic appointment, read your test results, renew a prescription or communicate with your care team.     To sign up for [employer] visit the website at [email or web address]   You will be asked to enter the access code listed below, as well as some personal information. Please follow the directions to create your username and password.     Your access code is: [email or web address]  Expires: 10/9/2018  7:00 PM     Your access code will  in 90 days. If you need help or a new code, please contact your [facility] or call [phone] for assistance.        Care EveryWhere ID     This is your Care EveryWhere ID. This could be used by other  "organizations to access your [place] medical records  [id number]        Your Vitals Were     Pulse Temperature Respirations Height Pulse Oximetry BMI (Body Mass Index)    68 98  F (36.7  C) (Oral) 16 4' 9.4\" (145.8 cm) 99% 27.7 kg/m2       Blood Pressure from Last 3 Encounters:   10/02/18 143/88   08/23/18 161/76   07/26/18 161/89    Weight from Last 3 Encounters:   10/02/18 129 lb 12.8 oz (58.9 kg)   08/23/18 129 lb (58.5 kg)   07/26/18 130 lb 12.8 oz (59.3 kg)              We Performed the Following     Basic Metabolic Panel ([place])     EKG 12-lead complete w/read - Clinics     Hemoglobin A1c ([facility])          Today's Medication Changes          These changes are accurate as of 10/2/18  2:04 PM.  If you have any questions, ask your nurse or doctor.               Start taking these medicines.        Dose/Directions    hydrOXYzine 25 MG tablet   Commonly known as:  ATARAX   Used for:  Anxiety   Started by:  Amrita Carballo MD        Dose:  25-75 mg   Take 1-3 tablets (25-75 mg) by mouth every 6 hours as needed for itching   Quantity:  60 tablet   Refills:  0         These medicines have changed or have updated prescriptions.        Dose/Directions    lisinopril 30 MG tablet   Commonly known as:  PRINIVIL,ZESTRIL   This may have changed:    - medication strength  - how much to take   Used for:  Microalbuminuria   Changed by:  Amrita Carballo MD        Dose:  30 mg   Take 1 tablet (30 mg) by mouth daily   Quantity:  90 tablet   Refills:  1            Where to get your medicines      These medications were sent to [facility] - [place], [place] - [address], [place] [address]     Phone:  [phone]     hydrOXYzine 25 MG tablet    lisinopril 30 MG tablet                Primary Care Provider Office Phone # Fax #    Amrita Carballo [phone]       [address] [place] [address]        Equal Access to Services     RIMMA COLE AH: Soni pierre " santo Haile, wapanfiloda luqadaha, qaybta kaalmada baljeet, vincent jimmiein hayaan terraeric gaviotapako lakendallflavia isabel. So [facility] [phone].    ATENCIÓN: Si habla español, tiene a farah disposición servicios gratuitos de asistencia lingüística. Anastasia al [phone].    We comply with applicable federal civil rights laws and [place] laws. We do not discriminate on the basis of race, color, national origin, age, disability, sex, sexual orientation, or gender identity.            Thank you!     Thank you for choosing [facility]  for your care. Our goal is always to provide you with excellent care. Hearing back from our patients is one way we can continue to improve our services. Please take a few minutes to complete the written survey that you may receive in the mail after your visit with us. Thank you!             Your Updated Medication List - Protect others around you: Learn how to safely use, store and throw away your medicines at www.disposemymeds.org.          This list is accurate as of 10/2/18  2:04 PM.  Always use your most recent med list.                   Brand Name Dispense Instructions for use Diagnosis    acetaminophen 500 MG tablet    TYLENOL    100 tablet    Take 1 pill in Am, 1 pill in PM and 1 additional pill PRN    Primary osteoarthritis of right shoulder       alum & mag hydroxide-simethicone 200-200-25 MG Chew chewable tablet    MYLANTA/MAALOX     Take 1 tablet by mouth 2 times daily        atorvastatin 80 MG tablet    LIPITOR    90 tablet    Take 1 tablet (80 mg) by mouth daily    Type 2 diabetes mellitus without complication, without long-term current use of insulin (H)       blood glucose monitoring meter device kit     1 kit    Use to test blood sugars 1 times daily or as directed.    Type 2 diabetes mellitus with hyperglycemia, without long-term current use of insulin (H)       blood glucose monitoring test strip    no brand specified    1 Box    Use to test blood sugars 1 times daily or as directed     Type 2 diabetes mellitus with hyperglycemia, without long-term current use of insulin (H)       cholecalciferol 2000 units tablet     100 tablet    Take 2,000 Units by mouth daily    Vitamin D deficiency       glipiZIDE 10 MG 24 hr tablet    GLUCOTROL XL    90 tablet    Take 1 tablet (10 mg) by mouth daily    Type 2 diabetes mellitus with microalbuminuria, without long-term current use of insulin (H)       hydrOXYzine 25 MG tablet    ATARAX    60 tablet    Take 1-3 tablets (25-75 mg) by mouth every 6 hours as needed for itching    Anxiety       lisinopril 30 MG tablet    PRINIVIL,ZESTRIL    90 tablet    Take 1 tablet (30 mg) by mouth daily    Microalbuminuria       metFORMIN 500 MG 24 hr tablet    GLUCOPHAGE-XR    360 tablet    Take 2 tablets (1,000 mg) by mouth 2 times daily (with meals)    Type 2 diabetes mellitus without complication, without long-term current use of insulin (H)       * MICROLET LANCETS Misc     100 each    1 Box daily    Diabetes mellitus (H)       * blood glucose monitoring lancets     100 each    Use to test blood sugars 1 times daily or as directed.    Type 2 diabetes mellitus with hyperglycemia, without long-term current use of insulin (H)       pioglitazone 45 MG tablet    ACTOS    90 tablet    Take 1 tablet (45 mg) by mouth daily    Type 2 diabetes mellitus without complication, without long-term current use of insulin (H)       prazosin 2 MG capsule    MINIPRESS    90 capsule    Take 1 capsule (2 mg) by mouth At Bedtime    PTSD (post-traumatic stress disorder)       ranitidine 300 MG tablet    ZANTAC    90 tablet    Take 1 tablet (300 mg) by mouth At Bedtime    Gastroesophageal reflux disease without esophagitis       sertraline 100 MG tablet    ZOLOFT    90 tablet    Take 1 tablet (100 mg) by mouth daily    Moderate episode of recurrent major depressive disorder (H)       sitagliptin 100 MG tablet    JANUVIA    90 tablet    Take 1 tablet (100 mg) by mouth daily    Type 2 diabetes  mellitus with hyperglycemia, without long-term current use of insulin (H)       * Notice:  This list has 2 medication(s) that are the same as other medications prescribed for you. Read the directions carefully, and ask your doctor or other care provider to review them with you.

## 2018-10-02 NOTE — PATIENT INSTRUCTIONS
Schedule a pre-operative exam before November 4th.    Increase the strength of your blood pressure medication (Take 30mg daily of Lisinopril)  Keep diabetes medications the same, but bring in your meter next visit.    Refill HYDROXYZINE to take if becoming worried or anxious.

## 2018-10-02 NOTE — PROGRESS NOTES
Rachael Ch-    Here is a copy of your lab results.  Kidney tests are good.  Blood sugars are good, but A1c is a little higher.  We'll continue to watch this.  Bring your list of blood sugars or your meter in for your next follow up visit.  Please call the clinic at [phone] if you have any questions.      Amrita Carballo    Please send results to patient.

## 2018-10-02 NOTE — NURSING NOTE
Called [facility] at [phone] - surgery is scheduled on 11/4/18 with Dr. Hillary Meza fax [phone]

## 2018-10-02 NOTE — NURSING NOTE
Due to patient being non-English speaking/uses sign language, an  was used for this visit. Only for face-to-face interpretation by an external agency, date and length of interpretation can be found on the scanned worksheet.     name: Silvino Banks (Htoo)  Agency: [employer]  Language: Radha   Telephone number: [phone]  Type of interpretation: Face-to-face, spoken

## 2018-10-02 NOTE — LETTER
October 3, 2018      [facility] [facility] [address]        Dear Rachael,  Here is a copy of your lab results.  Kidney tests are good.  Blood sugars are good, but A1c is a little higher.  We'll continue to watch this.  Bring your list of blood sugars or your meter in for your next follow up visit.  Please call the clinic at [phone] if you have any questions.     Please see below for your test results.    Resulted Orders   Basic Metabolic Panel ([place])   Result Value Ref Range    Urea Nitrogen 11.3 7.0 - 19.0 mg/dL    Calcium 9.8 8.5 - 10.1 mg/dL    Chloride 99.8 98.0 - 110.0 mmol/L    Carbon Dioxide 27.0 20.0 - 32.0 mmol/L    Creatinine 0.7 0.5 - 1.0 mg/dL    Glucose 151.7 (H) 70.0 - 99.0 mg'dL    Potassium 4.0 3.2 - 4.6 mmol/dL    Sodium 140.1 132.0 - 142.0 mmol/L    GFR Estimate >90 >60.0 mL/min/1.7 m2    GFR Estimate If Black >90 >60.0 mL/min/1.7 m2   Hemoglobin A1c ([facility])   Result Value Ref Range    Hemoglobin A1C 8.3 (H) 4.1 - 5.7 %       If you have any questions, please call the clinic to make an appointment.    Sincerely,    Amrita Carballo MD

## 2018-10-03 ASSESSMENT — PATIENT HEALTH QUESTIONNAIRE - PHQ9: SUM OF ALL RESPONSES TO PHQ QUESTIONS 1-9: 8

## 2018-10-03 ASSESSMENT — ANXIETY QUESTIONNAIRES: GAD7 TOTAL SCORE: 5

## 2018-10-08 ENCOUNTER — MEDICAL CORRESPONDENCE (OUTPATIENT)
Dept: HEALTH INFORMATION MANAGEMENT | Facility: CLINIC | Age: 53
End: 2018-10-08

## 2018-10-15 DIAGNOSIS — Z01.818 PRE-OPERATIVE EXAMINATION: Primary | ICD-10-CM

## 2018-10-16 ENCOUNTER — OFFICE VISIT (OUTPATIENT)
Dept: FAMILY MEDICINE | Facility: CLINIC | Age: 53
End: 2018-10-16
Payer: COMMERCIAL

## 2018-10-16 VITALS
SYSTOLIC BLOOD PRESSURE: 135 MMHG | WEIGHT: 129 LBS | HEIGHT: 57 IN | BODY MASS INDEX: 27.83 KG/M2 | RESPIRATION RATE: 16 BRPM | TEMPERATURE: 99 F | HEART RATE: 72 BPM | OXYGEN SATURATION: 99 % | DIASTOLIC BLOOD PRESSURE: 78 MMHG

## 2018-10-16 DIAGNOSIS — Z01.818 PRE-OPERATIVE EXAMINATION: ICD-10-CM

## 2018-10-16 DIAGNOSIS — Z01.818 PREOP GENERAL PHYSICAL EXAM: Primary | ICD-10-CM

## 2018-10-16 LAB
BUN SERPL-MCNC: 14.8 MG/DL (ref 7–19)
CALCIUM SERPL-MCNC: 9.7 MG/DL (ref 8.5–10.1)
CHLORIDE SERPLBLD-SCNC: 94.8 MMOL/L (ref 98–110)
CO2 SERPL-SCNC: 27.9 MMOL/L (ref 20–32)
CREAT SERPL-MCNC: 0.8 MG/DL (ref 0.5–1)
GFR SERPL CREATININE-BSD FRML MDRD: 79.2 ML/MIN/1.7 M2
GLUCOSE SERPL-MCNC: 394.4 MG'DL (ref 70–99)
HEMOGLOBIN: 12.3 G/DL (ref 11.7–15.7)
POTASSIUM SERPL-SCNC: 3.7 MMOL/DL (ref 3.2–4.6)
SODIUM SERPL-SCNC: 133.2 MMOL/L (ref 132–142)

## 2018-10-16 NOTE — PATIENT INSTRUCTIONS
NO FOOD 6 hours before surgery  DO NOT take your Glipizide or Pioglitazone the day of surgery  DO take all of your other medications  Surgery will be 11/12 in [place].    Presurgery Checklist  You are scheduled to have surgery. The healthcare staff will try to make your stay comfortable. Use the guidelines below to remind yourself what to do before surgery. Be sure to follow any specific pre-op instructions from your surgeon or nurse.   Preparing for Surgery  Ask your surgeon if you ll need a blood transfusion during surgery and if so, how to prepare for it. In some cases, you can donate blood before surgery. If needed, this blood can be given back (transfused) to you during or after surgery.  If you are having abdominal surgery, ask what you need to do to clear your bowel.  Tell your surgeon if you have allergies to any medications or foods.  Arrange for an adult family member or friend to drive you home after surgery. If possible, have someone ready to help you at home as you recover.  Call the surgeon if you get a cold, fever, sore throat, diarrhea, or other health problem just before surgery. Your surgeon can decide whether or not to postpone the surgery.  Medications  Tell your surgeon about all medications you take, including prescription and over-the-counter products such as herbal remedies and vitamins. Ask if you should continue taking them.  If you take ibuprofen, naproxen, or  blood thinners  such as aspirin, clopidogrel (Plavix), or warfarin (Coumadin), ask your surgeon whether you should stop taking them and how long before surgery you should stop.  You may be told to take antibiotics just before surgery to prevent infection. If so, follow instructions carefully on how to take them.  If you are told to take medications called anticoagulants to prevent blood clots after surgery, be sure to follow the instructions on how to take them.  Stop Smoking  If you smoke, healing may take longer. So at least 2  week(s) before surgery, stop smoking.  Bathing or Showering Before Surgery  If instructed, wash with antibacterial soap. Afterward, do not use lotions or powders.  If you are having surgery on the head, you may be asked to shampoo with antibacterial soap. Follow instructions for doing so.  Do Not Remove Hair from the Surgery Site  Do not shave hair from the incision site, unless you are given specific instructions to do so. Usually, if hair needs to be removed, it will be done at the hospital right before surgery.  Don t Eat or Drink  Your doctor will tell you when to stop eating and drinking. If you do not follow your doctor's instructions, your procedure may be postponed or rescheduled for another day.  If your surgeon tells you to continue any medications, take them with small sips of water.  You can brush your teeth and rinse your mouth, but don t swallow any water.  Day of Surgery  Do not wear makeup. Do not use perfume, deodorant, or hairspray. Remove nail polish and artificial nails.  Leave jewelry (including rings), watches, and other valuables at home.  Be sure to bring health insurance cards or forms and a photo ID.  Bring a list of your medications (include the name, dose, how often you take them, and the time last dose was taken).  Arrive on time at the hospital or surgery facility.

## 2018-10-16 NOTE — MR AVS SNAPSHOT
After Visit Summary   10/16/2018    Rachael Ch    MRN: [id number]           Patient Information     Date Of Birth          1965        Visit Information        Provider Department      10/16/2018 3:10 PM Amrita Carballo MD [facility]        Today's Diagnoses     Preop general physical exam    -  1    Pre-operative examination          Care Instructions    NO FOOD 6 hours before surgery  DO NOT take your Glipizide or Pioglitazone the day of surgery  DO take all of your other medications  Surgery will be 11/12 in [place].    Presurgery Checklist  You are scheduled to have surgery. The healthcare staff will try to make your stay comfortable. Use the guidelines below to remind yourself what to do before surgery. Be sure to follow any specific pre-op instructions from your surgeon or nurse.   Preparing for Surgery  Ask your surgeon if you ll need a blood transfusion during surgery and if so, how to prepare for it. In some cases, you can donate blood before surgery. If needed, this blood can be given back (transfused) to you during or after surgery.  If you are having abdominal surgery, ask what you need to do to clear your bowel.  Tell your surgeon if you have allergies to any medications or foods.  Arrange for an adult family member or friend to drive you home after surgery. If possible, have someone ready to help you at home as you recover.  Call the surgeon if you get a cold, fever, sore throat, diarrhea, or other health problem just before surgery. Your surgeon can decide whether or not to postpone the surgery.  Medications  Tell your surgeon about all medications you take, including prescription and over-the-counter products such as herbal remedies and vitamins. Ask if you should continue taking them.  If you take ibuprofen, naproxen, or  blood thinners  such as aspirin, clopidogrel (Plavix), or warfarin (Coumadin), ask your surgeon whether you should stop taking them and how long before  surgery you should stop.  You may be told to take antibiotics just before surgery to prevent infection. If so, follow instructions carefully on how to take them.  If you are told to take medications called anticoagulants to prevent blood clots after surgery, be sure to follow the instructions on how to take them.  Stop Smoking  If you smoke, healing may take longer. So at least 2 week(s) before surgery, stop smoking.  Bathing or Showering Before Surgery  If instructed, wash with antibacterial soap. Afterward, do not use lotions or powders.  If you are having surgery on the head, you may be asked to shampoo with antibacterial soap. Follow instructions for doing so.  Do Not Remove Hair from the Surgery Site  Do not shave hair from the incision site, unless you are given specific instructions to do so. Usually, if hair needs to be removed, it will be done at the hospital right before surgery.  Don t Eat or Drink  Your doctor will tell you when to stop eating and drinking. If you do not follow your doctor's instructions, your procedure may be postponed or rescheduled for another day.  If your surgeon tells you to continue any medications, take them with small sips of water.  You can brush your teeth and rinse your mouth, but don t swallow any water.  Day of Surgery  Do not wear makeup. Do not use perfume, deodorant, or hairspray. Remove nail polish and artificial nails.  Leave jewelry (including rings), watches, and other valuables at home.  Be sure to bring health insurance cards or forms and a photo ID.  Bring a list of your medications (include the name, dose, how often you take them, and the time last dose was taken).  Arrive on time at the hospital or surgery facility.          Follow-ups after your visit        Who to contact     Please call your clinic at [phone] to:    Ask questions about your health    Make or cancel appointments    Discuss your medicines    Learn about your test results    Speak to your  "doctor            Additional Information About Your Visit        MyChart Information     [employer] is an electronic gateway that provides easy, online access to your medical records. With [employer], you can request a clinic appointment, read your test results, renew a prescription or communicate with your care team.     To sign up for [employer] visit the website at [email or web address]   You will be asked to enter the access code listed below, as well as some personal information. Please follow the directions to create your username and password.     Your access code is: [email or web address]  Expires: 2019  4:14 PM     Your access code will  in 90 days. If you need help or a new code, please contact your [facility] or call [phone] for assistance.        Care EveryWhere ID     This is your Care EveryWhere ID. This could be used by other organizations to access your [place] medical records  [id number]        Your Vitals Were     Pulse Temperature Respirations Height Pulse Oximetry BMI (Body Mass Index)    72 99  F (37.2  C) (Oral) 16 4' 9.48\" (146 cm) 99% 27.45 kg/m2       Blood Pressure from Last 3 Encounters:   10/16/18 135/78   10/02/18 143/88   18 161/76    Weight from Last 3 Encounters:   10/16/18 129 lb (58.5 kg)   10/02/18 129 lb 12.8 oz (58.9 kg)   18 129 lb (58.5 kg)              We Performed the Following     Basic Metabolic Panel ([place])     Hemoglobin (HGB) ([facility])        Primary Care Provider Office Phone # Fax #    Amrita Carballo [phone]       [address] [place] [address]        Equal Access to Services     [facility] Hospital[facility] AH: Soni Haile, wapanfiloda luyamiladaha, qaybta kaalmada baljeet, vincent hall. So [facility] [phone].    ATENCIÓN: Si habla español, tiene a farah disposición servicios gratuitos de asistencia lingüística. Llame al [phone].    We comply with applicable " federal civil rights laws and [place] laws. We do not discriminate on the basis of race, color, national origin, age, disability, sex, sexual orientation, or gender identity.            Thank you!     Thank you for choosing [facility]  for your care. Our goal is always to provide you with excellent care. Hearing back from our patients is one way we can continue to improve our services. Please take a few minutes to complete the written survey that you may receive in the mail after your visit with us. Thank you!             Your Updated Medication List - Protect others around you: Learn how to safely use, store and throw away your medicines at www.disposemymeds.org.          This list is accurate as of 10/16/18  4:14 PM.  Always use your most recent med list.                   Brand Name Dispense Instructions for use Diagnosis    acetaminophen 500 MG tablet    TYLENOL    100 tablet    Take 1 pill in Am, 1 pill in PM and 1 additional pill PRN    Primary osteoarthritis of right shoulder       alum & mag hydroxide-simethicone 200-200-25 MG Chew chewable tablet    MYLANTA/MAALOX     Take 1 tablet by mouth 2 times daily        atorvastatin 80 MG tablet    LIPITOR    90 tablet    Take 1 tablet (80 mg) by mouth daily    Type 2 diabetes mellitus without complication, without long-term current use of insulin (H)       blood glucose monitoring meter device kit     1 kit    Use to test blood sugars 1 times daily or as directed.    Type 2 diabetes mellitus with hyperglycemia, without long-term current use of insulin (H)       blood glucose monitoring test strip    no brand specified    1 Box    Use to test blood sugars 1 times daily or as directed    Type 2 diabetes mellitus with hyperglycemia, without long-term current use of insulin (H)       cholecalciferol 2000 units tablet     100 tablet    Take 2,000 Units by mouth daily    Vitamin D deficiency       glipiZIDE 10 MG 24 hr tablet    GLUCOTROL XL    90 tablet    Take 1  tablet (10 mg) by mouth daily    Type 2 diabetes mellitus with microalbuminuria, without long-term current use of insulin (H)       hydrOXYzine 25 MG tablet    ATARAX    60 tablet    Take 1-3 tablets (25-75 mg) by mouth every 6 hours as needed for itching    Anxiety       lisinopril 30 MG tablet    PRINIVIL,ZESTRIL    90 tablet    Take 1 tablet (30 mg) by mouth daily    Microalbuminuria       metFORMIN 500 MG 24 hr tablet    GLUCOPHAGE-XR    360 tablet    Take 2 tablets (1,000 mg) by mouth 2 times daily (with meals)    Type 2 diabetes mellitus without complication, without long-term current use of insulin (H)       * MICROLET LANCETS Misc     100 each    1 Box daily    Diabetes mellitus (H)       * blood glucose monitoring lancets     100 each    Use to test blood sugars 1 times daily or as directed.    Type 2 diabetes mellitus with hyperglycemia, without long-term current use of insulin (H)       pioglitazone 45 MG tablet    ACTOS    90 tablet    Take 1 tablet (45 mg) by mouth daily    Type 2 diabetes mellitus without complication, without long-term current use of insulin (H)       prazosin 2 MG capsule    MINIPRESS    90 capsule    Take 1 capsule (2 mg) by mouth At Bedtime    PTSD (post-traumatic stress disorder)       ranitidine 300 MG tablet    ZANTAC    90 tablet    Take 1 tablet (300 mg) by mouth At Bedtime    Gastroesophageal reflux disease without esophagitis       sertraline 100 MG tablet    ZOLOFT    90 tablet    Take 1 tablet (100 mg) by mouth daily    Moderate episode of recurrent major depressive disorder (H)       sitagliptin 100 MG tablet    JANUVIA    90 tablet    Take 1 tablet (100 mg) by mouth daily    Type 2 diabetes mellitus with hyperglycemia, without long-term current use of insulin (H)       * Notice:  This list has 2 medication(s) that are the same as other medications prescribed for you. Read the directions carefully, and ask your doctor or other care provider to review them with you.

## 2018-10-16 NOTE — PROGRESS NOTES
[address] [address]  Phone: [phone]  Fax: [phone]    10/16/2018    Adult PRE-OP Evaluation:    Rachael Ch, 1965 presents for pre-operative evaluation and assessment as requested by Dr. Hillary Meza, prior to undergoing surgery/procedure for treatment of  Cataract.    Proposed procedure: Cataract survey.      Date of Surgery/ Procedure: November 4, 2018, [facility].  [phone]  Hospital/Surgical Facility: [facility]:  [address] [place] [address].  fax [phone]     Primary Physician: Amrita Carballo  Type of Anesthesia Anticipated: Local with MAC  History of anesthesia complications: NONE  History of  abnormal bleeding: NONE   History of blood transfusions: NO  Patient has a Health Care Directive or Living Will:  NO.  Decision maker during surgery and if needed is :  Airam Ch    Preoperative Questions   1. NO - Do you have a history of heart attack, stroke, stent, bypass or surgery on an artery in the head, neck, heart or legs?  2. NO - Do you ever have any pain or discomfort in your chest?  3. NO - Have you ever had a severe pain across the front of your chest lasting for half an hour or more?  4. NO - Do you have a history of Congestive Heart Failure?  5. NO - Are you troubled by shortness of breath when: walking on the level/ up a slight hill/ at night?  6. NO - Does your chest ever sound wheezy or whistling?  7. NO - Do you currently have a cold, bronchitis or other respiratory infection?  8. NO - Have you had a cold, bronchitis or other respiratory infection within the last 2 weeks?  9. NO - Do you usually have a cough?  10. NO - Do you sometimes get pains in the calves of your legs when you walk?  11. NO - Do you or anyone in your family have previous history of blood clots?  12. NO - Do you or does anyone in your family have a serious bleeding problem such as prolonged bleeding following surgeries or cuts?  13. NO - Have you ever had problems  with anemia or been told to take iron pills?  14. NO - Have you had any abnormal blood loss such as black, tarry or bloody stools, or abnormal vaginal bleeding?  15. NO - Have you ever had a blood transfusion?  16. NO - Have you or any of your relatives ever had problems with anesthesia?  17. YES - Do you have sleep apnea, excessive snoring or daytime drowsiness?  Snores loud but no daytime sleepiness.    18. NO - Do you have any prosthetic heart valves?  19. NO - Do you have prosthetic joints?  20. NO - Is there any chance that you may be pregnant?    Patient Active Problem List   Diagnosis     Essential hypertension, benign     Diabetes mellitus, type 2 (H)     Hyperlipidemia     Health Care Home     Helicobacter pylori gastritis     PTSD (post-traumatic stress disorder)     Moderate episode of recurrent major depressive disorder (H)     Cognitive complaints     Screening for depression     Microalbuminuria         Current Outpatient Prescriptions on File Prior to Visit:  acetaminophen (TYLENOL) 500 MG tablet Take 1 pill in Am, 1 pill in PM and 1 additional pill PRN   alum & mag hydroxide-simethicone (MYLANTA/MAALOX) 200-200-25 MG CHEW chewable tablet Take 1 tablet by mouth 2 times daily   atorvastatin (LIPITOR) 80 MG tablet Take 1 tablet (80 mg) by mouth daily   blood glucose monitoring (NO BRAND SPECIFIED) test strip Use to test blood sugars 1 times daily or as directed   blood glucose monitoring (ONE TOUCH DELICA) lancets Use to test blood sugars 1 times daily or as directed.   blood glucose monitoring (ONETOUCH VERIO) meter device kit Use to test blood sugars 1 times daily or as directed.   cholecalciferol 2000 units tablet Take 2,000 Units by mouth daily   glipiZIDE (GLUCOTROL XL) 10 MG 24 hr tablet Take 1 tablet (10 mg) by mouth daily   hydrOXYzine (ATARAX) 25 MG tablet Take 1-3 tablets (25-75 mg) by mouth every 6 hours as needed for itching   lisinopril (PRINIVIL,ZESTRIL) 30 MG tablet Take 1 tablet (30 mg)  by mouth daily   metFORMIN (GLUCOPHAGE-XR) 500 MG 24 hr tablet Take 2 tablets (1,000 mg) by mouth 2 times daily (with meals)   MICROLET LANCETS MISC 1 Box daily   pioglitazone (ACTOS) 45 MG tablet Take 1 tablet (45 mg) by mouth daily   prazosin (MINIPRESS) 2 MG capsule Take 1 capsule (2 mg) by mouth At Bedtime   ranitidine (ZANTAC) 300 MG tablet Take 1 tablet (300 mg) by mouth At Bedtime   sertraline (ZOLOFT) 100 MG tablet Take 1 tablet (100 mg) by mouth daily   sitagliptin (JANUVIA) 100 MG tablet Take 1 tablet (100 mg) by mouth daily     No current facility-administered medications on file prior to visit.     OTC products: None, except as noted above    Allergies   Allergen Reactions     Gabapentin Other (See Comments)     Facial Swelling     Nkda [No Known Drug Allergies]      Venlafaxine      Causes abdominal pain, poor appetite and dizziness.       Latex Allergy: NO    Social History     Social History     Marital status:      Spouse name: N/A     Number of children: N/A     Years of education: N/A     Social History Main Topics     Smoking status: Former Smoker     Packs/day: 0.50     Types: Cigarettes     Quit date: 8/23/2017     Smokeless tobacco: Current User     Types: Chew     Alcohol use No     Drug use: No     Sexual activity: Yes     Partners: Male     Other Topics Concern     None     Social History Narrative       REVIEW OF SYSTEMS:   REVIEW OF SYMPTOMS    GENERAL:  No intentional weight loss or gain.  Occasional mild headaches.  No dizziness or lightheadedness.  No increased fatigue.  No fevers or chils  HEENT:  No problems with vision or hearing.  No eye concerns.  No allergies or nasal congestion.  No sore throat or difficulty swallowing.    Neck:  No neck pain, normal movement.    CARDS:  No chest pain or palpitations.  No shortness of breath with exertion.    RESP:  No history of asthma.  No shortness of breath, no wheezing.  No cough  GI:  No abdominal pain.  No heartburn.  No nausea or  "vomitting.  No constipation or diarrhea  :  Normal urination, no dysuria, no sexual concerns, occasional incontinence.    EXTREMITIES:  No weakness.  No extremity pain.    SKIN:  No rashes, bruises, lumps or bumps.    Psyche:  PHQ-9 negative.  No hx of depression or anxiety.      Habits:    Smoking:  Current non-smoker.  Previous smoker.  Uses betel nut  Alcohol:  No alcohol use  Illicit Drugs: No current illicit drug use.  No previous history of drug use.        EXAM:     Patient Vitals for the past 24 hrs:   BP Temp Temp src Pulse Resp SpO2 Height Weight   10/16/18 1523 135/78 99  F (37.2  C) Oral 72 16 99 % 4' 9.48\" (146 cm) 129 lb (58.5 kg)     Body mass index is 27.45 kg/(m^2).  GENERAL: healthy, alert and no distress  EYES: Eyes grossly normal to inspection, extraocular movements - intact, and PERRL  HENT: ear canals- normal; TMs- chronic scarring bilaterally; Nose- normal; Mouth- no ulcers, no lesions  NECK: no tenderness, no adenopathy, no asymmetry, no masses, no stiffness; thyroid- normal to palpation  RESP: lungs clear to auscultation - no rales, no rhonchi, no wheezes  CV: regular rates and rhythm, normal S1 S2, no S3 or S4 and no murmur, no click or rub -  ABDOMEN: soft, no tenderness, no  hepatosplenomegaly, no masses, normal bowel sounds  MS: extremities- no gross deformities noted, no edema  SKIN: no suspicious lesions, no rashes  NEURO: strength and tone- normal, sensory exam- grossly normal, mentation- intact, speech- normal, reflexes- symmetric  BACK: no CVA tenderness, no paralumbar tenderness  PSYCH: Alert and oriented times 3; speech- coherent , normal rate and volume; able to articulate logical thoughts  LYMPHATICS: ant. cervical- normal, post. cervical- normal    DIAGNOSTICS:      Results for orders placed or performed in visit on 10/16/18   Basic Metabolic Panel ([place])   Result Value Ref Range    Urea Nitrogen 14.8 7.0 - 19.0 mg/dL    Calcium 9.7 8.5 - 10.1 mg/dL    Chloride 94.8 (L) " 98.0 - 110.0 mmol/L    Carbon Dioxide 27.9 20.0 - 32.0 mmol/L    Creatinine 0.8 0.5 - 1.0 mg/dL    Glucose 394.4 (H) 70.0 - 99.0 mg'dL    Potassium 3.7 3.2 - 4.6 mmol/dL    Sodium 133.2 132.0 - 142.0 mmol/L    GFR Estimate 79.2 >60.0 mL/min/1.7 m2    GFR Estimate If Black >90 >60.0 mL/min/1.7 m2   Hemoglobin (HGB) ([facility])   Result Value Ref Range    Hemoglobin 12.3 11.7 - 15.7 g/dL     No EKG required.      RISK ASSESSMENT:     Cardiovascular Risk:  -Patient is able to participate in strenuous activities without chest pain.  -The patient does not have chest pain with exertion.  -Patient does not have a history of congestive heart failure.    -The patient does not have a history of stroke and does not have a history of valvular disease.    Pulmonary Risk:  -In terms of risk factors for pulmonary complication, the patient has no risk factors    Perioperative Complications:  -The patient does not have a history of bleeding or clotting problems in the past.    -The patient has not had surgery previously.    -The patient does not have a family history of any anesthesia or surgical complications.      IMPRESSION:   Reason for surgery/procedure: cataract    The proposed surgical procedure is considered LOW risk.    For above listed surgery and anesthesia:   Patient is at low risk for surgery/procedure and perioperative/procedure complications.    RECOMMENDATIONS:     Labs:  Results for orders placed or performed in visit on 10/16/18   Basic Metabolic Panel ([place])   Result Value Ref Range    Urea Nitrogen 14.8 7.0 - 19.0 mg/dL    Calcium 9.7 8.5 - 10.1 mg/dL    Chloride 94.8 (L) 98.0 - 110.0 mmol/L    Carbon Dioxide 27.9 20.0 - 32.0 mmol/L    Creatinine 0.8 0.5 - 1.0 mg/dL    Glucose 394.4 (H) 70.0 - 99.0 mg'dL    Potassium 3.7 3.2 - 4.6 mmol/dL    Sodium 133.2 132.0 - 142.0 mmol/L    GFR Estimate 79.2 >60.0 mL/min/1.7 m2    GFR Estimate If Black >90 >60.0 mL/min/1.7 m2   Hemoglobin (HGB) ([facility])   Result Value Ref  Range    Hemoglobin 12.3 11.7 - 15.7 g/dL         Fasting:  NPO for 12 hours prior to surgery    Preop Plan:  --Approval given to proceed with proposed procedure, without further diagnostic evaluation    Medications:  Patient should take their regular medications the morning of surgery unless otherwise instructed.    DO NOT TAKE GLIPIZIDE OR PIOGLITAZONE>    Hold aspirin 7 days prior to surgery.  Hold ibuprofen for 5 days prior.      Amrita Carballo MD    Please contact our office if there are any further questions or information required about this patient.

## 2018-10-16 NOTE — NURSING NOTE
Due to patient being non-English speaking/uses sign language, an  was used for this visit. Only for face-to-face interpretation by an external agency, date and length of interpretation can be found on the scanned worksheet.       name: Silvino Banks (Htoo)  Language: Radha  Agency:  [employer]  Phone number: [phone]  Type of interpretation:  Face-to-face, spoken

## 2018-10-29 DIAGNOSIS — R80.9 MICROALBUMINURIA: ICD-10-CM

## 2018-10-30 ENCOUNTER — TELEPHONE (OUTPATIENT)
Dept: FAMILY MEDICINE | Facility: CLINIC | Age: 53
End: 2018-10-30

## 2018-10-30 DIAGNOSIS — K21.9 GASTROESOPHAGEAL REFLUX DISEASE WITHOUT ESOPHAGITIS: Primary | ICD-10-CM

## 2018-10-30 RX ORDER — LISINOPRIL 30 MG/1
30 TABLET ORAL DAILY
Qty: 90 TABLET | Refills: 1 | Status: SHIPPED | OUTPATIENT
Start: 2018-10-30 | End: 2019-01-16

## 2018-10-30 NOTE — TELEPHONE ENCOUNTER
Requesting fill for chewable acid reflux medication.  Talha chew is on patient's list.  Prescription sent.      Amrita Carballo    Routed to FELIZ Martin.

## 2018-12-04 ENCOUNTER — MEDICAL CORRESPONDENCE (OUTPATIENT)
Dept: HEALTH INFORMATION MANAGEMENT | Facility: CLINIC | Age: 53
End: 2018-12-04

## 2018-12-05 ENCOUNTER — OFFICE VISIT (OUTPATIENT)
Dept: FAMILY MEDICINE | Facility: CLINIC | Age: 53
End: 2018-12-05
Payer: COMMERCIAL

## 2018-12-05 VITALS
TEMPERATURE: 98.3 F | HEIGHT: 58 IN | BODY MASS INDEX: 27.58 KG/M2 | RESPIRATION RATE: 28 BRPM | DIASTOLIC BLOOD PRESSURE: 79 MMHG | HEART RATE: 84 BPM | SYSTOLIC BLOOD PRESSURE: 143 MMHG | WEIGHT: 131.4 LBS | OXYGEN SATURATION: 98 %

## 2018-12-05 DIAGNOSIS — Z01.818 PREOP GENERAL PHYSICAL EXAM: Primary | ICD-10-CM

## 2018-12-05 NOTE — PROGRESS NOTES
Preceptor Attestation:   Patient seen, evaluated and discussed with the resident. I have verified the content of the note, which accurately reflects my assessment of the patient and the plan of care.   Supervising Physician:  Jorge Gavin MD

## 2018-12-05 NOTE — PROGRESS NOTES
[address] [address]  Phone: [phone]  Fax: [phone]    12/5/2018    Adult PRE-OP Evaluation:    Rachael Ch, 1965 presents for pre-operative evaluation and assessment as requested by Dr. Hillary Meza, prior to undergoing surgery/procedure for treatment of  Cataract .    Proposed procedure: Cataract surgery    Date of Surgery/ Procedure: 12/6/18  Hospital/Surgical Facility: [facility].   [place]: [address] [place] [address]. Fax: [phone]     Primary Physician: Amrita Carballo  Type of Anesthesia Anticipated: Local with MAC  History of anesthesia complications: NONE  History of  abnormal bleeding: NONE   History of blood transfusions: NO  Patient has a Health Care Directive or Living Will:  NO    Preoperative Questions   1. NO - Do you have a history of heart attack, stroke, stent, bypass or surgery on an artery in the head, neck, heart or legs?  2. NO - Do you ever have any pain or discomfort in your chest?  3. NO - Have you ever had a severe pain across the front of your chest lasting for half an hour or more?  4. NO - Do you have a history of Congestive Heart Failure?  5. NO - Are you troubled by shortness of breath when: walking on the level/ up a slight hill/ at night?  6. NO - Does your chest ever sound wheezy or whistling?  7. NO - Do you currently have a cold, bronchitis or other respiratory infection?  8. NO - Have you had a cold, bronchitis or other respiratory infection within the last 2 weeks?  9. NO - Do you usually have a cough?  10. NO - Do you sometimes get pains in the calves of your legs when you walk?  11. NO - Do you or anyone in your family have previous history of blood clots?  12. NO - Do you or does anyone in your family have a serious bleeding problem such as prolonged bleeding following surgeries or cuts?  13. NO - Have you ever had problems with anemia or been told to take iron pills?  14. NO - Have you had any abnormal blood loss  such as black, tarry or bloody stools, or abnormal vaginal bleeding?  15. NO - Have you ever had a blood transfusion?  16. NO - Have you or any of your relatives ever had problems with anesthesia?  17. YES - Do you have sleep apnea, excessive snoring or daytime drowsiness? Yes - does not have sleep apnea but does snore loudly.   18. NO - Do you have any prosthetic heart valves?  19. NO - Do you have prosthetic joints?  20. NO - Is there any chance that you may be pregnant?    Patient Active Problem List   Diagnosis     Essential hypertension, benign     Diabetes mellitus, type 2 (H)     Hyperlipidemia     Health Care Home     Helicobacter pylori gastritis     PTSD (post-traumatic stress disorder)     Moderate episode of recurrent major depressive disorder (H)     Cognitive complaints     Screening for depression     Microalbuminuria         Current Outpatient Prescriptions on File Prior to Visit:  atorvastatin (LIPITOR) 80 MG tablet Take 1 tablet (80 mg) by mouth daily   cholecalciferol 2000 units tablet Take 2,000 Units by mouth daily   glipiZIDE (GLUCOTROL XL) 10 MG 24 hr tablet Take 1 tablet (10 mg) by mouth daily   lisinopril (PRINIVIL,ZESTRIL) 30 MG tablet Take 1 tablet (30 mg) by mouth daily   metFORMIN (GLUCOPHAGE-XR) 500 MG 24 hr tablet Take 2 tablets (1,000 mg) by mouth 2 times daily (with meals)   pioglitazone (ACTOS) 45 MG tablet Take 1 tablet (45 mg) by mouth daily   prazosin (MINIPRESS) 2 MG capsule Take 1 capsule (2 mg) by mouth At Bedtime   ranitidine (ZANTAC) 300 MG tablet Take 1 tablet (300 mg) by mouth At Bedtime   sertraline (ZOLOFT) 100 MG tablet Take 1 tablet (100 mg) by mouth daily   sitagliptin (JANUVIA) 100 MG tablet Take 1 tablet (100 mg) by mouth daily   acetaminophen (TYLENOL) 500 MG tablet Take 1 pill in Am, 1 pill in PM and 1 additional pill PRN   alum & mag hydroxide-simethicone (MYLANTA/MAALOX) 200-200-25 MG CHEW chewable tablet Take 1 tablet by mouth 4 times daily as needed for  "indigestion   blood glucose monitoring (NO BRAND SPECIFIED) test strip Use to test blood sugars 1 times daily or as directed   blood glucose monitoring (ONE TOUCH DELICA) lancets Use to test blood sugars 1 times daily or as directed.   blood glucose monitoring (ONETOUCH VERIO) meter device kit Use to test blood sugars 1 times daily or as directed.   hydrOXYzine (ATARAX) 25 MG tablet Take 1-3 tablets (25-75 mg) by mouth every 6 hours as needed for itching   MICROLET LANCETS MISC 1 Box daily     No current facility-administered medications on file prior to visit.     OTC products: None, except as noted above    Allergies   Allergen Reactions     Gabapentin Other (See Comments)     Facial Swelling     Nkda [No Known Drug Allergies]      Venlafaxine      Causes abdominal pain, poor appetite and dizziness.       Latex Allergy: NO    Social History     Social History     Marital status:      Spouse name: N/A     Number of children: N/A     Years of education: N/A     Social History Main Topics     Smoking status: Former Smoker     Packs/day: 0.50     Types: Cigarettes     Quit date: 8/23/2017     Smokeless tobacco: Current User     Types: Chew     Alcohol use No     Drug use: No     Sexual activity: Yes     Partners: Male     Other Topics Concern     None     Social History Narrative       REVIEW OF SYSTEMS:   Constitutional, HEENT, cardiovascular, pulmonary, GI, , musculoskeletal, neuro, skin, endocrine and psych systems are negative, except as otherwise noted.    EXAM:     Patient Vitals for the past 24 hrs:   BP Temp Temp src Pulse Resp SpO2 Height Weight   12/05/18 1517 143/79 98.3  F (36.8  C) Oral 84 28 98 % 4' 10\" (147.3 cm) 131 lb 6.4 oz (59.6 kg)     Body mass index is 27.46 kg/(m^2).  GENERAL: healthy, alert and no distress  EYES: Eyes grossly normal to inspection, extraocular movements - intact, and PERRL  HENT: ear canals- normal; TMs-bilateral scarring; Nose- normal; Mouth- no ulcers, no " lesions  NECK: no tenderness, no adenopathy, no asymmetry, no masses, no stiffness; thyroid- normal to palpation  RESP: lungs clear to auscultation - no rales, no rhonchi, no wheezes  CV: regular rates and rhythm, normal S1 S2, no S3 or S4 and no murmur, no click or rub -  ABDOMEN: soft, no tenderness, no  hepatosplenomegaly, no masses, normal bowel sounds  MS: extremities- no gross deformities noted, no edema  SKIN: no suspicious lesions, no rashes  NEURO: strength and tone- normal, sensory exam- grossly normal, mentation- intact, speech- normal, reflexes- symmetric  BACK: no CVA tenderness, no paralumbar tenderness  PSYCH: Alert and oriented times 3; speech- coherent , normal rate and volume; able to articulate logical thoughts  LYMPHATICS: ant. cervical- normal, post. cervical- normal    DIAGNOSTICS:      No labs or EKG required for low risk surgery (cataract, skin procedure, breast biopsy, etc)    RISK ASSESSMENT:     Cardiovascular Risk:  -Patient is able to participate in strenuous activities without chest pain.  -The patient does not have chest pain with exertion.  -Patient does not have a history of congestive heart failure.    -The patient does not have a history of stroke and does not have a history of valvular disease.    Pulmonary Risk:  -In terms of risk factors for pulmonary complication, the patient has no risk factors    Perioperative Complications:  -The patient does not have a history of bleeding or clotting problems in the past.    -The patient has not had complications from surgeries.    -The patient does not have a family history of any anesthesia or surgical complications.      IMPRESSION:   Reason for surgery/procedure: Cataract     The proposed surgical procedure is considered LOW risk.    For above listed surgery and anesthesia:   Patient is at low risk for surgery/procedure and perioperative/procedure complications.    RECOMMENDATIONS:       Fasting:  NPO for 12 hours prior to  surgery    Preop Plan:  --Approval given to proceed with proposed procedure, without further diagnostic evaluation    Medications:  Patient should take their regular medications the morning of surgery unless otherwise instructed.   Do not take glipizide or pioglitazone the evening prior to surgery  Hold aspirin 7 days prior to surgery.  Hold ibuprofen for 5 days prior.    I precepted today with Jorge Gavin MD.     Pa Hong, PGY2  [facility]      Please contact our office if there are any further questions or information required about this patient.

## 2018-12-05 NOTE — NURSING NOTE
Due to patient being non-English speaking/uses sign language, an  was used for this visit. Only for face-to-face interpretation by an external agency, date and length of interpretation can be found on the scanned worksheet.       name: Silvino Banks (Htoo)  Language: Radha  Agency:  [employer]  Phone number: [phone]  Type of interpretation:  Face-to-face, spoken

## 2018-12-05 NOTE — MR AVS SNAPSHOT
After Visit Summary   12/5/2018    Rachael Ch    MRN: [id number]           Patient Information     Date Of Birth          1965        Visit Information        Provider Department      12/5/2018 3:10 PM aP Hong MD [facility]        Today's Diagnoses     Preop general physical exam    -  1      Care Instructions      Presurgery Checklist  You are scheduled to have surgery. The healthcare staff will try to make your stay comfortable. Use the guidelines below to remind yourself what to do before surgery. Be sure to follow any specific pre-op instructions from your surgeon or nurse.   Preparing for Surgery  Ask your surgeon if you ll need a blood transfusion during surgery and if so, how to prepare for it. In some cases, you can donate blood before surgery. If needed, this blood can be given back (transfused) to you during or after surgery.  If you are having abdominal surgery, ask what you need to do to clear your bowel.  Tell your surgeon if you have allergies to any medications or foods.  Arrange for an adult family member or friend to drive you home after surgery. If possible, have someone ready to help you at home as you recover.  Call the surgeon if you get a cold, fever, sore throat, diarrhea, or other health problem just before surgery. Your surgeon can decide whether or not to postpone the surgery.  Medications  Tell your surgeon about all medications you take, including prescription and over-the-counter products such as herbal remedies and vitamins. Ask if you should continue taking them.  If you take ibuprofen, naproxen, or  blood thinners  such as aspirin, clopidogrel (Plavix), or warfarin (Coumadin), ask your surgeon whether you should stop taking them and how long before surgery you should stop.  You may be told to take antibiotics just before surgery to prevent infection. If so, follow instructions carefully on how to take them.  If you are told to take medications called  anticoagulants to prevent blood clots after surgery, be sure to follow the instructions on how to take them.  Stop Smoking  If you smoke, healing may take longer. So at least 2 week(s) before surgery, stop smoking.  Bathing or Showering Before Surgery  If instructed, wash with antibacterial soap. Afterward, do not use lotions or powders.  If you are having surgery on the head, you may be asked to shampoo with antibacterial soap. Follow instructions for doing so.  Do Not Remove Hair from the Surgery Site  Do not shave hair from the incision site, unless you are given specific instructions to do so. Usually, if hair needs to be removed, it will be done at the hospital right before surgery.  Don t Eat or Drink  Your doctor will tell you when to stop eating and drinking. If you do not follow your doctor's instructions, your procedure may be postponed or rescheduled for another day.  If your surgeon tells you to continue any medications, take them with small sips of water.  You can brush your teeth and rinse your mouth, but don t swallow any water.  Day of Surgery  Do not wear makeup. Do not use perfume, deodorant, or hairspray. Remove nail polish and artificial nails.  Leave jewelry (including rings), watches, and other valuables at home.  Be sure to bring health insurance cards or forms and a photo ID.  Bring a list of your medications (include the name, dose, how often you take them, and the time last dose was taken).  Arrive on time at the hospital or surgery facility.          Follow-ups after your visit        Who to contact     Please call your clinic at [phone] to:    Ask questions about your health    Make or cancel appointments    Discuss your medicines    Learn about your test results    Speak to your doctor            Additional Information About Your Visit        [employer] Information     [employer] is an electronic gateway that provides easy, online access to your medical records. With [employer], you can  "request a clinic appointment, read your test results, renew a prescription or communicate with your care team.     To sign up for [employer] visit the website at [email or web address]   You will be asked to enter the access code listed below, as well as some personal information. Please follow the directions to create your username and password.     Your access code is: [email or web address]  Expires: 2019  3:14 PM     Your access code will  in 90 days. If you need help or a new code, please contact your [facility] or call [phone] for assistance.        Care EveryWhere ID     This is your Care EveryWhere ID. This could be used by other organizations to access your [facility] medical records  [id number]        Your Vitals Were     Pulse Temperature Respirations Height Pulse Oximetry BMI (Body Mass Index)    84 98.3  F (36.8  C) (Oral) 28 4' 10\" (147.3 cm) 98% 27.46 kg/m2       Blood Pressure from Last 3 Encounters:   18 143/79   10/16/18 135/78   10/02/18 143/88    Weight from Last 3 Encounters:   18 131 lb 6.4 oz (59.6 kg)   10/16/18 129 lb (58.5 kg)   10/02/18 129 lb 12.8 oz (58.9 kg)              Today, you had the following     No orders found for display       Primary Care Provider Office Phone # Fax #    Amrita Carballo [phone]       [address] [place] [address]        Equal Access to Services     RIMMA COLE AH: Hadii sander hallo Somatt, waaxda luqadaha, qaybta kaalmada vincent delong. So [facility] [phone].    ATENCIÓN: Si habla español, tiene a farah disposición servicios gratuitos de asistencia lingüística. Llame al [phone].    We comply with applicable federal civil rights laws and [place] laws. We do not discriminate on the basis of race, color, national origin, age, disability, sex, sexual orientation, or gender identity.            Thank you!     Thank you for choosing " [facility]  for your care. Our goal is always to provide you with excellent care. Hearing back from our patients is one way we can continue to improve our services. Please take a few minutes to complete the written survey that you may receive in the mail after your visit with us. Thank you!             Your Updated Medication List - Protect others around you: Learn how to safely use, store and throw away your medicines at www.disposemymeds.org.          This list is accurate as of 12/5/18  3:56 PM.  Always use your most recent med list.                   Brand Name Dispense Instructions for use Diagnosis    acetaminophen 500 MG tablet    TYLENOL    100 tablet    Take 1 pill in Am, 1 pill in PM and 1 additional pill PRN    Primary osteoarthritis of right shoulder       alum & mag hydroxide-simethicone 200-200-25 MG Chew chewable tablet    MYLANTA/MAALOX    100 tablet    Take 1 tablet by mouth 4 times daily as needed for indigestion    Gastroesophageal reflux disease without esophagitis       atorvastatin 80 MG tablet    LIPITOR    90 tablet    Take 1 tablet (80 mg) by mouth daily    Type 2 diabetes mellitus without complication, without long-term current use of insulin (H)       blood glucose monitoring meter device kit     1 kit    Use to test blood sugars 1 times daily or as directed.    Type 2 diabetes mellitus with hyperglycemia, without long-term current use of insulin (H)       blood glucose monitoring test strip    NO BRAND SPECIFIED    1 Box    Use to test blood sugars 1 times daily or as directed    Type 2 diabetes mellitus with hyperglycemia, without long-term current use of insulin (H)       cholecalciferol 2000 units tablet     100 tablet    Take 2,000 Units by mouth daily    Vitamin D deficiency       glipiZIDE 10 MG 24 hr tablet    GLUCOTROL XL    90 tablet    Take 1 tablet (10 mg) by mouth daily    Type 2 diabetes mellitus with microalbuminuria, without long-term current use of insulin (H)        hydrOXYzine 25 MG tablet    ATARAX    60 tablet    Take 1-3 tablets (25-75 mg) by mouth every 6 hours as needed for itching    Anxiety       lisinopril 30 MG tablet    PRINIVIL/ZESTRIL    90 tablet    Take 1 tablet (30 mg) by mouth daily    Microalbuminuria       metFORMIN 500 MG 24 hr tablet    GLUCOPHAGE-XR    360 tablet    Take 2 tablets (1,000 mg) by mouth 2 times daily (with meals)    Type 2 diabetes mellitus without complication, without long-term current use of insulin (H)       * MICROLET LANCETS Misc     100 each    1 Box daily    Diabetes mellitus (H)       * blood glucose monitoring lancets     100 each    Use to test blood sugars 1 times daily or as directed.    Type 2 diabetes mellitus with hyperglycemia, without long-term current use of insulin (H)       pioglitazone 45 MG tablet    ACTOS    90 tablet    Take 1 tablet (45 mg) by mouth daily    Type 2 diabetes mellitus without complication, without long-term current use of insulin (H)       prazosin 2 MG capsule    MINIPRESS    90 capsule    Take 1 capsule (2 mg) by mouth At Bedtime    PTSD (post-traumatic stress disorder)       ranitidine 300 MG tablet    ZANTAC    90 tablet    Take 1 tablet (300 mg) by mouth At Bedtime    Gastroesophageal reflux disease without esophagitis       sertraline 100 MG tablet    ZOLOFT    90 tablet    Take 1 tablet (100 mg) by mouth daily    Moderate episode of recurrent major depressive disorder (H)       sitagliptin 100 MG tablet    JANUVIA    90 tablet    Take 1 tablet (100 mg) by mouth daily    Type 2 diabetes mellitus with hyperglycemia, without long-term current use of insulin (H)       * Notice:  This list has 2 medication(s) that are the same as other medications prescribed for you. Read the directions carefully, and ask your doctor or other care provider to review them with you.

## 2019-01-14 DIAGNOSIS — F41.9 ANXIETY: ICD-10-CM

## 2019-01-14 DIAGNOSIS — M19.011 PRIMARY OSTEOARTHRITIS OF RIGHT SHOULDER: ICD-10-CM

## 2019-01-14 RX ORDER — ACETAMINOPHEN 500 MG
TABLET ORAL
Qty: 100 TABLET | Refills: 3 | Status: SHIPPED | OUTPATIENT
Start: 2019-01-14 | End: 2019-01-25

## 2019-01-14 RX ORDER — HYDROXYZINE HYDROCHLORIDE 25 MG/1
25-75 TABLET, FILM COATED ORAL EVERY 6 HOURS PRN
Qty: 60 TABLET | Refills: 5 | Status: SHIPPED | OUTPATIENT
Start: 2019-01-14 | End: 2019-01-25

## 2019-01-14 NOTE — TELEPHONE ENCOUNTER
[facility] is also requesting Calcium Carbonate 500mg, Carboxymethylcell-Glycerine (EQ Lubricating Eye Drops) 0.5-0.9%, Diclofenac Sodium 1%, and Potassium Chloride ER 10 SAGE tabs but none of them are on the pt's med list.  Veronica Rosas, CMA

## 2019-01-16 DIAGNOSIS — E11.65 TYPE 2 DIABETES MELLITUS WITH HYPERGLYCEMIA, WITHOUT LONG-TERM CURRENT USE OF INSULIN (H): ICD-10-CM

## 2019-01-16 DIAGNOSIS — F41.9 ANXIETY: ICD-10-CM

## 2019-01-16 DIAGNOSIS — E11.9 TYPE 2 DIABETES MELLITUS WITHOUT COMPLICATION, WITHOUT LONG-TERM CURRENT USE OF INSULIN (H): ICD-10-CM

## 2019-01-16 DIAGNOSIS — F43.10 PTSD (POST-TRAUMATIC STRESS DISORDER): ICD-10-CM

## 2019-01-16 DIAGNOSIS — M19.011 PRIMARY OSTEOARTHRITIS OF RIGHT SHOULDER: ICD-10-CM

## 2019-01-16 DIAGNOSIS — F33.1 MODERATE EPISODE OF RECURRENT MAJOR DEPRESSIVE DISORDER (H): ICD-10-CM

## 2019-01-16 DIAGNOSIS — E11.29 TYPE 2 DIABETES MELLITUS WITH MICROALBUMINURIA, WITHOUT LONG-TERM CURRENT USE OF INSULIN (H): ICD-10-CM

## 2019-01-16 DIAGNOSIS — K21.9 GASTROESOPHAGEAL REFLUX DISEASE WITHOUT ESOPHAGITIS: ICD-10-CM

## 2019-01-16 DIAGNOSIS — R80.9 TYPE 2 DIABETES MELLITUS WITH MICROALBUMINURIA, WITHOUT LONG-TERM CURRENT USE OF INSULIN (H): ICD-10-CM

## 2019-01-16 DIAGNOSIS — E55.9 VITAMIN D DEFICIENCY: ICD-10-CM

## 2019-01-16 NOTE — PROGRESS NOTES
Unclear which pharmacy refills should go to--[facility] vs [facility].    Veronica, were you able to clarify this?    Amrita Carballo    Routed to Veronica, FELIZ.

## 2019-01-25 RX ORDER — PIOGLITAZONEHYDROCHLORIDE 45 MG/1
45 TABLET ORAL DAILY
Qty: 90 TABLET | Refills: 1 | Status: SHIPPED | OUTPATIENT
Start: 2019-01-25 | End: 2019-03-06

## 2019-01-25 RX ORDER — GLIPIZIDE 10 MG/1
10 TABLET, FILM COATED, EXTENDED RELEASE ORAL DAILY
Qty: 90 TABLET | Refills: 1 | Status: SHIPPED | OUTPATIENT
Start: 2019-01-25 | End: 2019-03-06

## 2019-01-25 RX ORDER — METFORMIN HCL 500 MG
1000 TABLET, EXTENDED RELEASE 24 HR ORAL 2 TIMES DAILY WITH MEALS
Qty: 360 TABLET | Refills: 3 | Status: SHIPPED | OUTPATIENT
Start: 2019-01-25 | End: 2019-08-12

## 2019-01-25 RX ORDER — ATORVASTATIN CALCIUM 80 MG/1
80 TABLET, FILM COATED ORAL DAILY
Qty: 90 TABLET | Refills: 3 | Status: SHIPPED | OUTPATIENT
Start: 2019-01-25 | End: 2019-08-12

## 2019-01-25 RX ORDER — HYDROXYZINE HYDROCHLORIDE 25 MG/1
25-75 TABLET, FILM COATED ORAL EVERY 6 HOURS PRN
Qty: 60 TABLET | Refills: 5 | Status: SHIPPED | OUTPATIENT
Start: 2019-01-25 | End: 2019-08-01

## 2019-01-25 RX ORDER — BLOOD-GLUCOSE METER
EACH MISCELLANEOUS
Qty: 1 KIT | Refills: 0 | Status: SHIPPED | OUTPATIENT
Start: 2019-01-25 | End: 2019-07-11

## 2019-01-25 RX ORDER — ACETAMINOPHEN 500 MG
TABLET ORAL
Qty: 100 TABLET | Refills: 3 | Status: SHIPPED | OUTPATIENT
Start: 2019-01-25 | End: 2019-06-26

## 2019-01-25 RX ORDER — SERTRALINE HYDROCHLORIDE 100 MG/1
100 TABLET, FILM COATED ORAL DAILY
Qty: 90 TABLET | Refills: 1 | Status: SHIPPED | OUTPATIENT
Start: 2019-01-25 | End: 2019-02-13

## 2019-01-25 RX ORDER — PRAZOSIN HYDROCHLORIDE 2 MG/1
2 CAPSULE ORAL AT BEDTIME
Qty: 90 CAPSULE | Refills: 3 | Status: SHIPPED | OUTPATIENT
Start: 2019-01-25 | End: 2019-08-07

## 2019-01-25 NOTE — PROGRESS NOTES
Refills on all medications sent to [facility].  Patient should follow up in the next 2-3 weeks to recheck diabetes.      Amrita Carballo    Routed to [facility]

## 2019-01-30 ENCOUNTER — MEDICAL CORRESPONDENCE (OUTPATIENT)
Dept: HEALTH INFORMATION MANAGEMENT | Facility: CLINIC | Age: 54
End: 2019-01-30

## 2019-02-13 DIAGNOSIS — F33.1 MODERATE EPISODE OF RECURRENT MAJOR DEPRESSIVE DISORDER (H): ICD-10-CM

## 2019-02-13 RX ORDER — SERTRALINE HYDROCHLORIDE 100 MG/1
100 TABLET, FILM COATED ORAL DAILY
Qty: 90 TABLET | Refills: 3 | Status: SHIPPED | OUTPATIENT
Start: 2019-02-13 | End: 2019-04-17

## 2019-03-06 DIAGNOSIS — E11.9 TYPE 2 DIABETES MELLITUS WITHOUT COMPLICATION, WITHOUT LONG-TERM CURRENT USE OF INSULIN (H): ICD-10-CM

## 2019-03-06 DIAGNOSIS — E11.29 TYPE 2 DIABETES MELLITUS WITH MICROALBUMINURIA, WITHOUT LONG-TERM CURRENT USE OF INSULIN (H): ICD-10-CM

## 2019-03-06 DIAGNOSIS — R80.9 TYPE 2 DIABETES MELLITUS WITH MICROALBUMINURIA, WITHOUT LONG-TERM CURRENT USE OF INSULIN (H): ICD-10-CM

## 2019-03-06 RX ORDER — PIOGLITAZONEHYDROCHLORIDE 45 MG/1
45 TABLET ORAL DAILY
Qty: 90 TABLET | Refills: 1 | Status: SHIPPED | OUTPATIENT
Start: 2019-03-06 | End: 2019-08-07

## 2019-03-06 RX ORDER — GLIPIZIDE 10 MG/1
10 TABLET, FILM COATED, EXTENDED RELEASE ORAL DAILY
Qty: 90 TABLET | Refills: 1 | Status: SHIPPED | OUTPATIENT
Start: 2019-03-06 | End: 2019-08-07

## 2019-03-08 ENCOUNTER — MEDICAL CORRESPONDENCE (OUTPATIENT)
Dept: HEALTH INFORMATION MANAGEMENT | Facility: CLINIC | Age: 54
End: 2019-03-08

## 2019-03-16 ENCOUNTER — TRANSFERRED RECORDS (OUTPATIENT)
Dept: HEALTH INFORMATION MANAGEMENT | Facility: CLINIC | Age: 54
End: 2019-03-16

## 2019-03-28 ENCOUNTER — TELEPHONE (OUTPATIENT)
Dept: FAMILY MEDICINE | Facility: CLINIC | Age: 54
End: 2019-03-28

## 2019-03-28 NOTE — TELEPHONE ENCOUNTER
Not clear to me why this was discontinued, and I have not seen the patient since Oct 2018.  She is due for DM follow up anyways.  Please call patient to schedule follow up visit to discuss and bring in all medications.      Amrita Carballo    Routed to FELIZ Martin.

## 2019-03-28 NOTE — TELEPHONE ENCOUNTER
Pt's pharmacy is requesting a refill of Lisinopril 20mg, however it is no longer on the pt's med list.  Please advise.  Veronica Rosas, CMA

## 2019-03-29 NOTE — TELEPHONE ENCOUNTER
Tried calling but unable to get hold of patient, couldn't leave a message either, so will try to call back later.  Damien, OSVALDO

## 2019-04-03 ENCOUNTER — MEDICAL CORRESPONDENCE (OUTPATIENT)
Dept: HEALTH INFORMATION MANAGEMENT | Facility: CLINIC | Age: 54
End: 2019-04-03

## 2019-04-10 ENCOUNTER — TELEPHONE (OUTPATIENT)
Dept: FAMILY MEDICINE | Facility: CLINIC | Age: 54
End: 2019-04-10

## 2019-04-10 NOTE — TELEPHONE ENCOUNTER
Pt's pharmacy is requesting a refill of Lisinopril 20 mg, however it is no longer on the pt's med list.  Please advise.  Veronica Rosas, CMA

## 2019-04-10 NOTE — TELEPHONE ENCOUNTER
Chart is reviewed and it is unclear whether she should be taking this medication, and is behind on follow up.  Needs to schedule an appointment to discuss.      Please call patient to schedule.      Amrita Carballo    Routed to FELIZ Martin.

## 2019-04-12 NOTE — TELEPHONE ENCOUNTER
Patient was called and notified.  She is scheduled to see you on next Wednesday 04/17/2019 at 1:30pm.    Thank you.  KALINA Quezada

## 2019-04-17 ENCOUNTER — TRANSFERRED RECORDS (OUTPATIENT)
Dept: HEALTH INFORMATION MANAGEMENT | Facility: CLINIC | Age: 54
End: 2019-04-17

## 2019-04-17 ENCOUNTER — OFFICE VISIT (OUTPATIENT)
Dept: FAMILY MEDICINE | Facility: CLINIC | Age: 54
End: 2019-04-17
Payer: MEDICAID

## 2019-04-17 ENCOUNTER — OFFICE VISIT (OUTPATIENT)
Dept: PHARMACY | Facility: CLINIC | Age: 54
End: 2019-04-17
Payer: MEDICAID

## 2019-04-17 VITALS
BODY MASS INDEX: 28.21 KG/M2 | RESPIRATION RATE: 16 BRPM | DIASTOLIC BLOOD PRESSURE: 76 MMHG | HEART RATE: 67 BPM | TEMPERATURE: 98.3 F | WEIGHT: 134.4 LBS | OXYGEN SATURATION: 100 % | SYSTOLIC BLOOD PRESSURE: 123 MMHG | HEIGHT: 58 IN

## 2019-04-17 DIAGNOSIS — E11.29 TYPE 2 DIABETES MELLITUS WITH MICROALBUMINURIA, WITHOUT LONG-TERM CURRENT USE OF INSULIN (H): Primary | ICD-10-CM

## 2019-04-17 DIAGNOSIS — M19.011 PRIMARY OSTEOARTHRITIS OF RIGHT SHOULDER: ICD-10-CM

## 2019-04-17 DIAGNOSIS — R80.9 TYPE 2 DIABETES MELLITUS WITH MICROALBUMINURIA, WITHOUT LONG-TERM CURRENT USE OF INSULIN (H): Primary | ICD-10-CM

## 2019-04-17 DIAGNOSIS — Z00.00 PREVENTATIVE HEALTH CARE: ICD-10-CM

## 2019-04-17 DIAGNOSIS — G47.09 OTHER INSOMNIA: ICD-10-CM

## 2019-04-17 DIAGNOSIS — E11.00 TYPE 2 DIABETES MELLITUS WITH HYPEROSMOLARITY WITHOUT COMA, UNSPECIFIED WHETHER LONG TERM INSULIN USE (H): Primary | ICD-10-CM

## 2019-04-17 DIAGNOSIS — F33.1 MODERATE EPISODE OF RECURRENT MAJOR DEPRESSIVE DISORDER (H): ICD-10-CM

## 2019-04-17 LAB
BUN SERPL-MCNC: 11.1 MG/DL (ref 7–19)
CALCIUM SERPL-MCNC: 9.6 MG/DL (ref 8.5–10.1)
CHLORIDE SERPLBLD-SCNC: 103.1 MMOL/L (ref 98–110)
CHOLEST SERPL-MCNC: 114.1 MG/DL (ref 0–200)
CHOLEST/HDLC SERPL: 2.7 {RATIO} (ref 0–5)
CO2 SERPL-SCNC: 27 MMOL/L (ref 20–32)
CREAT SERPL-MCNC: 0.8 MG/DL (ref 0.5–1)
CREAT UR-MCNC: 25.9 MG/DL
GFR SERPL CREATININE-BSD FRML MDRD: 79.2 ML/MIN/1.7 M2
GLUCOSE SERPL-MCNC: 132 MG'DL (ref 70–99)
HBA1C MFR BLD: 6.6 % (ref 4.1–5.7)
HDLC SERPL-MCNC: 42.7 MG/DL
LDLC SERPL CALC-MCNC: 59 MG/DL (ref 0–129)
MICROALBUMIN UR-MCNC: 6.74 MG/DL (ref 0–1.99)
MICROALBUMIN/CREAT UR: 260.2 MG/G
POTASSIUM SERPL-SCNC: 3.5 MMOL/DL (ref 3.2–4.6)
SODIUM SERPL-SCNC: 140.6 MMOL/L (ref 132–142)
TRIGL SERPL-MCNC: 61.4 MG/DL (ref 0–150)
VLDL CHOLESTEROL: 12.3 MG/DL (ref 7–32)

## 2019-04-17 RX ORDER — LISINOPRIL 20 MG/1
20 TABLET ORAL DAILY
COMMUNITY
Start: 2019-04-17 | End: 2019-05-28

## 2019-04-17 ASSESSMENT — PATIENT HEALTH QUESTIONNAIRE - PHQ9: SUM OF ALL RESPONSES TO PHQ QUESTIONS 1-9: 14

## 2019-04-17 ASSESSMENT — MIFFLIN-ST. JEOR: SCORE: 1089.25

## 2019-04-17 NOTE — PROGRESS NOTES
Medication Management Note                                                     eenu was referred by [facility] pharmacy services for *  MEDICATION REVIEW:  Discussed all medication indications, dosage and effectiveness, adverse effects, and adherence with patient/caregiver.    Pt had meds with them: yes  Pt had med list with them: no  Pt was knowledgeable about meds: yes  Medications set up by: JESSIE  Medications administered by someone else (e.g., LTCF): No  Pt uses a medication box or automated dispenser: yes    Medication Discrepancies  Medications (including OTCs and supplements) on EMR med list that pt is NOT taking:  yes, Sertraline, Maalox  Medications pt IS taking that are NOT on EMR med list (e.g., from specialist, hospital): Lisinopril  Dosage or frequency listed differently than how patient is taking: none  Duplicate medication on list (two occurrences of the same medication):  none  TOTAL NUMBER OF MEDICATION DISCREPANCIES:  3    Subjective                                                       Patient reports the following problems or concerns with their medications:  None  Patient reports the following adverse reactions to medications:  yes, Stopped Sertraline about a month ago in March due to adverse reactions of not eating/sleeping well and dry mouth  Pt reports missing doses:  never  Additional subjective information (e.g., reason for visit, frequency of PRNs, reasons meds were D/C ed):    Has HHN who sets up med box every Thursday    Taking APAP 500 mg daily - 1-2 tabs about 2-3x/day for body aches    Stopped Sertraline and is using Hydroxyzine 1 tab at bedtime - works well for her and not interested in replacing Sertraline     Taking Lisinopril daily even though per our records it was discontinued    Objective                                                       Estimated Creatinine Clearance: 76.5 mL/min (based on SCr of 0.8 mg/dL).    Lab Results   Component Value Date    A1C 6.6 04/17/2019    A1C 8.3  10/02/2018    A1C 8.0 08/23/2018    A1C 7.1 04/30/2018    A1C 7.4 01/22/2018     Last Comprehensive Metabolic Panel:  Sodium   Date Value Ref Range Status   04/17/2019 140.6 132.0 - 142.0 mmol/L Final     Potassium   Date Value Ref Range Status   04/17/2019 3.5 3.2 - 4.6 mmol/dL Final     Chloride   Date Value Ref Range Status   04/17/2019 103.1 98.0 - 110.0 mmol/L Final     Carbon Dioxide   Date Value Ref Range Status   04/17/2019 27.0 20.0 - 32.0 mmol/L Final     Glucose   Date Value Ref Range Status   04/17/2019 132.0 (H) 70.0 - 99.0 mg'dL Final     Urea Nitrogen   Date Value Ref Range Status   04/17/2019 11.1 7.0 - 19.0 mg/dL Final     Creatinine   Date Value Ref Range Status   04/17/2019 0.8 0.5 - 1.0 mg/dL Final     GFR Estimate   Date Value Ref Range Status   04/17/2019 79.2 >60.0 mL/min/1.7 m2 Final     Calcium   Date Value Ref Range Status   04/17/2019 9.6 8.5 - 10.1 mg/dL Final       BP Readings from Last 3 Encounters:   04/17/19 123/76   12/05/18 143/79   10/16/18 135/78       The ASCVD Risk score (Lance HI Jr., et al., 2013) failed to calculate for the following reasons:    The valid total cholesterol range is 130 to 320 mg/dL    PHQ-9 score:    PHQ-9 SCORE 10/2/2018   PHQ-9 Total Score -   PHQ-9 Total Score 8     Assessment / Plan                                                        Updated medication list in the EMR; deleted meds patient no longer taking and added meds patient is now taking, and changed doses where there was a dose discrepancy.    Completed at this visit    T2DM -  controlled     A1c 8.3% (10/2/18), 6.6% today at goal per ADA <7%    Continue current diabetes treatment regimen    HTN -  controlled     BP today 123/76 at goal per ACC/AHA <130/80    Continue Lisinopril for BP control and kidney protection    Mood -  controlled     Stopped Sertraline, taking Hydroxyzine at bedtime and no complaints    Patient educated that she can take up to 3 tablets daily    Discarded Sertraline per  request of patient to avoid confusion when N sets up med box    Options for treatment and/or follow-up care were reviewed with the patient.  Rachael was engaged and actively involved in the decision making process, verbalized understanding of the options discussed, and was satisfied with the final plan.    Patient was provided with written instructions/medication list via AVS.     Follow-Up                                                         Medication issue to be addressed at a future visit      Check A1c in 3 months    Assess mood control with PRN hydroxyzine    Monitor BP/K/SCr - adjust Lisinopril if appropriate    Dr. Carballo was provided the recommendations above  in clinic today and was available for supervision during this visit and is the authorizing prescriber for this visit through the pharmacist collaborative practice agreement.    Melyssa Prakash, PharmD Resident      Drug therapy problems identified  1. Med: Sertraline - Safety - Undesirable effect  - Resolution: Discontinue Drug; resolved      # of medical conditions addressed: 3  # of medications addressed: 13  # of medication discrepancies identified: 3  # of DTP identified: 1  Time spent: 20 minutes  Level of service: 2    Due to patient being non-English speaking/uses sign language, an  was used for this visit. Only for face-to-face interpretation by an external agency, date and length of interpretation can be found on the scanned worksheet.       name: Silvino Banks (Htoo)  Language: Radha  Agency:  [employer]  Phone number: [phone]  Type of interpretation:  Face-to-face, spoken    Due to patient being non-English speaking/uses sign language, an  was used for this visit. Only

## 2019-04-17 NOTE — PATIENT INSTRUCTIONS
Please schedule a physical soon!    Stop Sertraline!  Okay to take up to 3 tabs of hydroxyzine daily at night for sleep.  Continue lisinopril  No other changes in medications.  Follow up in 3 months for recheck.

## 2019-04-17 NOTE — PROGRESS NOTES
Nursing Notes:   Veronica Rosas, [place]  4/17/2019  2:22 PM  Signed  Due to patient being non-English speaking/uses sign language, an  was used for this visit. Only for face-to-face interpretation by an external agency, date and length of interpretation can be found on the scanned worksheet.       name: Silvino Banks (Htoo)  Language: Radha  Agency:  [employer]  Phone number: [phone]  Type of interpretation:  Face-to-face, spoken      SUBJECTIVE  Rachael Ch is a 53 year old female with past medical history significant for    Patient Active Problem List   Diagnosis     Essential hypertension, benign     Diabetes mellitus, type 2 (H)     Hyperlipidemia     Health Care Home     Helicobacter pylori gastritis     PTSD (post-traumatic stress disorder)     Moderate episode of recurrent major depressive disorder (H)     Cognitive complaints     Screening for depression     Microalbuminuria     Others present at the visit:   Silvino Banks    Presents for   Chief Complaint   Patient presents with     Recheck Medication     Pt is here to recheck medications.     Diabetes     Pt is here to follow up on diabetes.     Medication Reconciliation     Complete.      Patient presents today for routine follow-up visit.  Overall, she has been feeling well.  Still has a home nurse that sets up most of her medications.  She endorses taking most of them regularly.  Spends half her time here in the [facility] [place]  She does endorse some days when she feels sad, fatigued, or down.  Feels better when she can go outside.  Feels better when she can walk.  Feels better when she can come to clinic.  Knows that her appetite has been somewhat down.  Feels like she is not as hungry.  Also having some difficulty falling asleep at night.  She then feels more tired during the day.    She is concerned that her sertraline is causing heaviness, and increased  "fatigue.  Because of this, she stopped the medication on her own cold turkey in March.  She has not been taking it for 1 month.  Feels better without it.  She is still taking some hydroxyzine at night to help with sleep and feels like this is enough to control her moods.  Sometimes takes 1 but may take up to 3 pills at night with her evening meal to help her sleep.  Feels like this works well.    Continues to have some intermittent pain in her knees bilaterally, but this is stable.  Chronic right shoulder pain has been better.  She does get some numbness and tingling in her feet.  Has been following with the eye doctor and had a diabetic eye exam done in March.  Has been seeing better following cataract surgery this past fall, but still needs to wear glasses.    She has no longer been getting any individual or group therapy.  Asked her if she is interested in this and she declines today.      OBJECTIVE:  Vitals: /76 (BP Location: Left arm, Patient Position: Sitting, Cuff Size: Adult Regular)   Pulse 67   Temp 98.3  F (36.8  C) (Oral)   Resp 16   Ht 1.465 m (4' 9.68\")   Wt 61 kg (134 lb 6.4 oz)   SpO2 100%   BMI 28.41 kg/m    BMI= Body mass index is 28.41 kg/m .  Objective:    Vitals:  Vitals are reviewed and are within the normal range  Gen:  Alert, pleasant, no acute distress  Cardiac:  Regular rate and rhythm, no murmurs, rubs or gallops  Respiratory:  Lungs clear to auscultation bilaterally  Abdomen:  Soft, non-tender, non-distended, bowel sounds positive  Extremities:  Warm, well-perfused, pulses 2+/4, no lower extremity edema.  Monofilament testing was normal.  Psych: Mood and affect are bright, she is engaged, good eye contact, answers questions appropriately.    PHQ-9 SCORE 4/30/2018 10/2/2018 4/17/2019   PHQ-9 Total Score - - -   PHQ-9 Total Score 12 8 14       Results for orders placed or performed in visit on 04/17/19   Lipid Panel ([place])   Result Value Ref Range    Cholesterol 114.1 0.0 - " 200.0 mg/dL    Cholesterol/HDL Ratio 2.7 0.0 - 5.0    HDL Cholesterol 42.7 >40.0 mg/dL    LDL Cholesterol Calculated 59 0 - 129 mg/dL    Triglycerides 61.4 0.0 - 150.0 mg/dL    VLDL Cholesterol 12.3 7.0 - 32.0 mg/dL   Basic Metabolic Panel ([place])   Result Value Ref Range    Urea Nitrogen 11.1 7.0 - 19.0 mg/dL    Calcium 9.6 8.5 - 10.1 mg/dL    Chloride 103.1 98.0 - 110.0 mmol/L    Carbon Dioxide 27.0 20.0 - 32.0 mmol/L    Creatinine 0.8 0.5 - 1.0 mg/dL    Glucose 132.0 (H) 70.0 - 99.0 mg'dL    Potassium 3.5 3.2 - 4.6 mmol/dL    Sodium 140.6 132.0 - 142.0 mmol/L    GFR Estimate 79.2 >60.0 mL/min/1.7 m2    GFR Estimate If Black >90 >60.0 mL/min/1.7 m2   Hemoglobin A1c ([facility])   Result Value Ref Range    Hemoglobin A1C 6.6 (H) 4.1 - 5.7 %           ASSESSMENT AND PLAN:      Rachael was seen today for recheck medication, diabetes and medication reconciliation.    Diagnoses and all orders for this visit:    Type 2 diabetes mellitus with microalbuminuria, without long-term current use of insulin (H).  Discussed her A1c, which is at goal.  She is up-to-date on her eye exam.  Some numbness and tingling but normal sensation in feet.  She does have microalbuminuria.  We will continue with her same diabetes medications.  This includes metformin 2000 mg daily, Januvia 100 mg daily, Actos 45 mg daily, and glipizide 10 mg daily.  Blood pressure is appropriate so we will continue the lisinopril 20.  -     Microalbumin Creatinine Ratio Random Ur ([facility])  -     Lipid Panel ([place])  -     Basic Metabolic Panel ([place])  -     Hemoglobin A1c ([facility])    Preventative health care.  She will return for test.  -     Fecal Occult Blood - FIT, iFOB ([facility]); Future    Moderate episode of recurrent major depressive disorder (H).  PHQ 9 is somewhat more elevated.  She endorses sleep as her biggest concern.  We will increase the dose on her hydroxyzine so she can take additional tabs at night for sleep if needed.  Discontinue  sertraline.  She is not interested in therapy right now but will continue to bring this up as an option going forward.    Primary osteoarthritis of right shoulder.  Pain is stable.  Knee and leg pain is also stable.  Encouraged her to continue to be active and walking.    Other insomnia.  We will increase the dose on her hydroxyzine, and continue with the prazosin.        Patient Instructions   Please schedule a physical soon!    Stop Sertraline!  Okay to take up to 3 tabs of hydroxyzine daily at night for sleep.  Continue lisinopril  No other changes in medications.  Follow up in 3 months for recheck.          Amrita Carballo

## 2019-04-17 NOTE — LETTER
April 18, 2019      Rachael [facility] [address]        Dear Rachael,    Here is a copy of your lab results.  Your A1c diabetes test looks good.  Your cholesterol is excellent.  Kidney test is good.  Please call the clinic at [phone] if you have any questions.       Please see below for your test results.    Resulted Orders   Lipid Panel ([place])   Result Value Ref Range    Cholesterol 114.1 0.0 - 200.0 mg/dL    Cholesterol/HDL Ratio 2.7 0.0 - 5.0    HDL Cholesterol 42.7 >40.0 mg/dL    LDL Cholesterol Calculated 59 0 - 129 mg/dL    Triglycerides 61.4 0.0 - 150.0 mg/dL    VLDL Cholesterol 12.3 7.0 - 32.0 mg/dL   Basic Metabolic Panel ([place])   Result Value Ref Range    Urea Nitrogen 11.1 7.0 - 19.0 mg/dL    Calcium 9.6 8.5 - 10.1 mg/dL    Chloride 103.1 98.0 - 110.0 mmol/L    Carbon Dioxide 27.0 20.0 - 32.0 mmol/L    Creatinine 0.8 0.5 - 1.0 mg/dL    Glucose 132.0 (H) 70.0 - 99.0 mg'dL    Potassium 3.5 3.2 - 4.6 mmol/dL    Sodium 140.6 132.0 - 142.0 mmol/L    GFR Estimate 79.2 >60.0 mL/min/1.7 m2    GFR Estimate If Black >90 >60.0 mL/min/1.7 m2   Hemoglobin A1c ([facility])   Result Value Ref Range    Hemoglobin A1C 6.6 (H) 4.1 - 5.7 %       If you have any questions, please call the clinic to make an appointment.    Sincerely,    Amrita Carballo MD

## 2019-04-17 NOTE — NURSING NOTE
Due to patient being non-English speaking/uses sign language, an  was used for this visit. Only for face-to-face interpretation by an external agency, date and length of interpretation can be found on the scanned worksheet.       name: Silvino Banks (Htoo)  Language: Radha  Agency:  [employer]  Phone number: [phone]  Type of interpretation:  Face-to-face, spoken

## 2019-04-17 NOTE — LETTER
April 18, 2019      Rachael [facility]  [address] [address]        Dear Rachael,  Here is a copy of your lab results.  Your diabetes numbers continue to look very good.  Your kidney tests show a small amount of damage, but this is stable.  Your cholesterol is excellent.  Please call the clinic at [phone] if you have any questions.     Please see below for your test results.    Resulted Orders   Microalbumin Creatinine Ratio Random Ur ([facility])   Result Value Ref Range    Microalbumin, Urine 6.74 (H) 0.00 - 1.99 mg/dL    Creatinine, Urine 25.9 mg/dL    Albumin Urine mg/g Cr 260.2 (H) <=19.9 mg/g    Narrative    Test performed by:  [facility]  [address] [place] [address]  Microalbumin, Random Urine  <2.0 mg/dL . . . . . . . . Normal  3.0-30.0 mg/dL . . . . . . Microalbuminuria  >30.0 mg/dL . . . . . .  . Clinical Proteinuria  Microalbumin/Creatinine Ratio, Random Urine  <20 mg/g . . . . .. . . . Normal   mg/g . . . . . . . Microalbuminuria  >300 mg/g . . . . . . . . Clinical Proteinuria   Lipid Panel ([place])   Result Value Ref Range    Cholesterol 114.1 0.0 - 200.0 mg/dL    Cholesterol/HDL Ratio 2.7 0.0 - 5.0    HDL Cholesterol 42.7 >40.0 mg/dL    LDL Cholesterol Calculated 59 0 - 129 mg/dL    Triglycerides 61.4 0.0 - 150.0 mg/dL    VLDL Cholesterol 12.3 7.0 - 32.0 mg/dL   Basic Metabolic Panel ([place])   Result Value Ref Range    Urea Nitrogen 11.1 7.0 - 19.0 mg/dL    Calcium 9.6 8.5 - 10.1 mg/dL    Chloride 103.1 98.0 - 110.0 mmol/L    Carbon Dioxide 27.0 20.0 - 32.0 mmol/L    Creatinine 0.8 0.5 - 1.0 mg/dL    Glucose 132.0 (H) 70.0 - 99.0 mg'dL    Potassium 3.5 3.2 - 4.6 mmol/dL    Sodium 140.6 132.0 - 142.0 mmol/L    GFR Estimate 79.2 >60.0 mL/min/1.7 m2    GFR Estimate If Black >90 >60.0 mL/min/1.7 m2   Hemoglobin A1c ([facility])   Result Value Ref Range    Hemoglobin A1C 6.6 (H) 4.1 - 5.7 %       If you have any questions, please call the clinic to make an  appointment.    Sincerely,    Amrita Carballo MD

## 2019-04-18 NOTE — RESULT ENCOUNTER NOTE
Gardeniayann Millerdang-    Here is a copy of your lab results.  Your A1c diabetes test looks good.  Your cholesterol is excellent.  Kidney test is good.  Please call the clinic at [phone] if you have any questions.      Amrita Carballo    Please send results to patient.      Results also discussed with patient with  in clinic.

## 2019-04-18 NOTE — RESULT ENCOUNTER NOTE
Rachael Ch-    Here is a copy of your lab results.  Your diabetes numbers continue to look very good.  Your kidney tests show a small amount of damage, but this is stable.  Your cholesterol is excellent.  Please call the clinic at [phone] if you have any questions.      Amrita Carballo    Please send results to patient.

## 2019-04-18 NOTE — PROGRESS NOTES
I have verified the content of the note, which accurately reflects my assessment of the patient and the plan of care.   Mimi Hernandez, PharmD

## 2019-04-25 DIAGNOSIS — Z00.00 PREVENTATIVE HEALTH CARE: ICD-10-CM

## 2019-04-25 LAB — HEMOCCULT STL QL IA: NEGATIVE

## 2019-04-25 NOTE — LETTER
April 25, 2019      Rachael [facility]  [address] [address]        Dear Rachael,    Your FIT testing for colon cancer is negative.  This is good news.  We should repeat the test in 1 year.  Please call the clinic at [phone] if you have any questions.       Please see below for your test results.    Resulted Orders   Fecal Occult Blood - FIT, iFOB ([facility])   Result Value Ref Range    Occult Blood Scn FIT NEGATIVE Negative       If you have any questions, please call the clinic to make an appointment.    Sincerely,    [facility]

## 2019-04-25 NOTE — RESULT ENCOUNTER NOTE
Rachael Ch-    Your FIT testing for colon cancer is negative.  This is good news.  We should repeat the test in 1 year.  Please call the clinic at [phone] if you have any questions.      Amrita Carballo    Please send results to patient.

## 2019-05-21 ENCOUNTER — TRANSFERRED RECORDS (OUTPATIENT)
Dept: HEALTH INFORMATION MANAGEMENT | Facility: CLINIC | Age: 54
End: 2019-05-21

## 2019-05-28 ENCOUNTER — MEDICAL CORRESPONDENCE (OUTPATIENT)
Dept: HEALTH INFORMATION MANAGEMENT | Facility: CLINIC | Age: 54
End: 2019-05-28

## 2019-05-28 ENCOUNTER — TRANSFERRED RECORDS (OUTPATIENT)
Dept: HEALTH INFORMATION MANAGEMENT | Facility: CLINIC | Age: 54
End: 2019-05-28

## 2019-05-28 ENCOUNTER — DOCUMENTATION ONLY (OUTPATIENT)
Dept: FAMILY MEDICINE | Facility: CLINIC | Age: 54
End: 2019-05-28

## 2019-05-28 DIAGNOSIS — I10 ESSENTIAL HYPERTENSION, BENIGN: Primary | ICD-10-CM

## 2019-05-28 RX ORDER — LISINOPRIL 20 MG/1
20 TABLET ORAL DAILY
Qty: 90 TABLET | Refills: 1 | Status: SHIPPED | OUTPATIENT
Start: 2019-05-28 | End: 2019-10-31

## 2019-05-28 NOTE — PROGRESS NOTES
To be completed in Nursing note:    Please reference list for forms that require a visit for completion.  Please remind patients that providers are given 3-5 business days to complete and return forms.      Form type: Home Health Care Orders    Date form received: 19    Date form completed by Physician: 19    How was form returned to patient (mailed, faxed, or at  for patient to ): Faxed back to [facility] at [phone]    Date form mailed/faxed/left at  for patient and sent to HIM for [email or web address]      Once form is left for patient, faxed, or mailed PCS will then close the documentation only encounter.

## 2019-06-24 ENCOUNTER — DOCUMENTATION ONLY (OUTPATIENT)
Dept: FAMILY MEDICINE | Facility: CLINIC | Age: 54
End: 2019-06-24

## 2019-06-24 NOTE — PROGRESS NOTES
To be completed in Nursing note:    Please reference list for forms that require a visit for completion.  Please remind patients that providers are given 3-5 business days to complete and return forms.      Form type: Home Health Care    Date form received: 19    Date form completed by Physician: 19    How was form returned to patient (mailed, faxed, or at  for patient to ): Faxed back to home health care at [phone]    Date form mailed/faxed/left at  for patient and sent to HIM for [email or web address]      Once form is left for patient, faxed, or mailed PCS will then close the documentation only encounter.

## 2019-06-25 ENCOUNTER — MEDICAL CORRESPONDENCE (OUTPATIENT)
Dept: HEALTH INFORMATION MANAGEMENT | Facility: CLINIC | Age: 54
End: 2019-06-25

## 2019-06-26 DIAGNOSIS — M19.011 PRIMARY OSTEOARTHRITIS OF RIGHT SHOULDER: ICD-10-CM

## 2019-06-26 RX ORDER — ACETAMINOPHEN 500 MG
TABLET ORAL
Qty: 100 TABLET | Refills: 3 | Status: SHIPPED | OUTPATIENT
Start: 2019-06-26 | End: 2020-09-11

## 2019-07-08 NOTE — PROGRESS NOTES
I have reviewed and agree with the behavioral health fellow's documentation for this visit.  I did not personally see the patient.  Maria Moon, PhD., LP     no

## 2019-07-11 DIAGNOSIS — E11.65 TYPE 2 DIABETES MELLITUS WITH HYPERGLYCEMIA, WITHOUT LONG-TERM CURRENT USE OF INSULIN (H): Primary | ICD-10-CM

## 2019-07-11 RX ORDER — BLOOD-GLUCOSE METER
EACH MISCELLANEOUS
Qty: 1 KIT | Refills: 0 | Status: SHIPPED | OUTPATIENT
Start: 2019-07-11 | End: 2020-02-11

## 2019-07-31 ENCOUNTER — OFFICE VISIT (OUTPATIENT)
Dept: FAMILY MEDICINE | Facility: CLINIC | Age: 54
End: 2019-07-31

## 2019-07-31 VITALS
WEIGHT: 133 LBS | DIASTOLIC BLOOD PRESSURE: 80 MMHG | RESPIRATION RATE: 12 BRPM | HEART RATE: 70 BPM | TEMPERATURE: 98.1 F | OXYGEN SATURATION: 98 % | BODY MASS INDEX: 27.92 KG/M2 | SYSTOLIC BLOOD PRESSURE: 145 MMHG | HEIGHT: 58 IN

## 2019-07-31 DIAGNOSIS — E11.65 TYPE 2 DIABETES MELLITUS WITH HYPERGLYCEMIA, WITHOUT LONG-TERM CURRENT USE OF INSULIN (H): Primary | ICD-10-CM

## 2019-07-31 DIAGNOSIS — F33.1 MODERATE EPISODE OF RECURRENT MAJOR DEPRESSIVE DISORDER (H): ICD-10-CM

## 2019-07-31 DIAGNOSIS — M72.2 PLANTAR FASCIITIS: Primary | ICD-10-CM

## 2019-07-31 DIAGNOSIS — I10 ESSENTIAL HYPERTENSION, BENIGN: ICD-10-CM

## 2019-07-31 DIAGNOSIS — G44.209 TENSION HEADACHE: ICD-10-CM

## 2019-07-31 DIAGNOSIS — F41.9 ANXIETY: ICD-10-CM

## 2019-07-31 DIAGNOSIS — E11.65 TYPE 2 DIABETES MELLITUS WITH HYPERGLYCEMIA, WITHOUT LONG-TERM CURRENT USE OF INSULIN (H): ICD-10-CM

## 2019-07-31 LAB
BUN SERPL-MCNC: 8.9 MG/DL (ref 7–19)
CALCIUM SERPL-MCNC: 9 MG/DL (ref 8.5–10.1)
CHLORIDE SERPLBLD-SCNC: 104.8 MMOL/L (ref 98–110)
CO2 SERPL-SCNC: 26.2 MMOL/L (ref 20–32)
CREAT SERPL-MCNC: 0.8 MG/DL (ref 0.5–1)
GFR SERPL CREATININE-BSD FRML MDRD: 78.9 ML/MIN/1.7 M2
GLUCOSE SERPL-MCNC: 357.2 MG'DL (ref 70–99)
HBA1C MFR BLD: 6.5 % (ref 4.1–5.7)
POTASSIUM SERPL-SCNC: 3.5 MMOL/DL (ref 3.2–4.6)
SODIUM SERPL-SCNC: 137.8 MMOL/L (ref 132–142)

## 2019-07-31 RX ORDER — HYDROXYZINE PAMOATE 25 MG/1
CAPSULE ORAL
Qty: 100 CAPSULE | Refills: 3 | Status: SHIPPED | OUTPATIENT
Start: 2019-07-31 | End: 2019-12-09

## 2019-07-31 ASSESSMENT — PATIENT HEALTH QUESTIONNAIRE - PHQ9: SUM OF ALL RESPONSES TO PHQ QUESTIONS 1-9: 11

## 2019-07-31 ASSESSMENT — MIFFLIN-ST. JEOR: SCORE: 1075.09

## 2019-07-31 NOTE — PATIENT INSTRUCTIONS
New prescription for hydroxyzine sent.  Use as needed for anxiety or panic and can take at night for sleep.  Dr. Carballo will call the pharnacy.      For feet:  Wear hard soles shoes and use ball or round surface to massage feet.      Follow up in 6 weeks

## 2019-07-31 NOTE — LETTER
August 1, 2019      [facility] [address]        Dear Rachael,    Please see below for your test results.    Here is a copy of your lab results.  Your diabetes and kidney tests all look good.  Keep up the good work!  Please call the clinic at [phone] if you have any questions.       Resulted Orders   Basic Metabolic Panel ([place])   Result Value Ref Range    Urea Nitrogen 8.9 7.0 - 19.0 mg/dL    Calcium 9.0 8.5 - 10.1 mg/dL    Chloride 104.8 98.0 - 110.0 mmol/L    Carbon Dioxide 26.2 20.0 - 32.0 mmol/L    Creatinine 0.8 0.5 - 1.0 mg/dL    Glucose 357.2 (H) 70.0 - 99.0 mg'dL    Potassium 3.5 3.2 - 4.6 mmol/dL    Sodium 137.8 132.0 - 142.0 mmol/L    GFR Estimate 78.9 >60.0 mL/min/1.7 m2    GFR Estimate If Black >90 >60.0 mL/min/1.7 m2   Hemoglobin A1c ([facility])   Result Value Ref Range    Hemoglobin A1C 6.5 (H) 4.1 - 5.7 %       If you have any questions, please call the clinic to make an appointment.    Sincerely,    Amrita Carballo MD

## 2019-07-31 NOTE — PROGRESS NOTES
Nursing Notes:   Charu Salazar CMA  7/31/2019 10:56 AM  Signed  Due to patient being non-English speaking/uses sign language, an  was used for this visit. Only for face-to-face interpretation by an external agency, date and length of interpretation can be found on the scanned worksheet.       name: Dubois (Htoo) Chivoluis  Language: Radha  Agency:  [employer]  Phone number: [phone]  Type of interpretation:  Face-to-face, spoken    SUBJECTIVE  Rachael Ch is a 54 year old female with past medical history significant for    Patient Active Problem List   Diagnosis     Essential hypertension, benign     Diabetes mellitus, type 2 (H)     Hyperlipidemia     Health Care Home     Helicobacter pylori gastritis     PTSD (post-traumatic stress disorder)     Moderate episode of recurrent major depressive disorder (H)     Cognitive complaints     Screening for depression     Microalbuminuria     Others present at the visit:   Silvino Yuen    Presents for   Chief Complaint   Patient presents with     Headache     Musculoskeletal Problem     Recheck Medication     Sertaline HCL having some side effect     Patient presents for follow-up on diabetes, mood, and to discuss headache.  Headache developed about 2 weeks ago, but at this point it has gotten better and is no longer bothering her.  She thinks it was triggered by the loud music that her  was playing and describes it as being located in the posterior region of her neck with extension and tightness the move down into her upper back.  She took some Tylenol and this was helpful.  Noted increased pain with head movement.  No nausea or vomiting.  She did describe some poking feeling but no tingling.    She reports that she has been taking her medications regularly without difficulty.  Shows me the sertraline bottle, and shares that this is still being sent to her.  She had wanted to discontinue the medication, and we decided to do this at last  "visit, but it seems like the pharmacy is not aware of this.  She feels bad when she takes it.  I will call the pharmacy to discuss with them.  She also has noted that she no longer has hydroxyzine available.  Takes this when she gets panic, chest pain, or worried.  She does say that she had some difficulties with low moods.  Is not is excited to be outside she has been previously.  Feels a little more irritable at times.  Continues to struggle with memory and this is quite frustrating for her.  She frequently loses her phone.  Family members worry about her.  She can worry about even things that she knows her very small and not important.    She has not been regularly checking her blood sugars, but is taking her medications regularly.  No numbness and tingling in her feet.  No chest pain, no shortness of breath, no symptoms of hypoglycemia.  Feels well.  Appetite is been good.    She has been noting increased pain in her feet.  This is most prominent in her left foot along the arch.  Had her grandchildren rub it, but this led to increased pain.  She wears soft crocs and sandals.  Is most sore in the morning, and after she has been walking for long period of time.  These are no longer causing trouble.  She is not falling.      OBJECTIVE:  Vitals: BP (!) 145/80 (BP Location: Left arm, Patient Position: Sitting, Cuff Size: Adult Regular)   Pulse 70   Temp 98.1  F (36.7  C) (Oral)   Resp 12   Ht 1.461 m (4' 9.5\")   Wt 60.3 kg (133 lb)   SpO2 98%   BMI 28.28 kg/m    BMI= Body mass index is 28.28 kg/m .  Vitals:  Vitals are reviewed and are within the normal range.  Blood pressure is mildly elevated.  Gen:  Alert, pleasant, no acute distress  HEENT: Cranial nerves grossly intact.  Eye movements normal.  Neck range of motion is intact.  Cardiac:  Regular rate and rhythm, no murmurs, rubs or gallops  Respiratory:  Lungs clear to auscultation bilaterally  Abdomen:  Soft, non-tender, non-distended, bowel sounds " positive  Extremities:  Warm, well-perfused, pulses 2+/4, no lower extremity edema.  Monofilament testing is normal.  Her foot is not erythematous, no swelling, she has pain over the arch consistent with plantar fasciitis.  No bruising or skin changes.    Results for orders placed or performed in visit on 07/31/19   Basic Metabolic Panel ([place])   Result Value Ref Range    Urea Nitrogen 8.9 7.0 - 19.0 mg/dL    Calcium 9.0 8.5 - 10.1 mg/dL    Chloride 104.8 98.0 - 110.0 mmol/L    Carbon Dioxide 26.2 20.0 - 32.0 mmol/L    Creatinine 0.8 0.5 - 1.0 mg/dL    Glucose 357.2 (H) 70.0 - 99.0 mg'dL    Potassium 3.5 3.2 - 4.6 mmol/dL    Sodium 137.8 132.0 - 142.0 mmol/L    GFR Estimate 78.9 >60.0 mL/min/1.7 m2    GFR Estimate If Black >90 >60.0 mL/min/1.7 m2   Hemoglobin A1c ([facility] )   Result Value Ref Range    Hemoglobin A1C 6.5 (H) 4.1 - 5.7 %       ASSESSMENT AND PLAN:      Rachael was seen today for headache, musculoskeletal problem and recheck medication.  Here for routine follow-up visit.    Diagnoses and all orders for this visit:    Plantar fasciitis.  Examine history consistent with plantar fasciitis.  Discussed symptomatic care including using tennis ball for massage and wearing hard soled shoes.    Type 2 diabetes mellitus with hyperglycemia, without long-term current use of insulin (H)    Essential hypertension, benign.  Blood pressure is just mildly elevated.  Will not make any changes today.  A1c is at goal.  She denies hypoglycemia.  No changes in medications today.  -     Basic Metabolic Panel ([place])  -     Hemoglobin A1c ([facility])    Moderate episode of recurrent major depressive disorder (H)  Anxiety.  Patient is noted side effects on the sertraline.  Interestingly, she has not been taking it, and mood has gotten worse.  She did have benefit from hydroxyzine the past, so we will restart this today.  Could continue a different antidepressant if mood is not improved.  She will follow-up in 6 weeks to  discuss this.  -     hydrOXYzine (VISTARIL) 25 MG capsule; Take 1 pill as needed for panic, anxiety, can take 1-2 at night to help with sleep.      Tension headache.  This has resolved.  Gets better with acetaminophen.  Discussed having her  decreased volume on the music.      Patient Instructions   New prescription for hydroxyzine sent.  Use as needed for anxiety or panic and can take at night for sleep.  Dr. Carballo will call the pharnacy.      For feet:  Wear hard soles shoes and use ball or round surface to massage feet.      Follow up in 6 weeks      Amrita Carballo

## 2019-07-31 NOTE — NURSING NOTE
Due to patient being non-English speaking/uses sign language, an  was used for this visit. Only for face-to-face interpretation by an external agency, date and length of interpretation can be found on the scanned worksheet.       name: Silvino Banks (Htoo)  Language: Radha  Agency:  [employer]  Phone number: [phone]  Type of interpretation:  Face-to-face, spoken

## 2019-08-01 NOTE — RESULT ENCOUNTER NOTE
Rachael Bhattdang-    Here is a copy of your lab results.  Your diabetes and kidney tests all look good.  Keep up the good work!  Please call the clinic at [phone] if you have any questions.      Amrita Carballo    Please send results to patient.

## 2019-08-07 DIAGNOSIS — R80.9 TYPE 2 DIABETES MELLITUS WITH MICROALBUMINURIA, WITHOUT LONG-TERM CURRENT USE OF INSULIN (H): ICD-10-CM

## 2019-08-07 DIAGNOSIS — E11.29 TYPE 2 DIABETES MELLITUS WITH MICROALBUMINURIA, WITHOUT LONG-TERM CURRENT USE OF INSULIN (H): ICD-10-CM

## 2019-08-07 DIAGNOSIS — F43.10 PTSD (POST-TRAUMATIC STRESS DISORDER): ICD-10-CM

## 2019-08-07 DIAGNOSIS — E11.9 TYPE 2 DIABETES MELLITUS WITHOUT COMPLICATION, WITHOUT LONG-TERM CURRENT USE OF INSULIN (H): ICD-10-CM

## 2019-08-07 DIAGNOSIS — E11.65 TYPE 2 DIABETES MELLITUS WITH HYPERGLYCEMIA, WITHOUT LONG-TERM CURRENT USE OF INSULIN (H): ICD-10-CM

## 2019-08-07 DIAGNOSIS — K21.9 GASTROESOPHAGEAL REFLUX DISEASE WITHOUT ESOPHAGITIS: ICD-10-CM

## 2019-08-07 RX ORDER — GLIPIZIDE 10 MG/1
10 TABLET, FILM COATED, EXTENDED RELEASE ORAL DAILY
Qty: 90 TABLET | Refills: 1 | Status: SHIPPED | OUTPATIENT
Start: 2019-08-07 | End: 2020-01-09

## 2019-08-07 RX ORDER — PIOGLITAZONEHYDROCHLORIDE 45 MG/1
45 TABLET ORAL DAILY
Qty: 90 TABLET | Refills: 1 | Status: SHIPPED | OUTPATIENT
Start: 2019-08-07 | End: 2020-01-09

## 2019-08-07 RX ORDER — PRAZOSIN HYDROCHLORIDE 2 MG/1
2 CAPSULE ORAL AT BEDTIME
Qty: 90 CAPSULE | Refills: 1 | Status: SHIPPED | OUTPATIENT
Start: 2019-08-07 | End: 2020-01-09

## 2019-08-12 DIAGNOSIS — E11.9 TYPE 2 DIABETES MELLITUS WITHOUT COMPLICATION, WITHOUT LONG-TERM CURRENT USE OF INSULIN (H): ICD-10-CM

## 2019-08-12 RX ORDER — METFORMIN HCL 500 MG
1000 TABLET, EXTENDED RELEASE 24 HR ORAL 2 TIMES DAILY WITH MEALS
Qty: 360 TABLET | Refills: 3 | Status: SHIPPED | OUTPATIENT
Start: 2019-08-12 | End: 2020-07-13

## 2019-08-12 RX ORDER — ATORVASTATIN CALCIUM 80 MG/1
80 TABLET, FILM COATED ORAL DAILY
Qty: 90 TABLET | Refills: 3 | Status: SHIPPED | OUTPATIENT
Start: 2019-08-12 | End: 2020-07-13

## 2019-08-14 ENCOUNTER — OFFICE VISIT (OUTPATIENT)
Dept: FAMILY MEDICINE | Facility: CLINIC | Age: 54
End: 2019-08-14

## 2019-08-14 VITALS
BODY MASS INDEX: 28.13 KG/M2 | DIASTOLIC BLOOD PRESSURE: 70 MMHG | WEIGHT: 134 LBS | SYSTOLIC BLOOD PRESSURE: 118 MMHG | RESPIRATION RATE: 16 BRPM | HEART RATE: 74 BPM | HEIGHT: 58 IN | OXYGEN SATURATION: 100 % | TEMPERATURE: 98.2 F

## 2019-08-14 DIAGNOSIS — M72.2 PLANTAR FASCIITIS: Primary | ICD-10-CM

## 2019-08-14 ASSESSMENT — MIFFLIN-ST. JEOR: SCORE: 1081.82

## 2019-08-14 NOTE — PROGRESS NOTES
Preceptor Attestation:   Patient seen, evaluated and discussed with the resident. I have verified the content of the note, which accurately reflects my assessment of the patient and the plan of care.   Supervising Physician:  Roscoe Cordova MD

## 2019-08-14 NOTE — PROGRESS NOTES
SUBJECTIVE  HPI: Rachael Ch is a 54 year old female who presents with complaints of:     Left heel pain.   Does not radiate.   No alleviating factors.   One month duration.   Pain is worse in the morning with the first few steps.  Also notices pain seems to be worse after riding in car.  Denies any bruising or swelling.   Wears mostly flat slipons or goes barefoot.    ROS: Denies any numbness, tingling. No weakness.     PMH:   Patient Active Problem List    Diagnosis Date Noted     Plantar fasciitis 08/14/2019     Priority: Medium     Microalbuminuria 05/19/2016     Priority: Medium     Screening for depression 12/15/2014     Priority: Medium     [facility] Mental Health Screener:  12/15/14: 6 (Positive)       Cognitive complaints 07/01/2014     Priority: Medium     Pt discharged from group therapy at [facility] on 1/29/15. She will continue with [facility] program for follow-up mental health care and .       Moderate episode of recurrent major depressive disorder (H) 03/17/2014     Priority: Medium     Pt discharged from group therapy at [facility] on 1/29/15. She will continue with [facility] program for follow-up mental health care and .       PTSD (post-traumatic stress disorder) 03/13/2014     Priority: Medium     Pt discharged from group therapy at [facility] on 1/29/15. She will continue with [facility] program for follow-up mental health care and .       Helicobacter pylori gastritis 03/10/2014     Priority: Medium     Essential hypertension, benign 12/27/2012     Priority: Medium     Diabetes mellitus, type 2 (H) 12/27/2012     Priority: Medium     Problem list name updated by automated process. Provider to review       Hyperlipidemia 12/27/2012     Priority: Medium     Problem list name updated by automated process. Provider to review       Health Care Home 12/27/2012     Priority: Medium     Tier 0  DX V65.8 REPLACED WITH 78003 HEALTH CARE HOME (04/08/2013)         Social  "Hx:  Social History     Social History Narrative     Not on file     History   Smoking Status     Former Smoker     Packs/day: 0.50     Types: Cigarettes     Quit date: 8/23/2017   Smokeless Tobacco     Current User     Types: Chew       OBJECTIVE:  Vitals: /70   Pulse 74   Temp 98.2  F (36.8  C) (Oral)   Resp 16   Ht 1.464 m (4' 9.64\")   Wt 60.8 kg (134 lb)   SpO2 100%   BMI 28.36 kg/m    General: Pleasant. Middle-aged woman. No distress.  Extremities: Extremities warm and well-perfused. No peripheral edema.  Ankle/Foot: Normal to inspection.  Able to bear weight.  No effusion.  Full ROM in dorsiflexion, plantarflexion, eversion, and inversion.  Strength 5/5 in resistance to all movements.  No tenderness over medial malleoli, ATFL, CFL, or PTFL.  No tenderness over fibular head or distal fibula.  No tenderness over navicular or 5th metatarsal. Mild TTP over achilles insertion and diffusely over plantar aspect of left heel.  Neuro: Strength and sensation of lower extremities grossly symmetric and normal.        ASSESSMENT & PLAN:    Plantar fasciitis: Left heel pain most c/w plantar fasciitis. Counseled patient on plantar fasciitis. Advised improved footwear with sturdy tennis shoes with good arch support. Recommended trying arch support inserts.  Demonstrated home eccentric exercises and provided patient with handout for exercises. As previously advised, discussed trying tennis ball for gentle massage. Has previously planned follow up visit with PCP in 1 month.         The patient was actively involved in the decision making process, and all the questions were answered to their satisfaction prior to leaving.       Patient precepted with attending physician, Dr. Cordova.    Jemima Reynolds, DO   PGY-3 [facility]  [facility]  Pager: [phone]        "

## 2019-08-14 NOTE — NURSING NOTE
Due to patient being non-English speaking/uses sign language, an  was used for this visit. Only for face-to-face interpretation by an external agency, date and length of interpretation can be found on the scanned worksheet.       name: Silvino Banks (Htoo)  Language: Radha  Agency:  [employer]  Phone number: [phone]  Type of interpretation:  Face-to-face, spoken

## 2019-08-14 NOTE — PATIENT INSTRUCTIONS
Patient Education   Patient Education     Ankle Dorsiflexion/Plantarflexion (Flexibility)    1. Sit on the floor or in bed with your legs straight in front of you.  2. Point both feet. Then flex both feet.  3. Do this 10 to 30 times in a row.  4. Repeat this exercise 2 times a day, or as instructed.  Date Last Reviewed: 5/1/2016 2000-2018 The [employer]. [address] [place] [address], [place] [address]. All rights reserved. This information is not intended as a substitute for professional medical care. Always follow your healthcare professional's instructions.           Understanding Heel Pain    Your heel is the back part of your foot. A band of tissue called the plantar fascia connects the heel bone to the bones in the ball of your foot. Nerves run from the heel up the inside of your ankle and into your leg. When you feel pain in the bottom of your heel, the plantar fascia may be inflamed. Overuse, Achilles tightness, and excess body weight can cause the tissue to tear or pull away from the bone. Sometimes the inflamed plantar fascia also irritates a nerve, causing more pain.  What causes heel pain?  Wearing shoes with poor cushioning can irritate the tissue in your heel (plantar fascia). Being overweight or standing for long periods can also irritate the tissue. Running, walking, tennis, and other sports that put stress on the heels can cause tiny tears in the tissue. If your lower leg muscles are tight, this is more likely to happen. A tight Achilles tendon will also contribute to heel pain.  Symptoms  You may feel pain on the bottom or on the inside edge of your heel. The pain may be sharp when you get out of bed or when you stand up after sitting for a while. You may feel a dull ache in your heel after you ve been standing for a long time on a hard surface. Running can also cause a dull ache.  Preventing future problems  To prevent future heel pain, wear shoes with well-cushioned heels. And do exercises  prescribed by your healthcare provider to stretch the plantar fascia and the muscles in the lower leg.   Date Last Reviewed: 10/1/2017    6478-8820 The [employer]. [address] [place] [address], [place] [address]. All rights reserved. This information is not intended as a substitute for professional medical care. Always follow your healthcare professional's instructions.

## 2019-08-16 ENCOUNTER — DOCUMENTATION ONLY (OUTPATIENT)
Dept: FAMILY MEDICINE | Facility: CLINIC | Age: 54
End: 2019-08-16

## 2019-08-16 NOTE — PROGRESS NOTES
To be completed in Nursing note:    Please reference list for forms that require a visit for completion.  Please remind patients that providers are given 3-5 business days to complete and return forms.      Form type: Home Health Orders Physician Orders    Date form received: 08/15/19    Date form completed by Physician: 19    How was form returned to patient (mailed, faxed, or at  for patient to ): Faxed back to [phone]    Date form mailed/faxed/left at  for patient and sent to HIM for [email or web address]      Once form is left for patient, faxed, or mailed PCS will then close the documentation only encounter.

## 2019-08-16 NOTE — PROGRESS NOTES
To be completed in Nursing note:    Please reference list for forms that require a visit for completion.  Please remind patients that providers are given 3-5 business days to complete and return forms.      Form type: Home Health Care Plan of Care Form    Date form received: 08/15/19    Date form completed by Physician: 19    How was form returned to patient (mailed, faxed, or at  for patient to ): Faxed back to [phone]    Date form mailed/faxed/left at  for patient and sent to HIM for [email or web address]      Once form is left for patient, faxed, or mailed PCS will then close the documentation only encounter.

## 2019-08-19 ENCOUNTER — MEDICAL CORRESPONDENCE (OUTPATIENT)
Dept: HEALTH INFORMATION MANAGEMENT | Facility: CLINIC | Age: 54
End: 2019-08-19

## 2019-09-03 DIAGNOSIS — E55.9 VITAMIN D DEFICIENCY: ICD-10-CM

## 2019-09-30 ENCOUNTER — MEDICAL CORRESPONDENCE (OUTPATIENT)
Dept: HEALTH INFORMATION MANAGEMENT | Facility: CLINIC | Age: 54
End: 2019-09-30

## 2019-09-30 ENCOUNTER — DOCUMENTATION ONLY (OUTPATIENT)
Dept: FAMILY MEDICINE | Facility: CLINIC | Age: 54
End: 2019-09-30

## 2019-09-30 NOTE — PROGRESS NOTES
To be completed in Nursing note:    Please reference list for forms that require a visit for completion.  Please remind patients that providers are given 3-5 business days to complete and return forms.      Form type: Home Health Care Recertification Orders for 19-19    Date form received: 19    Date form completed by Physician: 19    How was form returned to patient (mailed, faxed, or at  for patient to ): Faxed back to [phone]    Date form mailed/faxed/left at  for patient and sent to HIM for [email or web address]      Once form is left for patient, faxed, or mailed PCS will then close the documentation only encounter.

## 2019-10-18 ENCOUNTER — MEDICAL CORRESPONDENCE (OUTPATIENT)
Dept: HEALTH INFORMATION MANAGEMENT | Facility: CLINIC | Age: 54
End: 2019-10-18

## 2019-10-18 ENCOUNTER — DOCUMENTATION ONLY (OUTPATIENT)
Dept: FAMILY MEDICINE | Facility: CLINIC | Age: 54
End: 2019-10-18

## 2019-10-18 NOTE — PROGRESS NOTES
To be completed in Nursing note:    Please reference list for forms that require a visit for completion.  Please remind patients that providers are given 3-5 business days to complete and return forms.      Form type: Home Health Care - Late recertification needed d/t scheduling conflicts.    Date form received: 10/15/19    Date form completed by Physician: 10/22/19    How was form returned to patient (mailed, faxed, or at  for patient to ): Faxed back to home health care at [phone]    Date form mailed/faxed/left at  for patient and sent to HIM for scanning:      Once form is left for patient, faxed, or mailed PCS will then close the documentation only encounter.

## 2019-10-31 DIAGNOSIS — I10 ESSENTIAL HYPERTENSION, BENIGN: ICD-10-CM

## 2019-10-31 DIAGNOSIS — E11.65 TYPE 2 DIABETES MELLITUS WITH HYPERGLYCEMIA, WITHOUT LONG-TERM CURRENT USE OF INSULIN (H): ICD-10-CM

## 2019-10-31 RX ORDER — LISINOPRIL 20 MG/1
20 TABLET ORAL DAILY
Qty: 90 TABLET | Refills: 1 | Status: SHIPPED | OUTPATIENT
Start: 2019-10-31 | End: 2020-02-11

## 2019-11-26 ENCOUNTER — DOCUMENTATION ONLY (OUTPATIENT)
Dept: FAMILY MEDICINE | Facility: CLINIC | Age: 54
End: 2019-11-26

## 2019-11-26 NOTE — PROGRESS NOTES
To be completed in Nursing note:    Please reference list for forms that require a visit for completion.  Please remind patients that providers are given 3-5 business days to complete and return forms.      Form type: Home Health Care Plan of Care Form    Date form received: 19    Date form completed by Physician: 19    How was form returned to patient (mailed, faxed, or at  for patient to ): Faxed back to [phone]    Date form mailed/faxed/left at  for patient and sent to HIM for [email or web address]      Once form is left for patient, faxed, or mailed PCS will then close the documentation only encounter.

## 2019-12-03 ENCOUNTER — MEDICAL CORRESPONDENCE (OUTPATIENT)
Dept: HEALTH INFORMATION MANAGEMENT | Facility: CLINIC | Age: 54
End: 2019-12-03

## 2020-01-28 ENCOUNTER — DOCUMENTATION ONLY (OUTPATIENT)
Dept: FAMILY MEDICINE | Facility: CLINIC | Age: 55
End: 2020-01-28

## 2020-01-28 ENCOUNTER — MEDICAL CORRESPONDENCE (OUTPATIENT)
Dept: HEALTH INFORMATION MANAGEMENT | Facility: CLINIC | Age: 55
End: 2020-01-28

## 2020-01-28 NOTE — PROGRESS NOTES
To be completed in Nursing note:    Please reference list for forms that require a visit for completion.  Please remind patients that providers are given 3-5 business days to complete and return forms.      Form type: Home Health Plan of Care for 20 - 20    Date form received: 20    Date form completed by Physician: 20    How was form returned to patient (mailed, faxed, or at  for patient to ): Faxed back to [phone]    Date form mailed/faxed/left at  for patient and sent to HIM for [email or web address]      Once form is left for patient, faxed, or mailed PCS will then close the documentation only encounter.

## 2020-02-04 ENCOUNTER — TELEPHONE (OUTPATIENT)
Dept: FAMILY MEDICINE | Facility: CLINIC | Age: 55
End: 2020-02-04

## 2020-02-04 NOTE — TELEPHONE ENCOUNTER
[facility] phone call message- patient requesting a refill:    Full Medication Name: EYE DROPS    Dose: ?    Pharmacy confirmed as   [facility] - [place], [place] - [address]  [address] [facility] [address]  [place] [address]  Phone: [phone] Fax: [phone]  : Yes    Additional Comments: She has got eye drops in the past and would like a refill on them.     OK to leave a message on voice mail? Yes    Primary language: Radha      needed? Yes    Call taken on February 4, 2020 at 3:25 PM by Mic Shirley

## 2020-02-05 NOTE — TELEPHONE ENCOUNTER
Pt does not have, nor have they ever had an eye drop prescribed here.  If pt calls back please schedule them for a visit with their PCP for this medication.  Veronica Rosas, [facility]

## 2020-02-10 DIAGNOSIS — E11.65 TYPE 2 DIABETES MELLITUS WITH HYPERGLYCEMIA, WITHOUT LONG-TERM CURRENT USE OF INSULIN (H): Primary | ICD-10-CM

## 2020-02-11 ENCOUNTER — OFFICE VISIT (OUTPATIENT)
Dept: FAMILY MEDICINE | Facility: CLINIC | Age: 55
End: 2020-02-11
Payer: COMMERCIAL

## 2020-02-11 VITALS
WEIGHT: 141.4 LBS | BODY MASS INDEX: 29.93 KG/M2 | OXYGEN SATURATION: 100 % | SYSTOLIC BLOOD PRESSURE: 153 MMHG | RESPIRATION RATE: 16 BRPM | DIASTOLIC BLOOD PRESSURE: 79 MMHG | HEART RATE: 71 BPM | TEMPERATURE: 98.7 F

## 2020-02-11 DIAGNOSIS — I10 ESSENTIAL HYPERTENSION, BENIGN: ICD-10-CM

## 2020-02-11 DIAGNOSIS — R00.2 PALPITATIONS: Primary | ICD-10-CM

## 2020-02-11 DIAGNOSIS — R80.9 TYPE 2 DIABETES MELLITUS WITH MICROALBUMINURIA, WITHOUT LONG-TERM CURRENT USE OF INSULIN (H): ICD-10-CM

## 2020-02-11 DIAGNOSIS — E11.29 TYPE 2 DIABETES MELLITUS WITH MICROALBUMINURIA, WITHOUT LONG-TERM CURRENT USE OF INSULIN (H): ICD-10-CM

## 2020-02-11 DIAGNOSIS — E11.65 TYPE 2 DIABETES MELLITUS WITH HYPERGLYCEMIA, WITHOUT LONG-TERM CURRENT USE OF INSULIN (H): ICD-10-CM

## 2020-02-11 LAB
BUN SERPL-MCNC: 18.7 MG/DL (ref 7–19)
CALCIUM SERPL-MCNC: 10 MG/DL (ref 8.5–10.1)
CHLORIDE SERPLBLD-SCNC: 105.5 MMOL/L (ref 98–110)
CO2 SERPL-SCNC: 29.7 MMOL/L (ref 20–32)
CREAT SERPL-MCNC: 0.8 MG/DL (ref 0.5–1)
GFR SERPL CREATININE-BSD FRML MDRD: 77.7 ML/MIN/1.7 M2
GLUCOSE SERPL-MCNC: 128.9 MG'DL (ref 70–99)
HBA1C MFR BLD: 6.2 % (ref 4.1–5.7)
POTASSIUM SERPL-SCNC: 3.3 MMOL/L (ref 3.2–4.6)
SODIUM SERPL-SCNC: 139.5 MMOL/L (ref 132–142)

## 2020-02-11 RX ORDER — GLIPIZIDE 10 MG/1
10 TABLET, FILM COATED, EXTENDED RELEASE ORAL DAILY
Qty: 1 TABLET | Refills: 0 | Status: SHIPPED | OUTPATIENT
Start: 2020-02-11 | End: 2020-04-22

## 2020-02-11 RX ORDER — LISINOPRIL 40 MG/1
40 TABLET ORAL DAILY
Qty: 90 TABLET | Refills: 1 | Status: SHIPPED | OUTPATIENT
Start: 2020-02-11 | End: 2020-07-17

## 2020-02-11 ASSESSMENT — PATIENT HEALTH QUESTIONNAIRE - PHQ9
5. POOR APPETITE OR OVEREATING: SEVERAL DAYS
SUM OF ALL RESPONSES TO PHQ QUESTIONS 1-9: 9

## 2020-02-11 ASSESSMENT — ANXIETY QUESTIONNAIRES
7. FEELING AFRAID AS IF SOMETHING AWFUL MIGHT HAPPEN: NOT AT ALL
3. WORRYING TOO MUCH ABOUT DIFFERENT THINGS: SEVERAL DAYS
1. FEELING NERVOUS, ANXIOUS, OR ON EDGE: SEVERAL DAYS
6. BECOMING EASILY ANNOYED OR IRRITABLE: SEVERAL DAYS
2. NOT BEING ABLE TO STOP OR CONTROL WORRYING: MORE THAN HALF THE DAYS
GAD7 TOTAL SCORE: 7
5. BEING SO RESTLESS THAT IT IS HARD TO SIT STILL: SEVERAL DAYS

## 2020-02-11 NOTE — PATIENT INSTRUCTIONS
STOP  Taking the glipizide when your home nurse comes out again.     We will stop having them send out the red test strips.  Ordered more of the other test strips.     Increase Lisinopril to 40mg per day.  New prescription sent.       Please write down blood pressures when home nurse comes and bring list in to the visit.      Make sure you have the medicine to take if you get fast heart.  Want to make sure you have this when you need it.  (I think this is the Vistaril, but if not, please let me know).      Stretching for your foot--use a pill bottle to roll on the bottom of the feet.

## 2020-02-11 NOTE — LETTER
February 12, 2020      [facility] [facility] [address]        Dear Rachael,    Here is a copy of your lab results.  As we discussed in clinic, your blood sugar numbers are good.  I'm glad that we can try stopping the glipizide medication for diabetes.  Your kidney tests also look good today.  Please call the clinic at [phone] if you have any questions.       Please see below for your test results.    Resulted Orders   Hemoglobin A1c ([facility])   Result Value Ref Range    Hemoglobin A1C 6.2 (H) 4.1 - 5.7 %   Basic Metabolic Panel ([place])   Result Value Ref Range    Urea Nitrogen 18.7 7.0 - 19.0 mg/dL    Calcium 10.0 8.5 - 10.1 mg/dL    Chloride 105.5 98.0 - 110.0 mmol/L    Carbon Dioxide 29.7 20.0 - 32.0 mmol/L    Creatinine 0.8 0.5 - 1.0 mg/dL    Glucose 128.9 (H) 70.0 - 99.0 mg'dL    Potassium 3.3 3.2 - 4.6 mmol/L    Sodium 139.5 132.0 - 142.0 mmol/L    GFR Estimate 77.7 >60.0 mL/min/1.7 m2    GFR Estimate If Black >90 >60.0 mL/min/1.7 m2       If you have any questions, please call the clinic to make an appointment.    Sincerely,    Amrita Carballo MD

## 2020-02-11 NOTE — NURSING NOTE
Due to patient being non-English speaking/uses sign language, an  was used for this visit. Only for face-to-face interpretation by an external agency, date and length of interpretation can be found on the scanned worksheet.     name: Kapil Anderson  Agency: [employer]  Language: Radha   Telephone number: [phone]  Type of interpretation: Face-to-face, spoken

## 2020-02-11 NOTE — PROGRESS NOTES
"  Nursing Notes:   CristianaashliVeronica guillaume, [facility]  2/11/2020  9:40 AM  Signed  Due to patient being non-English speaking/uses sign language, an  was used for this visit. Only for face-to-face interpretation by an external agency, date and length of interpretation can be found on the scanned worksheet.     name: Kapil Anderson  Agency: [employer]  Language: Radha   Telephone number: [phone]  Type of interpretation: Face-to-face, spoken        SUBJECTIVE  Rachael Ch is a 54 year old female with past medical history significant for    Patient Active Problem List   Diagnosis     Essential hypertension, benign     Diabetes mellitus, type 2 (H)     Hyperlipidemia     Health Care Home     Helicobacter pylori gastritis     PTSD (post-traumatic stress disorder)     Moderate episode of recurrent major depressive disorder (H)     Cognitive complaints     Screening for depression     Microalbuminuria     Plantar fasciitis     Others present at the visit:   Kapil Anderson    Presents for   Chief Complaint   Patient presents with     Pain     Pt is here for leg pain, neck pain, and chest pain.     Generalized Body Aches     Pt is also here for generalized body aches.     Medication Reconciliation     Complete.      Patient presents today for follow-up.  Most concerning for her is that she had an episode of heart palpitations about 2 to 3 weeks ago.  She woke up in the morning with the symptoms.  She describes feeling nervous, feel like her heart was beating fast like \"tit, tit, tit, tit, tit\" quickly into her chest.  If she got up she felt dizzy and uncomfortable so had to remain laying down.  Symptoms lasted nearly all day.  She describes her family members going to a pharmacy and picking up a round multicolored pill and she took 3 of these pills together.  After that she felt better, and has not had any trouble since then.  She does describe some shortness of breath with the episode, no diaphoresis.  Was not " able to move them but otherwise can move normally without having trouble with the sensations.  She thinks it might be her anxiety but she is not sure.  She also describes feeling generalized achiness in her arms and body that accompanied the episode.  Overall her stomach has been feeling good.  She has not had gas or bloating.  No acid reflux or heartburn symptoms.  Sometimes when she drinks water that helps with these sensations of rapid heart rate.    Also describes doing well with her medications.  Home nurses still coming to set them up.  She is not sure what she is taking, but reports taking it regularly.  She endorses that the prazosin is helping a lot for sleep, but that she cannot sleep if she does not take it.  Is checking her blood sugars occasionally in the morning, and readings lately have been between 89 and 164.  It is above 200 if she checks after she eats.  Denies symptoms of hypoglycemia recently.  Has noted some pain in her left foot along the sole.  It is sore and stiff and hurts when she gets up to walk.  Denies numbness and tingling, swelling, or changes of sensation in her feet.  She has been going to the eye doctor regularly and denies difficulty with this today.    Blood pressure is elevated today.  Her home nurse checks blood pressures when she comes to see the patient, but patient does not know what the readings are and cannot tell me this information today.  She reports eating and drinking well.  She has gained some weight since her last visit.    OBJECTIVE:  Vitals: BP (!) 153/79 (BP Location: Left arm, Patient Position: Sitting, Cuff Size: Adult Large)   Pulse 71   Temp 98.7  F (37.1  C) (Oral)   Resp 16   Wt 64.1 kg (141 lb 6.4 oz)   SpO2 100%   BMI 29.93 kg/m    BMI= Body mass index is 29.93 kg/m .  Objective:    Vitals:  Vitals are reviewed and are within the normal range  Gen:  Alert, pleasant, no acute distress  Neck: No lymphadenopathy, normal thyroid.  Cardiac:  Regular rate  and rhythm, no murmurs, rubs or gallops  Respiratory:  Lungs clear to auscultation bilaterally  Abdomen:  Soft, non-tender, non-distended, bowel sounds positive  Extremities:  Warm, well-perfused, pulses 2+/4, no lower extremity edema, monofilament testing was normal.  She does have pain along the arch of her left foot consistent with plantar fasciitis.    Results for orders placed or performed in visit on 02/11/20   Hemoglobin A1c ([facility])     Status: Abnormal   Result Value Ref Range    Hemoglobin A1C 6.2 (H) 4.1 - 5.7 %   Basic Metabolic Panel ([place])     Status: Abnormal   Result Value Ref Range    Urea Nitrogen 18.7 7.0 - 19.0 mg/dL    Calcium 10.0 8.5 - 10.1 mg/dL    Chloride 105.5 98.0 - 110.0 mmol/L    Carbon Dioxide 29.7 20.0 - 32.0 mmol/L    Creatinine 0.8 0.5 - 1.0 mg/dL    Glucose 128.9 (H) 70.0 - 99.0 mg'dL    Potassium 3.3 3.2 - 4.6 mmol/L    Sodium 139.5 132.0 - 142.0 mmol/L    GFR Estimate 77.7 >60.0 mL/min/1.7 m2    GFR Estimate If Black >90 >60.0 mL/min/1.7 m2           ASSESSMENT AND PLAN:      Rachael was seen today for pain, generalized body aches and medication reconciliation.    Diagnoses and all orders for this visit:    Palpitations.  Patient presenting with complaints of palpitations.  Most likely secondary to anxiety given her long history of this and improvement with what I think was hydroxyzine.  Would want to do further cardiac work-up if she is having additional symptoms, and we will have her return in a few weeks and bring in all of her medications so we can ensure that that was in fact medicine that was helpful for her.  While patient has significant mental health history she does also have hypertension diabetes but does have risk factors for cardiac disease as well.    Type 2 diabetes mellitus with hyperglycemia, without long-term current use of insulin (H).  Hemoglobin A1c is at goal and I actually think it is lower than it needs to be.  We will discontinue glipizide, refill  glucose testing strips, and continue to have her check 2-3 times a week fasting in the morning.  -     Hemoglobin A1c ([facility])  -     Basic Metabolic Panel ([place])  -     blood glucose (ONETOUCH ULTRA) test strip; Use to test blood sugar 1 times daily or as directed.  -     Discontinue glipiZIDE (GLUCOTROL XL) 10 MG 24 hr tablet; Take 1 tablet (10 mg) by mouth daily    Essential hypertension, benign.   Blood pressure is elevated today.  Renal function is normal.  We will double her lisinopril from 20-40 and see if this improves blood pressure levels.  May be associated with her recent weight gain.  We will need to monitor this.  We will recheck a BMP at next visit.  -     lisinopril (PRINIVIL/ZESTRIL) 40 MG tablet; Take 1 tablet (40 mg) by mouth daily    Plantar Fasciitis Exam of feet shows no evidence of peripheral neuropathy but does show evidence of plantar fasciitis.  Discussed using a pill bottle to massage as well as using Tylenol and ibuprofen as needed.    Patient Instructions   STOP  Taking the glipizide when your home nurse comes out again.     We will stop having them send out the red test strips.  Ordered more of the other test strips.     Increase Lisinopril to 40mg per day.  New prescription sent.       Please write down blood pressures when home nurse comes and bring list in to the visit.      Make sure you have the medicine to take if you get fast heart.  Want to make sure you have this when you need it.  (I think this is the Vistaril, but if not, please let me know).      Stretching for your foot--use a pill bottle to roll on the bottom of the feet.              Amrita Carballo MD

## 2020-02-12 ASSESSMENT — ANXIETY QUESTIONNAIRES: GAD7 TOTAL SCORE: 7

## 2020-02-12 NOTE — RESULT ENCOUNTER NOTE
Rachael Ch-    Here is a copy of your lab results.  As we discussed in clinic, your blood sugar numbers are good.  I'm glad that we can try stopping the glipizide medication for diabetes.  Your kidney tests also look good today.  Please call the clinic at [phone] if you have any questions.      Amrita Carballo MD    Please send results to patient.      Of note--potassium is slightly low, blood pressure slightly high.  Will increase lisinopril to 40mg--this should normalize potassium and improve blood pressure.  Will recheck labs at follow up visit in 6 weeks.

## 2020-02-29 ENCOUNTER — TRANSFERRED RECORDS (OUTPATIENT)
Dept: HEALTH INFORMATION MANAGEMENT | Facility: CLINIC | Age: 55
End: 2020-02-29

## 2020-03-09 DIAGNOSIS — E11.9 TYPE 2 DIABETES MELLITUS WITHOUT COMPLICATION, WITHOUT LONG-TERM CURRENT USE OF INSULIN (H): ICD-10-CM

## 2020-03-09 DIAGNOSIS — F43.10 PTSD (POST-TRAUMATIC STRESS DISORDER): ICD-10-CM

## 2020-03-10 RX ORDER — PIOGLITAZONEHYDROCHLORIDE 45 MG/1
45 TABLET ORAL DAILY
Qty: 90 TABLET | Refills: 0 | Status: SHIPPED | OUTPATIENT
Start: 2020-03-10 | End: 2020-06-16

## 2020-03-10 RX ORDER — PRAZOSIN HYDROCHLORIDE 2 MG/1
2 CAPSULE ORAL AT BEDTIME
Qty: 90 CAPSULE | Refills: 0 | Status: SHIPPED | OUTPATIENT
Start: 2020-03-10 | End: 2020-06-05

## 2020-03-24 ENCOUNTER — VIRTUAL VISIT (OUTPATIENT)
Dept: FAMILY MEDICINE | Facility: CLINIC | Age: 55
End: 2020-03-24
Payer: COMMERCIAL

## 2020-03-24 DIAGNOSIS — R63.0 POOR APPETITE: ICD-10-CM

## 2020-03-24 DIAGNOSIS — F33.1 MODERATE EPISODE OF RECURRENT MAJOR DEPRESSIVE DISORDER (H): ICD-10-CM

## 2020-03-24 DIAGNOSIS — R53.83 FATIGUE, UNSPECIFIED TYPE: Primary | ICD-10-CM

## 2020-03-24 DIAGNOSIS — K59.00 CONSTIPATION, UNSPECIFIED CONSTIPATION TYPE: ICD-10-CM

## 2020-03-24 RX ORDER — AMOXICILLIN 250 MG
1 CAPSULE ORAL DAILY
Qty: 90 TABLET | Refills: 3 | Status: SHIPPED | OUTPATIENT
Start: 2020-03-24 | End: 2021-02-01

## 2020-03-24 ASSESSMENT — PATIENT HEALTH QUESTIONNAIRE - PHQ9: SUM OF ALL RESPONSES TO PHQ QUESTIONS 1-9: 12

## 2020-03-24 NOTE — PROGRESS NOTES
Preceptor Attestation:  I discussed the patient with the resident. I have verified the content of the note, which accurately reflects my assessment of the patient and the plan of care.   Supervising Physician:  Dylan Castro MD.

## 2020-03-24 NOTE — PROGRESS NOTES
"Due to patient being non-English speaking/uses sign language, an  was used for this visit. Only for face-to-face interpretation by an external agency, date and length of interpretation can be found on the scanned worksheet.     name: Wisam Adhikari  Agency: [employer]  Language: Radha   Telephone number: [phone]  Type of interpretation: Telephone, spoken      Family Medicine Telephone Visit Note           Telephone Visit Consent   Patient was verbally read the following and verbal consent was obtained.  \"I understand that I may revoke this request for a phone visit at any time.  This consent will automatically  3 months from the signed date and time.\"    Name person giving consent:  Patient   Date verbal consent given:  3/24/2020  Time verbal consent given:  8:50 AM    Chief Complaint   Patient presents with     RECHECK     need follow up per patient.       Medication Reconciliation     reviewed     Fatigue     have fast heart rate and feeling tire for about over a month now per patient.       Current Outpatient Medications   Medication Sig Dispense Refill     acetaminophen (TYLENOL) 500 MG tablet Take 1 pill in Am, 1 pill in PM and 1 additional pill  tablet 3     atorvastatin (LIPITOR) 80 MG tablet Take 1 tablet (80 mg) by mouth daily 90 tablet 3     blood glucose (ONETOUCH ULTRA) test strip Use to test blood sugar 1 times daily or as directed. 50 strip 3     blood glucose monitoring (ACCU-CHEK MULTICLIX) lancets Use to test blood sugar 1 times daily or as directed. 102 each 3     cholecalciferol 2000 units tablet Take 2,000 Units by mouth daily 100 tablet 3     glipiZIDE (GLUCOTROL XL) 10 MG 24 hr tablet Take 1 tablet (10 mg) by mouth daily 1 tablet 0     hydrOXYzine (VISTARIL) 25 MG capsule TAKE 1 PILL AS NEEDED FOR PANIC, ANXIETY, CAN TAKE 1-2 AT NIGHT TO HELP WITH SLEEP. 100 capsule 3     JANUVIA 100 MG tablet TAKE 1 TABLET (100 MG) BY MOUTH DAILY 90 tablet 1     lisinopril " (PRINIVIL/ZESTRIL) 40 MG tablet Take 1 tablet (40 mg) by mouth daily 90 tablet 1     metFORMIN (GLUCOPHAGE-XR) 500 MG 24 hr tablet Take 2 tablets (1,000 mg) by mouth 2 times daily (with meals) 360 tablet 3     pioglitazone (ACTOS) 45 MG tablet TAKE 1 TABLET (45 MG) BY MOUTH DAILY 90 tablet 0     prazosin (MINIPRESS) 2 MG capsule TAKE 1 CAPSULE (2 MG) BY MOUTH AT BEDTIME 90 capsule 0     ranitidine (ZANTAC) 300 MG tablet Take 1 tablet (300 mg) by mouth At Bedtime 90 tablet 3     Allergies   Allergen Reactions     Gabapentin Other (See Comments)     Facial Swelling     Nkda [No Known Drug Allergies]      Venlafaxine      Causes abdominal pain, poor appetite and dizziness.               HPI   Patients name: Rachael  Appointment start time:  9:12 AM    The patient is seen in virtual visit for follow-up.  Her most recent visit was with PCP on 2/11/2020, at that time she was complaining of an episode of heart palpitations.  Since his most recent visit, the patient states that she is feeling fatigued and has had a fast heart rate.  She does not describe similar episode of significant palpitations like she experienced before.  Along with this, the patient notes that she has poor appetite.  She says that she generally eats 2 meals a day.  However over the last 3 or 4 months, she is intermittently been experiencing early satiety with nausea.  When this happens, she will have about half of a normal meal once or twice a day.  Other than this, she has very small snacks and drinks juice.  She does not drink much plain water.  Patient does not note any heartburn, abdominal pain.  She does have some constipation, having a bowel movement about once every other day or so, which is less often than normal for her.    Patient states that she is been continuing to experience intermittent anxiety.  She takes the medications that are set up for her by her home nurse, she does not take any as needed medications.  She does have hydroxyzine  listed as an as needed medication, she does not believe she has been taking this recently.  She does note that she has a daughter at home who speaks English and is able to help out with her medications.    Patient has been checking her fasting blood sugars about once a week since her last visit.  She notes that her readings since her last visit have all been in the 140s.     used via [employer] or similar service.  number: see above          Assessment and Plan     Fatigue  Comment: Patient endorses a few months of fatigue, associated with poor appetite, early satiety, perception of tachycardia.  We discussed a few possible etiologies for this, including depression/anxiety, diabetes, constipation, medication side effect, dehydration.  Patient is interested in addressing one thing at a time at this point.  Reviewing her history and recent results, it appears that diabetes is well controlled.  Would like to start by addressing constipation first.  Plan: Start senna docusate, daily. I encouraged the patient to try to maintain good level of hydration and encouraged plain water rather than juice, I also recommended she do her best to eat two meals per day as tolerated. We will follow-up in approximately 2 weeks and evaluate efficacy of this.  Would like to consider possibly initiating mirtazapine to help with appetite and depression, we will address this at the next visit if patient is continued symptoms.     Moderate episode of recurrent major depressive disorder (H)  Comment: Patient reports continue to experience symptoms of anxiety.  At this time, she is taking prazosin to help with sleep.  She has hydroxyzine ordered as a as needed, but not suspect that she is taking this because she denies using any as needed medications.  The patient has a daughter at home who speaks English and is capable of helping with medications.  Daughter is not available to speak with her today, however we discussed  having the patient asked her daughter to review her medications and help with the one that is indicated to be as needed.  I would prefer being able to speak with the daughter directly about this, we will try this at a future visit if possible.  Plan: Patient will ask daughter to help with as needed medication, hydroxyzine.  If patient is continued to have symptoms of depression/anxiety and also poor appetite at the time of next visit, we will discuss possibly initiating mirtazapine to help with both the symptoms.    Refilled medications that would be required in the next 3 months.     After Visit Information:  Will print and mail AVS     Appointment end time: 9:35 AM  This is a telephone visit that took 23 minutes.      Clinician location:  [place]    Staffed with Dr. Matthew Pierson MD  PGY2  [facility]  Pager: [phone]

## 2020-04-01 ENCOUNTER — DOCUMENTATION ONLY (OUTPATIENT)
Dept: FAMILY MEDICINE | Facility: CLINIC | Age: 55
End: 2020-04-01

## 2020-04-01 NOTE — PROGRESS NOTES
To be completed in Nursing note:    Please reference list for forms that require a visit for completion.  Please remind patients that providers are given 3-5 business days to complete and return forms.      Form type: [facility] DC Summary    Date form received: 20    Date form completed by Physician: 20    How was form returned to patient (mailed, faxed, or at  for patient to ): Faxed back to [phone]    Date form mailed/faxed/left at  for patient and sent to HIM for [email or web address]      Once form is left for patient, faxed, or mailed PCS will then close the documentation only encounter.

## 2020-04-03 ENCOUNTER — MEDICAL CORRESPONDENCE (OUTPATIENT)
Dept: HEALTH INFORMATION MANAGEMENT | Facility: CLINIC | Age: 55
End: 2020-04-03

## 2020-04-16 ENCOUNTER — TELEPHONE (OUTPATIENT)
Dept: FAMILY MEDICINE | Facility: CLINIC | Age: 55
End: 2020-04-16

## 2020-04-16 ENCOUNTER — VIRTUAL VISIT (OUTPATIENT)
Dept: FAMILY MEDICINE | Facility: CLINIC | Age: 55
End: 2020-04-16
Payer: COMMERCIAL

## 2020-04-16 DIAGNOSIS — R53.83 FATIGUE, UNSPECIFIED TYPE: ICD-10-CM

## 2020-04-16 DIAGNOSIS — K21.00 GASTROESOPHAGEAL REFLUX DISEASE WITH ESOPHAGITIS: Primary | ICD-10-CM

## 2020-04-16 DIAGNOSIS — E11.65 TYPE 2 DIABETES MELLITUS WITH HYPERGLYCEMIA, WITHOUT LONG-TERM CURRENT USE OF INSULIN (H): Primary | ICD-10-CM

## 2020-04-16 DIAGNOSIS — F33.1 MODERATE EPISODE OF RECURRENT MAJOR DEPRESSIVE DISORDER (H): Primary | ICD-10-CM

## 2020-04-16 DIAGNOSIS — I10 ESSENTIAL HYPERTENSION, BENIGN: ICD-10-CM

## 2020-04-16 DIAGNOSIS — R53.82 CHRONIC FATIGUE: ICD-10-CM

## 2020-04-16 RX ORDER — FAMOTIDINE 40 MG/1
40 TABLET, FILM COATED ORAL DAILY
Qty: 90 TABLET | Refills: 3 | Status: SHIPPED | OUTPATIENT
Start: 2020-04-16 | End: 2021-08-05

## 2020-04-16 NOTE — PROGRESS NOTES
Preceptor Attestation:    I talked to the patient on the phone and discussed the patient with the resident. I have verified the content of the note, which accurately reflects my assessment of the patient and the plan of care.   Supervising Physician:  Roshan Teran MD.

## 2020-04-16 NOTE — TELEPHONE ENCOUNTER
Pt's pharmacy is requesting a refill of Ranitidine.  Please send alternative as this is no longer on the market.  Veronica Rosas, CMA

## 2020-04-16 NOTE — PROGRESS NOTES
"Family Medicine Telephone Visit Note                   Telephone Visit Consent   Patient was verbally read the following and verbal consent was obtained.    \"This telephone visit will be conducted via a call between you and your physician/provider. We have found that certain health care needs can be provided without the need for a physical exam.  This service lets us provide the care you need with a short phone conversation.  If a prescription is necessary we can send it directly to your pharmacy.  If lab work is needed we can place an order for that and you can then stop by our lab to have the test done at a later time.    Telephone visits are billed at different rates depending on your insurance coverage. During this emergency period, for some insurers they may be billed the same as an in-person visit.  Please reach out to your insurance provider with any questions.    If during the course of the call the physician/provider feels a telephone visit is not appropriate, you will not be charged for this service.\"    Name person giving consent:  Patient   Date verbal consent given:  4/16/2020  Time verbal consent given:  9:06 AM         Chief Complaint   Patient presents with     Fatigue     Depression                  Due to patient being non-English speaking/uses sign language, an  was used for this visit. Only for face-to-face interpretation by an external agency, date and length of interpretation can be found on the scanned worksheet.     name: Shelly Bueno  Agency: [employer]  Language: Radha   Telephone number: [phone]  Type of interpretation: Telephone, spoken         HPI   Patients name: Rachael  Appointment start time:  9:10 AM    Recently seen on 3/24/2020 for similar symptoms and counseled to start on hydroxyzine. Started on senna docusate daily with some improvement in constipation.     Feels symptoms have been improving. Has been feeling better 3-4 times a week. Becomes really fatigued " throughout the day. Does not occur daily. Has had a history of a low appetite. Has been eating normal meals, 1-2 times per day and eats all of meals. Only drinking 3-4 cups of water per day because she does not feel thirsty. She states her sleep is still poor. Has thoughts in her head that keep her awake. Denies nightmares or thoughts that bother or disturb her. She reports her mood is fine. Denies suicidal thoughts or thoughts to harm self or others. She is concerned about her intermittent palpitations that occur at nighttime and only occur a couple times per week. This makes her scared. She denies fever/chills, chest pain, trouble breathing, dizziness, syncope, headache, vision changes, numbness/tingling/weakness. Has not been taking hydroxyzine as prescribed to try and help with sleep.       Current Outpatient Medications   Medication Sig Dispense Refill     acetaminophen (TYLENOL) 500 MG tablet Take 1 pill in Am, 1 pill in PM and 1 additional pill  tablet 3     atorvastatin (LIPITOR) 80 MG tablet Take 1 tablet (80 mg) by mouth daily 90 tablet 3     blood glucose (ACCU-CHEK JEANNINE PLUS) test strip USE TO TEST BLOOD SUGAR 1 TIMES DAILY OR AS DIRECTED. **50 DAY SUPPLY** 100 strip 3     blood glucose monitoring (ACCU-CHEK FASTCLIX) lancets USE TO TEST BLOOD SUGAR 1 TIMES DAILY OR AS DIRECTED. **102 DAY SUPPLY** 102 each 3     cholecalciferol 2000 units tablet Take 2,000 Units by mouth daily 100 tablet 3     glipiZIDE (GLUCOTROL XL) 10 MG 24 hr tablet Take 1 tablet (10 mg) by mouth daily 1 tablet 0     hydrOXYzine (VISTARIL) 25 MG capsule TAKE 1 PILL AS NEEDED FOR PANIC, ANXIETY, CAN TAKE 1-2 AT NIGHT TO HELP WITH SLEEP. 100 capsule 3     JANUVIA 100 MG tablet TAKE 1 TABLET (100 MG) BY MOUTH DAILY 90 tablet 1     lisinopril (PRINIVIL/ZESTRIL) 40 MG tablet Take 1 tablet (40 mg) by mouth daily 90 tablet 1     metFORMIN (GLUCOPHAGE-XR) 500 MG 24 hr tablet Take 2 tablets (1,000 mg) by mouth 2 times daily (with  "meals) 360 tablet 3     pioglitazone (ACTOS) 45 MG tablet TAKE 1 TABLET (45 MG) BY MOUTH DAILY 90 tablet 0     prazosin (MINIPRESS) 2 MG capsule TAKE 1 CAPSULE (2 MG) BY MOUTH AT BEDTIME 90 capsule 0     ranitidine (ZANTAC) 300 MG tablet Take 1 tablet (300 mg) by mouth At Bedtime 90 tablet 3     senna-docusate (SENOKOT-S/PERICOLACE) 8.6-50 MG tablet Take 1 tablet by mouth daily 90 tablet 3     Allergies   Allergen Reactions     Gabapentin Other (See Comments)     Facial Swelling     Nkda [No Known Drug Allergies]      Venlafaxine      Causes abdominal pain, poor appetite and dizziness.                Physical Exam:     There were no vitals taken for this visit.  Estimated body mass index is 29.93 kg/m  as calculated from the following:    Height as of 8/14/19: 1.464 m (4' 9.64\").    Weight as of 2/11/20: 64.1 kg (141 lb 6.4 oz).    Exam:  Constitutional: healthy, alert and no distress  Psychiatric: mentation appears normal and affect normal/bright        Assessment and Plan       1. Moderate episode of recurrent major depressive disorder (H)  2. Chronic fatigue  Patient presenting for follow up regarding fatigue, low appetite and intermittent \"heart racing\" palpitations which appears to occur at night. She has been taking prazosin at night and was recently started on hydroxyzine during her visit on 3/24/2020. She states that she feels \"good\" 3-4 times per week, which is improved from previous. She is still having trouble sleeping. Daughter joined conversation and stated that she was not taking hydroxyzine at this time. Patient denied any red flag symptoms, no syncope, no chest pain, no trouble breathing. She denied any \"bad thoughts\" or nightmares and stated that her mood was good. At the end of the visit, insisted upon seeing a provider in person to be evaluated because this was what she is used to. Counseled patient/daughter that she should start taking hydroxyzine in the meantime, but, can address this in person. " I believe that an in person examination with patient would be warranted at this time secondary to language barrier and perception of symptoms. Symptoms likely secondary to PTSD/anxiety history, however, may warrant further cardiac evaluation if still having palpitations. She will make an appointment to be seen in person with her PCP, Dr. Haris stuart. Counseled to call us back in the meantime with any questions or concerns.       Refilled medications that would be required in the next 3 months.     After Visit Information:  Patient declined AVS     No follow-ups on file.    Appointment end time: 9:30 AM  This is a telephone visit that took 20 minutes.      Clinician location:  [facility]    Crystal Gustafson MD PGY2  [facility]    I precepted today with Dr. Roshan Teran.

## 2020-04-22 ENCOUNTER — OFFICE VISIT (OUTPATIENT)
Dept: FAMILY MEDICINE | Facility: CLINIC | Age: 55
End: 2020-04-22
Payer: COMMERCIAL

## 2020-04-22 VITALS
TEMPERATURE: 98 F | HEIGHT: 58 IN | RESPIRATION RATE: 18 BRPM | OXYGEN SATURATION: 100 % | SYSTOLIC BLOOD PRESSURE: 114 MMHG | BODY MASS INDEX: 29.18 KG/M2 | WEIGHT: 139 LBS | DIASTOLIC BLOOD PRESSURE: 72 MMHG | HEART RATE: 67 BPM

## 2020-04-22 DIAGNOSIS — E11.65 TYPE 2 DIABETES MELLITUS WITH HYPERGLYCEMIA, WITHOUT LONG-TERM CURRENT USE OF INSULIN (H): ICD-10-CM

## 2020-04-22 DIAGNOSIS — K21.00 GASTROESOPHAGEAL REFLUX DISEASE WITH ESOPHAGITIS: Primary | ICD-10-CM

## 2020-04-22 DIAGNOSIS — M25.561 ACUTE PAIN OF RIGHT KNEE: ICD-10-CM

## 2020-04-22 DIAGNOSIS — R53.83 FATIGUE, UNSPECIFIED TYPE: ICD-10-CM

## 2020-04-22 DIAGNOSIS — I10 ESSENTIAL HYPERTENSION, BENIGN: ICD-10-CM

## 2020-04-22 LAB
% GRANULOCYTES: 68.3 %G (ref 40–75)
BUN SERPL-MCNC: 18.8 MG/DL (ref 7–19)
CALCIUM SERPL-MCNC: 9.9 MG/DL (ref 8.5–10.1)
CHLORIDE SERPLBLD-SCNC: 107.9 MMOL/L (ref 98–110)
CO2 SERPL-SCNC: 26.9 MMOL/L (ref 20–32)
CREAT SERPL-MCNC: 1 MG/DL (ref 0.5–1)
FERRITIN SERPL-MCNC: 101 NG/ML (ref 10–130)
GFR SERPL CREATININE-BSD FRML MDRD: 64 ML/MIN/1.7 M2
GLUCOSE SERPL-MCNC: 137.6 MG'DL (ref 70–99)
GRANULOCYTES #: 4.1 K/UL (ref 1.6–8.3)
HBA1C MFR BLD: 6.2 % (ref 4.1–5.7)
HCT VFR BLD AUTO: 38.5 % (ref 35–47)
HEMOGLOBIN: 11.2 G/DL (ref 11.7–15.7)
LYMPHOCYTES # BLD AUTO: 1.5 K/UL (ref 0.8–5.3)
LYMPHOCYTES NFR BLD AUTO: 25.7 %L (ref 20–48)
MAGNESIUM SERPL-MCNC: 1.8 MG/DL (ref 1.8–2.6)
MCH RBC QN AUTO: 27.5 PG (ref 26.5–35)
MCHC RBC AUTO-ENTMCNC: 29.1 G/DL (ref 32–36)
MCV RBC AUTO: 94.3 FL (ref 78–100)
MID #: 0.4 K/UL (ref 0–2.2)
MID %: 6 %M (ref 0–20)
PLATELET # BLD AUTO: 272 K/UL (ref 150–450)
POTASSIUM SERPL-SCNC: 4.4 MMOL/L (ref 3.2–4.6)
RBC # BLD AUTO: 4.1 M/UL (ref 3.8–5.2)
SODIUM SERPL-SCNC: 141.5 MMOL/L (ref 132–142)
TSH SERPL DL<=0.05 MIU/L-ACNC: 1.24 UIU/ML (ref 0.3–5)
WBC # BLD AUTO: 6 K/UL (ref 4–11)

## 2020-04-22 RX ORDER — CALCIUM CARBONATE 500 MG/1
1 TABLET, CHEWABLE ORAL 2 TIMES DAILY PRN
Qty: 100 TABLET | Refills: 3 | Status: SHIPPED | OUTPATIENT
Start: 2020-04-22 | End: 2020-10-09

## 2020-04-22 RX ORDER — NAPROXEN 500 MG/1
500 TABLET ORAL 2 TIMES DAILY WITH MEALS
Qty: 60 TABLET | Refills: 0 | Status: SHIPPED | OUTPATIENT
Start: 2020-04-22 | End: 2020-05-15

## 2020-04-22 RX ORDER — FERROUS SULFATE 325(65) MG
325 TABLET ORAL
Qty: 30 TABLET | Refills: 1 | Status: SHIPPED | OUTPATIENT
Start: 2020-04-22 | End: 2020-05-18

## 2020-04-22 ASSESSMENT — PAIN SCALES - GENERAL: PAINLEVEL: EXTREME PAIN (8)

## 2020-04-22 ASSESSMENT — MIFFLIN-ST. JEOR: SCORE: 1104.54

## 2020-04-22 ASSESSMENT — PATIENT HEALTH QUESTIONNAIRE - PHQ9: SUM OF ALL RESPONSES TO PHQ QUESTIONS 1-9: 13

## 2020-04-22 NOTE — PROGRESS NOTES
Nursing Notes:   Xavier Valdivia CMA  4/22/2020  9:18 AM  Signed  Due to patient being non-English speaking/uses sign language, an  was used for this visit. Only for face-to-face interpretation by an external agency, date and length of interpretation can be found on the scanned worksheet.     name: Elian Onofre  Agency: [employer]  Language: Radha   Telephone number: [phone]  Type of interpretation: Face-to-face, spoken      SUBJECTIVE  Rachael Ch is a 54 year old female with past medical history significant for    Patient Active Problem List   Diagnosis     Essential hypertension, benign     Diabetes mellitus, type 2 (H)     Hyperlipidemia     Health Care Home     Helicobacter pylori gastritis     PTSD (post-traumatic stress disorder)     Moderate episode of recurrent major depressive disorder (H)     Cognitive complaints     Screening for depression     Microalbuminuria     Plantar fasciitis     Others present at the visit:   Shelly Bueno Via phone    Presents for   Chief Complaint   Patient presents with     Fatigue     f/u fatigue     Knee Pain     right knee pain x1 wk, pt fell inside the house because she tripped on a shoe      Patient presents for in person visit to discuss multiple issues.      First complaint is of palpitations, difficulty breathing, and feeling of numbness and tingling across her upper chest.  This is been happening 1-2 times per week.  Sometimes gets numb and achy and sometimes she gets a sharp pain.  It will last for a few minutes, up to 15-20 before goes away.  Usually this happens in the morning or during the day and is not happening at night.  She describes it as an electric sensation.  Family has gotten her sometimes which seems to help but takes a little while to kick in.  She gets very scared when these episodes happen.  Is not associated with exercise.  Does become short of breath, but not diaphoretic.    Second complaint is of generalized fatigue.   "Continues to have some difficulty with sleeping.  Feels nervous, worried, scared, and her thoughts will not slow down so it is hard to sleep.  Will often take her a while to fall asleep.  She is not having nightmares right now.  Feels safe.  Does feel like the medicine helped her sleep.  Feels tired all the time.  No overall weakness.  No specific focal areas of weakness.  No dizziness or lightheadedness.  We discussed her low iron and hemoglobin levels and she is interested in taking a pill for this.    Thirdly, patient would like to talk about right knee pain.  Has had pain here before, but had an injury 1 week ago where she tripped over a shoe and fell and hit her knee.  No bruising or significant swelling.  It feels sore and stiff over the entire knee.  Gets quite uncomfortable if she does not stretch it or walk.  She has been using Tylenol and Tiger balm which has been somewhat helpful.    She does have some questions about her medications.  Previously had a home nurse, but with COVID they are worried about doing this.  Instead her college-age daughters who are home with her have been setting up medications.  They do read and speak English.  Right now she is taking all of her medications together at nighttime.    We reviewed in particular her anxiety medication, the hydroxyzine, as well she can take this 1 pill a day at night, she can also take it as needed for symptoms.    OBJECTIVE:  Vitals: /72   Pulse 67   Temp 98  F (36.7  C) (Oral)   Resp 18   Ht 1.464 m (4' 9.64\")   Wt 63 kg (139 lb)   SpO2 100%   BMI 29.42 kg/m    BMI= Body mass index is 29.42 kg/m .  Vitals:  Vitals are reviewed and are within the normal range  Gen:  Alert, pleasant, no acute distress  Cardiac:  Regular rate and rhythm, no murmurs, rubs or gallops  Respiratory:  Lungs clear to auscultation bilaterally  Abdomen:  Soft, non-tender, non-distended, bowel sounds positive  Extremities:  Warm, well-perfused, pulses 2+/4, no lower " extremity edema.  Right knee with tenderness on both the medial and lateral joint line as well as posteriorly but worse on the medial side.  Some pain with patellar movement but minimal and some crepitus.  No palpable effusion.  No skin changes or bruising.  No warmth or drainage.    Results for orders placed or performed in visit on 04/22/20   CBC with Diff Plt ([facility])     Status: Abnormal   Result Value Ref Range    WBC 6.0 4.0 - 11.0 K/uL    Lymphocytes # 1.5 0.8 - 5.3 K/uL    % Lymphocytes 25.7 20.0 - 48.0 %L    Mid # 0.4 0.0 - 2.2 K/uL    Mid % 6.0 0.0 - 20.0 %M    GRANULOCYTES # 4.1 1.6 - 8.3 K/uL    % Granulocytes 68.3 40.0 - 75.0 %G    RBC 4.1 3.8 - 5.2 M/uL    Hemoglobin 11.2 (L) 11.7 - 15.7 g/dL    Hematocrit 38.5 35.0 - 47.0 %    MCV 94.3 78.0 - 100.0 fL    MCH 27.5 26.5 - 35.0    MCHC 29.1 (L) 32.0 - 36.0 g/dL    Platelets 272.0 150.0 - 450.0 K/uL   Hemoglobin A1c ([facility])     Status: Abnormal   Result Value Ref Range    Hemoglobin A1C 6.2 (H) 4.1 - 5.7 %   Basic Metabolic Panel ([place])     Status: Abnormal   Result Value Ref Range    Urea Nitrogen 18.8 7.0 - 19.0 mg/dL    Calcium 9.9 8.5 - 10.1 mg/dL    Chloride 107.9 98.0 - 110.0 mmol/L    Carbon Dioxide 26.9 20.0 - 32.0 mmol/L    Creatinine 1.0 0.5 - 1.0 mg/dL    Glucose 137.6 (H) 70.0 - 99.0 mg'dL    Potassium 4.4 3.2 - 4.6 mmol/L    Sodium 141.5 132.0 - 142.0 mmol/L    GFR Estimate 64.0 >60.0 mL/min/1.7 m2    GFR Estimate If Black 77.4 >60.0 mL/min/1.7 m2       ASSESSMENT AND PLAN:      Rachael was seen today for fatigue and knee pain.  Multiple issues were discussed today.    Diagnoses and all orders for this visit:    Gastroesophageal reflux disease with esophagitis.  I go to some-     calcium carbonate (TUMS) 500 MG chewable tablet; Take 1 tablet (500 mg) by mouth 2 times daily as needed for heartburn    Type 2 diabetes mellitus with hyperglycemia, without long-term current use of insulin (H).  Reviewed A1c which was appropriate.  She is  needing refills on her strips and lancets, so these were sent in today.  Called pharmacy to ensure they will mail out the prescriptions for her.  -     CBC with Diff Plt ([facility])  -     Hemoglobin A1c ([facility])  -     blood glucose (NO BRAND SPECIFIED) test strip; Use to test blood sugar 2 times daily or as directed.  -     blood glucose (NO BRAND SPECIFIED) lancets standard; Use to test blood sugar 2 times daily or as directed.    Essential hypertension, benign.  Blood pressure is well controlled today.  No change in medications.  Potassium and creatinine are normal.  Magnesium still pending.  -     Magnesium ([facility])  -     Basic Metabolic Panel ([place])    Fatigue, unspecified type.  Fatigue may be multifactorial.  Lungs and heart were clear today.  Her hemoglobin is low, so we will check a ferritin and prescribe iron supplements.  Awaiting thyroid results.  I do think a component of this is anxiety, and making sure she can take the hydroxyzine when needed with panic attacks will be important.  -     Ferritin ([facility])  -     Vitamin D 25-Hydroxy ([facility])  -     Thyroid [place] ([facility])  -     ferrous sulfate (FEROSUL) 325 (65 Fe) MG tablet; Take 1 tablet (325 mg) by mouth daily (with breakfast)    Acute pain of right knee.  Mechanical fall on right knee.  No evidence of fracture, mild discomfort on exam.  Was given an Ace wrap in clinic today, and will start naproxen 1 pill twice daily with food.  Discussed using ice on the knee at home as well.  Patient felt better with the Ace wrap in place.  -     naproxen (NAPROSYN) 500 MG tablet; Take 1 tablet (500 mg) by mouth 2 times daily (with meals)        Patient Instructions   For Right knee pain:  Take the Naproxen, 1 pill 2x per day for 1 week.  Take with food.   Use the Ace wrap on the knee  Use ice and keep the knee elevated.      For Fatigue:  Blood counts are low.  Take the iron pills, 1 pill per day to improve energy    STOP taking the  Ranitidine.  Just take the famotidine instead.      Okay to take all medications at night time.  Make sure that you are taking 1 pill of the Prazosin at night, and 1 pill of the Hydroxyzine at night.      Please take an extra pill of the Hydrozyxine during the day if you get rapid heart rate and tingling in your chest and shoulder.      I will call the pharmacy.  Please have your daughter call us if they have any questions.      Follow up in 1 month by phone.        Amrita Carballo MD

## 2020-04-22 NOTE — NURSING NOTE
Due to patient being non-English speaking/uses sign language, an  was used for this visit. Only for face-to-face interpretation by an external agency, date and length of interpretation can be found on the scanned worksheet.     name: Elian Adhikari  Agency: [employer]  Language: Radha   Telephone number: [phone]  Type of interpretation: Face-to-face, spoken

## 2020-04-22 NOTE — LETTER
April 24, 2020      Rachael Ch  [address], [place], operated by [facility] [id number]        Dear ,    We are writing to inform you of your test results.    Here is a copy of your lab results.  Your kidney tests look good.  Your vitamin D level is also good.  Your thyroid testing is good.  Magnesium levels are good.  Diabetes tests are good.  Blood counts are good.  Please let us know if you are not feeling better.  Please call the clinic at [phone] if you have any questions.       Resulted Orders   CBC with Diff Plt ([facility])   Result Value Ref Range    WBC 6.0 4.0 - 11.0 K/uL    Lymphocytes # 1.5 0.8 - 5.3 K/uL    % Lymphocytes 25.7 20.0 - 48.0 %L    Mid # 0.4 0.0 - 2.2 K/uL    Mid % 6.0 0.0 - 20.0 %M    GRANULOCYTES # 4.1 1.6 - 8.3 K/uL    % Granulocytes 68.3 40.0 - 75.0 %G    RBC 4.1 3.8 - 5.2 M/uL    Hemoglobin 11.2 (L) 11.7 - 15.7 g/dL    Hematocrit 38.5 35.0 - 47.0 %    MCV 94.3 78.0 - 100.0 fL    MCH 27.5 26.5 - 35.0    MCHC 29.1 (L) 32.0 - 36.0 g/dL    Platelets 272.0 150.0 - 450.0 K/uL   Hemoglobin A1c ([facility])   Result Value Ref Range    Hemoglobin A1C 6.2 (H) 4.1 - 5.7 %   Magnesium ([facility])   Result Value Ref Range    Magnesium 1.8 1.8 - 2.6 mg/dL    Narrative    Test performed by:  [facility]  [address] [place] [address]   Basic Metabolic Panel ([place])   Result Value Ref Range    Urea Nitrogen 18.8 7.0 - 19.0 mg/dL    Calcium 9.9 8.5 - 10.1 mg/dL    Chloride 107.9 98.0 - 110.0 mmol/L    Carbon Dioxide 26.9 20.0 - 32.0 mmol/L    Creatinine 1.0 0.5 - 1.0 mg/dL    Glucose 137.6 (H) 70.0 - 99.0 mg'dL    Potassium 4.4 3.2 - 4.6 mmol/L    Sodium 141.5 132.0 - 142.0 mmol/L    GFR Estimate 64.0 >60.0 mL/min/1.7 m2    GFR Estimate If Black 77.4 >60.0 mL/min/1.7 m2   Ferritin ([address])   Result Value Ref Range    Ferritin 101 10 - 130 ng/mL    Narrative    Test performed by:  [facility]  [address] [place] [address]   Vitamin D 25-Hydroxy ([address])   Result Value Ref Range     Vitamin D,25-Hydroxy 35.2 30.0 - 80.0 ng/mL    Narrative    Test performed by:  [facility] [address] [place] [address]  Deficiency <10.0 ng/mL  Insufficiency 10.0-29.9 ng/mL  Sufficiency 30.0-80.0 ng/mL  Toxicity (possible) >100.0 ng/mL   Thyroid [place] ([facility])   Result Value Ref Range    TSH 1.24 0.30 - 5.00 uIU/mL    Narrative    Test performed by:  [facility] [address] [place] [address]       If you have any questions or concerns, please call the clinic at the number listed above.       Sincerely,        Amrita Carballo MD

## 2020-04-23 LAB — 25(OH)D3 SERPL-MCNC: 35.2 NG/ML (ref 30–80)

## 2020-04-23 NOTE — PROGRESS NOTES
"Family Medicine Telephone Visit Note               Telephone Visit Consent   Patient was verbally read the following and verbal consent was obtained.  \"I understand that I may revoke this request for a phone visit at any time.  This consent will automatically  3 months from the signed date and time.\"    Name person giving consent:  Patient   Date verbal consent given:  20  Time verbal consent given:  9:10 AM    Due to patient being non-English speaking/uses sign language, an  was used for this visit. Only for face-to-face interpretation by an external agency, date and length of interpretation can be found on the scanned worksheet.     name: Elian Adhikari  Agency: [employer]  Language: Radha   Telephone number: [phone]  Type of interpretation: Telephone, spoken          "

## 2020-04-24 NOTE — RESULT ENCOUNTER NOTE
Rachael Bhatt-    Here is a copy of your lab results.  Your kidney tests look good.  Your vitamin D level is also good.  Your thyroid testing is good.  Magnesium levels are good.  Diabetes tests are good.  Blood counts are good.  Please let us know if you are not feeling better.  Please call the clinic at [phone] if you have any questions.      Amrita Carballo MD    Please send results to patient.

## 2020-05-01 ENCOUNTER — DOCUMENTATION ONLY (OUTPATIENT)
Dept: FAMILY MEDICINE | Facility: CLINIC | Age: 55
End: 2020-05-01

## 2020-05-05 ENCOUNTER — TRANSFERRED RECORDS (OUTPATIENT)
Dept: HEALTH INFORMATION MANAGEMENT | Facility: CLINIC | Age: 55
End: 2020-05-05

## 2020-05-26 ENCOUNTER — VIRTUAL VISIT (OUTPATIENT)
Dept: FAMILY MEDICINE | Facility: CLINIC | Age: 55
End: 2020-05-26
Payer: COMMERCIAL

## 2020-05-26 ENCOUNTER — TRANSFERRED RECORDS (OUTPATIENT)
Dept: HEALTH INFORMATION MANAGEMENT | Facility: CLINIC | Age: 55
End: 2020-05-26

## 2020-05-26 DIAGNOSIS — E11.29 TYPE 2 DIABETES MELLITUS WITH MICROALBUMINURIA, WITHOUT LONG-TERM CURRENT USE OF INSULIN (H): Primary | ICD-10-CM

## 2020-05-26 DIAGNOSIS — R80.9 TYPE 2 DIABETES MELLITUS WITH MICROALBUMINURIA, WITHOUT LONG-TERM CURRENT USE OF INSULIN (H): Primary | ICD-10-CM

## 2020-05-26 NOTE — PROGRESS NOTES
"Family Medicine Telephone Visit Note         Telephone Visit Consent   Patient was verbally read the following and verbal consent was obtained.    \"Telephone visits are billed at different rates depending on your insurance coverage. During this emergency period, for some insurers they may be billed the same as an in-person visit.  Please reach out to your insurance provider with any questions.  If during the course of the call the physician/provider feels a telephone visit is not appropriate, you will not be charged for this service.\"    Name person giving consent:  Patient   Date verbal consent given:  5/26/2020  Time verbal consent given:  4:29 PM    Chief Complaint   Patient presents with     Clinic Care Coordination - Follow-up     Pt is not sure what she is following up on today.  She thinks on medication.         name: EdÃºkame  Agency: [employer]  Language: Radha   Telephone number: [phone]  Type of interpretation: Telephone, spoken         HPI   Patients name: Rachael  Appointment start time:  4:32 PM    Diabetes Follow-up      Patient is checking blood sugars: not at all. Was sent the wrong test strips for her glucometer. Requesting the correct test strips for her Accu-chek Radha glucometer         -Last A1C was   Lab Results   Component Value Date    A1C 6.2 04/22/2020    A1C 6.2 02/11/2020    A1C 6.5 07/31/2019    A1C 6.6 04/17/2019    A1C 8.3 10/02/2018        Diabetic concerns: None    Chest Pain or exercise related calf pain (claudication):no     Symptoms of hypoglycemia (low blood sugar): none     Paresthesias (numbness or burning in feet) or sores: No     Diabetic eye exam within the last year?: No      Adherence and Exercise  Medication side effects: no  How often is a medication missed? Never      Current Outpatient Medications   Medication Sig Dispense Refill     acetaminophen (TYLENOL) 500 MG tablet Take 1 pill in Am, 1 pill in PM and 1 additional pill  tablet 3     " atorvastatin (LIPITOR) 80 MG tablet Take 1 tablet (80 mg) by mouth daily 90 tablet 3     blood glucose (ACCU-CHEK JEANNINE) test strip Use to test blood sugar 3-4 times daily or as directed. 400 strip 0     blood glucose (NO BRAND SPECIFIED) lancets standard Use to test blood sugar 2 times daily or as directed. 100 each 3     blood glucose (NO BRAND SPECIFIED) test strip Use to test blood sugar 2 times daily or as directed. 100 strip 3     calcium carbonate (TUMS) 500 MG chewable tablet Take 1 tablet (500 mg) by mouth 2 times daily as needed for heartburn 100 tablet 3     cholecalciferol 2000 units tablet Take 2,000 Units by mouth daily 100 tablet 3     famotidine (PEPCID) 40 MG tablet Take 1 tablet (40 mg) by mouth daily 90 tablet 3     ferrous sulfate (FEROSUL) 325 (65 Fe) MG tablet TAKE 1 TABLET (325 MG) BY MOUTH DAILY (WITH BREAKFAST) 30 tablet 1     hydrOXYzine (VISTARIL) 25 MG capsule TAKE 1 PILL AS NEEDED FOR PANIC, ANXIETY, CAN TAKE 1-2 AT NIGHT TO HELP WITH SLEEP. 100 capsule 3     JANUVIA 100 MG tablet TAKE 1 TABLET (100 MG) BY MOUTH DAILY 90 tablet 1     lisinopril (PRINIVIL/ZESTRIL) 40 MG tablet Take 1 tablet (40 mg) by mouth daily 90 tablet 1     metFORMIN (GLUCOPHAGE-XR) 500 MG 24 hr tablet Take 2 tablets (1,000 mg) by mouth 2 times daily (with meals) 360 tablet 3     naproxen (NAPROSYN) 500 MG tablet TAKE 1 TABLET (500 MG) BY MOUTH 2 TIMES DAILY (WITH MEALS) 60 tablet 0     pioglitazone (ACTOS) 45 MG tablet TAKE 1 TABLET (45 MG) BY MOUTH DAILY 90 tablet 0     prazosin (MINIPRESS) 2 MG capsule TAKE 1 CAPSULE (2 MG) BY MOUTH AT BEDTIME 90 capsule 0     senna-docusate (SENOKOT-S/PERICOLACE) 8.6-50 MG tablet Take 1 tablet by mouth daily 90 tablet 3     Allergies   Allergen Reactions     Gabapentin Other (See Comments)     Facial Swelling     Nkda [No Known Drug Allergies]      Venlafaxine      Causes abdominal pain, poor appetite and dizziness.                Review of Systems:     Constitutional, HEENT,  "cardiovascular, pulmonary, gi and gu systems are negative, except as otherwise noted.         Physical Exam:     There were no vitals taken for this visit.  Estimated body mass index is 29.42 kg/m  as calculated from the following:    Height as of 4/22/20: 1.464 m (4' 9.64\").    Weight as of 4/22/20: 63 kg (139 lb).    Exam:  Constitutional: healthy, alert and no distress  Psychiatric: mentation appears normal and affect normal/bright          Assessment and Plan   Rachael was seen today for clinic care coordination - follow-up.    Diagnoses and all orders for this visit:    Type 2 diabetes mellitus with microalbuminuria, without long-term current use of insulin (H)  -     blood glucose (ACCU-CHEK JEANNINE) test strip; Use to test blood sugar 3-4 times daily or as directed.      After Visit Information:  Patient declined AVS     Return in about 3 months (around 8/26/2020) for Diabetes f/u with PCP.    Appointment end time: 4:39 PM  This is a telephone visit that took 7 minutes.      Clinician location:  [facility] Patel MD  I precepted today with Dr. Darby.          "

## 2020-05-26 NOTE — NURSING NOTE
Due to patient being non-English speaking/uses sign language, an  was used for this visit. Only for face-to-face interpretation by an external agency, date and length of interpretation can be found on the scanned worksheet.     name: Elian Adhikari  Agency: [employer]  Language: Radha   Telephone number: [phone]  Type of interpretation: Telephone, spoken

## 2020-06-05 DIAGNOSIS — F43.10 PTSD (POST-TRAUMATIC STRESS DISORDER): ICD-10-CM

## 2020-06-05 RX ORDER — PRAZOSIN HYDROCHLORIDE 2 MG/1
2 CAPSULE ORAL AT BEDTIME
Qty: 90 CAPSULE | Refills: 3 | Status: SHIPPED | OUTPATIENT
Start: 2020-06-05 | End: 2020-09-30

## 2020-07-21 ENCOUNTER — VIRTUAL VISIT (OUTPATIENT)
Dept: FAMILY MEDICINE | Facility: CLINIC | Age: 55
End: 2020-07-21

## 2020-07-21 ENCOUNTER — DOCUMENTATION ONLY (OUTPATIENT)
Dept: FAMILY MEDICINE | Facility: CLINIC | Age: 55
End: 2020-07-21

## 2020-07-21 ENCOUNTER — MEDICAL CORRESPONDENCE (OUTPATIENT)
Dept: HEALTH INFORMATION MANAGEMENT | Facility: CLINIC | Age: 55
End: 2020-07-21

## 2020-07-21 DIAGNOSIS — M25.561 RIGHT KNEE PAIN, UNSPECIFIED CHRONICITY: Primary | ICD-10-CM

## 2020-07-21 NOTE — PROGRESS NOTES
To be completed in Nursing note:    Please reference list for forms that require a visit for completion.  Please remind patients that providers are given 3-5 business days to complete and return forms.      Form type: [facility] transitioning to retail pharmacy form    Date form received: 20    Date form completed by Physician: 20    How was form returned to patient (mailed, faxed, or at  for patient to ): Faxed back to [phone]    Date form mailed/faxed/left at  for patient and sent to HIM for [email or web address]      Once form is left for patient, faxed, or mailed PCS will then close the documentation only encounter.

## 2020-07-21 NOTE — PROGRESS NOTES
"Family Medicine Telephone Visit Note         Telephone Visit Consent   Patient was verbally read the following and verbal consent was obtained.    \"Telephone visits are billed at different rates depending on your insurance coverage. During this emergency period, for some insurers they may be billed the same as an in-person visit.  Please reach out to your insurance provider with any questions.  If during the course of the call the physician/provider feels a telephone visit is not appropriate, you will not be charged for this service.\"    Name person giving consent:  Patient   Date verbal consent given:  7/21/2020  Time verbal consent given:  2:23 PM    Chief Complaint   Patient presents with     Headache     Pt would like to talk about a headache.     Musculoskeletal Problem     Pt would like to discuss her leg pain.  She states that the injections did not help.      Musculoskeletal Problem     Pt would like to follow up on her arm.           HPI   Patients name: Rachael  Appointment start time:  2:27 PM    Joint/Muscle Pain      Onset: right knee has been bothering for 2 months now.     Injury?  Yes Details: has fallen down on the right knee in the past 2-3 months ago    Description:   Location(s): right knee, left heel  Character: Sharp and Dull ache    Intensity: 8-10/10    Progression of Symptoms: same    Accompanying Signs & Symptoms:  Other symptoms: none    History:   Previous similar pain: no      Worsened by    Overuse?: Yes Details: especially with walking/standing, worse at night    Alleviating factors:  Improved by: massage and knee brace  Therapies Tried and outcome: Injections into the knee with good relief for 2-3 weeks, takes a medication (unclear if it's IBU or Tylenol). Has tried some topical with minimal relief.           Current Outpatient Medications   Medication Sig Dispense Refill     acetaminophen (TYLENOL) 500 MG tablet Take 1 pill in Am, 1 pill in PM and 1 additional pill  tablet 3     " atorvastatin (LIPITOR) 80 MG tablet TAKE 1 TABLET (80 MG) BY MOUTH DAILY 90 tablet 3     blood glucose (ACCU-CHEK JEANNINE) test strip Use to test blood sugar 3-4 times daily or as directed. 400 strip 0     blood glucose (NO BRAND SPECIFIED) lancets standard Use to test blood sugar 2 times daily or as directed. 100 each 3     blood glucose (NO BRAND SPECIFIED) test strip Use to test blood sugar 2 times daily or as directed. 100 strip 3     calcium carbonate (TUMS) 500 MG chewable tablet Take 1 tablet (500 mg) by mouth 2 times daily as needed for heartburn 100 tablet 3     famotidine (PEPCID) 40 MG tablet Take 1 tablet (40 mg) by mouth daily 90 tablet 3     FEROSUL 325 (65 Fe) MG tablet TAKE 1 TABLET (325 MG) BY MOUTH DAILY (WITH BREAKFAST) 30 tablet 0     hydrOXYzine (VISTARIL) 25 MG capsule TAKE 1 PILL AS NEEDED FOR PANIC, ANXIETY, CAN TAKE 1-2 AT NIGHT TO HELP WITH SLEEP. 100 capsule 3     JANUVIA 100 MG tablet TAKE 1 TABLET (100 MG) BY MOUTH DAILY 90 tablet 1     lisinopril (ZESTRIL) 40 MG tablet TAKE 1 TABLET (40 MG) BY MOUTH DAILY 90 tablet 1     metFORMIN (GLUCOPHAGE-XR) 500 MG 24 hr tablet TAKE 2 TABLETS (1,000 MG) BY MOUTH 2 TIMES DAILY (WITH MEALS) 360 tablet 3     naproxen (NAPROSYN) 500 MG tablet Take 1 tablet (500 mg) by mouth 2 times daily as needed for moderate pain 30 tablet 1     pioglitazone (ACTOS) 45 MG tablet TAKE 1 TABLET (45 MG) BY MOUTH DAILY 90 tablet 1     prazosin (MINIPRESS) 2 MG capsule Take 1 capsule (2 mg) by mouth At Bedtime 90 capsule 3     senna-docusate (SENOKOT-S/PERICOLACE) 8.6-50 MG tablet Take 1 tablet by mouth daily 90 tablet 3     vitamin D3 (CHOLECALCIFEROL) 50 mcg (2000 units) tablet TAKE ONE TABLET BY MOUTH DAILY 100 tablet 3     Allergies   Allergen Reactions     Gabapentin Other (See Comments)     Facial Swelling     Nkda [No Known Drug Allergies]      Venlafaxine      Causes abdominal pain, poor appetite and dizziness.                Review of Systems:     Constitutional,  "HEENT, cardiovascular, pulmonary, GI, , musculoskeletal, neuro, skin, endocrine and psych systems are negative, except as otherwise noted.         Physical Exam:     There were no vitals taken for this visit.  Estimated body mass index is 29.42 kg/m  as calculated from the following:    Height as of 4/22/20: 1.464 m (4' 9.64\").    Weight as of 4/22/20: 63 kg (139 lb).    Exam:  Constitutional: healthy, alert and no distress  Psychiatric: mentation appears normal and affect normal/bright          Assessment and Plan   Rachael was seen today for headache, musculoskeletal problem and musculoskeletal problem.    Diagnoses and all orders for this visit:    Right knee pain, unspecified chronicity  Initially seen by [facility] on 5/05/2020 after fall with follow up on 5/26/2020 for cortisone injection. Plan was to follow up as needed. Offered repeat knee injection, which patient accepts. Offered PT, which she declines at this time, stating that she has been doing some exercises at home. Likely due to OA, make need to be referred out to Ortho if pain is still persisting.       After Visit Information:  Will print and mail AVS     Return in about 1 week (around 7/28/2020) for right knee injection.    Appointment end time: 2:53 PM  This is a telephone visit that took 26 minutes.      Clinician location:  [facility]     Norbert Patel MD  I precepted today with Dr. Darby.        "

## 2020-07-21 NOTE — PROGRESS NOTES
Preceptor Attestation:   I talked to the patient on the phone. I discussed the patient with the resident. I have verified the content of the note, which accurately reflects my assessment of the patient and the plan of care.   Supervising Physician:  Alvarado Darby MD.

## 2020-07-23 NOTE — NURSING NOTE
Due to patient being non-English speaking/uses sign language, an  was used for this visit. Only for face-to-face interpretation by an external agency, date and length of interpretation can be found on the scanned worksheet.       name: Silvino Banks (Htoo)  Language: Radha  Agency:  [employer]  Phone number: [phone]  Type of interpretation:  Face-to-face, spoken

## 2020-08-06 ENCOUNTER — OFFICE VISIT (OUTPATIENT)
Dept: FAMILY MEDICINE | Facility: CLINIC | Age: 55
End: 2020-08-06

## 2020-08-06 VITALS
HEART RATE: 78 BPM | TEMPERATURE: 98.6 F | OXYGEN SATURATION: 97 % | SYSTOLIC BLOOD PRESSURE: 143 MMHG | DIASTOLIC BLOOD PRESSURE: 71 MMHG | BODY MASS INDEX: 28.31 KG/M2 | WEIGHT: 133.8 LBS | RESPIRATION RATE: 24 BRPM

## 2020-08-06 DIAGNOSIS — R80.9 TYPE 2 DIABETES MELLITUS WITH MICROALBUMINURIA, WITHOUT LONG-TERM CURRENT USE OF INSULIN (H): Primary | ICD-10-CM

## 2020-08-06 DIAGNOSIS — G89.29 CHRONIC PAIN OF LEFT KNEE: ICD-10-CM

## 2020-08-06 DIAGNOSIS — E11.29 TYPE 2 DIABETES MELLITUS WITH MICROALBUMINURIA, WITHOUT LONG-TERM CURRENT USE OF INSULIN (H): Primary | ICD-10-CM

## 2020-08-06 DIAGNOSIS — M25.562 CHRONIC PAIN OF LEFT KNEE: ICD-10-CM

## 2020-08-06 DIAGNOSIS — I10 ESSENTIAL HYPERTENSION, BENIGN: ICD-10-CM

## 2020-08-06 DIAGNOSIS — E78.5 HYPERLIPIDEMIA, UNSPECIFIED HYPERLIPIDEMIA TYPE: ICD-10-CM

## 2020-08-06 LAB
ALBUMIN SERPL-MCNC: 4.6 MG/DL (ref 3.3–4.9)
ALP SERPL-CCNC: 65.6 U/L (ref 40–150)
ALT SERPL-CCNC: 16.2 U/L (ref 0–45)
AST SERPL-CCNC: 17.7 U/L (ref 0–45)
BILIRUB SERPL-MCNC: 0.7 MG/DL (ref 0.2–1.3)
BUN SERPL-MCNC: 15.8 MG/DL (ref 7–19)
CALCIUM SERPL-MCNC: 10 MG/DL (ref 8.5–10.1)
CHLORIDE SERPLBLD-SCNC: 100.9 MMOL/L (ref 98–110)
CHOLEST SERPL-MCNC: 133.5 MG/DL (ref 0–200)
CHOLEST/HDLC SERPL: 3.3 {RATIO} (ref 0–5)
CO2 SERPL-SCNC: 27.7 MMOL/L (ref 20–32)
CREAT SERPL-MCNC: 0.9 MG/DL (ref 0.5–1)
CREAT UR-MCNC: 54.4 MG/DL
GFR SERPL CREATININE-BSD FRML MDRD: 73 ML/MIN/1.7 M2
GLUCOSE SERPL-MCNC: 176 MG'DL (ref 70–99)
HBA1C MFR BLD: 6.8 % (ref 4.1–5.7)
HDLC SERPL-MCNC: 40.1 MG/DL
LDLC SERPL CALC-MCNC: 73 MG/DL (ref 0–129)
MICROALBUMIN UR-MCNC: 8.93 MG/DL (ref 0–1.99)
MICROALBUMIN/CREAT UR: 164.2 MG/G
POTASSIUM SERPL-SCNC: 3.8 MMOL/L (ref 3.2–4.6)
PROT SERPL-MCNC: 7.9 G/DL (ref 6.8–8.8)
SODIUM SERPL-SCNC: 141.1 MMOL/L (ref 132–142)
TRIGL SERPL-MCNC: 104.6 MG/DL (ref 0–150)
VLDL CHOLESTEROL: 20.9 MG/DL (ref 7–32)

## 2020-08-06 RX ORDER — DILTIAZEM HYDROCHLORIDE 120 MG/1
120 CAPSULE, EXTENDED RELEASE ORAL DAILY
Qty: 90 CAPSULE | Refills: 1 | Status: SHIPPED | OUTPATIENT
Start: 2020-08-06 | End: 2021-02-01

## 2020-08-06 NOTE — PROGRESS NOTES
"SUBJECTIVE:  Rachael Ch  Is a 55 year old female who presents today for follow up of diabetes mellitus type .2.    Patient is being treated with Januvia 100mg every day, Metformin 500mg 2 tabs twice a day, Actos 45mg every day    Patient glucose self monitoring as follows: Can't check because glucometer does not match test strips, once daily.   Results as follows: not available      BP:   BP Readings from Last 3 Encounters:   20 114/72   20 (!) 153/79   19 118/70      Does not have a home blood pressure cuff.    Previous A1cs:  Lab Results   Component Value Date    A1C 6.2 2020    A1C 6.2 2020    A1C 6.5 2019    A1C 6.6 2019    A1C 8.3 10/02/2018       Habits:   Social History     Tobacco Use     Smoking status: Former Smoker     Packs/day: 0.50     Types: Cigarettes     Last attempt to quit: 2017     Years since quittin.9     Smokeless tobacco: Current User     Types: Chew   Substance Use Topics     Alcohol use: No     Drug use: No     Diet: \"not so good\", does not have appetite   ASA  No  Exercise: Moderate  walking \"a bit\"    Current Concerns  Patient : is concerned about L knee hurting about 2 months. Went to ER a few months ago and got steroid injection into L knee with mild relief. Pain started after a fall few months ago, has gotten cortisone injections at [facility].     Patient Active Problem List   Diagnosis     Essential hypertension, benign     Diabetes mellitus, type 2 (H)     Hyperlipidemia     Health Care Home     Helicobacter pylori gastritis     PTSD (post-traumatic stress disorder)     Moderate episode of recurrent major depressive disorder (H)     Cognitive complaints     Screening for depression     Microalbuminuria     Plantar fasciitis       Medication allergies and current medications reviewed.  See EMR    Review Of Systems  Constitutional:No fevers, chills, or myalgias.  Skin: negative  Eyes: negative  Ears/Nose/Throat: negative  Respiratory: " No shortness of breath, dyspnea on exertion, cough, or hemoptysis  Cardiovascular: negative  Gastrointestinal: negative  Genitourinary: negative  Musculoskeletal: negative  Neurologic: negative  Psychiatric: negative  Hematologic/Lymphatic/Immunologic: negative  Endocrine: negative       OBJECTIVE:    EXAM:  BP (!) 143/71   Pulse 78   Temp 98.6  F (37  C) (Oral)   Resp 24   Wt 60.7 kg (133 lb 12.8 oz)   SpO2 97%   BMI 28.31 kg/m    Body mass index is 28.31 kg/m .  GEN: NAD, healthy, alert  EYES: grossly normal to inspection, PERRL, EOMI, normal conjunctivae/sclerae  HENT: normal ear canals/TM's, nose & mouth w/o ulcers or lesions, clear oropharynx, MMM  NECK: no LAD, masses  RESP: CTAB, no w/r/r  CV: RRR, nl S1/S2, no m/r/g, no peripheral edema  ABD: soft, NT/ND, no obvious hepatosplenomegaly/masses, no rebound, +BS throughout  MSK: no MSK defects noted, gait is age appropriate w/o ataxia. R knee in brace. B/L knees no effusion, no crepitus felt, no erythema, nontender to palpation.   SKIN: no suspicious lesions or rashes  NEURO: no obvious focal deficits, normal strength and tone, sensory exam grossly normal, mentation intact, speech normal  PSYCH: mentation appears normal, affect normal/bright                             Foot Exam:  normal DP and PT pulses, no trophic changes or ulcerative lesions and normal sensory exam    ASSESSMENT/PLAN:     Assessment/Plan:  Rachael Ch was seen today for T2DM f/u and L knee pain.     Diagnosis and all orders for this visit:     1. Type 2 diabetes mellitus with microalbuminuria, without long-term current use of insulin (H)  Order correct contour test strips - test only 1-2 times weekly  Continue Januvia, Metformin, and Actos  Labs: A1c, CMP, Lipid Panel, Microalb Creatinine ratio    2. Essential hypertension, benign  Start Diltiazem ER 120mg PO every day #90 (1RF)    3. Hyperlipidemia, unspecified hyperlipidemia type  Labs: A1c, CMP, Lipid Panel, Urine Microalb Creatinine  ratio     4. Knee Pain  Chronic knee pain, per patient report, Xray 2 months ago showed no Fx.  Start Voltaren Gel   F/U in 3 month with Dr. Carballo to discuss Home Eval for reduced PCA hours. Patient declined a home eval today and would like to discuss with Dr. Carballo next visit     Other orders:  Eye referral No, had exam in 1/2020  Microalbumin creatitine ratio No  Flu shot No  No changes in the patient's current treatment plan  Follow up in 3 months. with Dr. Carballo    Options for treatment and follow-up care were reviewed with the patient who was engaged and actively involved in the decision making process, verbalized understanding of the options discussed, and satisfied with the final plan.    Patient was staffed with supervising physician, Dr. Dunbar.     Javon Valdivia DO, PGY-1  [facility]

## 2020-08-06 NOTE — PATIENT INSTRUCTIONS
Start Diltiazem ER 120mg every day   Apply Voltaren (Diclofenac) gel to knees up to 4 times a day  Get labs after this visit: CMP, Lipid Panel, A1c, Urine Microalbuminemia Cr Ratio  Test strips ordered for Contour testing machine - only need to test 1 to 2 times a week.

## 2020-08-06 NOTE — PROGRESS NOTES
[facility] Visit    Subjective:  Rachael Ch is a 55 year old female with a PMHx significant for   Patient Active Problem List   Diagnosis     Essential hypertension, benign     Diabetes mellitus, type 2 (H)     Hyperlipidemia     Health Care Home     Helicobacter pylori gastritis     PTSD (post-traumatic stress disorder)     Moderate episode of recurrent major depressive disorder (H)     Cognitive complaints     Screening for depression     Microalbuminuria     Plantar fasciitis     Left knee pain    who presents with ***.     Objective:  Vitals:    08/06/20 1501   BP: (!) 143/71   Pulse: 78   Resp: 24   Temp: 98.6  F (37  C)   TempSrc: Oral   SpO2: 97%   Weight: 60.7 kg (133 lb 12.8 oz)     Body mass index is 28.31 kg/m .    GEN: NAD, healthy, alert  EYES: grossly normal to inspection, PERRL, EOMI, normal conjunctivae/sclerae  HENT: normal ear canals/TM's, nose & mouth w/o ulcers or lesions, clear oropharynx, MMM  NECK: no LAD, masses  RESP: CTAB, no w/r/r  CV: RRR, nl S1/S2, no m/r/g, no peripheral edema  ABD: soft, NT/ND, no obvious hepatosplenomegaly/masses, no rebound, +BS throughout  MSK: no MSK defects noted, gait is age appropriate w/o ataxia  SKIN: no suspicious lesions or rashes  NEURO: no obvious focal deficits, normal strength and tone, sensory exam grossly normal, mentation intact, speech normal  PSYCH: mentation appears normal, affect normal/bright    ***    Assessment/Plan:  Rachael was seen today for diabetes, knee pain and fatigue.    Diagnoses and all orders for this visit:    Type 2 diabetes mellitus with microalbuminuria, without long-term current use of insulin (H)  -     Comprehensive Metabolic Panel ([place])  -     Lipid Panel ([place])  -     Microalbumin Creatinine Ratio Random Ur ([facility])  -     blood glucose (NO BRAND SPECIFIED) test strip; Use to test blood sugar once or twice times weekly Patient has Contour test machine, please send [facility] CONTOUR test  strips.  -     Hemoglobin A1c ([facility])    Essential hypertension, benign  -     diltiazem ER (DILT-XR) 120 MG 24 hr capsule; Take 1 capsule (120 mg) by mouth daily    Hyperlipidemia, unspecified hyperlipidemia type  -     Lipid Panel ([place])    Chronic pain of left knee  -     diclofenac (VOLTAREN) 1 % topical gel; Place 2 g onto the skin 4 times daily         Options for treatment and follow-up care were reviewed with the patient who was engaged and actively involved in the decision making process, verbalized understanding of the options discussed, and satisfied with the final plan.    Patient was staffed with supervising physician,  {[facility] single:[id number]},    Javon Valdivia MD, DO, PGY1  [facility]

## 2020-08-06 NOTE — PROGRESS NOTES
Preceptor Attestation:    Patient seen and evaluated in person. I discussed the patient with the resident. I have verified the content of the note, which accurately reflects my assessment of the patient and the plan of care.   Supervising Physician:  Babak Dunbar MD.

## 2020-08-17 ENCOUNTER — TELEPHONE (OUTPATIENT)
Dept: FAMILY MEDICINE | Facility: CLINIC | Age: 55
End: 2020-08-17

## 2020-08-17 NOTE — TELEPHONE ENCOUNTER
Patient's daughter in law requesting a doctor's note stating that she should have a PCA.     They are interested in getting a PCA and the PCA facility is requesting a note from her provider.     They have not been in contact with the county yet and are not sure of the process other than needing this letter.     Routed to TRACY Garcia ./SEDRICK

## 2020-08-17 NOTE — TELEPHONE ENCOUNTER
[facility] phone call message- general phone call:    Reason for call: She needs a doctors note for PCA.    Action desired: call back.    Return call needed: Yes    OK to leave a message on voice mail? Yes    Advised patient to response may take up to 2 business days: Yes    Primary language: Radha      needed? Yes    Call taken on August 17, 2020 at 11:18 AM by Mic Shirley

## 2020-08-17 NOTE — LETTER
August 19, 2020      Rachael Ch  [address] [address]      To whom this may concern:    Rachael Ch is a long term patient of mine here at [facility].  She has multiple chronic medical problems including diabetes, hypertension, hyperlipidemia,  Major Depression and Post Traumatic Stress Disorder, as well as chronic neck, shoulder and knee pain from osteo arthritis.  She requires support from a home health nurse to manage and effectively take her medications safely at home, and requires additional support and monitoring from her family to manage her activities of daily living.  She has memory and cognitive challenges and at times has wandered off and gotten lost.  She requires support from family with cooking, cleaning, and to maintain safety at home.  She would benefit from more regular support to better manage her diabetes with regular blood sugar checks, healthy diabetic diet and monitoring of carbohydrates, and healthy, safe exercise, and blood pressure monitoring.  I am in support of PCA support for this patient to help her stay healthy nad safe at home, as well as to manage her daily ADL needs.  Please call the clinic at [phone] if you have any questions.        Sincerely,        Amrita Carballo MD

## 2020-08-18 NOTE — TELEPHONE ENCOUNTER
SW called [facility]- left a message as they did not answer. Requested a return call to discuss.     Routing to  and  as FILOMENA Dumont had previously sent the message to . Will ask  if he feels he has enough insights to right a letter of support to increase the PCA hours. He recently had 2 visits with patient, both having been virtual visits.       JAMAAL Hunter

## 2020-08-18 NOTE — TELEPHONE ENCOUNTER
CHELSEA called patient's daughter in law, Elayne to discuss PCA supports. The only time a letter from a PCP is needed, is when a person is already signed up and receiving PCA supports. CHELSEA spoke with Elayne. She confirms patient recieves PCA supports through [facility] based out of [place], [place]. Patient's son is the designated PCA worker but really the whole family provides cares. CHELSEA inquired why they are signed up through an agency that is located 3 hours away from [facility], the son was not sure but states they work well with that company and would like to continue services through them.     Elayne goes on to explain that Rachael had a PCA reassessment about 2-3 weeks ago. Present for the reassessment was patient and her youngest daughter. Elayne states this was problematic because that family member is not fully aware of the needs that Rachael has and was not a good advocate. It sounds like that same family member may have acted as the  for the assessment. The outcome of the assessment was PCA hours being reduced from 6.5 hours per day to 3.5 hours per day. That is a pretty significant reduction.     This SW has not met Rachael and is not aware of her level of functioning, so they inquired further on that. Elayne states that Rachael needs a watchful eye around the clock. She attributes this mostly to Rachael's mental health state. She indicates she has severe depression and has had moments of being suicidal. In addition, Elayne indicates that Rachael does not have a great gait, therefore, assistance with mobility and transfers are sometimes required. In addition, PCA helps with toileting and bathing. They also help with cooking and serving meals, although Rachael physically feeds herself.     CHELSEA and Elayne and her  discussed a letter of support from PCP. Informed them that  is out for the next 2 weeks. Stated that we could ask another provider to assist but that might be hard as Rachael primarily sees . She  has seen  recently, for 2 visits. He might be a back up option. The family would like the letter sooner rather than later as they state their are time constraints on getting the letter to the PCA agency. SW agreed to the call the agency to discuss this part further. Will connect with Tea and her  later today or tomorrow.     JAMAAL Hunter

## 2020-08-18 NOTE — TELEPHONE ENCOUNTER
This SW recieved a call back from [facility], [facility]. They were very helpful in informing of the appeal. The family has requested an appeal through [facility]. The PCA agency is not invovled in this process. They inform that a letter of medical support is just that, a supporting letter to the appeal the family has already sent in. They do need the letter by the end of this month as the hearing, which will occur via phone, with a , is scheduled for early September.     SW will continue to engage with the family around this request.    JAMAAL Hunter

## 2020-08-19 NOTE — TELEPHONE ENCOUNTER
Note written and attached to this message.  Will forward to CHELSEA Yañez to print and mail to family.      Amrita Carballo MD    ROuted to CHELSEA Yañez

## 2020-08-20 NOTE — TELEPHONE ENCOUNTER
This SW reached out to Elayne, patient's daughter in law, to inform her  was able to write a letter of support and to figure out how they wanted to receive it.     She did not answer. SW will try her again later today and tomorrow.     AJMAAL Hunter

## 2020-08-21 NOTE — TELEPHONE ENCOUNTER
SW called patient's daughter in law, Elayne Baltazar. She informs she would like to come pick the letter up in person. SW agree's to have it ready for her in an envelope, with both her name and patient's name on it. She will be to clinic between 2pm-3pm to pick it up at the .     JAMAAL Hunter

## 2020-09-02 ENCOUNTER — OFFICE VISIT (OUTPATIENT)
Dept: FAMILY MEDICINE | Facility: CLINIC | Age: 55
End: 2020-09-02

## 2020-09-02 ENCOUNTER — RECORDS - HEALTHEAST (OUTPATIENT)
Dept: ADMINISTRATIVE | Facility: OTHER | Age: 55
End: 2020-09-02

## 2020-09-02 VITALS
HEART RATE: 77 BPM | OXYGEN SATURATION: 99 % | RESPIRATION RATE: 16 BRPM | SYSTOLIC BLOOD PRESSURE: 152 MMHG | TEMPERATURE: 98.6 F | DIASTOLIC BLOOD PRESSURE: 76 MMHG

## 2020-09-02 DIAGNOSIS — Z23 NEED FOR PROPHYLACTIC VACCINATION AND INOCULATION AGAINST INFLUENZA: ICD-10-CM

## 2020-09-02 DIAGNOSIS — I10 ESSENTIAL HYPERTENSION, BENIGN: ICD-10-CM

## 2020-09-02 DIAGNOSIS — F33.1 MODERATE EPISODE OF RECURRENT MAJOR DEPRESSIVE DISORDER (H): ICD-10-CM

## 2020-09-02 DIAGNOSIS — M25.561 RIGHT KNEE PAIN, UNSPECIFIED CHRONICITY: ICD-10-CM

## 2020-09-02 DIAGNOSIS — E11.29 TYPE 2 DIABETES MELLITUS WITH MICROALBUMINURIA, WITHOUT LONG-TERM CURRENT USE OF INSULIN (H): Primary | ICD-10-CM

## 2020-09-02 DIAGNOSIS — R80.9 TYPE 2 DIABETES MELLITUS WITH MICROALBUMINURIA, WITHOUT LONG-TERM CURRENT USE OF INSULIN (H): Primary | ICD-10-CM

## 2020-09-02 DIAGNOSIS — R41.3 POOR MEMORY: ICD-10-CM

## 2020-09-02 DIAGNOSIS — F43.10 PTSD (POST-TRAUMATIC STRESS DISORDER): ICD-10-CM

## 2020-09-02 RX ORDER — BLOOD-GLUCOSE METER
EACH MISCELLANEOUS
Qty: 1 KIT | Refills: 0 | Status: SHIPPED | OUTPATIENT
Start: 2020-09-02 | End: 2021-11-30

## 2020-09-02 NOTE — NURSING NOTE
Due to patient being non-English speaking/uses sign language, an  was used for this visit. Only for face-to-face interpretation by an external agency, date and length of interpretation can be found on the scanned worksheet.     name: Silvino Banks (Htoo)  Language: Radha  Agency:  [employer]  Phone number: [phone]  Type of interpretation:  Face-to-face, spoken

## 2020-09-02 NOTE — PROGRESS NOTES
"  Nursing Notes:   Veronica [place]as, [place]  9/2/2020 10:50 AM  Signed  Due to patient being non-English speaking/uses sign language, an  was used for this visit. Only for face-to-face interpretation by an external agency, date and length of interpretation can be found on the scanned worksheet.     name: Silvino Banks (Htoo)  Language: Radha  Agency:  [employer]  Phone number: [phone]  Type of interpretation:  Face-to-face, spoken      SUBJECTIVE  Rachael Ch is a 55 year old female with past medical history significant for    Patient Active Problem List   Diagnosis     Essential hypertension, benign     Diabetes mellitus, type 2 (H)     Hyperlipidemia     Health Care Home     Helicobacter pylori gastritis     PTSD (post-traumatic stress disorder)     Moderate episode of recurrent major depressive disorder (H)     Cognitive complaints     Screening for depression     Microalbuminuria     Plantar fasciitis     Left knee pain     Right knee pain, unspecified chronicity     Others present at the visit:  Patient's daughter,   Silvino Waldronluis via phone    Presents for   Chief Complaint   Patient presents with     Counseling     Pt is here to discuss getting a PCA.     Medication Reconciliation     Complete.      Imm/Inj     Flu Shot     Patient presents today accompanied by her daughter for evaluation of a number of issues.    Patient reports that she is needing more support at home.  She currently has a PCA that helps her with \"everything \".  Helps with housework, laundry, cooking, dressing.  Daughter shares that family is quite worried about her and feels like she needs to be monitored nearly can tenuously.  She has wandered off and gotten lost.  Mental health is up and down at times she has suicidal thoughts and they are worried she might act on them.  Having a PCA allows them some breaks at providing care for her as well.    Patient shares that her energy has been poor.  Today things are " feeling worse.  She has good days and bad days.  When she has low energy she feels more sad and down.  When she is more sad and down she has less energy.  This happens a couple of times per month.  Also has pain in the pain contributes to her low moods.  She is frustrated because she continues to have pain in her right knee.  Did not improve much from injection in May.  She worries that she might fall, and that is limiting her opportunities to leave the house.  She does use a cane for this.  She gets pain in her right knee and alternatively in her left foot.  This pain often alternates.  She reports that sleep is been okay.  Is not having flashbacks.    Daughter shares that patient does like to spend time with extended family at a farm house in [place].  However has been more irritable, more secluded, and does not want to leave the house to do enjoyable things like go to the river, check out a sunflower field, or spend time with family.  Family has noticed that when she participates in those activities in the past she feels better.    Patient reports that she is taking her medication, and daughter shares that the son is trying to help with med set up.  She previously had a home nurse that came to set up her meds and this is very helpful.  It is been a couple months since the home nurses come.  They would like to resume this support.  Patient brought all of her medications with her today, but have a little understanding of the medications.  Initially only showed me some medications, and was missing some of her blood pressure pills from the supply.    Also they have had trouble checking blood sugars.  Received new test strips from the pharmacy that do not fit in their glucometer.  Have been unable to check sugars because of this.    Patient reports that things are just not going as well as they have been previously.anymore.      OBJECTIVE:  Vitals: BP (!) 152/76 (BP Location: Left arm, Patient Position: Sitting, Cuff  Size: Adult Regular)   Pulse 77   Temp 98.6  F (37  C) (Oral)   Resp 16   SpO2 99%   BMI= There is no height or weight on file to calculate BMI.  Vitals:  Vitals are reviewed and are within the normal range  Gen:  Alert, pleasant, no acute distress  Cardiac:  Regular rate and rhythm, no murmurs, rubs or gallops  Respiratory:  Lungs clear to auscultation bilaterally  Extremities:  Warm, well-perfused, pulses 2+/4, mild tenderness on the base of patient's left foot consistent with plantar fasciitis.  Knee exam is relatively normal there is a mild effusion, no erythema or warmth, and good range of motion.  She does walk with a limp, both with and without her cane.    Most recent lab testing from August 6 was reviewed.  Electrolytes were normal.  Liver function tests were normal.  Cholesterol was well controlled.  Hemoglobin A1c was well controlled at 6.8.    ASSESSMENT AND PLAN:      Rachael was seen today for counseling, medication reconciliation and imm/inj. multiple issues were discussed today.  Patient has significant depression, PTSD, and difficulties with memory.  Needs lots of support from family.  Benefits significantly from PCA hours.    I will place orders to restart her home nurse for medication set up.  Home nurse has been coming out every 2 weeks and this had worked well.    Letter already written indicating the importance of PCA hours for this patient as well as a list of her diagnoses.    I wrote a prescription for a 4 wheeled walker with seat and brake.  I think this will allow her to leave the house more often and more safely, which will be important for her mood and mental health.    I did not start any new medications, in particular mood medications because it is not completely clear what she is taking.  Has done some group therapy in the past, but was not interested in this right now.  Instead encouraged her to continue to engage with family and leave the house safely with family and be outdoors  when she is able to.    We sent a new glucometer kit that matches the test strips covered by her insurance.    Blood pressure is elevated, but I I suspect patient has not been taking her lisinopril.  It does look like she has been taking the new diltiazem as prescribed at her last visit.  Will recheck blood pressure in 1 month, and see what it looks like when her home nurse is setting up medications again.    Diagnoses and all orders for this visit:    Type 2 diabetes mellitus with microalbuminuria, without long-term current use of insulin (H)  -     blood glucose monitoring (ACCU-CHEK JEANNINE PLUS) meter device kit; Use to test blood sugar 1 times daily or as directed.    Right knee pain, unspecified chronicity  -     Walker Order for DME - ONLY FOR DME  -     Walker Order for DME - ONLY FOR DME    Need for prophylactic vaccination and inoculation against influenza  -     Fluzone quad, preserve-free/prefilled  0.5ml, 6+ months [35946]    Essential hypertension, benign    PTSD (post-traumatic stress disorder)    Moderate episode of recurrent major depressive disorder (H)    Poor memory        Patient Instructions   Take all the medications 1 pill 1x per day EXCEPT the tall bottle.  Take 2 pills 2x per day of that one.     a new glucometer that should work with the test strips that you have.     PCA meeting on 9/18.  Letter should have the information that you need.     Prescription for a walker to help make it easier to get outside more.  Take the paper prescription to:    [facility]  [address], [place]   PHONE: [phone]    Dr. Carballo will restart the home nurse to help with medication set up.      Follow up visit in 4 weeks to see Dr. Cabrallo        I spent 45 minutes with patient and daughter, 90% in counseling and coordination of care.    Follow up in 4 weeks for recheck mood, energy, diabetes, blood pressure.      Amrita Carballo MD

## 2020-09-08 ENCOUNTER — HOME CARE/HOSPICE - HEALTHEAST (OUTPATIENT)
Dept: HOME HEALTH SERVICES | Facility: HOME HEALTH | Age: 55
End: 2020-09-08

## 2020-09-09 ENCOUNTER — TELEPHONE (OUTPATIENT)
Dept: FAMILY MEDICINE | Facility: CLINIC | Age: 55
End: 2020-09-09

## 2020-09-09 NOTE — TELEPHONE ENCOUNTER
[facility] phone call message - order or referral request for patient:     Order or referral being requested: DELAY IN CARE        Additional Comments: due to capacity to Friday the 11th     OK to leave a message on voice mail? Yes    Primary language: Radha      needed? Yes    Call taken on September 9, 2020 at 1:24 PM by Fritz Arechiga    DELAY IN CARE

## 2020-09-10 ENCOUNTER — HOME CARE/HOSPICE - HEALTHEAST (OUTPATIENT)
Dept: HOME HEALTH SERVICES | Facility: HOME HEALTH | Age: 55
End: 2020-09-10

## 2020-09-11 ENCOUNTER — RECORDS - HEALTHEAST (OUTPATIENT)
Dept: ADMINISTRATIVE | Facility: OTHER | Age: 55
End: 2020-09-11

## 2020-09-11 ENCOUNTER — MEDICAL CORRESPONDENCE (OUTPATIENT)
Dept: HEALTH INFORMATION MANAGEMENT | Facility: CLINIC | Age: 55
End: 2020-09-11

## 2020-09-11 ENCOUNTER — HOME CARE/HOSPICE - HEALTHEAST (OUTPATIENT)
Dept: HOME HEALTH SERVICES | Facility: HOME HEALTH | Age: 55
End: 2020-09-11

## 2020-09-15 ENCOUNTER — HOME CARE/HOSPICE - HEALTHEAST (OUTPATIENT)
Dept: HOME HEALTH SERVICES | Facility: HOME HEALTH | Age: 55
End: 2020-09-15

## 2020-09-18 ENCOUNTER — HOME CARE/HOSPICE - HEALTHEAST (OUTPATIENT)
Dept: HOME HEALTH SERVICES | Facility: HOME HEALTH | Age: 55
End: 2020-09-18

## 2020-09-22 ENCOUNTER — TELEPHONE (OUTPATIENT)
Dept: FAMILY MEDICINE | Facility: CLINIC | Age: 55
End: 2020-09-22

## 2020-09-22 ENCOUNTER — HOME CARE/HOSPICE - HEALTHEAST (OUTPATIENT)
Dept: HOME HEALTH SERVICES | Facility: HOME HEALTH | Age: 55
End: 2020-09-22

## 2020-09-22 NOTE — TELEPHONE ENCOUNTER
Prescription for famotidine has already been sent.  Can you call and let them know?  I'm happy to send in further refills if needed.     Amrita Carballo MD    Routed to FELIZ Martin

## 2020-09-22 NOTE — TELEPHONE ENCOUNTER
[facility] phone call message- medication clarification/question:    Full Medication Name:    ranitidine (ZANTAC) 300 MG tablet   TAKE 1 TABLET (300 MG) BY MOUTH AT BEDTIME - Oral     Question:     They keep receiving prescriptions for medication, but medication is discontinued. Wondering if they can get a verbal for Famotidine?    Pharmacy confirmed as [place], [place] - [address]. [address]: Yes    OK to leave a message on voice mail? Yes    Primary language: Radha      needed? Yes    Call taken on September 22, 2020 at 10:00 AM by Lissette Rojas CMA

## 2020-09-25 ENCOUNTER — HOME CARE/HOSPICE - HEALTHEAST (OUTPATIENT)
Dept: HOME HEALTH SERVICES | Facility: HOME HEALTH | Age: 55
End: 2020-09-25

## 2020-09-28 ENCOUNTER — HOME CARE/HOSPICE - HEALTHEAST (OUTPATIENT)
Dept: HOME HEALTH SERVICES | Facility: HOME HEALTH | Age: 55
End: 2020-09-28

## 2020-09-30 ENCOUNTER — OFFICE VISIT (OUTPATIENT)
Dept: FAMILY MEDICINE | Facility: CLINIC | Age: 55
End: 2020-09-30

## 2020-09-30 ENCOUNTER — TELEPHONE (OUTPATIENT)
Dept: FAMILY MEDICINE | Facility: CLINIC | Age: 55
End: 2020-09-30

## 2020-09-30 VITALS
WEIGHT: 132.4 LBS | BODY MASS INDEX: 28.02 KG/M2 | SYSTOLIC BLOOD PRESSURE: 120 MMHG | TEMPERATURE: 98.2 F | RESPIRATION RATE: 16 BRPM | OXYGEN SATURATION: 100 % | HEART RATE: 77 BPM | DIASTOLIC BLOOD PRESSURE: 75 MMHG

## 2020-09-30 DIAGNOSIS — M19.011 PRIMARY OSTEOARTHRITIS OF RIGHT SHOULDER: ICD-10-CM

## 2020-09-30 DIAGNOSIS — I10 ESSENTIAL HYPERTENSION, BENIGN: ICD-10-CM

## 2020-09-30 DIAGNOSIS — F43.10 PTSD (POST-TRAUMATIC STRESS DISORDER): ICD-10-CM

## 2020-09-30 DIAGNOSIS — M25.561 RIGHT KNEE PAIN, UNSPECIFIED CHRONICITY: Primary | ICD-10-CM

## 2020-09-30 DIAGNOSIS — F33.1 MODERATE EPISODE OF RECURRENT MAJOR DEPRESSIVE DISORDER (H): ICD-10-CM

## 2020-09-30 RX ORDER — PRAZOSIN HYDROCHLORIDE 5 MG/1
5 CAPSULE ORAL AT BEDTIME
Qty: 90 CAPSULE | Refills: 1 | Status: SHIPPED | OUTPATIENT
Start: 2020-09-30 | End: 2021-03-05

## 2020-09-30 NOTE — PROGRESS NOTES
Nursing Notes:   Veronica [place]as, [place]  9/30/2020 11:34 AM  Signed  Due to patient being non-English speaking/uses sign language, an  was used for this visit. Only for face-to-face interpretation by an external agency, date and length of interpretation can be found on the scanned worksheet.     name: Silvino  Darren  Language: Radha  Agency:  [employer]  Phone number: [phone]  Type of interpretation:  Face-to-face, spoken      SUBJECTIVE  Rachael Ch is a 55 year old female with past medical history significant for    Patient Active Problem List   Diagnosis     Essential hypertension, benign     Diabetes mellitus, type 2 (H)     Hyperlipidemia     Health Care Home     Helicobacter pylori gastritis     PTSD (post-traumatic stress disorder)     Moderate episode of recurrent major depressive disorder (H)     Cognitive complaints     Screening for depression     Microalbuminuria     Plantar fasciitis     Left knee pain     Right knee pain, unspecified chronicity       Others present at the visit:   Silvino Banks, patient's daugther,     Presents for   Chief Complaint   Patient presents with     MOOD CHANGES     Pt is here to follow up on her mood.     Medication Reconciliation     Complete.      Patient presents today with her daughter, and  tries to tie you for follow-up on her mood.  She reports that things are okay.  Still feels very down.  Physic she is tired all the time.  Is having difficulty sleeping.  Goes to bed around 10 PM, and then will wake up between 1 AM and 3 AM multiple times.  Feels tired in the morning.  Still having some nightmares, and difficulty getting back to sleep.  Daughter shares that she continues to disengage her activities.  Is doing more walking.  They were able to obtained a four-wheel walker, and they take this to the park.  Few comments that she worries about using a walker because her grandchildren like to play with it, and she worries  that it is unsafe for them.      She had about a week where her knee pain felt better, now it is bothersome again.  Right worse than left.  Worse on the lateral side.  No instability.  No recent falls.  Tylenol and naproxen helps somewhat.  Knee sleeve helps somewhat.  Using walker helps somewhat.  Notes that when she walks more he gets more sore.  Had an injection done back in May, which did not help initially but maybe helped for a while after that.  She does not have much hector in another injection would help but does not want 1 of these today.  We discussed that the next step would be to consider surgery and she is not interested in this.  Says she is much too scared and too old for it.    She has reestablished with her home nurse.  Is taking medications more regularly.  They were able to get the correct glucose strips for her glucometer.  Blood sugars have been typically between 120 and 140.  She reports feeling better with low sugars.  Is taking her diabetes medicines regularly.  Has been eating but feels like her appetite is down.  Has been taking her blood pressure medicines as well.  Blood pressure is well controlled today.    OBJECTIVE:  Vitals: /75 (BP Location: Left arm, Patient Position: Sitting, Cuff Size: Adult Regular)   Pulse 77   Temp 98.2  F (36.8  C) (Oral)   Resp 16   Wt 60.1 kg (132 lb 6.4 oz)   SpO2 100%   BMI 28.02 kg/m    BMI= Body mass index is 28.02 kg/m .  Objective:    Vitals:  Vitals are reviewed and are within the normal range.  Large well controlled.  Gen:  Alert, pleasant, no acute distress  Psych: Mood and affect are flat, blunted.  Patient smiles and engages however when talking about her grandson.  Continues to endorse difficulty with cognition and memory.  Feels very tired and fatigued.  Cardiac:  Regular rate and rhythm, no murmurs, rubs or gallops  Respiratory:  Lungs clear to auscultation bilaterally  Abdomen:  Soft, non-tender, non-distended, bowel sounds  positive  Extremities:  Warm, well-perfused, pulses 2+/4, no lower extremity edema.  Walks with a cane.  Mild limp.    I reviewed all of patient's medications and her updated med list today.    ASSESSMENT AND PLAN:      Rachael was seen today for mood changes and medication reconciliation.  Multiple issues discussed today.  Please concern is continued persistent depression.  Having difficulty with sleep at night.  We will increase her prazosin from 2 mg to 5 mg.  Left message on her AVS to tell home nurse to increase from 2-4 until the new prescription for 5 mg capsules comes in.  Otherwise will not change medications today.    For the knee pain, recommended Tylenol, ice, elevation, knee sleeve, Voltaren gel, and discussed with patient that all of these will only help a little, but hopefully together will keep her pain under reasonable control.  We will continue to talk about knee replacements, as her lack of mobility severely affects her mood.    Blood sugars appear well controlled for diabetes.  No change in medications.    Patient to follow-up with me in 6 weeks for recheck.  Requested in person  given patient's underlying cognitive function mental health, as well as to have her daughter attend these visits with her.    Diagnoses and all orders for this visit:    Right knee pain, unspecified chronicity    PTSD (post-traumatic stress disorder)  -     prazosin (MINIPRESS) 5 MG capsule; Take 1 capsule (5 mg) by mouth At Bedtime    Essential hypertension, benign    Primary osteoarthritis of right shoulder    Moderate episode of recurrent major depressive disorder (H)      Patient Instructions   Increase Prazosin to 4mg per day at night until new bottle comes in.   New Prazosin will be 5mg daily at night (but only 1 pill)    Keep walking!  Keep using the knee sleeve and the walker.    Check in in 6 weeks.        Amrita Carballo MD

## 2020-09-30 NOTE — NURSING NOTE
Due to patient being non-English speaking/uses sign language, an  was used for this visit. Only for face-to-face interpretation by an external agency, date and length of interpretation can be found on the scanned worksheet.     name: Silvino Banks (Htoo)  Language: Radha  Agency:  [employer]  Phone number: [phone]  Type of interpretation:  Face-to-face, spoken

## 2020-09-30 NOTE — PATIENT INSTRUCTIONS
Increase Prazosin to 4mg per day at night until new bottle comes in.   New Prazosin will be 5mg daily at night (but only 1 pill)    Keep walking!  Keep using the knee sleeve and the walker.    Check in in 6 weeks.

## 2020-09-30 NOTE — TELEPHONE ENCOUNTER
[facility] phone call message- general phone call:    Reason for call:     Verbal Orders    Action desired:     SN - Pt is currently once a week for the next 4 weeks.     Pt's requesting every 2 weeks until 6 more weeks because she has two homes and where they service pt, pt has to travel there.    Return call needed: Yes    OK to leave a message on voice mail? Yes    Advised patient to response may take up to 2 business days: Yes    Primary language: Radha      needed? Yes    Call taken on September 30, 2020 at 8:29 AM by Lissette Rojas CMA

## 2020-10-01 ASSESSMENT — PATIENT HEALTH QUESTIONNAIRE - PHQ9: SUM OF ALL RESPONSES TO PHQ QUESTIONS 1-9: 17

## 2020-10-02 ENCOUNTER — HOME CARE/HOSPICE - HEALTHEAST (OUTPATIENT)
Dept: HOME HEALTH SERVICES | Facility: HOME HEALTH | Age: 55
End: 2020-10-02

## 2020-10-16 ENCOUNTER — HOME CARE/HOSPICE - HEALTHEAST (OUTPATIENT)
Dept: HOME HEALTH SERVICES | Facility: HOME HEALTH | Age: 55
End: 2020-10-16

## 2020-10-19 ENCOUNTER — TELEPHONE (OUTPATIENT)
Dept: FAMILY MEDICINE | Facility: CLINIC | Age: 55
End: 2020-10-19

## 2020-10-19 NOTE — TELEPHONE ENCOUNTER
[facility] phone call message - order or referral request for patient:     Order or referral being requested: skilled nursing       Additional Comments: 3 PRN visits     OK to leave a message on voice mail? Yes    Primary language: Radha      needed? Yes    Call taken on October 19, 2020 at 11:17 AM by Fritz Arechiga

## 2020-10-30 ENCOUNTER — HOME CARE/HOSPICE - HEALTHEAST (OUTPATIENT)
Dept: HOME HEALTH SERVICES | Facility: HOME HEALTH | Age: 55
End: 2020-10-30

## 2020-10-30 ENCOUNTER — OFFICE VISIT (OUTPATIENT)
Dept: FAMILY MEDICINE | Facility: CLINIC | Age: 55
End: 2020-10-30
Payer: COMMERCIAL

## 2020-10-30 VITALS
OXYGEN SATURATION: 100 % | DIASTOLIC BLOOD PRESSURE: 77 MMHG | RESPIRATION RATE: 16 BRPM | TEMPERATURE: 98.1 F | BODY MASS INDEX: 28.27 KG/M2 | HEART RATE: 72 BPM | WEIGHT: 133.6 LBS | SYSTOLIC BLOOD PRESSURE: 144 MMHG

## 2020-10-30 DIAGNOSIS — R80.9 TYPE 2 DIABETES MELLITUS WITH MICROALBUMINURIA, WITHOUT LONG-TERM CURRENT USE OF INSULIN (H): Primary | ICD-10-CM

## 2020-10-30 DIAGNOSIS — E11.29 TYPE 2 DIABETES MELLITUS WITH MICROALBUMINURIA, WITHOUT LONG-TERM CURRENT USE OF INSULIN (H): Primary | ICD-10-CM

## 2020-10-30 DIAGNOSIS — R53.83 FATIGUE, UNSPECIFIED TYPE: ICD-10-CM

## 2020-10-30 DIAGNOSIS — M25.561 ACUTE PAIN OF RIGHT KNEE: ICD-10-CM

## 2020-10-30 DIAGNOSIS — F43.10 PTSD (POST-TRAUMATIC STRESS DISORDER): ICD-10-CM

## 2020-10-30 DIAGNOSIS — M19.011 PRIMARY OSTEOARTHRITIS OF RIGHT SHOULDER: ICD-10-CM

## 2020-10-30 DIAGNOSIS — I10 ESSENTIAL HYPERTENSION, BENIGN: ICD-10-CM

## 2020-10-30 DIAGNOSIS — F33.1 MODERATE EPISODE OF RECURRENT MAJOR DEPRESSIVE DISORDER (H): ICD-10-CM

## 2020-10-30 PROCEDURE — 99214 OFFICE O/P EST MOD 30 MIN: CPT | Performed by: STUDENT IN AN ORGANIZED HEALTH CARE EDUCATION/TRAINING PROGRAM

## 2020-10-30 ASSESSMENT — PATIENT HEALTH QUESTIONNAIRE - PHQ9: SUM OF ALL RESPONSES TO PHQ QUESTIONS 1-9: 7

## 2020-10-30 NOTE — NURSING NOTE
Due to patient being non-English speaking/uses sign language, an  was used for this visit. Only for face-to-face interpretation by an external agency, date and length of interpretation can be found on the scanned worksheet.     name: Silvino Banks (Htoo)  Language: Radha  Agency:  [employer]  Phone number: [phone]  Type of interpretation:  Face-to-face, spoken

## 2020-10-30 NOTE — PATIENT INSTRUCTIONS
Updated medication list:    Medicines to STOP taking:    Ferrous Sulfate  Januvia  Naproxen    Medications to take as needed ONLY  Acetaminophen  TUMS      Continue other medications.      Second bag of medications is empty bottles and duplicate medications.

## 2020-10-31 NOTE — PROGRESS NOTES
Nursing Notes:   Veronica Rosas CMA  10/30/2020 11:19 AM  Signed  Due to patient being non-English speaking/uses sign language, an  was used for this visit. Only for face-to-face interpretation by an external agency, date and length of interpretation can be found on the scanned worksheet.     name: Silvino  Darren  Language: Radha  Agency:  [employer]  Phone number: [phone]  Type of interpretation:  Face-to-face, spoken        SUBJECTIVE  Rachael Ch is a 55 year old female with past medical history significant for    Patient Active Problem List   Diagnosis     Essential hypertension, benign     Diabetes mellitus, type 2 (H)     Hyperlipidemia     Health Care Home     Helicobacter pylori gastritis     PTSD (post-traumatic stress disorder)     Moderate episode of recurrent major depressive disorder (H)     Cognitive complaints     Screening for depression     Microalbuminuria     Plantar fasciitis     Left knee pain     Right knee pain, unspecified chronicity     Others present at the visit:   Silvino Banks in person    Presents for   Chief Complaint   Patient presents with     Clinic Care Coordination - Follow-up     Pt is here to follow up on a few things from her last visit.     Medication Reconciliation     Complete.      This is a 55-year-old female presenting for follow-up of multiple chronic problems.  Seen today with the help of in person  Silvino Banks.  Patient reports overall she is doing well.  Pain is improved.  Knees are feeling better.  Wrist and thumb pain has resolved.  Headaches are better.  Dizziness is better.  She is sleeping well.  Having fewer flashbacks, and overall feels good.  She feels like she takes too many meds and would like us to review them today and see if we can decrease her last.    No new complaints.  We reviewed her list together.  She currently has a home nurse that comes out to set up medications.  Really likes when they come  with an  that explains things.  She is becoming quite knowledgeable in her medications.  They are checking sugars at home and this is going well.    OBJECTIVE:  Vitals: BP (!) 144/77 (BP Location: Right arm, Patient Position: Sitting, Cuff Size: Adult Regular)   Pulse 72   Temp 98.1  F (36.7  C) (Oral)   Resp 16   Wt 60.6 kg (133 lb 9.6 oz)   SpO2 100%   BMI 28.27 kg/m    BMI= Body mass index is 28.27 kg/m .  Objective:    Vitals:  Vitals are reviewed and are within the normal range  Gen:  Alert, pleasant, no acute distress  Cardiac:  Regular rate and rhythm, no murmurs, rubs or gallops  Respiratory:  Lungs clear to auscultation bilaterally  Abdomen:  Soft, non-tender, non-distended, bowel sounds positive  Extremities: Normal range of motion of her wrist, thumbs, and hands bilaterally with no swelling or erythema.      ASSESSMENT AND PLAN:      Rachael was seen today for clinic care coordination - follow-up and medication reconciliation.  Multiple issues discussed today.  Overall patient is noticing improvement in all of her symptoms.  We reviewed her medication list and will make the following changes.      We will stop the Januvia, but continue with the Metformin and the pioglitazone.      Blood pressure is borderline, so we will not make changes in those medications.  She is currently taking diltiazem, lisinopril, and prazosin at night for PTSD.  Denies dizziness or lightheadedness.    Mood is better currently.  Finds the hydroxyzine helpful for sleep.  Finds the prazosin helpful for nightmares and hypervigilance.  Feels like the vitamin D gives her more energy.    Stomach symptoms overall are good.  She uses the Tums as needed.    Is just doing Tylenol as needed for pain and this seems to be effective.  No changes here.    We will also discontinue her ferrous sulfate, and recheck hemoglobin at next visit.    Diagnoses and all orders for this visit:    Type 2 diabetes mellitus with  microalbuminuria, without long-term current use of insulin (H)    Fatigue, unspecified type    Moderate episode of recurrent major depressive disorder (H)    Essential hypertension, benign    PTSD (post-traumatic stress disorder)    Acute pain of right knee    Primary osteoarthritis of right shoulder        Patient Instructions   Updated medication list:    Medicines to STOP taking:    Ferrous Sulfate  Januvia  Naproxen    Medications to take as needed ONLY  Acetaminophen  TUMS      Continue other medications.      Second bag of medications is empty bottles and duplicate medications.        Patient will follow-up in 6 weeks for recheck.  We will continue with education on medications, monitoring, and symptoms.  Appreciate assistance from family and home nurse with managing medications as she does have a complex list.    Amrita Carballo MD

## 2020-11-06 ENCOUNTER — HOME CARE/HOSPICE - HEALTHEAST (OUTPATIENT)
Dept: HOME HEALTH SERVICES | Facility: HOME HEALTH | Age: 55
End: 2020-11-06

## 2020-11-06 ENCOUNTER — TELEPHONE (OUTPATIENT)
Dept: FAMILY MEDICINE | Facility: CLINIC | Age: 55
End: 2020-11-06

## 2020-11-20 ENCOUNTER — HOME CARE/HOSPICE - HEALTHEAST (OUTPATIENT)
Dept: HOME HEALTH SERVICES | Facility: HOME HEALTH | Age: 55
End: 2020-11-20

## 2020-12-04 ENCOUNTER — HOME CARE/HOSPICE - HEALTHEAST (OUTPATIENT)
Dept: HOME HEALTH SERVICES | Facility: HOME HEALTH | Age: 55
End: 2020-12-04

## 2020-12-17 ENCOUNTER — HOME CARE/HOSPICE - HEALTHEAST (OUTPATIENT)
Dept: HOME HEALTH SERVICES | Facility: HOME HEALTH | Age: 55
End: 2020-12-17

## 2021-01-02 ENCOUNTER — HOME CARE/HOSPICE - HEALTHEAST (OUTPATIENT)
Dept: HOME HEALTH SERVICES | Facility: HOME HEALTH | Age: 56
End: 2021-01-02

## 2021-01-06 ENCOUNTER — HOME CARE/HOSPICE - HEALTHEAST (OUTPATIENT)
Dept: HOME HEALTH SERVICES | Facility: HOME HEALTH | Age: 56
End: 2021-01-06

## 2021-01-12 ENCOUNTER — TELEPHONE (OUTPATIENT)
Dept: FAMILY MEDICINE | Facility: CLINIC | Age: 56
End: 2021-01-12

## 2021-01-12 NOTE — TELEPHONE ENCOUNTER
Oked verbal orders 1/6/21 for ongoing home care for next 60 days beginning 1/9/21. Skilled nursing every 2 weeks for medication set up and DM management. ./LR

## 2021-01-20 ENCOUNTER — HOME CARE/HOSPICE - HEALTHEAST (OUTPATIENT)
Dept: HOME HEALTH SERVICES | Facility: HOME HEALTH | Age: 56
End: 2021-01-20

## 2021-01-29 NOTE — PROGRESS NOTES
To be completed in Nursing note:    Please reference list for forms that require a visit for completion.  Please remind patients that providers are given 3-5 business days to complete and return forms.      Form type: [employer] Form    Date form received: 20    Date form completed by Physician: 20    How was form returned to patient (mailed, faxed, or at  for patient to ): Faxed back to [phone]    Date form mailed/faxed/left at  for patient and sent to HIM for scannin20      Once form is left for patient, faxed, or mailed PCS will then close the documentation only encounter.     
157

## 2021-02-03 ENCOUNTER — HOME CARE/HOSPICE - HEALTHEAST (OUTPATIENT)
Dept: HOME HEALTH SERVICES | Facility: HOME HEALTH | Age: 56
End: 2021-02-03

## 2021-02-20 ENCOUNTER — HOME CARE/HOSPICE - HEALTHEAST (OUTPATIENT)
Dept: HOME HEALTH SERVICES | Facility: HOME HEALTH | Age: 56
End: 2021-02-20

## 2021-02-22 ENCOUNTER — HOME CARE/HOSPICE - HEALTHEAST (OUTPATIENT)
Dept: HOME HEALTH SERVICES | Facility: HOME HEALTH | Age: 56
End: 2021-02-22

## 2021-03-04 ENCOUNTER — HOME CARE/HOSPICE - HEALTHEAST (OUTPATIENT)
Dept: HOME HEALTH SERVICES | Facility: HOME HEALTH | Age: 56
End: 2021-03-04

## 2021-03-04 ENCOUNTER — TELEPHONE (OUTPATIENT)
Dept: FAMILY MEDICINE | Facility: CLINIC | Age: 56
End: 2021-03-04

## 2021-03-04 ENCOUNTER — RECORDS - HEALTHEAST (OUTPATIENT)
Dept: ADMINISTRATIVE | Facility: OTHER | Age: 56
End: 2021-03-04

## 2021-03-04 NOTE — TELEPHONE ENCOUNTER
Angel Luis verbal orders for skilled nursing every 3 weeks     Patient has not had office visit since 10/30/20 - scheduled for follow up 3/5/21. Routed to Dr. Carballo. ./SEDRICK

## 2021-03-05 ENCOUNTER — OFFICE VISIT (OUTPATIENT)
Dept: PHARMACY | Facility: CLINIC | Age: 56
End: 2021-03-05
Payer: COMMERCIAL

## 2021-03-05 ENCOUNTER — OFFICE VISIT (OUTPATIENT)
Dept: FAMILY MEDICINE | Facility: CLINIC | Age: 56
End: 2021-03-05
Payer: COMMERCIAL

## 2021-03-05 VITALS
OXYGEN SATURATION: 100 % | BODY MASS INDEX: 29.29 KG/M2 | DIASTOLIC BLOOD PRESSURE: 77 MMHG | WEIGHT: 138.4 LBS | RESPIRATION RATE: 16 BRPM | HEART RATE: 67 BPM | TEMPERATURE: 98.1 F | SYSTOLIC BLOOD PRESSURE: 121 MMHG

## 2021-03-05 DIAGNOSIS — E11.29 TYPE 2 DIABETES MELLITUS WITH MICROALBUMINURIA, WITHOUT LONG-TERM CURRENT USE OF INSULIN (H): ICD-10-CM

## 2021-03-05 DIAGNOSIS — F43.10 PTSD (POST-TRAUMATIC STRESS DISORDER): Primary | ICD-10-CM

## 2021-03-05 DIAGNOSIS — F33.1 MODERATE EPISODE OF RECURRENT MAJOR DEPRESSIVE DISORDER (H): ICD-10-CM

## 2021-03-05 DIAGNOSIS — I10 ESSENTIAL HYPERTENSION, BENIGN: Primary | ICD-10-CM

## 2021-03-05 DIAGNOSIS — I10 ESSENTIAL HYPERTENSION, BENIGN: ICD-10-CM

## 2021-03-05 DIAGNOSIS — R80.9 TYPE 2 DIABETES MELLITUS WITH MICROALBUMINURIA, WITHOUT LONG-TERM CURRENT USE OF INSULIN (H): ICD-10-CM

## 2021-03-05 DIAGNOSIS — E11.9 TYPE 2 DIABETES MELLITUS WITHOUT COMPLICATION, WITHOUT LONG-TERM CURRENT USE OF INSULIN (H): ICD-10-CM

## 2021-03-05 DIAGNOSIS — R12 HEARTBURN: ICD-10-CM

## 2021-03-05 DIAGNOSIS — K59.00 CONSTIPATION: ICD-10-CM

## 2021-03-05 DIAGNOSIS — M25.562 CHRONIC PAIN OF LEFT KNEE: ICD-10-CM

## 2021-03-05 DIAGNOSIS — G89.29 CHRONIC PAIN OF LEFT KNEE: ICD-10-CM

## 2021-03-05 DIAGNOSIS — M19.011 PRIMARY OSTEOARTHRITIS OF RIGHT SHOULDER: ICD-10-CM

## 2021-03-05 DIAGNOSIS — E11.9 TYPE 2 DIABETES MELLITUS WITHOUT COMPLICATION, WITHOUT LONG-TERM CURRENT USE OF INSULIN (H): Primary | ICD-10-CM

## 2021-03-05 LAB — HBA1C MFR BLD: 6.7 % (ref 4.1–5.7)

## 2021-03-05 PROCEDURE — 99214 OFFICE O/P EST MOD 30 MIN: CPT | Performed by: STUDENT IN AN ORGANIZED HEALTH CARE EDUCATION/TRAINING PROGRAM

## 2021-03-05 PROCEDURE — 83036 HEMOGLOBIN GLYCOSYLATED A1C: CPT | Performed by: STUDENT IN AN ORGANIZED HEALTH CARE EDUCATION/TRAINING PROGRAM

## 2021-03-05 PROCEDURE — 99605 MTMS BY PHARM NP 15 MIN: CPT | Performed by: PHARMACIST

## 2021-03-05 PROCEDURE — 36415 COLL VENOUS BLD VENIPUNCTURE: CPT | Performed by: STUDENT IN AN ORGANIZED HEALTH CARE EDUCATION/TRAINING PROGRAM

## 2021-03-05 ASSESSMENT — PATIENT HEALTH QUESTIONNAIRE - PHQ9: SUM OF ALL RESPONSES TO PHQ QUESTIONS 1-9: 8

## 2021-03-05 NOTE — NURSING NOTE
Due to patient being non-English speaking/uses sign language, an  was used for this visit. Only for face-to-face interpretation by an external agency, date and length of interpretation can be found on the scanned worksheet.     name: Silvino Banks (Htoo)  Language: Radha  Agency:  [employer]  Phone number: [phone]  Type of interpretation:  Face-to-face, spoken

## 2021-03-05 NOTE — PATIENT INSTRUCTIONS
No change in medications today.    Diabetes looks good.   Follow up in 1 month.     I will coordinate with your daughter and the home nurse about the move

## 2021-03-05 NOTE — PROGRESS NOTES
Rachael was seen today for diabetes and medication reconciliation.  Additionally, patient will be moving 35-40 min away with her family (lives with her daughter and family).  Does not know the name of the town.  Worried about being able to keep home nurse and still needing assistance with medications and medication set up.      Diagnoses and all orders for this visit:    Type 2 diabetes mellitus without complication, without long-term current use of insulin (H)  A1c today is 6.7% which is stable compared to 6.8% previously. Recommended no changes to current medications. On metformin and pioglitazone.   -     Hemoglobin A1c ([facility])    Essential hypertension, benign.  Well controlled.  No change in medication.      Moderate episode of recurrent major depressive disorder (H).  Stable, doing well with current medications.  No change.      Primary osteoarthritis of right knee.  Pain has been stable and improved.  Plans to increase activity with the warming temperatures this spring.      F/u in 4 weeks for diabetes recheck before her move on 4/15.     I will reach out to Pt's daughter:  Naveen Jyoti Negrete at [phone] to confirm timing and location of mood, as well as next steps to re-establishing home nurse.     Amrita Carballo MD      Pk Cano is a 55 year old who presents with past medical history significant for     Patient Active Problem List   Diagnosis     Essential hypertension, benign     Diabetes mellitus, type 2 (H)     Hyperlipidemia     Health Care Home     Helicobacter pylori gastritis     PTSD (post-traumatic stress disorder)     Moderate episode of recurrent major depressive disorder (H)     Cognitive complaints     Screening for depression     Microalbuminuria     Plantar fasciitis     Left knee pain     Right knee pain, unspecified chronicity      Others present at the visit:  Silvino Banks in person.     HPI     Patient reports doing well overall. Home blood sugar readings have been in the  110s-120s; she checks once every morning or every other morning. No vision changes, numbness or tingling, or urinary changes. Taking metformin and pioglitazone.     Also reports mood and sleep have been well. Still taking prazosin and hydroxyzine.     Patient does express some concern that she is moving with her daughter to a new city about 40 minutes away and worries about being able to continue with home nursing services, which comes out every 2 weeks to set up her medications.      No chest pain, no shortness of breath.  Walking is limited by leg/hip pain on the right side, but knees have been feeling better.          Objective    /77 (BP Location: Left arm, Patient Position: Sitting, Cuff Size: Adult Regular)   Pulse 67   Temp 98.1  F (36.7  C) (Oral)   Resp 16   Wt 62.8 kg (138 lb 6.4 oz)   SpO2 100%   BMI 29.29 kg/m    Body mass index is 29.29 kg/m .  Physical Exam   Objective:    Vitals:  Vitals are reviewed and are within the normal range  Gen:  Alert, pleasant, no acute distress  Cardiac:  Regular rate and rhythm, no murmurs, rubs or gallops  Respiratory:  Lungs clear to auscultation bilaterally  Abdomen:  Soft, non-tender, non-distended, bowel sounds positive  Extremities:  Warm, well-perfused, pulses 2+/4, no lower extremity edema  Psyche:  Mood and affect are bright, patient is engaged, thought process logical.     PHQ 10/1/2020 10/30/2020 3/5/2021   PHQ-9 Total Score 17 7 8   Q9: Thoughts of better off dead/self-harm past 2 weeks Several days Not at all Several days       Results for orders placed or performed in visit on 03/05/21   Hemoglobin A1c ([facility])     Status: Abnormal   Result Value Ref Range    Hemoglobin A1C 6.7 (H) 4.1 - 5.7 %

## 2021-03-05 NOTE — PATIENT INSTRUCTIONS
Continue taking all medications as prescribed      Continue to check blood sugar daily as directed

## 2021-03-05 NOTE — LETTER
March 8, 2021      Petatianayann Dima  [address] [address]        Dear ,    We are writing to inform you of your test results.    So nice to see you in clinic today.  Your A1c looks good today.  I'm glad the medications are working well.  I will work on next steps with the home nurse to figure out the best option for when you move.  I look forward to seeing you in a month.     Resulted Orders   Hemoglobin A1c (P )   Result Value Ref Range    Hemoglobin A1C 6.7 (H) 4.1 - 5.7 %       If you have any questions or concerns, please call the clinic at the number listed above.       Sincerely,      Amrita Carballo MD

## 2021-03-05 NOTE — PROGRESS NOTES
Medication Therapy Management (MTM) Encounter    ASSESSMENT:                            Medication Adherence/Access: See below for considerations    Type 2 Diabetes: Stable. Patient is meeting A1c goal of < 7%. Self monitoring of blood glucose is at goal of fasting  mg/dL. Aspirin therapy is not indicated in this patient. Other than the isolated hypoglycemia episode patient appears to be doing quite well on the current regimen.    Hypertension: At goal (BP <130/80). Diltiazem may not be the most appropriate agent for hypertension, consider replacing with amlodipine or diuretic in the future if indicated. However, patient is stable on this medication.     PTSD/Depression: Stable.    Heartburn: Stable.    Pain: Stable.    Constipation: Stable.    PLAN:                              Continue taking all medications as prescribed      Continue to check blood sugar daily as directed    Follow-up: Next in-person visit    Medication issues to be addressed at a future visit      Consider replacing diltiazem with amlodipine or diuretic      SUBJECTIVE/OBJECTIVE:                          Rachael Ch is a 55 year old female seen for an initial visit. She was referred to me from Dr. Carballo. Today's visit is a co-visit with Dr. Carballo.     Reason for visit: MTM initial visit.    Allergies/ADRs: Reviewed in chart  Tobacco: She reports that she quit smoking about 3 years ago. Her smoking use included cigarettes. She smoked 0.50 packs per day. Her smokeless tobacco use includes chew.  Alcohol: not assessed  Caffeine: not assessed  Activity: not assessed  Past Medical History: Reviewed in chart    Medication Adherence/Access: Patient uses pill box that is set up by her nurse. However, patient brought all her medication bottles to clinic and had duplicate bottles for most of her medications that had a significant amount of tablets left in them. Patient says she doesn't know her medications well and that she just takes what is in  her pill box but when shown each medication she is able to confidently describe how she takes it. Consider reaching out to nurse to confirm how pill box is being set up and/or dispose of excess medication to prevent patient confusion.    Patient takes medications 2 time(s) per day.   Per patient, misses medication 0 times per week.   Medication barriers: fears/concerns about moving 45 minutes from the cities and her nurse not being able to fill her pillbox anymore.    Type 2 Diabetes:  Currently taking metformin 1g twice daily and pioglitazone 45mg. Patient is not experiencing side effects.  Blood sugar monitoring: once daily. Ranges (based on glucometer readings): See below    3/5/21 121   3/4/21 125   3/2/21 120   3/1/21 101   2/22/21 160   2/17/21 123   2/12/21 118   2/3/21 126     Symptoms of low blood sugar? shaky, dizzy, weak, sweaty, Patient reports once episode of extreme hypoglycemia last month; patient fell and could not get up and vomited. Reported that her grandson came to get her and that she drank juice and felt better. Discussed that in addition to drinking juice she should also eat something to help keep her blood sugar from crashing again.  Symptoms of high blood sugar? none  Eye exam: up to date  Foot exam: up to date  Diet/Exercise: not assessed  Aspirin: not indicated  Statin: Yes: Atorvastatin 80mg   ACEi/ARB: Yes: Lisinopril 40mg.   Urine Albumin:   Lab Results   Component Value Date    UMALCR 164.2 (H) 08/06/2020      Lab Results   Component Value Date    A1C 6.7 03/05/2021    A1C 6.8 08/06/2020    A1C 6.2 04/22/2020    A1C 6.2 02/11/2020    A1C 6.5 07/31/2019     Immunization History   Administered Date(s) Administered     Flu, Unspecified 09/09/2009, 10/22/2012     HepB 10/09/2006, 03/20/2007, 01/10/2008     HepB, Unspecified 10/09/2006, 03/20/2007     HepB-Adult 01/10/2008     Influenza (H1N1) 12/19/2009     Influenza (IIV3) PF 11/03/2008, 09/09/2009, 10/22/2012, 10/17/2013     Influenza  Vaccine IM > 6 months Valent IIV4 10/18/2013, 10/30/2014, 11/06/2015, 10/18/2016, 11/12/2017, 09/02/2020     Influenza Vaccine, 6+MO IM (QUADRIVALENT W/PRESERVATIVES) 10/30/2014, 11/06/2015, 10/18/2016     MMR 10/09/2006, 03/06/2007     Mantoux Tuberculin Skin Test 03/13/2007     Pneumococcal 23 valent 08/20/2012     TD (ADULT, 7+) 10/09/2006, 01/10/2008     TDAP Vaccine (Adacel) 03/20/2007     TDAP Vaccine (Boostrix) 08/23/2018     Td (Adult), Adsorbed 10/09/2006     Tdap (Adacel,Boostrix) 03/20/2007     Varicella 10/09/2006, 03/20/2007     Hypertension: Current medications include lisinopril 40mg and diltiazem Xr 120mg.  [facility] nurse monitors BP (last reading 2/22/21 was 126/71).  Patient reports no current medication side effects.    BP Readings from Last 3 Encounters:   03/05/21 121/77   10/30/20 (!) 144/77   09/30/20 120/75     PTSD/Depression: Current medications include prazosin 5 mg and hydroxyzine pamoate 25mg at bedtime. Patient reports that medications work well for her and that she does not have any concerns.    Heartburn: Current medications include Tums 500mg as needed and famotidine 40mg daily. Patient reports using Tums 1-2x/week and that it works well for her. Patient did not bring a famotidine bottle today and was not sure if she takes that medicine. Patient feels that her heartburn is well managed and does not have any concerns.    Pain: Currently taking acetaminophen 500mg one tablet each morning and one additional tablet as needed  and naproxen 500mg twice daily as needed. Patient says she only uses acetaminophen of an unknown strength two tablets as needed for headache 1-2x/month and that it is effective for her. Patient says she is not sure if she still uses naproxen and it was not present with the bottles she brought in today.    Constipation: Current medications include Senokot 8.6-50 mg one tablet daily. Patient reports that this medication is effective for her and she has not  "concerns.    Today's Vitals:  BP Readings from Last 1 Encounters:   03/05/21 121/77     Pulse Readings from Last 1 Encounters:   03/05/21 67     Wt Readings from Last 1 Encounters:   03/05/21 138 lb 6.4 oz (62.8 kg)     Ht Readings from Last 1 Encounters:   04/22/20 4' 9.64\" (1.464 m)     Estimated body mass index is 29.29 kg/m  as calculated from the following:    Height as of 4/22/20: 4' 9.64\" (1.464 m).    Weight as of an earlier encounter on 3/5/21: 138 lb 6.4 oz (62.8 kg).    Temp Readings from Last 1 Encounters:   03/05/21 98.1  F (36.7  C) (Oral)     ----------------      I spent 20 minutes with this patient today. Dr. Carballo was provided the recommendations above  in clinic today and Dr. Carballo is the authorizing prescriber for this visit through the pharmacist collaborative practice agreement.. A copy of the visit note was provided to the patient's primary care provider.    The patient was given a summary of these recommendations. See Provider note/AVS from today.     Jacquie Prieto, P4 pharmacy student    I was present with the pharmacy student who participated in the service and in the documentation of this note. I have verified the history, personally performed the medical decision making, and have verified the content of the note, which accurately reflects my assessment of the patient and the plan of care.   Mimi Hernandez, [facility], PharmD       Medication Therapy Recommendations  No medication therapy recommendations to display        "

## 2021-03-07 NOTE — RESULT ENCOUNTER NOTE
Rachael Ch-    So nice to see you in clinic today.  Your A1c looks good today.  I'm glad the medications are working well.  I will work on next steps with the home nurse to figure out the best option for when you move.  I look forward to seeing you in a month.     Amrita Carballo MD    Please send results to patient.

## 2021-03-08 ENCOUNTER — TELEPHONE (OUTPATIENT)
Dept: FAMILY MEDICINE | Facility: CLINIC | Age: 56
End: 2021-03-08

## 2021-03-08 NOTE — TELEPHONE ENCOUNTER
Call placed to patient's daughter, Naveen Negrete at number given to me by patient ([phone]) to check on information about family's upcoming move.      Right now, they are planning to move to [place] in April, but are planning to keep their house in [place] for the near future and will be traveling back and forth some.      They would like to continue with the same home nurse at the same location, but are requesting one visit per 4 weeks, instead of every 2 weeks.  This seems reasonable to me.  Patient has been stable on medications for the last 6 months.     Message sent to pt's home RN, Tessa Higginbotham, to discuss decreasing visit frequency.     Amrita Carballo MD

## 2021-03-09 ENCOUNTER — TELEPHONE (OUTPATIENT)
Dept: FAMILY MEDICINE | Facility: CLINIC | Age: 56
End: 2021-03-09

## 2021-03-09 NOTE — TELEPHONE ENCOUNTER
[facility] phone call message - order or referral request for patient:     Order or referral being requested: orders      Additional Comments: nursing 1ce a month for 3 month for med set up,3 PRN for med chances.    OK to leave a message on voice mail? Yes    Primary language: Radha      needed? Yes    Call taken on March 9, 2021 at 4:14 PM by Mic Shirley

## 2021-03-10 ENCOUNTER — HOME CARE/HOSPICE - HEALTHEAST (OUTPATIENT)
Dept: HOME HEALTH SERVICES | Facility: HOME HEALTH | Age: 56
End: 2021-03-10

## 2021-03-19 ENCOUNTER — HOME CARE/HOSPICE - HEALTHEAST (OUTPATIENT)
Dept: HOME HEALTH SERVICES | Facility: HOME HEALTH | Age: 56
End: 2021-03-19

## 2021-03-24 ENCOUNTER — DOCUMENTATION ONLY (OUTPATIENT)
Dept: FAMILY MEDICINE | Facility: CLINIC | Age: 56
End: 2021-03-24

## 2021-03-24 NOTE — PROGRESS NOTES
To be completed in Nursing note:    Please reference list for forms that require a visit for completion.  Please remind patients that providers are given 3-5 business days to complete and return forms.      Form type: [facility] Certification and Plan of Care for 03/10/21 - 21    Date form received: 21    Date form completed by Physician: 21    How was form returned to patient (mailed, faxed, or at  for patient to ): Faxed back to [phone]    Date form mailed/faxed/left at  for patient and sent to HIM for scannin21      Once form is left for patient, faxed, or mailed PCS will then close the documentation only encounter.

## 2021-03-29 NOTE — PATIENT INSTRUCTIONS
Change Prazosin at night from 5mg to 2mg to help with dizziness.    Try to eat more regularly, especially in the morning.  Okay to eat a small meal.  Sit down with you family to do it.   Keep up with the walking and going to the park.    Keep drinking water regular, and bring water and snacks with you too the park.        Preventive Health Recommendations  Female Ages 50 - 64    Yearly exam: See your health care provider every year in order to  o Review health changes.   o Discuss preventive care.    o Review your medicines if your doctor has prescribed any.      Get a Pap test every three years (unless you have an abnormal result and your provider advises testing more often).    If you get Pap tests with HPV test, you only need to test every 5 years, unless you have an abnormal result.     You do not need a Pap test if your uterus was removed (hysterectomy) and you have not had cancer.    You should be tested each year for STDs (sexually transmitted diseases) if you're at risk.     Have a mammogram every 1 to 2 years.    Have a colonoscopy at age 50, or have a yearly FIT test (stool test). These exams screen for colon cancer.      Have a cholesterol test every 5 years, or more often if advised.    Have a diabetes test (fasting glucose) every three years. If you are at risk for diabetes, you should have this test more often.     If you are at risk for osteoporosis (brittle bone disease), think about having a bone density scan (DEXA).    Shots: Get a flu shot each year. Get a tetanus shot every 10 years.    Nutrition:     Eat at least 5 servings of fruits and vegetables each day.    Eat whole-grain bread, whole-wheat pasta and brown rice instead of white grains and rice.    Get adequate Calcium and Vitamin D.     Lifestyle    Exercise at least 150 minutes a week (30 minutes a day, 5 days a week). This will help you control your weight and prevent disease.    Limit alcohol to one drink per day.    No smoking.     Wear  sunscreen to prevent skin cancer.     See your dentist every six months for an exam and cleaning.    See your eye doctor every 1 to 2 years.

## 2021-03-29 NOTE — PROGRESS NOTES
Female Physical Note    Concerns today: Feeling tired, having palpitations and worries at night time.      A [employer]  was used for  this visit.     ROS:  CONSTITUTIONAL: no fatigue, no unexpected change in weight  SKIN: no worrisome rashes, no worrisome moles, no worrisome lesions  EYES: no acute vision problems or changes  ENT: no ear problems, no mouth problems, no throat problems  RESP: no significant cough, no shortness of breath  BREAST: no masses, no tenderness, no discharge  CV: no chest pain, no palpitations, no new or worsening peripheral edema  GI: no nausea, no vomiting, no constipation, no diarrhea  : no frequency, no dysuria, no hematuria  MUSCULOSKELETAL: chronic left knee pain, stable and slightly improved.    ENDOCRINE: Hot flashes, otherwise no symptoms.    HEME: no easy bruising, no bleeding problems  PSYCHIATRIC: Chronic depression and PTSD, stable.  Some sleep difficulties, worries and palpitations at night time.      Sexually Active: No  Sexual concerns: No   Contraception:not needed   P: 6  Menarche: Many years ago.  Has been menopausal since .   No LMP recorded. Patient is postmenopausal.  STD History: Neg  Last Pap Smear Date: 2018 normal  Abnormal Pap History: None    Patient Active Problem List   Diagnosis     Essential hypertension, benign     Diabetes mellitus, type 2 (H)     Hyperlipidemia     Health Care Home     Helicobacter pylori gastritis     PTSD (post-traumatic stress disorder)     Moderate episode of recurrent major depressive disorder (H)     Cognitive complaints     Screening for depression     Microalbuminuria     Plantar fasciitis     Left knee pain     Right knee pain, unspecified chronicity       Current Outpatient Medications   Medication Sig Dispense Refill     acetaminophen (ACETAMINOPHEN EXTRA STRENGTH) 500 MG tablet TAKE 1 PILL IN THE MORNING, 1 PILL IN THE EVENING AND 1 ADDITIONAL PILL AS NEEDED 100 tablet 3     atorvastatin (LIPITOR) 80 MG  tablet TAKE 1 TABLET (80 MG) BY MOUTH DAILY 90 tablet 3     blood glucose (ACCU-CHEK JEANNINE) test strip Use to test blood sugar 3-4 times daily or as directed. 400 strip 0     blood glucose (NO BRAND SPECIFIED) lancets standard Use to test blood sugar 2 times daily or as directed. 100 each 3     blood glucose (NO BRAND SPECIFIED) test strip Use to test blood sugar once or twice times weekly Patient has Contour test machine, please send [employer] CONTOUR test strips. 1 Box 3     blood glucose (NO BRAND SPECIFIED) test strip Use to test blood sugar 2 times daily or as directed. 100 strip 3     blood glucose monitoring (ACCU-CHEK JEANNINE PLUS) meter device kit Use to test blood sugar 1 times daily or as directed. 1 kit 0     CALCIUM ANTACID 500 MG chewable tablet TAKE 1 TABLET (500 MG) BY MOUTH 2 TIMES DAILY AS NEEDED FOR HEARTBURN 40 tablet 3     DILT- MG 24 hr capsule TAKE 1 CAPSULE (120 MG) BY MOUTH DAILY 90 capsule 3     famotidine (PEPCID) 40 MG tablet Take 1 tablet (40 mg) by mouth daily 90 tablet 3     hydrOXYzine (VISTARIL) 25 MG capsule TAKE 1 CAPSULE BY MOUTH AS NEEDED FOR PANIC, ANXIETY **MAY TAKE 1-2 CAPSULES AT NIGHT TO HELP WITH SLEEP.** 90 capsule 3     lisinopril (ZESTRIL) 40 MG tablet TAKE 1 TABLET (40 MG) BY MOUTH DAILY 90 tablet 1     metFORMIN (GLUCOPHAGE-XR) 500 MG 24 hr tablet TAKE 2 TABLETS (1,000 MG) BY MOUTH 2 TIMES DAILY (WITH MEALS) 360 tablet 3     naproxen (NAPROSYN) 500 MG tablet TAKE 1 TABLET (500 MG) BY MOUTH 2 TIMES DAILY AS NEEDED FOR MODERATE PAIN 30 tablet 1     pioglitazone (ACTOS) 45 MG tablet TAKE 1 TABLET (45 MG) BY MOUTH DAILY 90 tablet 1     prazosin (MINIPRESS) 5 MG capsule TAKE 1 CAPSULE (5 MG) BY MOUTH AT BEDTIME 90 capsule 1     STIMULANT LAXATIVE 8.6-50 MG tablet TAKE 1 TABLET BY MOUTH DAILY 30 tablet 3     vitamin D3 (CHOLECALCIFEROL) 50 mcg (2000 units) tablet TAKE ONE TABLET BY MOUTH DAILY 100 tablet 3       Past Medical History:   Diagnosis Date     Aspirin  contraindicated 11/7/13    young age/amp     Depressive disorder      Diabetes (H)      Hypertension         Family History     Problem (# of Occurrences) Relation (Name,Age of Onset)    Cancer (1) Mother    Diabetes (1) Father       Negative family history of: Coronary Artery Disease, Heart Disease          Problem List Medication List and Allergy List were reviewed and updated.    Patient is an established patient of this clinic..    Social History     Tobacco Use     Smoking status: Former Smoker     Packs/day: 0.50     Types: Cigarettes     Quit date: 8/23/2017     Years since quitting: 3.6     Smokeless tobacco: Current User     Types: Chew   Substance Use Topics     Alcohol use: No       Children ? 6 adult children, lives with daughter and her family.      Has anyone hurt you physically, for example by pushing, hitting, slapping or kicking you or forcing you to have sex? Denies  Do you feel threatened or controlled by a partner, ex-partner or anyone in your life? Denies    RISK BEHAVIORS AND HEALTHY HABITS:  Tobacco Use/Smoking: Uses [employer]  Illicit Drug Use: None  Do you use alcohol? No  Diet (5-7 servings of fruits/veg daily): Yes  Likes vegetables, less fruits  Exercise (30 min accumulated most days):Yes Goes to the park with her grandkids.  Can't always keep up.  Likes to walk, but goes slow.    Dental Care: No  She is scared of the dentist and does not want to go.    Calcium 1500 mg/d:  No  Seat Belt Use: Yes     Cholesterol Level (>44 yo or at risk):  Date done 8/20/2020  Result(s) LDL 73,   Pap/HPV cotest every 5 years for women 30-65   Date done 8/23/2018  Result(s) Normal  HIV screening:  Date done 4/2018  Result(s) Normal   Dexa Scan (women>65 yrs or risk = that of a 66yo woman):  Testing not indicated   Colon CA Screening (>50-75 ):  Date done 3/2016  Result(s) Normal, follow up in 10 years.   Breast CA Screening (>41 yo or 10 y before 1st degree relative diagnosis): Recommended and  patient declined testing.  Last normal in Aug, 2018  CV Risk based on Pooled Cohort Risk:9.9%.  High dose statin recommended--patient is already taking.   Aspirin not recommended.     Pooled Cohort Risk Calculator    Immunization History   Administered Date(s) Administered     Flu, Unspecified 09/09/2009, 10/22/2012     HepB 10/09/2006, 03/20/2007, 01/10/2008     HepB, Unspecified 10/09/2006, 03/20/2007     HepB-Adult 01/10/2008     Influenza (H1N1) 12/19/2009     Influenza (IIV3) PF 11/03/2008, 09/09/2009, 10/22/2012, 10/17/2013     Influenza Vaccine IM > 6 months Valent IIV4 10/18/2013, 10/30/2014, 11/06/2015, 10/18/2016, 11/12/2017, 09/02/2020     Influenza Vaccine, 6+MO IM (QUADRIVALENT W/PRESERVATIVES) 10/30/2014, 11/06/2015, 10/18/2016     MMR 10/09/2006, 03/06/2007     Mantoux Tuberculin Skin Test 03/13/2007     Pneumococcal 23 valent 08/20/2012     TD (ADULT, 7+) 10/09/2006, 01/10/2008     TDAP Vaccine (Adacel) 03/20/2007     TDAP Vaccine (Boostrix) 08/23/2018     Td (Adult), Adsorbed 10/09/2006     Tdap (Adacel,Boostrix) 03/20/2007     Varicella 10/09/2006, 03/20/2007    Reviewed Immunization Record Today  Patient is willing to schedule COVID-19 vaccine for Saturday.      EXAMINATION:   There were no vitals taken for this visit.  GENERAL: healthy, alert and no distress  EYES: Eyes grossly normal to inspection, extraocular movements - intact, and PERRL  HENT: ear canals- normal; TMs- normal; Nose- normal; Mouth- no ulcers, no lesions  NECK: no tenderness, no adenopathy, no asymmetry, no masses, no stiffness; thyroid- normal to palpation  RESP: lungs clear to auscultation - no rales, no rhonchi, no wheezes  CV: regular rates and rhythm, normal S1 S2, no S3 or S4 and no murmur, no click or rub -  ABDOMEN: soft, no tenderness, no  hepatosplenomegaly, no masses, normal bowel sounds  MS: extremities- no gross deformities noted, no edema  SKIN: no suspicious lesions, no rashes  NEURO: strength and tone- normal,  sensory exam- grossly normal, mentation- intact, speech- normal, reflexes- symmetric  BACK: no CVA tenderness, no paralumbar tenderness  PSYCH: Alert and oriented times 3; speech- coherent , normal rate and volume; able to articulate logical thoughts, able to abstract reason, no tangential thoughts, no hallucinations or delusions, affect- normal  LYMPHATICS: ant. cervical- normal, post. cervical- normal, axillary- normal, supraclavicular- normal, inguinal- normal    PHQ 10/1/2020 10/30/2020 3/5/2021   PHQ-9 Total Score 17 7 8   Q9: Thoughts of better off dead/self-harm past 2 weeks Several days Not at all Several days       ASSESSMENT:  1. Health Care Maintenance: Normal Physical Exam  Extensively discussed lifestyle, including regular meals, healthy diet, fluid intake, regular exercise and sleep.    Having dizziness at night.  Will decrease dose of Prazosin from 5mg to 2mg daily at night and reassess at follow up visit.     PLAN:  Preventative Recs:  Last Pap:  8/2018:  Normal HPV negative.  Repeat 8/2023  Last Colonoscopy:  3/2016 normal.  Repeat 3/2016  Last Mammo:  8/2018 normal.  Patient declined.  Will readdress at future visits.    A1c:  Good control  Cholesterol:  On high dose statin  Blood pressure:  Well controlled.    Recommended COVID 19 vaccine.  Signed up for upcoming clinic at [place] on 4/3/2021 for [facility].      Amrita Carballo MD

## 2021-03-30 ENCOUNTER — OFFICE VISIT (OUTPATIENT)
Dept: FAMILY MEDICINE | Facility: CLINIC | Age: 56
End: 2021-03-30
Payer: COMMERCIAL

## 2021-03-30 VITALS
HEART RATE: 76 BPM | WEIGHT: 139.6 LBS | DIASTOLIC BLOOD PRESSURE: 70 MMHG | SYSTOLIC BLOOD PRESSURE: 113 MMHG | RESPIRATION RATE: 16 BRPM | BODY MASS INDEX: 29.3 KG/M2 | HEIGHT: 58 IN

## 2021-03-30 DIAGNOSIS — F43.10 PTSD (POST-TRAUMATIC STRESS DISORDER): ICD-10-CM

## 2021-03-30 DIAGNOSIS — Z00.00 ROUTINE GENERAL MEDICAL EXAMINATION AT A HEALTH CARE FACILITY: Primary | ICD-10-CM

## 2021-03-30 PROCEDURE — 99396 PREV VISIT EST AGE 40-64: CPT | Performed by: STUDENT IN AN ORGANIZED HEALTH CARE EDUCATION/TRAINING PROGRAM

## 2021-03-30 RX ORDER — PRAZOSIN HYDROCHLORIDE 2 MG/1
2 CAPSULE ORAL AT BEDTIME
Qty: 90 CAPSULE | Refills: 0 | Status: SHIPPED | OUTPATIENT
Start: 2021-03-30 | End: 2021-06-01

## 2021-03-30 ASSESSMENT — PATIENT HEALTH QUESTIONNAIRE - PHQ9: SUM OF ALL RESPONSES TO PHQ QUESTIONS 1-9: 15

## 2021-03-30 ASSESSMENT — MIFFLIN-ST. JEOR: SCORE: 1107.97

## 2021-03-30 NOTE — NURSING NOTE
Due to patient being non-English speaking/uses sign language, an  was used for this visit. Only for face-to-face interpretation by an external agency, date and length of interpretation can be found on the scanned worksheet.     name: Silvino Banks  Agency: [employer]  Language: Radha   Telephone number: [phone]  Type of interpretation: Face-to-face, spoken

## 2021-03-31 DIAGNOSIS — Z53.9 DIAGNOSIS NOT YET DEFINED: Primary | ICD-10-CM

## 2021-04-03 ENCOUNTER — IMMUNIZATION (OUTPATIENT)
Dept: FAMILY MEDICINE | Facility: CLINIC | Age: 56
End: 2021-04-03
Payer: COMMERCIAL

## 2021-04-03 PROCEDURE — 91303 PR COVID VAC JANSSEN AD26 0.5ML: CPT

## 2021-04-03 PROCEDURE — 0031A PR COVID VAC JANSSEN AD26 0.5ML: CPT

## 2021-04-12 ENCOUNTER — DOCUMENTATION ONLY (OUTPATIENT)
Dept: FAMILY MEDICINE | Facility: CLINIC | Age: 56
End: 2021-04-12

## 2021-04-12 NOTE — PROGRESS NOTES
To be completed in Nursing note:    Please reference list for forms that require a visit for completion.  Please remind patients that providers are given 3-5 business days to complete and return forms.      Form type: [facility] Med Change from Appt on 3/30/21    Date form received: 21    Date form completed by Physician: 21    How was form returned to patient (mailed, faxed, or at  for patient to ): Faxed back to [phone]    Date form mailed/faxed/left at  for patient and sent to HIM for [email or web address]      Once form is left for patient, faxed, or mailed PCS will then close the documentation only encounter.     Lee Merida, MARY JANE  8:41 AM  2021

## 2021-04-20 ENCOUNTER — TRANSFERRED RECORDS (OUTPATIENT)
Dept: HEALTH INFORMATION MANAGEMENT | Facility: CLINIC | Age: 56
End: 2021-04-20

## 2021-05-04 ENCOUNTER — TELEPHONE (OUTPATIENT)
Dept: FAMILY MEDICINE | Facility: CLINIC | Age: 56
End: 2021-05-04

## 2021-05-04 NOTE — TELEPHONE ENCOUNTER
[facility], home health care nurse calls with update:    BP today 170s systolic. Patient reports having more generalize myalgia since getting COVID-19 vaccine (on 4/3/21). No localized pain.     Oked verbal orders for ongoing nursing care monthly. Routed to Dr. Carballo. Seeing patient on 5/7/21. ./LR

## 2021-05-07 ENCOUNTER — OFFICE VISIT (OUTPATIENT)
Dept: FAMILY MEDICINE | Facility: CLINIC | Age: 56
End: 2021-05-07
Payer: COMMERCIAL

## 2021-05-07 VITALS
SYSTOLIC BLOOD PRESSURE: 132 MMHG | RESPIRATION RATE: 16 BRPM | HEART RATE: 67 BPM | OXYGEN SATURATION: 100 % | BODY MASS INDEX: 29.55 KG/M2 | TEMPERATURE: 98.3 F | WEIGHT: 141.4 LBS | DIASTOLIC BLOOD PRESSURE: 74 MMHG

## 2021-05-07 DIAGNOSIS — E11.9 TYPE 2 DIABETES MELLITUS WITHOUT COMPLICATION, WITHOUT LONG-TERM CURRENT USE OF INSULIN (H): ICD-10-CM

## 2021-05-07 DIAGNOSIS — M54.2 NECK PAIN: Primary | ICD-10-CM

## 2021-05-07 DIAGNOSIS — I10 ESSENTIAL HYPERTENSION, BENIGN: ICD-10-CM

## 2021-05-07 PROCEDURE — 99214 OFFICE O/P EST MOD 30 MIN: CPT | Performed by: STUDENT IN AN ORGANIZED HEALTH CARE EDUCATION/TRAINING PROGRAM

## 2021-05-07 ASSESSMENT — PATIENT HEALTH QUESTIONNAIRE - PHQ9: SUM OF ALL RESPONSES TO PHQ QUESTIONS 1-9: 14

## 2021-05-07 NOTE — NURSING NOTE
Depression Response ([facility])    Question 9 on the PHQ-9 was positive for suicidality? Yes    I personally notified the following: Provider    Action(s) taken: I notified provider.    Veronica Rosas, [facility]

## 2021-05-07 NOTE — PATIENT INSTRUCTIONS
If you develop neck pain:    Check your blood pressure  Try taking tylenol  Try using massage and heat on the area  If blood pressure is high, do NOT take an extra lisinopril.   If it is high and you have pain you can take an extra Diltiazem, but then recheck your blood pressure in 20 min.      Follow up in 6 weeks.

## 2021-05-07 NOTE — PROGRESS NOTES
Nursing Notes:   Veronica Rosas CMA  5/7/2021  9:01 AM  Signed  Due to patient being non-English speaking/uses sign language, an  was used for this visit. Only for face-to-face interpretation by an external agency, date and length of interpretation can be found on the scanned worksheet.     name: Silvino  Darren  Language: Radha  Agency:  [employer]  Phone number: [phone]  Type of interpretation:  Face-to-face, spoken      Veronica Rosas CMA  5/7/2021 12:09 PM  Signed  Depression Response ([facility])    Question 9 on the PHQ-9 was positive for suicidality? Yes    I personally notified the following: Provider    Action(s) taken: I notified provider.    Veronica Rosas CMA                RAYSA Ch is a 55 year old female with past medical history significant for    Patient Active Problem List   Diagnosis     Essential hypertension, benign     Diabetes mellitus, type 2 (H)     Hyperlipidemia     Health Care Home     Helicobacter pylori gastritis     PTSD (post-traumatic stress disorder)     Moderate episode of recurrent major depressive disorder (H)     Cognitive complaints     Screening for depression     Microalbuminuria     Plantar fasciitis     Left knee pain     Right knee pain, unspecified chronicity     Others present at the visit:   Silvino Banks, present in person    Presents for   Chief Complaint   Patient presents with     Follow Up     Pt is here to follow up.     Medication Reconciliation     Complete.      Patient presents today for a routine follow-up visit.  She shares that she has been having difficulty with neck pain.  This happens intermittently, but is quite bothersome for her.  She feels that this is due to high blood pressure, and so when she gets the neck pain she will take an additional blood pressure medication.  Tends to feel better when she does this.  Also notices that her neck pain is better after taking her blood  pressure medicines in the morning.    She does endorse some other areas of pain.  Gets swelling and discomfort in her knees.  Sometimes her granddaughter massages this and it helps.  She does try taking Tylenol for both the neck and the knee pain.  She describes the neck pain as tight and heavy, and notes that she sometimes feels more tired and her eyelids will feel heavy as well.    She does not have a blood pressure cuff at home, so does not check her blood pressure.  When she told her home nurse she was taking extra blood pressure medicine and the nurse told her not to do it, and she is wondering what she should do instead.    We reviewed her glucometer.  Her 30-day average is 127, 7-day average is 127, and most recent blood sugars were 134, 123, and 127.  She reports appetite overall has been okay.  She brings in examples of her lactose soy milk that she likes to drink.  And wonders if this is okay for her to have 1 small carton per day.      OBJECTIVE:  Vitals: /74 (BP Location: Left arm, Patient Position: Sitting, Cuff Size: Adult Regular)   Pulse 67   Temp 98.3  F (36.8  C) (Oral)   Resp 16   Wt 64.1 kg (141 lb 6.4 oz)   SpO2 100%   BMI 29.55 kg/m    BMI= Body mass index is 29.55 kg/m .  Objective:    Vitals:  Vitals are reviewed and are within the normal range  Gen:  Alert, pleasant, no acute distress  Psych: Mood and affect are bright, she is engaged.  Neck: Minimal strap muscle tenderness posteriorly.  No vertebral body tenderness.  Normal range of motion and strength in arms bilaterally.      ASSESSMENT AND PLAN:      Rachael was seen today for follow up and medication reconciliation.    Diagnoses and all orders for this visit:    Neck pain.   Essential hypertension, benign.  Patient very clearly associates her neck pain with her high blood pressure.  Discussed that while her blood pressure can cause headache and neck pain this is not always the case.  Discussed conservative measures including  heat, massage, and Tylenol to help with pain.  We reviewed her blood pressure medicines, and the when she is taking extra of his lisinopril 40 mg.  Discussed that this dose is too high and could cause problems for her kidneys.  She is also on diltiazem, and this would be okay to increase if she is feeling like the blood pressure is causing her symptoms.  She was given a blood pressure cuff during the visit today, and showed how to use this.  Discussed normal blood pressure results, and when it might be appropriate to take an additional pill of the diltiazem.  She will look and compared to times when she has pain with the blood pressure readings, and will bring her cuff with her to a follow-up visit, similarly to how she brings her glucometer.    Essential hypertension, benign.  No changes made in her blood pressure medicines.  Her blood pressure is appropriate today.  Continue with lisinopril 40 and the diltiazem 120.  Did discuss with patient if she is having pain and her blood pressure is high she can take a second dose of the diltiazem.  Requested that she try using Tylenol, heat and other topical treatments first.    Type 2 diabetes mellitus without complication, without long-term current use of insulin (H), blood sugars are well controlled.  A1c is in goal.  I do not think she is having lows.  Looked at the lack to soy, and we will does have some added sugar, at 1 small carton per day should be okay.      Patient Instructions   If you develop neck pain:    Check your blood pressure  Try taking tylenol  Try using massage and heat on the area  If blood pressure is high, do NOT take an extra lisinopril.   If it is high and you have pain you can take an extra Diltiazem, but then recheck your blood pressure in 20 min.      Follow up in 6 weeks.        Amrita Carballo MD

## 2021-05-07 NOTE — NURSING NOTE
Due to patient being non-English speaking/uses sign language, an  was used for this visit. Only for face-to-face interpretation by an external agency, date and length of interpretation can be found on the scanned worksheet.     name: Silvino Banks (Htoo)  Language: Radha  Agency:  [employer]  Phone number: [phone]  Type of interpretation:  Face-to-face, spoken

## 2021-05-13 ENCOUNTER — DOCUMENTATION ONLY (OUTPATIENT)
Dept: FAMILY MEDICINE | Facility: CLINIC | Age: 56
End: 2021-05-13

## 2021-05-13 ENCOUNTER — HOME CARE/HOSPICE - HEALTHEAST (OUTPATIENT)
Dept: HOME HEALTH SERVICES | Facility: HOME HEALTH | Age: 56
End: 2021-05-13

## 2021-05-13 NOTE — PROGRESS NOTES
To be completed in Nursing note:    Please reference list for forms that require a visit for completion.  Please remind patients that providers are given 3-5 business days to complete and return forms.      Form type: [facility] Update    Date form received: 21    Date form completed by Physician: 21    How was form returned to patient (mailed, faxed, or at  for patient to ): Faxed back to [phone]    Date form mailed/faxed/left at  for patient and sent to HIM for [email or web address]      Once form is left for patient, faxed, or mailed PCS will then close the documentation only encounter.

## 2021-05-17 ENCOUNTER — DOCUMENTATION ONLY (OUTPATIENT)
Dept: FAMILY MEDICINE | Facility: CLINIC | Age: 56
End: 2021-05-17

## 2021-05-17 NOTE — PROGRESS NOTES
To be completed in Nursing note:    Please reference list for forms that require a visit for completion.  Please remind patients that providers are given 3-5 business days to complete and return forms.      Form type: [facility] Certification and Plan of Care for 21 - 21    Date form received: 21    Date form completed by Physician: 21    How was form returned to patient (mailed, faxed, or at  for patient to ): Faxed back to [phone]    Date form mailed/faxed/left at  for patient and sent to HIM for [email or web address]      Once form is left for patient, faxed, or mailed PCS will then close the documentation only encounter.

## 2021-05-18 ENCOUNTER — MEDICAL CORRESPONDENCE (OUTPATIENT)
Dept: HEALTH INFORMATION MANAGEMENT | Facility: CLINIC | Age: 56
End: 2021-05-18

## 2021-05-19 ENCOUNTER — OFFICE VISIT (OUTPATIENT)
Dept: FAMILY MEDICINE | Facility: CLINIC | Age: 56
End: 2021-05-19
Payer: COMMERCIAL

## 2021-05-19 VITALS
RESPIRATION RATE: 18 BRPM | TEMPERATURE: 98.1 F | BODY MASS INDEX: 30.51 KG/M2 | HEART RATE: 72 BPM | SYSTOLIC BLOOD PRESSURE: 144 MMHG | HEIGHT: 57 IN | OXYGEN SATURATION: 100 % | DIASTOLIC BLOOD PRESSURE: 73 MMHG | WEIGHT: 141.4 LBS

## 2021-05-19 DIAGNOSIS — M79.89 SWELLING OF LIMB: Primary | ICD-10-CM

## 2021-05-19 LAB
ALBUMIN SERPL BCP-MCNC: 3.7 G/DL (ref 3.5–5)
ALP SERPL-CCNC: 59 U/L (ref 45–120)
ALT SERPL W/O P-5'-P-CCNC: 13 U/L (ref 0–45)
AST SERPL-CCNC: 18 U/L (ref 0–40)
BACTERIA: NORMAL
BILIRUB DIRECT SERPL-MCNC: 0.1 MG/DL (ref 0–0.5)
BILIRUB SERPL-MCNC: 0.3 MG/DL (ref 0–1)
BILIRUBIN UR: NEGATIVE MG/DL
BLOOD UR: ABNORMAL MG/DL
BNP SERPL-MCNC: 46 PG/ML (ref 0–87)
BUN SERPL-MCNC: 17.6 MG/DL (ref 7–19)
CALCIUM SERPL-MCNC: 9.9 MG/DL (ref 8.5–10.1)
CASTS: NORMAL /LPF
CHLORIDE SERPLBLD-SCNC: 96.8 MMOL/L (ref 98–110)
CO2 SERPL-SCNC: 29 MMOL/L (ref 20–32)
CREAT SERPL-MCNC: 1 MG/DL (ref 0.5–1)
CRYSTAL URINE: NORMAL /LPF
EPITHELIAL CELLS UR: NORMAL /LPF (ref 0–2)
GFR SERPL CREATININE-BSD FRML MDRD: 58.3 ML/MIN/1.7 M2
GLUCOSE SERPL-MCNC: 223.9 MG'DL (ref 70–99)
GLUCOSE URINE: NEGATIVE
KETONES UR QL: NEGATIVE MG/DL
LEUKOCYTE ESTERASE UR: ABNORMAL
MUCOUS URINE: NORMAL LPF
NITRITE UR QL STRIP: POSITIVE MG/DL
PH UR STRIP: 7 [PH] (ref 4.5–8)
POTASSIUM SERPL-SCNC: 4.4 MMOL/L (ref 3.2–4.6)
PROT SERPL-MCNC: 7.4 G/DL (ref 6–8)
PROTEIN UR: ABNORMAL MG/DL
RBC URINE: <2 /HPF
SODIUM SERPL-SCNC: 140.2 MMOL/L (ref 132–142)
SP GR UR STRIP: 1.02 (ref 1–1.03)
UROBILINOGEN UR STRIP-ACNC: ABNORMAL E.U./DL
WBC URINE: NORMAL /HPF

## 2021-05-19 PROCEDURE — 80048 BASIC METABOLIC PNL TOTAL CA: CPT | Performed by: STUDENT IN AN ORGANIZED HEALTH CARE EDUCATION/TRAINING PROGRAM

## 2021-05-19 PROCEDURE — 36415 COLL VENOUS BLD VENIPUNCTURE: CPT | Performed by: STUDENT IN AN ORGANIZED HEALTH CARE EDUCATION/TRAINING PROGRAM

## 2021-05-19 PROCEDURE — 99214 OFFICE O/P EST MOD 30 MIN: CPT | Mod: GC | Performed by: STUDENT IN AN ORGANIZED HEALTH CARE EDUCATION/TRAINING PROGRAM

## 2021-05-19 PROCEDURE — 93000 ELECTROCARDIOGRAM COMPLETE: CPT | Performed by: STUDENT IN AN ORGANIZED HEALTH CARE EDUCATION/TRAINING PROGRAM

## 2021-05-19 PROCEDURE — 81001 URINALYSIS AUTO W/SCOPE: CPT | Performed by: STUDENT IN AN ORGANIZED HEALTH CARE EDUCATION/TRAINING PROGRAM

## 2021-05-19 RX ORDER — FUROSEMIDE 20 MG
10 TABLET ORAL DAILY
Qty: 30 TABLET | Refills: 0 | Status: SHIPPED | OUTPATIENT
Start: 2021-05-19 | End: 2021-05-27

## 2021-05-19 ASSESSMENT — MIFFLIN-ST. JEOR: SCORE: 1106.01

## 2021-05-19 NOTE — PROGRESS NOTES
Assessment & Plan     Swelling of limb  New onset bilateral LE edema; relatively quick onset 3 days ago and now improving. No known cardiac history, but certainly has risk factors including diabetes, HTN; EKG with T wave inversion in V1 but this is unchanged from 2018 and BNP is normal. Will still obtain echo.  She is also on Actos, which has known side effect of LE edema/HF that can happen at any time during course of treatment and is not necessarily related to dose changes. Kidney function is virtually unchanged; unlikely nephrotic cause. DVT unlikely given bilaterality and equal onset. Seems too sudden onset for classic venous stasis. I think most likely is the Actos, thus will discontinue; no substitutions for now as her last A1C was within goal. I will see her back in 1 week. Discussed signs and symptoms that should prompt immediate ED evaluation and patient voiced understanding.  - DISCONTINUE PIOGLITAZONE (ACTOS)  - BNP ([facility])  - Urinalysis, Micro If ([facility])  - Basic Metabolic Panel ([facility])  - Results < 1 hr  - Urine Microscopic ([facility])  - EKG 12-lead complete w/read - Clinics  - furosemide (LASIX) 20 MG tablet  Dispense: 30 tablet; Refill: 0  - Echocardiogram Complete  - Compression Sleeve/Stocking Order for DME - ONLY FOR DME  - Hepatic Profile ([facility])  - CBC w/ Diff and Plt ([facility])    Hypertension  Slightly above goal today. No changes. Normally she is in normal range. She has follow up with me next week and scheduled follow up with her PCP in 1 month.     Review of the result(s) of each unique test - lab work and EKG. Review of 2018 EHG  Ordering of each unique test  Prescription drug management       Patient discussed with attending physician Dr. Jorge Gavin who agrees with the plan.     Wilfrid Renner MD PGY3  [facility] PCP Dr. Haris Whittington   Rachael is a 55 year old who presents for new onset LE swelling. This started rather suddenlt on  "5/15/21 (3 days ago). She had no accompanying symptoms such as chest pain or SOB. She did note that the legs were painful, felt tight. The swelling went to the knees. She did not have any new medications, change in diet, change in activity or exposures the week leading up to the swelling. She did not notice any redness or warmth of the legs; similarly no open wounds, blisters or ulcerations. She did not try any elevation of compression, but she notes that today she thinks that the swelling has started to improve a bit. No fevers, chills.     Review of Systems   Constitutional, HEENT, cardiovascular, pulmonary, GI, , musculoskeletal, neuro, skin, endocrine and psych systems are negative, except as otherwise noted.      Objective    BP (!) 144/73   Pulse 72   Temp 98.1  F (36.7  C) (Oral)   Resp 18   Ht 1.457 m (4' 9.36\")   Wt 64.1 kg (141 lb 6.4 oz)   SpO2 100%   BMI 30.21 kg/m    Body mass index is 30.21 kg/m .  Physical Exam   GENERAL: healthy, alert and no distress  RESP: lungs clear to auscultation - no rales, rhonchi or wheezes  CV: regular rate and rhythm, normal S1 S2, no S3 or S4, no murmur, click or rub, peripheral pulses strong  MS:1+ pitting edema to mid shins bilaterally.   PSYCH: mentation appears normal, affect normal/bright  SKIN: normal appearing skin of LE; no erythema or skin breakdown    Results for orders placed or performed in visit on 05/19/21   BNP ([facility])     Status: None   Result Value Ref Range    BNP 46 0 - 87 pg/mL    Narrative    Test performed by:  [facility]  [address] [place] [address]   Urinalysis, Micro If ([facility])     Status: Abnormal   Result Value Ref Range    Specific Gravity Urine 1.020 1.005 - 1.030    pH Urine 7.0 4.5 - 8.0    Leukocyte Esterase UR 3+ (A) NEGATIVE    Nitrite Urine Positive (A) NEGATIVE mg/dL    Protein UR 2+ (A) NEGATIVE mg/dL    Glucose Urine Negative NEGATIVE    Ketones Urine Negative NEGATIVE mg/dL    " Urobilinogen mg/dL 0.2 E.U./dL 0.2 E.U./dL E.U./dL    Bilirubin UR Negative NEGATIVE mg/dL    Blood UR 2+ (A) NEGATIVE mg/dL   Basic Metabolic Panel ([facility])  - Results < 1 hr     Status: Abnormal   Result Value Ref Range    Urea Nitrogen 17.6 7.0 - 19.0 mg/dL    Calcium 9.9 8.5 - 10.1 mg/dL    Chloride 96.8 (L) 98.0 - 110.0 mmol/L    Carbon Dioxide 29.0 20.0 - 32.0 mmol/L    Creatinine 1.0 (H) 0.5 - 1.0 mg/dL    Glucose 223.9 (H) 70.0 - 99.0 mg'dL    Potassium 4.4 3.2 - 4.6 mmol/L    Sodium 140.2 132.0 - 142.0 mmol/L    GFR Estimate 58.3 (L) >60.0 mL/min/1.7 m2   Urine Microscopic ([facility])     Status: None   Result Value Ref Range    WBC Urine 2-5 <5 /hpf    RBC Urine <2 <5 /hpf    Epithelial Cells UR 2-5 0 - 2 /lpf    Mucous Urine None NONE lpf    Casts Urine None NONE /lpf    Crystal Urine None NONE /lpf    Bacteria Wet Prep Few None   Hepatic Profile ([facility])     Status: None   Result Value Ref Range    Bilirubin Total 0.3 0.0 - 1.0 mg/dL    Bilirubin Direct 0.1 <=0.5 mg/dL    Protein Total 7.4 6.0 - 8.0 g/dL    Albumin 3.7 3.5 - 5.0 g/dL    Alkaline Phosphatase 59 45 - 120 U/L    AST (SGOT) 18 0 - 40 U/L    ALT (SGPT) 13 0 - 45 U/L    Narrative    Test performed by:  [facility] [address] [place] [address]

## 2021-05-19 NOTE — NURSING NOTE
Due to patient being non-English speaking/uses sign language, an  was used for this visit. Only for face-to-face interpretation by an external agency, date and length of interpretation can be found on the scanned worksheet.     name: LORA TAYLOR   Agency: [employer]  Language: Radha   Telephone number:   Type of interpretation: Face-to-face, spoken

## 2021-05-19 NOTE — Clinical Note
ISRAEL-- saw your patient for new onset swelling. I stopped her Actos (DM seems well controlled) and have a cardiac workup brewing. I see her again next week and it looks like she has scheduled follow up with you next month.     Thanks!  Wilfrid

## 2021-05-19 NOTE — PROGRESS NOTES
Preceptor Attestation:    I discussed the patient with the resident and evaluated the patient in person. I personally viewed the EKG and agree with the interpretation documented by the resident. I have verified the content of the note, which accurately reflects my assessment of the patient and the plan of care.   Supervising Physician:  Jorge Gavin MD.

## 2021-05-20 NOTE — PATIENT INSTRUCTIONS
05/20/21   [facility]  ([facility])  Phone:[phone]  Fax:[phone]    Referral, demographics, office note and med list faxed to [phone]. They will contact patient to schedule.    Sho Curran

## 2021-05-24 ENCOUNTER — RECORDS - HEALTHEAST (OUTPATIENT)
Dept: CARDIOLOGY | Facility: HOSPITAL | Age: 56
End: 2021-05-24

## 2021-05-24 ENCOUNTER — RECORDS - HEALTHEAST (OUTPATIENT)
Dept: ADMINISTRATIVE | Facility: OTHER | Age: 56
End: 2021-05-24

## 2021-05-24 DIAGNOSIS — M79.89 SWELLING OF LIMB: ICD-10-CM

## 2021-05-26 VITALS
DIASTOLIC BLOOD PRESSURE: 73 MMHG | HEART RATE: 68 BPM | TEMPERATURE: 98.7 F | RESPIRATION RATE: 16 BRPM | SYSTOLIC BLOOD PRESSURE: 135 MMHG | OXYGEN SATURATION: 98 %

## 2021-05-26 DIAGNOSIS — M79.89 SWELLING OF LIMB: Primary | ICD-10-CM

## 2021-05-26 DIAGNOSIS — Z53.9 DIAGNOSIS NOT YET DEFINED: Primary | ICD-10-CM

## 2021-05-27 ENCOUNTER — OFFICE VISIT (OUTPATIENT)
Dept: FAMILY MEDICINE | Facility: CLINIC | Age: 56
End: 2021-05-27
Payer: COMMERCIAL

## 2021-05-27 VITALS
SYSTOLIC BLOOD PRESSURE: 122 MMHG | HEART RATE: 80 BPM | OXYGEN SATURATION: 99 % | DIASTOLIC BLOOD PRESSURE: 62 MMHG | RESPIRATION RATE: 16 BRPM | TEMPERATURE: 99 F

## 2021-05-27 VITALS
SYSTOLIC BLOOD PRESSURE: 140 MMHG | HEART RATE: 68 BPM | TEMPERATURE: 98.5 F | DIASTOLIC BLOOD PRESSURE: 70 MMHG | OXYGEN SATURATION: 98 % | RESPIRATION RATE: 16 BRPM

## 2021-05-27 VITALS
RESPIRATION RATE: 14 BRPM | DIASTOLIC BLOOD PRESSURE: 86 MMHG | TEMPERATURE: 96.2 F | SYSTOLIC BLOOD PRESSURE: 138 MMHG | OXYGEN SATURATION: 99 % | HEART RATE: 68 BPM

## 2021-05-27 VITALS
DIASTOLIC BLOOD PRESSURE: 64 MMHG | OXYGEN SATURATION: 99 % | TEMPERATURE: 98 F | HEART RATE: 74 BPM | SYSTOLIC BLOOD PRESSURE: 132 MMHG

## 2021-05-27 VITALS
SYSTOLIC BLOOD PRESSURE: 141 MMHG | TEMPERATURE: 95.7 F | DIASTOLIC BLOOD PRESSURE: 69 MMHG | HEART RATE: 74 BPM | OXYGEN SATURATION: 97 % | RESPIRATION RATE: 12 BRPM

## 2021-05-27 VITALS
OXYGEN SATURATION: 98 % | TEMPERATURE: 96.5 F | DIASTOLIC BLOOD PRESSURE: 60 MMHG | HEART RATE: 69 BPM | RESPIRATION RATE: 12 BRPM | SYSTOLIC BLOOD PRESSURE: 119 MMHG

## 2021-05-27 VITALS
OXYGEN SATURATION: 98 % | RESPIRATION RATE: 12 BRPM | DIASTOLIC BLOOD PRESSURE: 61 MMHG | TEMPERATURE: 98.4 F | HEART RATE: 82 BPM | SYSTOLIC BLOOD PRESSURE: 122 MMHG

## 2021-05-27 VITALS
HEART RATE: 73 BPM | TEMPERATURE: 97.4 F | SYSTOLIC BLOOD PRESSURE: 122 MMHG | DIASTOLIC BLOOD PRESSURE: 68 MMHG | RESPIRATION RATE: 16 BRPM | OXYGEN SATURATION: 99 %

## 2021-05-27 VITALS
TEMPERATURE: 97.4 F | OXYGEN SATURATION: 96 % | DIASTOLIC BLOOD PRESSURE: 68 MMHG | SYSTOLIC BLOOD PRESSURE: 110 MMHG | RESPIRATION RATE: 16 BRPM | HEART RATE: 70 BPM

## 2021-05-27 VITALS
TEMPERATURE: 98.1 F | SYSTOLIC BLOOD PRESSURE: 99 MMHG | OXYGEN SATURATION: 100 % | HEART RATE: 73 BPM | BODY MASS INDEX: 29.44 KG/M2 | DIASTOLIC BLOOD PRESSURE: 61 MMHG | RESPIRATION RATE: 20 BRPM | WEIGHT: 137.8 LBS

## 2021-05-27 VITALS
TEMPERATURE: 98.1 F | DIASTOLIC BLOOD PRESSURE: 66 MMHG | SYSTOLIC BLOOD PRESSURE: 132 MMHG | HEART RATE: 76 BPM | OXYGEN SATURATION: 98 %

## 2021-05-27 VITALS
HEART RATE: 73 BPM | DIASTOLIC BLOOD PRESSURE: 78 MMHG | OXYGEN SATURATION: 99 % | SYSTOLIC BLOOD PRESSURE: 130 MMHG | TEMPERATURE: 98 F

## 2021-05-27 VITALS
HEART RATE: 80 BPM | SYSTOLIC BLOOD PRESSURE: 125 MMHG | OXYGEN SATURATION: 99 % | RESPIRATION RATE: 12 BRPM | DIASTOLIC BLOOD PRESSURE: 63 MMHG | TEMPERATURE: 96.3 F

## 2021-05-27 VITALS
RESPIRATION RATE: 12 BRPM | DIASTOLIC BLOOD PRESSURE: 71 MMHG | OXYGEN SATURATION: 98 % | HEART RATE: 74 BPM | SYSTOLIC BLOOD PRESSURE: 126 MMHG | TEMPERATURE: 98.1 F

## 2021-05-27 VITALS
DIASTOLIC BLOOD PRESSURE: 70 MMHG | HEART RATE: 64 BPM | OXYGEN SATURATION: 99 % | SYSTOLIC BLOOD PRESSURE: 114 MMHG | TEMPERATURE: 98.4 F

## 2021-05-27 VITALS
DIASTOLIC BLOOD PRESSURE: 64 MMHG | HEART RATE: 76 BPM | SYSTOLIC BLOOD PRESSURE: 126 MMHG | TEMPERATURE: 98.4 F | OXYGEN SATURATION: 98 %

## 2021-05-27 VITALS
HEART RATE: 68 BPM | SYSTOLIC BLOOD PRESSURE: 150 MMHG | TEMPERATURE: 98.4 F | OXYGEN SATURATION: 97 % | DIASTOLIC BLOOD PRESSURE: 80 MMHG

## 2021-05-27 DIAGNOSIS — E11.29 TYPE 2 DIABETES MELLITUS WITH MICROALBUMINURIA, WITHOUT LONG-TERM CURRENT USE OF INSULIN (H): ICD-10-CM

## 2021-05-27 DIAGNOSIS — M79.89 SWELLING OF LIMB: Primary | ICD-10-CM

## 2021-05-27 DIAGNOSIS — R80.9 TYPE 2 DIABETES MELLITUS WITH MICROALBUMINURIA, WITHOUT LONG-TERM CURRENT USE OF INSULIN (H): ICD-10-CM

## 2021-05-27 LAB
% IMMATURE GRANULOCYTES: 0 % (ref 0–0)
ABS IMMATURE GRANULOCYTES: 0 10E9/L (ref 0–0)
ALBUMIN SERPL-MCNC: 4.9 MG/DL (ref 3.3–4.9)
ALP SERPL-CCNC: 60.9 U/L (ref 40–150)
ALT SERPL-CCNC: 20 U/L (ref 0–45)
AST SERPL-CCNC: 24.8 U/L (ref 0–45)
BASOPHILS # BLD AUTO: 0 10E9/L (ref 0–0.2)
BASOPHILS NFR BLD AUTO: 0 % (ref 0–2)
BILIRUB SERPL-MCNC: 0.7 MG/DL (ref 0.2–1.3)
BUN SERPL-MCNC: 24.3 MG/DL (ref 7–19)
CALCIUM SERPL-MCNC: 10.2 MG/DL (ref 8.5–10.1)
CHLORIDE SERPLBLD-SCNC: 103.5 MMOL/L (ref 98–110)
CO2 SERPL-SCNC: 24.2 MMOL/L (ref 20–32)
CREAT SERPL-MCNC: 1 MG/DL (ref 0.5–1)
EOSINOPHIL # BLD AUTO: 0.2 10E9/L (ref 0–0.7)
EOSINOPHIL NFR BLD AUTO: 3 % (ref 0–6)
ERYTHROCYTE [DISTWIDTH] IN BLOOD BY AUTOMATED COUNT: 13 % (ref 10–15)
GFR SERPL CREATININE-BSD FRML MDRD: 61.2 ML/MIN/1.7 M2
GLUCOSE SERPL-MCNC: 157.8 MG'DL (ref 70–99)
HCT VFR BLD AUTO: 36.5 % (ref 35–47)
HEMOGLOBIN: 11.8 G/DL (ref 11.7–15.7)
LYMPHOCYTES # BLD AUTO: 1.8 10E9/L (ref 0.8–5.3)
LYMPHOCYTES NFR BLD AUTO: 23.8 % (ref 20–48)
MCH RBC QN AUTO: 27.8 PG (ref 26.5–35)
MCHC RBC AUTO-ENTMCNC: 32.3 G/DL (ref 32–36)
MCV RBC AUTO: 86.1 FL (ref 78–100)
MONOCYTES # BLD AUTO: 0.3 10E9/L (ref 0–1.3)
MONOCYTES NFR BLD AUTO: 4 % (ref 0–12)
NEUTROPHILS # BLD AUTO: 5.2 10E9/L (ref 1.6–8.3)
NEUTROPHILS NFR BLD AUTO: 69.5 % (ref 40–75)
PLATELET # BLD AUTO: 260 10E9/L (ref 150–450)
POTASSIUM SERPL-SCNC: 4.6 MMOL/L (ref 3.2–4.6)
PROT SERPL-MCNC: 8.6 G/DL (ref 6.8–8.8)
RBC # BLD AUTO: 4.2 10E12/L (ref 3.8–5.2)
SODIUM SERPL-SCNC: 137 MMOL/L (ref 132–142)
WBC # BLD AUTO: 7.4 10E9/L (ref 4–11)

## 2021-05-27 PROCEDURE — 85025 COMPLETE CBC W/AUTO DIFF WBC: CPT | Performed by: STUDENT IN AN ORGANIZED HEALTH CARE EDUCATION/TRAINING PROGRAM

## 2021-05-27 PROCEDURE — 36415 COLL VENOUS BLD VENIPUNCTURE: CPT | Performed by: STUDENT IN AN ORGANIZED HEALTH CARE EDUCATION/TRAINING PROGRAM

## 2021-05-27 PROCEDURE — 80053 COMPREHEN METABOLIC PANEL: CPT | Performed by: STUDENT IN AN ORGANIZED HEALTH CARE EDUCATION/TRAINING PROGRAM

## 2021-05-27 PROCEDURE — 99213 OFFICE O/P EST LOW 20 MIN: CPT | Mod: GC | Performed by: STUDENT IN AN ORGANIZED HEALTH CARE EDUCATION/TRAINING PROGRAM

## 2021-05-27 RX ORDER — FUROSEMIDE 20 MG
20 TABLET ORAL DAILY PRN
Qty: 30 TABLET | Refills: 0 | Status: SHIPPED | OUTPATIENT
Start: 2021-05-27 | End: 2021-08-05

## 2021-05-27 RX ORDER — FUROSEMIDE 20 MG
20 TABLET ORAL DAILY PRN
Qty: 30 TABLET | Refills: 0 | Status: SHIPPED | OUTPATIENT
Start: 2021-05-27 | End: 2021-05-27

## 2021-05-27 NOTE — RESULT ENCOUNTER NOTE
Results discussed directly with patient while patient was present. Any further details documented in the note.   Wilfrid Renner MD

## 2021-05-27 NOTE — PROGRESS NOTES
Preceptor attestation:  Vital signs reviewed: BP 99/61   Pulse 73   Temp 98.1  F (36.7  C) (Oral)   Resp 20   Wt 62.5 kg (137 lb 12.8 oz)   SpO2 100%   BMI 29.44 kg/m      Patient seen, evaluated, and discussed with the resident.  I have verified the content of the note, which accurately reflects my assessment of the patient and the plan of care.    Supervising physician: Debbie Valdes MD  [facility]

## 2021-05-27 NOTE — PROGRESS NOTES
[facility] Visit    Assessment/Plan:  Rachael was seen today for hypertension and swelling.    Diagnoses and all orders for this visit:    Swelling of limb  Swelling significantly improved after discontinuation of pioglitazone.  She has been using the compression stockings but never picked up the Lasix.  And that it is reasonable for her to get this just that she has as needed Lasix in case the swelling is worsening.  Encouraged her to try not wearing the compression stockings to see if the swelling returns.  BMP is stable from last visit and her CBC is within normal limits.  At this point I have very junk suspicion that the swelling was secondary to the pioglitazone been without other cardiac symptomatology think we can hold off on the echo.  She has scheduled follow-up with her PCP in about 2 weeks.  -     CBC with Diff Plt ([facility])  -     Comprehensive Metabolic Panel ([place])  -     Discontinue: furosemide (LASIX) 20 MG tablet; Take 1 tablet (20 mg) by mouth daily as needed (swelling)  -     furosemide (LASIX) 20 MG tablet; Take 1 tablet (20 mg) by mouth daily as needed (swelling)    Type 2 diabetes mellitus with microalbuminuria, without long-term current use of insulin (H)  Permanently discontinue pioglitazone.  Blood glucose today was 157 nonfasting; her previous A1c's were within target.  No new antihyperglycemic's were started at this visit.      Options for treatment and follow-up care were reviewed with the patient who was engaged and actively involved in the decision making process, verbalized understanding of the options discussed, and satisfied with the final plan.    Patient was staffed with supervising physician, Dr. Becerra.     Wilfrid Renner MD PGY3  [facility]    Subjective:  Rachael Ch is a 55 year old female with a PMHx significant for   Patient Active Problem List   Diagnosis     Essential hypertension, benign     Diabetes mellitus, type 2 (H)     Hyperlipidemia      Health Care Home     Helicobacter pylori gastritis     PTSD (post-traumatic stress disorder)     Moderate episode of recurrent major depressive disorder (H)     Cognitive complaints     Screening for depression     Microalbuminuria     Plantar fasciitis     Left knee pain     Right knee pain, unspecified chronicity    who presents for follow up of lower extremity swelling.     I saw her 1 week ago for new onset swelling that I thought was likely secondary to her Actos and thus this was discontinued.  Patient verified that she has not been taking the Actos.  She did  compression stockings which she has been wearing.  She never did get the Lasix.  She says that the swelling has significantly improved.  She is maturing the compression stockings all the time so she is not sure if the swelling will come back if she is not wearing the stockings.  She continues to not have any shortness of breath or chest pain.    ROS:   A complete 12-point ROS was obtained and was negative except as stated above in HPI.    Objective:  Vitals:    05/27/21 1120   BP: 99/61   Pulse: 73   Resp: 20   Temp: 98.1  F (36.7  C)   TempSrc: Oral   SpO2: 100%   Weight: 62.5 kg (137 lb 12.8 oz)     Body mass index is 29.44 kg/m .    GEN: NAD, healthy, alert  RESP: CTAB, no w/r/r  CV: RRR, nl S1/S2, no S3/S4, no m/r/g, trace bilateral pitting edema, peripheral pulses strong  PSYCH: mentation appears normal, affect normal/bright    Results for orders placed or performed in visit on 05/27/21 (from the past 24 hour(s))   CBC with Diff Plt ([facility])   Result Value Ref Range    WBC 7.4 4.0 - 11.0 10e9/L    RBC 4.2 3.8 - 5.2 10e12/L    Hemoglobin 11.8 11.7 - 15.7 g/dL    Hematocrit 36.5 35.0 - 47.0 %    MCV 86.1 78.0 - 100.0 fL    MCH 27.8 26.5 - 35.0 pg    MCHC 32.3 32.0 - 36.0 g/dL    RDW 13 10 - 15 %    Platelets 260.0 150.0 - 450.0 10e9/L    % Neutrophils 69.5 40.0 - 75.0 %    % Lymphocytes 23.8 20.0 - 48.0 %    % Monocytes 4 0 - 12 %    %  Eosinophils 3 0 - 6 %    % Basophils 0 0 - 2 %    % Immature Granulocytes 0 0 - 0 %    Absolute Neutrophil 5.2 1.6 - 8.3 10e9/L    Lymphocytes # 1.8 0.8 - 5.3 10e9/L    Absolute Monocytes 0.3 0.0 - 1.3 10e9/L    Absolute Eosinophils 0.2 0.0 - 0.7 10e9/L    Absolute Basophils 0.0 0.0 - 0.2 10e9/L    Abs Immature Granulocytes 0.0 0.0 - 0.0 10e9/L   Comprehensive Metabolic Panel ([facility])   Result Value Ref Range    Urea Nitrogen 24.3 (H) 7.0 - 19.0 mg/dL    Bilirubin Total 0.7 0.2 - 1.3 mg/dL    Albumin 4.9 (H) 3.3 - 4.9 mg/dL    Alkaline Phosphatase 60.9 40.0 - 150.0 U/L    ALT 20.0 0.0 - 45.0 U/L    AST 24.8 0.0 - 45.0 U/L    Calcium 10.2 (H) 8.5 - 10.1 mg/dL    Chloride 103.5 98.0 - 110.0 mmol/L    Carbon Dioxide 24.2 20.0 - 32.0 mmol/L    Creatinine 1.0 0.5 - 1.0 mg/dL    Glucose 157.8 (H) 70.0 - 99.0 mg'dL    Potassium 4.6 (H) 3.2 - 4.6 mmol/L    Sodium 137.0 132.0 - 142.0 mmol/L    Protein Total 8.6 6.8 - 8.8 g/dL    GFR Estimate 61.2 >60.0 mL/min/1.7 m2

## 2021-05-27 NOTE — NURSING NOTE
Due to patient being non-English speaking/uses sign language, an  was used for this visit. Only for face-to-face interpretation by an external agency, date and length of interpretation can be found on the scanned worksheet.     name: sofia          ID:[id number]  Agency: [facility] [employer] Phone Interpreting  Language: Radha   Telephone number: [phone]  Type of interpretation: Telephone, spoken      Due to patient being non-English speaking/uses sign language, an  was used for this visit. Only for face-to-face interpretation by an external agency, date and length of interpretation can be found on the scanned worksheet.       name: Silvino SaezActJessy Banks  Language: Radha  Agency:  [employer]  Phone number: [phone]  Type of interpretation:  Face-to-face, spoken

## 2021-06-01 ENCOUNTER — TELEPHONE (OUTPATIENT)
Dept: FAMILY MEDICINE | Facility: CLINIC | Age: 56
End: 2021-06-01

## 2021-06-01 DIAGNOSIS — F43.10 PTSD (POST-TRAUMATIC STRESS DISORDER): ICD-10-CM

## 2021-06-01 RX ORDER — PRAZOSIN HYDROCHLORIDE 2 MG/1
2 CAPSULE ORAL AT BEDTIME
Qty: 90 CAPSULE | Refills: 3 | Status: SHIPPED | OUTPATIENT
Start: 2021-06-01 | End: 2021-06-07

## 2021-06-01 NOTE — TELEPHONE ENCOUNTER
Tessa, HHN calls to clarify Lasix dose. She notes patient never ended up taking the initial 10 mg dose, which is reflected in Dr. Renner's 5/27/21 note. Provided her with instructions for patient to take 20 mg PRN for swelling per medication order and 5/27/21 note. She will communicate this to patient. ./LR

## 2021-06-04 ENCOUNTER — MEDICAL CORRESPONDENCE (OUTPATIENT)
Dept: HEALTH INFORMATION MANAGEMENT | Facility: CLINIC | Age: 56
End: 2021-06-04

## 2021-06-07 DIAGNOSIS — F43.10 PTSD (POST-TRAUMATIC STRESS DISORDER): ICD-10-CM

## 2021-06-07 DIAGNOSIS — F41.9 ANXIETY: ICD-10-CM

## 2021-06-07 DIAGNOSIS — M19.011 PRIMARY OSTEOARTHRITIS OF RIGHT SHOULDER: ICD-10-CM

## 2021-06-07 DIAGNOSIS — E11.9 TYPE 2 DIABETES MELLITUS WITHOUT COMPLICATION, WITHOUT LONG-TERM CURRENT USE OF INSULIN (H): ICD-10-CM

## 2021-06-07 RX ORDER — ACETAMINOPHEN 500 MG
TABLET ORAL
Qty: 100 TABLET | Refills: 3 | Status: SHIPPED | OUTPATIENT
Start: 2021-06-07 | End: 2021-10-29

## 2021-06-07 RX ORDER — HYDROXYZINE PAMOATE 25 MG/1
CAPSULE ORAL
Qty: 90 CAPSULE | Refills: 3 | Status: SHIPPED | OUTPATIENT
Start: 2021-06-07 | End: 2021-08-19

## 2021-06-07 RX ORDER — METFORMIN HCL 500 MG
1000 TABLET, EXTENDED RELEASE 24 HR ORAL 2 TIMES DAILY WITH MEALS
Qty: 360 TABLET | Refills: 3 | Status: SHIPPED | OUTPATIENT
Start: 2021-06-07 | End: 2021-09-30

## 2021-06-07 RX ORDER — PRAZOSIN HYDROCHLORIDE 2 MG/1
2 CAPSULE ORAL AT BEDTIME
Qty: 90 CAPSULE | Refills: 3 | Status: SHIPPED | OUTPATIENT
Start: 2021-06-07 | End: 2022-05-04

## 2021-06-11 NOTE — PROGRESS NOTES
CHELSEA did a chart review prior to speaking with pt. Pt and family are well connected to clinic [facility]. Pt had 6.5 hours daily PCA. Had a recent PCA reassessment and had a decrease to 3.5 hours daily. PCP has written a letter of support and clinic SW has assisted them with an appeal. SW spoke with pt via [employer] . She states they have not been updated on the appeal. SW advised her to continue working with her clinic SW. Pt sta  mary she does not have any other questions or concerns at this time. [facility].

## 2021-06-11 NOTE — PROGRESS NOTES
Seen patient today. For next appontment on 18th, it will need to be am. Pt states they have an appointment at 2 pm    Observed multiple bottles with mixed medication or wrong medication in bottle (example- metformin in Atorvastatin, not an Actos bottle in home but  had actos in lisinopril bottle, atorvastatin and senna-s mixed together) . I placed in correct pills into correct bottle checking pills to verify they are correct medication.   It appears she has mixed pills in a container in her DM bag unsure if they were previous (before our start). instructed  patient to leave medications in pill container if she misses any doses

## 2021-06-12 DIAGNOSIS — R80.9 TYPE 2 DIABETES MELLITUS WITH MICROALBUMINURIA, WITHOUT LONG-TERM CURRENT USE OF INSULIN (H): Primary | ICD-10-CM

## 2021-06-12 DIAGNOSIS — E11.29 TYPE 2 DIABETES MELLITUS WITH MICROALBUMINURIA, WITHOUT LONG-TERM CURRENT USE OF INSULIN (H): Primary | ICD-10-CM

## 2021-06-15 NOTE — PROGRESS NOTES
HOME HEALTH MISSED VISIT NOTIFICATION (FYI)       Your patient who receives home health services missed a visit on: 2/20/21      Type of service missed: SNV      Reason for missed visit (pick applicable reason):              Other (explain): Patient was out of town and requested to cancel visit this week. She is requesting SNV on Monday 2/22/21 for medication set up.      Provider(s) to be notified: Dr Carballo and Art      This request is sent as an inbasket message to , and provider if in our system.

## 2021-06-16 ENCOUNTER — OFFICE VISIT (OUTPATIENT)
Dept: FAMILY MEDICINE | Facility: CLINIC | Age: 56
End: 2021-06-16
Payer: COMMERCIAL

## 2021-06-16 VITALS
WEIGHT: 134 LBS | DIASTOLIC BLOOD PRESSURE: 58 MMHG | BODY MASS INDEX: 28.63 KG/M2 | HEART RATE: 66 BPM | RESPIRATION RATE: 16 BRPM | SYSTOLIC BLOOD PRESSURE: 94 MMHG | TEMPERATURE: 98.1 F | OXYGEN SATURATION: 100 %

## 2021-06-16 DIAGNOSIS — R80.9 TYPE 2 DIABETES MELLITUS WITH MICROALBUMINURIA, WITHOUT LONG-TERM CURRENT USE OF INSULIN (H): ICD-10-CM

## 2021-06-16 DIAGNOSIS — E11.29 TYPE 2 DIABETES MELLITUS WITH MICROALBUMINURIA, WITHOUT LONG-TERM CURRENT USE OF INSULIN (H): ICD-10-CM

## 2021-06-16 DIAGNOSIS — I10 ESSENTIAL HYPERTENSION, BENIGN: ICD-10-CM

## 2021-06-16 LAB — HBA1C MFR BLD: 6.9 % (ref 4.1–5.7)

## 2021-06-16 PROCEDURE — 36415 COLL VENOUS BLD VENIPUNCTURE: CPT | Performed by: STUDENT IN AN ORGANIZED HEALTH CARE EDUCATION/TRAINING PROGRAM

## 2021-06-16 PROCEDURE — 83036 HEMOGLOBIN GLYCOSYLATED A1C: CPT | Performed by: STUDENT IN AN ORGANIZED HEALTH CARE EDUCATION/TRAINING PROGRAM

## 2021-06-16 PROCEDURE — 99214 OFFICE O/P EST MOD 30 MIN: CPT | Performed by: STUDENT IN AN ORGANIZED HEALTH CARE EDUCATION/TRAINING PROGRAM

## 2021-06-16 RX ORDER — LISINOPRIL 20 MG/1
20 TABLET ORAL DAILY
Qty: 90 TABLET | Refills: 1 | Status: SHIPPED | OUTPATIENT
Start: 2021-06-16 | End: 2021-09-26

## 2021-06-16 NOTE — LETTER
June 16, 2021      Petatianayann Dima  [address] [address]        Dear ,    We are writing to inform you of your test results.    Here is the results of your diabetes test.  The numbers look good, even off of the medication.  No new medications for diabetes today.  Keep up the good work!  Please call the clinic at [phone] if you have any questions.       Resulted Orders   Hemoglobin A1c ([facility])   Result Value Ref Range    Hemoglobin A1C 6.9 (H) 4.1 - 5.7 %       If you have any questions or concerns, please call the clinic at the number listed above.       Sincerely,      Amrita Carballo MD

## 2021-06-16 NOTE — PROGRESS NOTES
Nursing Notes:   Veronica Rosas, [place]  6/16/2021 10:26 AM  Signed  Due to patient being non-English speaking/uses sign language, an  was used for this visit. Only for face-to-face interpretation by an external agency, date and length of interpretation can be found on the scanned worksheet.       name: Silvino  Chivoluis  Language: Radha  Agency:  [employer]  Phone number: [phone]  Type of interpretation:  Face-to-face, spoken        SUBJECTIVE  Rachael Ch is a 56 year old female with past medical history significant for    Patient Active Problem List   Diagnosis     Essential hypertension, benign     Diabetes mellitus, type 2 (H)     Hyperlipidemia     Health Care Home     Helicobacter pylori gastritis     PTSD (post-traumatic stress disorder)     Moderate episode of recurrent major depressive disorder (H)     Cognitive complaints     Screening for depression     Microalbuminuria     Plantar fasciitis     Left knee pain     Right knee pain, unspecified chronicity     Others present at the visit:   Silvino Banks, present in person.     Presents for   Chief Complaint   Patient presents with     Hypertension     Pt is here to follow up on blood pressure.     Diabetes     Pt is here to follow up on diabetes as well.      Patient presents today for follow-up visit.  She has overall been feeling well.  Continues to have some general fatigue, and difficulty concentrating.  Has intermittent difficulty sleeping as well.  Sometimes she gets anxious.  This happens she notices some trouble breathing.  It can happen at any time.  Disconnect the symptoms to her thinking.  Can also happen when she is hungry.  Denies any difficulties with this with ambulation or activity.  Otherwise she is feeling well.    Here to follow lower extremity edema.  This has resolved.  She feels that the compression stockings and the water pill both helped.  Has not used either the water pill or the compression  stockings for the last 3 days and has had no return buildup of edema.  Legs are nontender.  Feels good.    Is no longer taking the Actos/pioglitazone.  Blood sugars have been a little bit higher.  Over the last week or 2, her checks have been more in the 180s and 190s.  No low sugars.  Has noticed an overall decreased appetite.  No sugars over 200.  Has lost 7 pounds in the last month, but thinks this is mostly from water weight.    We reviewed her hemoglobin A1c which was 6.9.    Blood pressure is somewhat low today.  She denies any dizziness, lightheadedness, but does feel more fatigued in general.    OBJECTIVE:  Vitals: BP 94/58 (BP Location: Left arm, Patient Position: Sitting, Cuff Size: Adult Regular)   Pulse 66   Temp 98.1  F (36.7  C) (Oral)   Resp 16   Wt 60.8 kg (134 lb)   SpO2 100%   BMI 28.63 kg/m    BMI= Body mass index is 28.63 kg/m .  Objective:    Vitals:  Vitals are reviewed and are within the normal range  Gen:  Alert, pleasant, no acute distress  Cardiac:  Regular rate and rhythm, no murmurs, rubs or gallops  Respiratory:  Lungs clear to auscultation bilaterally  Abdomen:  Soft, non-tender, non-distended, bowel sounds positive  Extremities:  Warm, well-perfused, pulses 2+/4, no lower extremity edema    Results for orders placed or performed in visit on 06/16/21   Hemoglobin A1c ([facility])     Status: Abnormal   Result Value Ref Range    Hemoglobin A1C 6.9 (H) 4.1 - 5.7 %         ASSESSMENT AND PLAN:      Rachael was seen today for hypertension and diabetes.  Lower extremity edema has improved.  Weight is down, and blood pressure is somewhat low.  Will decrease her lisinopril to 20 mg or half a tab once daily.  We will continue off of the Actos  As an allergy.  Blood sugars have been a little bit higher but her A1c is excellent.  We will continue to monitor sugars and watch.  Could consider adding Januvia, or Jardiance in the future as other alternatives.    Diagnoses and all orders for this  visit:    Type 2 diabetes mellitus with microalbuminuria, without long-term current use of insulin (H)  -     Hemoglobin A1c ([facility])    Essential hypertension, benign  -     lisinopril (ZESTRIL) 20 MG tablet; Take 1 tablet (20 mg) by mouth daily        Patient Instructions   Okay to stop wearing the stockings and taking the water pill if you don't have any swelling.     You can always take the water pill and wear the stockings if the swelling comes back.      For the blood pressure, we are going to cut down on the lisinopril.  Take 1/2 pill per day instead of a whole pill (20mg per day).  Have your home nurse check your blood pressure and call us if less than 100/60 or higher than 150/110.      We will send in new prescription for 20mg tabs.     No change in diabetes medications for now.      Follow up in 6 weeks.        Amrita Carballo MD

## 2021-06-16 NOTE — PATIENT INSTRUCTIONS
Okay to stop wearing the stockings and taking the water pill if you don't have any swelling.     You can always take the water pill and wear the stockings if the swelling comes back.      For the blood pressure, we are going to cut down on the lisinopril.  Take 1/2 pill per day instead of a whole pill (20mg per day).  Have your home nurse check your blood pressure and call us if less than 100/60 or higher than 150/110.      We will send in new prescription for 20mg tabs.     No change in diabetes medications for now.      Follow up in 6 weeks.

## 2021-06-16 NOTE — RESULT ENCOUNTER NOTE
Rachael Ch-    Here is the results of your diabetes test.  The numbers look good, even off of the medication.  No new medications for diabetes today.  Keep up the good work!  Please call the clinic at [phone] if you have any questions.      Amrita Carballo MD    Please send results to patient.

## 2021-06-25 ENCOUNTER — MEDICAL CORRESPONDENCE (OUTPATIENT)
Dept: HEALTH INFORMATION MANAGEMENT | Facility: CLINIC | Age: 56
End: 2021-06-25

## 2021-07-05 ENCOUNTER — DOCUMENTATION ONLY (OUTPATIENT)
Dept: FAMILY MEDICINE | Facility: CLINIC | Age: 56
End: 2021-07-05

## 2021-07-05 NOTE — PROGRESS NOTES
To be completed in Nursing note:    Please reference list for forms that require a visit for completion.  Please remind patients that providers are given 3-5 business days to complete and return forms.      Form type: [facility] -  Clinic Visit on 21    Date form received: 21    Date form completed by Physician: 21    How was form returned to patient (mailed, faxed, or at  for patient to ): Faxed back to [phone]    Date form mailed/faxed/left at  for patient and sent to HIM for [email or web address]      Once form is left for patient, faxed, or mailed PCS will then close the documentation only encounter.

## 2021-07-06 DIAGNOSIS — E11.9 TYPE 2 DIABETES MELLITUS WITHOUT COMPLICATION, WITHOUT LONG-TERM CURRENT USE OF INSULIN (H): ICD-10-CM

## 2021-07-08 RX ORDER — ATORVASTATIN CALCIUM 80 MG/1
TABLET, FILM COATED ORAL
Qty: 30 TABLET | Refills: 0 | OUTPATIENT
Start: 2021-07-08

## 2021-07-13 ENCOUNTER — DOCUMENTATION ONLY (OUTPATIENT)
Dept: FAMILY MEDICINE | Facility: CLINIC | Age: 56
End: 2021-07-13

## 2021-07-13 NOTE — PROGRESS NOTES
To be completed in Nursing note:    Please reference list for forms that require a visit for completion.  Please remind patients that providers are given 3-5 business days to complete and return forms.      Form type: [facility] Certification and Plan of Care for 21 - 21    Date form received: 21    Date form completed by Physician: 21    How was form returned to patient (mailed, faxed, or at  for patient to ): Faxed back to [phone]    Date form mailed/faxed/left at  for patient and sent to HIM for [email or web address]      Once form is left for patient, faxed, or mailed PCS will then close the documentation only encounter.

## 2021-07-19 DIAGNOSIS — Z53.9 DIAGNOSIS NOT YET DEFINED: Primary | ICD-10-CM

## 2021-08-04 ENCOUNTER — OFFICE VISIT (OUTPATIENT)
Dept: FAMILY MEDICINE | Facility: CLINIC | Age: 56
End: 2021-08-04
Payer: COMMERCIAL

## 2021-08-04 VITALS
HEART RATE: 85 BPM | DIASTOLIC BLOOD PRESSURE: 73 MMHG | RESPIRATION RATE: 16 BRPM | WEIGHT: 134 LBS | TEMPERATURE: 98.6 F | SYSTOLIC BLOOD PRESSURE: 122 MMHG | HEIGHT: 58 IN | OXYGEN SATURATION: 99 % | BODY MASS INDEX: 28.13 KG/M2

## 2021-08-04 DIAGNOSIS — F33.1 MODERATE EPISODE OF RECURRENT MAJOR DEPRESSIVE DISORDER (H): ICD-10-CM

## 2021-08-04 DIAGNOSIS — F43.10 PTSD (POST-TRAUMATIC STRESS DISORDER): ICD-10-CM

## 2021-08-04 DIAGNOSIS — E11.29 TYPE 2 DIABETES MELLITUS WITH MICROALBUMINURIA, WITHOUT LONG-TERM CURRENT USE OF INSULIN (H): Primary | ICD-10-CM

## 2021-08-04 DIAGNOSIS — M79.89 SWELLING OF LIMB: ICD-10-CM

## 2021-08-04 DIAGNOSIS — R80.9 TYPE 2 DIABETES MELLITUS WITH MICROALBUMINURIA, WITHOUT LONG-TERM CURRENT USE OF INSULIN (H): Primary | ICD-10-CM

## 2021-08-04 DIAGNOSIS — I10 ESSENTIAL HYPERTENSION, BENIGN: ICD-10-CM

## 2021-08-04 PROCEDURE — 99214 OFFICE O/P EST MOD 30 MIN: CPT | Performed by: STUDENT IN AN ORGANIZED HEALTH CARE EDUCATION/TRAINING PROGRAM

## 2021-08-04 ASSESSMENT — ANXIETY QUESTIONNAIRES
1. FEELING NERVOUS, ANXIOUS, OR ON EDGE: SEVERAL DAYS
2. NOT BEING ABLE TO STOP OR CONTROL WORRYING: SEVERAL DAYS
6. BECOMING EASILY ANNOYED OR IRRITABLE: SEVERAL DAYS
IF YOU CHECKED OFF ANY PROBLEMS ON THIS QUESTIONNAIRE, HOW DIFFICULT HAVE THESE PROBLEMS MADE IT FOR YOU TO DO YOUR WORK, TAKE CARE OF THINGS AT HOME, OR GET ALONG WITH OTHER PEOPLE: SOMEWHAT DIFFICULT
5. BEING SO RESTLESS THAT IT IS HARD TO SIT STILL: NOT AT ALL
3. WORRYING TOO MUCH ABOUT DIFFERENT THINGS: SEVERAL DAYS
GAD7 TOTAL SCORE: 5
7. FEELING AFRAID AS IF SOMETHING AWFUL MIGHT HAPPEN: SEVERAL DAYS

## 2021-08-04 ASSESSMENT — PATIENT HEALTH QUESTIONNAIRE - PHQ9
5. POOR APPETITE OR OVEREATING: NOT AT ALL
SUM OF ALL RESPONSES TO PHQ QUESTIONS 1-9: 13

## 2021-08-04 ASSESSMENT — MIFFLIN-ST. JEOR: SCORE: 1069.63

## 2021-08-04 NOTE — PROGRESS NOTES
Nursing Notes:   Maria L Burnett, CMA  8/4/2021  1:30 PM  Signed  Due to patient being non-English speaking/uses sign language, an  was used for this visit. Only for face-to-face interpretation by an external agency, date and length of interpretation can be found on the scanned worksheet.       name: Silvino Banks (Htoo)  Language: Radha  Agency:  [employer]  Phone number: [phone]  Type of interpretation:  Face-to-face, spoken        ASSESSMENT AND PLAN:      Rachael was seen today for recheck.  Seen for diabetes follow-up visit.    Diagnoses and all orders for this visit:    Type 2 diabetes mellitus with microalbuminuria, without long-term current use of insulin (H).  Last A1c was at goal, but we have since discontinued her pioglitazone due to lower extremity edema and concern for heart failure with the medication.  The swelling has not returned since stopping the medication, and we have since stopped Lasix for treatment.  We will continue with Metformin, and add Jardiance today.  We will start with 10 mg and titrate up as needed.  She will return in 6 weeks and we will recheck an A1c at that time.  -     empagliflozin (JARDIANCE) 10 MG TABS tablet; Take 1 tablet (10 mg) by mouth daily    PTSD (post-traumatic stress disorder).   Moderate episode of recurrent major depressive disorder (H)  Symptoms overall stable.  She is not having nightmares just difficulty with sleep.  Hydroxyzine is helping.  Discussed that it is okay for her to take this during the day, and can take it in the morning or with episodes of worsening anxiety.      Essential hypertension, benign  Blood pressure at goal today.  We will continue with the lisinopril 20 mg, and diltiazem 120 mg.  Lasix has since been stopped.  She is also taking prazosin at night and we will continue this medication.    Swelling of limb-resolved        Patient Instructions   New medicine, 1 pill per day for diabetes.      Okay to use the Hydroxyzine 2  pills at night AND 1 pil in the morning if needed.            Amrita Carballo MD    RAYSA Ch is a 56 year old female with past medical history significant for    Patient Active Problem List   Diagnosis     Essential hypertension, benign     Diabetes mellitus, type 2 (H)     Hyperlipidemia     Health Care Home     Helicobacter pylori gastritis     PTSD (post-traumatic stress disorder)     Moderate episode of recurrent major depressive disorder (H)     Cognitive complaints     Screening for depression     Microalbuminuria     Plantar fasciitis     Left knee pain     Right knee pain, unspecified chronicity       Others present at the visit:   Silvino Banks    Presents for   Chief Complaint   Patient presents with     RECHECK     follow up mood and diabetes     Patient presents today for diabetes follow-up visit.  Overall has good days and bad days.  She does feel more tired, and feels like she has been sleeping more in general.  She is having some trouble sleeping at night.  Feels like her mind is heavy and her body is heavy and does not want to move.  Has been taking hydroxyzine to help with sleep and this helps some.  Appetite has been on and off, and she has not been as active as previously.  She is staying with her daughter and there is not as any opportunities to be outside.    She does report a recent tweak to her knee while walking.  Noticed some swelling and increased stiffness.  Is gradually improving.  She is using icy Hot cream and Tylenol and this has been helpful.  She notices the pain most when going up and down the stairs.    Notes that blood sugars in general have been higher.  She brings her glucometer and all of her medications with her today.  Blood sugars over the last couple days have been in the low to mid 200s, previously in the upper 100s.  She reports taking her medications regularly and family helps with administration.  No abdominal pain, no increased urination, no  "numbness and tingling in the feet.  The swelling in her feet that she had this past spring has since resolved.    She is not complaining of any acid reflux or GERD symptoms.  Does not have any of these in her box.  Is not taking them and does not feel like she needs them at this time.    Of note she does have the 2 discontinued pill bottles, for the Lasix and for the pioglitazone still with her, but they say stop and she has not been taking additional medications from these bottles.    OBJECTIVE:  Vitals: /73 (BP Location: Left arm, Patient Position: Sitting, Cuff Size: Adult Regular)   Pulse 85   Temp 98.6  F (37  C) (Tympanic)   Resp 16   Ht 1.461 m (4' 9.5\")   Wt 60.8 kg (134 lb)   SpO2 99%   BMI 28.50 kg/m    BMI= Body mass index is 28.5 kg/m .  Objective:    Vitals:  Vitals are reviewed and are within the normal range  Gen:  Alert, pleasant, no acute distress  Cardiac:  Regular rate and rhythm, no murmurs, rubs or gallops  Respiratory:  Lungs clear to auscultation bilaterally  Extremities:  Warm, well-perfused, pulses 2+/4, no lower extremity edema  Psych: Mood and affect are bright she is engaged and pleasant today.  Still has chronic thoughts of being better off dead which are stable.  She does not feel like she is more sad and worried than normal.  Does not want to add additional medications for this.    PHQ 3/30/2021 5/7/2021 8/4/2021   PHQ-9 Total Score 15 14 13   Q9: Thoughts of better off dead/self-harm past 2 weeks Several days Several days Several days         Patient Instructions   New medicine, 1 pill per day for diabetes.      Okay to use the Hydroxyzine 2 pills at night AND 1 pil in the morning if needed.            Amrita Carballo MD      "

## 2021-08-04 NOTE — NURSING NOTE
Due to patient being non-English speaking/uses sign language, an  was used for this visit. Only for face-to-face interpretation by an external agency, date and length of interpretation can be found on the scanned worksheet.       name: Silvino Banks (Htoo)  Language: Radha  Agency:  [employer]  Phone number: [phone]  Type of interpretation:  Face-to-face, spoken

## 2021-08-04 NOTE — PATIENT INSTRUCTIONS
New medicine, 1 pill per day for diabetes.      Okay to use the Hydroxyzine 2 pills at night AND 1 pil in the morning if needed.

## 2021-08-05 ASSESSMENT — ANXIETY QUESTIONNAIRES: GAD7 TOTAL SCORE: 5

## 2021-08-09 DIAGNOSIS — E55.9 VITAMIN D DEFICIENCY: ICD-10-CM

## 2021-08-12 ENCOUNTER — DOCUMENTATION ONLY (OUTPATIENT)
Dept: FAMILY MEDICINE | Facility: CLINIC | Age: 56
End: 2021-08-12

## 2021-08-12 RX ORDER — CHOLECALCIFEROL (VITAMIN D3) 50 MCG
1 TABLET ORAL DAILY
Qty: 100 TABLET | Refills: 3 | Status: SHIPPED | OUTPATIENT
Start: 2021-08-12 | End: 2022-05-04

## 2021-08-12 NOTE — PROGRESS NOTES
To be completed in Nursing note:    Please reference list for forms that require a visit for completion.  Please remind patients that providers are given 3-5 business days to complete and return forms.      Form type: [facility] FROM 21 VISIT    Date form received: 08/10/21    Date form completed by Physician: 21    How was form returned to patient (mailed, faxed, or at  for patient to ): Faxed back to [phone]    Date form mailed/faxed/left at  for patient and sent to HIM for scannin21      Once form is left for patient, faxed, or mailed PCS will then close the documentation only encounter.

## 2021-08-12 NOTE — TELEPHONE ENCOUNTER
VITAMIN D3 50 MCG (2000UT)T 50 MCG Tablet  Last Written Prescription Date:  7/13/2020  Last Fill Quantity: 100,   # refills: 3  Last Office Visit :8/4/2021  Future Office visit:  9/21/2021    Routing refill request to provider for review/approval because:  We do not fill medications for [facility].     Refer to Provider for review and refills Per Providers recommendations for Pt care.       Alice Galvez RN  Central Triage Red Flags/Med Refills

## 2021-08-16 ENCOUNTER — MEDICAL CORRESPONDENCE (OUTPATIENT)
Dept: HEALTH INFORMATION MANAGEMENT | Facility: CLINIC | Age: 56
End: 2021-08-16

## 2021-08-18 ENCOUNTER — TELEPHONE (OUTPATIENT)
Dept: FAMILY MEDICINE | Facility: CLINIC | Age: 56
End: 2021-08-18

## 2021-08-18 DIAGNOSIS — F41.9 ANXIETY: ICD-10-CM

## 2021-08-19 RX ORDER — HYDROXYZINE PAMOATE 25 MG/1
CAPSULE ORAL
Qty: 90 CAPSULE | Refills: 5 | Status: SHIPPED | OUTPATIENT
Start: 2021-08-19 | End: 2022-05-04

## 2021-08-19 NOTE — TELEPHONE ENCOUNTER
Prescription sent in for patient.    Please request prior authorization for this medication if not covered.     Amrita Carballo MD    Routed to FELIZ Martin.

## 2021-09-01 ENCOUNTER — TELEPHONE (OUTPATIENT)
Dept: FAMILY MEDICINE | Facility: CLINIC | Age: 56
End: 2021-09-01

## 2021-09-01 DIAGNOSIS — E11.9 TYPE 2 DIABETES MELLITUS WITHOUT COMPLICATION, WITHOUT LONG-TERM CURRENT USE OF INSULIN (H): ICD-10-CM

## 2021-09-01 DIAGNOSIS — K59.00 CONSTIPATION, UNSPECIFIED CONSTIPATION TYPE: ICD-10-CM

## 2021-09-01 DIAGNOSIS — E11.29 TYPE 2 DIABETES MELLITUS WITH MICROALBUMINURIA, WITHOUT LONG-TERM CURRENT USE OF INSULIN (H): ICD-10-CM

## 2021-09-01 DIAGNOSIS — R80.9 TYPE 2 DIABETES MELLITUS WITH MICROALBUMINURIA, WITHOUT LONG-TERM CURRENT USE OF INSULIN (H): ICD-10-CM

## 2021-09-01 NOTE — TELEPHONE ENCOUNTER
Oked verbal orders for ongoing skilled nursing every 4 weeks and 3 PRN visits for medication management for next 60 days.     Also requests rx for Senna to [facility], as last rx that was transferred there was . Routed to Dr. Carballo. ./SEDRICK

## 2021-09-02 RX ORDER — ATORVASTATIN CALCIUM 80 MG/1
80 TABLET, FILM COATED ORAL DAILY
Qty: 90 TABLET | Refills: 3 | Status: SHIPPED | OUTPATIENT
Start: 2021-09-02 | End: 2022-05-04

## 2021-09-02 RX ORDER — AMOXICILLIN 250 MG
1 CAPSULE ORAL DAILY
Qty: 30 TABLET | Refills: 3 | Status: SHIPPED | OUTPATIENT
Start: 2021-09-02 | End: 2022-05-04

## 2021-09-02 NOTE — TELEPHONE ENCOUNTER
Agree with orders.  New script for senna-docusate sent to [facility].  It also looks like her Atorvastatin was inadvertently sent to Dr. Art Carballo, so I sent in refills for that as well, and test strips.      Amrita Carballo MD    Routed to FILOMENA Dumont

## 2021-09-03 RX ORDER — ATORVASTATIN CALCIUM 80 MG/1
TABLET, FILM COATED ORAL
Qty: 30 TABLET | Refills: 0 | OUTPATIENT
Start: 2021-09-03

## 2021-09-16 DIAGNOSIS — Z53.9 DIAGNOSIS NOT YET DEFINED: Primary | ICD-10-CM

## 2021-09-20 DIAGNOSIS — E11.29 TYPE 2 DIABETES MELLITUS WITH MICROALBUMINURIA, WITHOUT LONG-TERM CURRENT USE OF INSULIN (H): Primary | ICD-10-CM

## 2021-09-20 DIAGNOSIS — R80.9 TYPE 2 DIABETES MELLITUS WITH MICROALBUMINURIA, WITHOUT LONG-TERM CURRENT USE OF INSULIN (H): Primary | ICD-10-CM

## 2021-09-21 ENCOUNTER — OFFICE VISIT (OUTPATIENT)
Dept: FAMILY MEDICINE | Facility: CLINIC | Age: 56
End: 2021-09-21
Payer: COMMERCIAL

## 2021-09-21 ENCOUNTER — DOCUMENTATION ONLY (OUTPATIENT)
Dept: FAMILY MEDICINE | Facility: CLINIC | Age: 56
End: 2021-09-21

## 2021-09-21 VITALS
OXYGEN SATURATION: 100 % | SYSTOLIC BLOOD PRESSURE: 120 MMHG | HEART RATE: 89 BPM | BODY MASS INDEX: 28.54 KG/M2 | TEMPERATURE: 99.4 F | RESPIRATION RATE: 16 BRPM | DIASTOLIC BLOOD PRESSURE: 75 MMHG | WEIGHT: 134.2 LBS

## 2021-09-21 DIAGNOSIS — R30.0 DYSURIA: Primary | ICD-10-CM

## 2021-09-21 DIAGNOSIS — R80.9 TYPE 2 DIABETES MELLITUS WITH MICROALBUMINURIA, WITHOUT LONG-TERM CURRENT USE OF INSULIN (H): ICD-10-CM

## 2021-09-21 DIAGNOSIS — N12 PYELONEPHRITIS: ICD-10-CM

## 2021-09-21 DIAGNOSIS — E11.29 TYPE 2 DIABETES MELLITUS WITH MICROALBUMINURIA, WITHOUT LONG-TERM CURRENT USE OF INSULIN (H): ICD-10-CM

## 2021-09-21 LAB
ALBUMIN UR-MCNC: 30 MG/DL
ANION GAP SERPL CALCULATED.3IONS-SCNC: 10 MMOL/L (ref 3–14)
APPEARANCE UR: ABNORMAL
BACTERIA #/AREA URNS HPF: ABNORMAL /HPF
BILIRUB UR QL STRIP: NEGATIVE
BUN SERPL-MCNC: 18 MG/DL (ref 7–30)
CALCIUM SERPL-MCNC: 9.5 MG/DL (ref 8.5–10.1)
CHLORIDE BLD-SCNC: 99 MMOL/L
CHOLEST SERPL-MCNC: 206 MG/DL
CO2 SERPL-SCNC: 28 MMOL/L (ref 20–32)
COLOR UR AUTO: YELLOW
CREAT SERPL-MCNC: 1.5 MG/DL
CREAT UR-MCNC: 55 MG/DL
FASTING STATUS PATIENT QL REPORTED: ABNORMAL
GFR SERPL CREATININE-BSD FRML MDRD: 38 ML/MIN/1.73M2
GLUCOSE BLD-MCNC: 250 MG/DL (ref 70–99)
GLUCOSE UR STRIP-MCNC: 100 MG/DL
HBA1C MFR BLD: 9.1 % (ref 0–5.6)
HDLC SERPL-MCNC: 49 MG/DL
HGB UR QL STRIP: ABNORMAL
KETONES UR STRIP-MCNC: NEGATIVE MG/DL
LDLC SERPL CALC-MCNC: 123 MG/DL
LEUKOCYTE ESTERASE UR QL STRIP: ABNORMAL
MICROALBUMIN UR-MCNC: 9.1 MG/DL (ref 0–1.99)
MICROALBUMIN/CREAT UR: 165.5 MG/G CR
NITRATE UR QL: NEGATIVE
PH UR STRIP: 7 [PH] (ref 5–8)
POTASSIUM BLD-SCNC: 4.1 MMOL/L (ref 3.4–5.3)
RBC #/AREA URNS AUTO: ABNORMAL /HPF
SODIUM SERPL-SCNC: 137 MMOL/L (ref 133–144)
SP GR UR STRIP: 1.01 (ref 1–1.03)
SQUAMOUS #/AREA URNS AUTO: ABNORMAL /LPF
TRIGL SERPL-MCNC: 169 MG/DL
UROBILINOGEN UR STRIP-ACNC: 0.2 E.U./DL
WBC #/AREA URNS AUTO: ABNORMAL /HPF
WBC CLUMPS #/AREA URNS HPF: PRESENT /HPF

## 2021-09-21 PROCEDURE — 82043 UR ALBUMIN QUANTITATIVE: CPT | Performed by: STUDENT IN AN ORGANIZED HEALTH CARE EDUCATION/TRAINING PROGRAM

## 2021-09-21 PROCEDURE — 87186 SC STD MICRODIL/AGAR DIL: CPT | Performed by: STUDENT IN AN ORGANIZED HEALTH CARE EDUCATION/TRAINING PROGRAM

## 2021-09-21 PROCEDURE — 99214 OFFICE O/P EST MOD 30 MIN: CPT | Performed by: STUDENT IN AN ORGANIZED HEALTH CARE EDUCATION/TRAINING PROGRAM

## 2021-09-21 PROCEDURE — 80061 LIPID PANEL: CPT | Performed by: STUDENT IN AN ORGANIZED HEALTH CARE EDUCATION/TRAINING PROGRAM

## 2021-09-21 PROCEDURE — 80048 BASIC METABOLIC PNL TOTAL CA: CPT | Performed by: STUDENT IN AN ORGANIZED HEALTH CARE EDUCATION/TRAINING PROGRAM

## 2021-09-21 PROCEDURE — 36415 COLL VENOUS BLD VENIPUNCTURE: CPT | Performed by: STUDENT IN AN ORGANIZED HEALTH CARE EDUCATION/TRAINING PROGRAM

## 2021-09-21 PROCEDURE — 81001 URINALYSIS AUTO W/SCOPE: CPT | Performed by: STUDENT IN AN ORGANIZED HEALTH CARE EDUCATION/TRAINING PROGRAM

## 2021-09-21 PROCEDURE — 83036 HEMOGLOBIN GLYCOSYLATED A1C: CPT | Performed by: STUDENT IN AN ORGANIZED HEALTH CARE EDUCATION/TRAINING PROGRAM

## 2021-09-21 PROCEDURE — 87088 URINE BACTERIA CULTURE: CPT | Performed by: STUDENT IN AN ORGANIZED HEALTH CARE EDUCATION/TRAINING PROGRAM

## 2021-09-21 PROCEDURE — 87086 URINE CULTURE/COLONY COUNT: CPT | Performed by: STUDENT IN AN ORGANIZED HEALTH CARE EDUCATION/TRAINING PROGRAM

## 2021-09-21 RX ORDER — CIPROFLOXACIN 500 MG/1
500 TABLET, FILM COATED ORAL 2 TIMES DAILY
Qty: 14 TABLET | Refills: 0 | Status: SHIPPED | OUTPATIENT
Start: 2021-09-21 | End: 2021-10-20

## 2021-09-21 NOTE — PATIENT INSTRUCTIONS
Drink lots of water!  Antibiotics for urine infection:  Ciprofloxacin, 1 pill 2x per day for 1 week.      Tylenol. 2 pills 3x per day for pain and fevers.     Increase the diabetes medicine.  Jardiance (empagliflozin), increase to 25mg per day.     Follow up 4-6 weeks.     If not feeling better by Thursday, call us and if getting worse, go to the hospital.

## 2021-09-21 NOTE — LETTER
September 27, 2021      Rachael Bhatt  [address] [place] [address]        Dear ,    We are writing to inform you of your test results.    Here is a copy of your lab results for your records.  Overall, things look good.  The medicine for your urine infection should be a good one to get rid of the infection.  It does look like you have some protein in your urine.  We should continue to watch this.  Please call the clinic at [phone] if you have any questions.         Resulted Orders   Albumin Random Urine Quantitative with Creat Ratio   Result Value Ref Range    Microalbumin Urine mg/dL 9.10 (H) 0.00 - 1.99 mg/dL    Creatinine Urine mg/dL 55 mg/dL    Microalbumin Urine mg/g Cr 165.5 (H) <=19.9 mg/g Cr    Narrative    Microalbumin, Random Urine   <2.0 mg/dL . . . . . . . . Normal   3.0-30.0 mg/dL . . . . . . Microalbuminuria   >30.0 mg/dL . . . . . .  . Clinical Proteinuria     Microalbumin/Creatinine Ratio, Random Urine   <20 mg/g . . . . .. . . . Normal    mg/g . . . . . . . Microalbuminuria   >300 mg/g . . . . . . . . Clinical Proteinuria   Basic metabolic panel   Result Value Ref Range    Sodium 137 133 - 144 mmol/L    Potassium 4.1 3.4 - 5.3 mmol/L    Chloride 99 mmol/L    Carbon Dioxide (CO2) 28 20 - 32 mmol/L    Anion Gap 10 3 - 14 mmol/L    Urea Nitrogen 18 7 - 30 mg/dL    Creatinine 1.50 mg/dL    Calcium 9.5 8.5 - 10.1 mg/dL    Glucose 250 (H) 70 - 99 mg/dL    GFR Estimate 38 (L) >60 mL/min/1.73m2      Comment:      As of July 11, 2021, eGFR is calculated by the CKD-EPI creatinine equation, without race adjustment. eGFR can be influenced by muscle mass, exercise, and diet. The reported eGFR is an estimation only and is only applicable if the renal function is stable.   Lipid Profile   Result Value Ref Range    Cholesterol 206 (H) <=199 mg/dL    Triglycerides 169 (H) <=149 mg/dL    Direct Measure HDL 49 (L) >=50 mg/dL      Comment:      HDL Cholesterol Reference Range:     0-2 years:   No  reference ranges established for patients under 2 years old  at [facility] for lipid analytes.    2-8 years:  Greater than 45 mg/dL     18 years and older:   Female: Greater than or equal to 50 mg/dL   Male:   Greater than or equal to 40 mg/dL    LDL Cholesterol Calculated 123 <=129 mg/dL    Patient Fasting > 8hrs? Unknown    Hemoglobin A1c   Result Value Ref Range    Hemoglobin A1C 9.1 (H) 0.0 - 5.6 %      Comment:      Normal <5.7%   Prediabetes 5.7-6.4%    Diabetes 6.5% or higher     Note: Adopted from ADA consensus guidelines.   UA reflex to Microscopic and Culture   Result Value Ref Range    Color Urine Yellow Colorless, Straw, Light Yellow, Yellow    Appearance Urine Slightly Cloudy (A) Clear    Glucose Urine 100  (A) Negative mg/dL    Bilirubin Urine Negative Negative    Ketones Urine Negative Negative mg/dL    Specific Gravity Urine 1.015 1.005 - 1.030    Blood Urine Moderate (A) Negative    pH Urine 7.0 5.0 - 8.0    Protein Albumin Urine 30  (A) Negative mg/dL    Urobilinogen Urine 0.2 0.2, 1.0 E.U./dL    Nitrite Urine Negative Negative    Leukocyte Esterase Urine Large (A) Negative   Urine Microscopic Exam   Result Value Ref Range    Bacteria Urine Moderate (A) None Seen /HPF    RBC Urine 0-2 0-2 /HPF /HPF    WBC Urine 25-50 (A) 0-5 /HPF /HPF    Squamous Epithelials Urine Few (A) None Seen /LPF    WBC Clumps Urine Present (A) None Seen /HPF   Urine Culture   Result Value Ref Range    Culture 10,000-50,000 CFU/mL Proteus mirabilis (A)        If you have any questions or concerns, please call the clinic at the number listed above.       Sincerely,      Amrita Carballo MD

## 2021-09-21 NOTE — PROGRESS NOTES
To be completed in Nursing note:    Please reference list for forms that require a visit for completion.  Please remind patients that providers are given 3-5 business days to complete and return forms.      Form type: [facility] Certification and Plan of Care for 21 - 21    Date form received: 21    Date form completed by Physician: 21    How was form returned to patient (mailed, faxed, or at  for patient to ): Faxed back to [phone]    Date form mailed/faxed/left at  for patient and sent to HIM for scannin21      Once form is left for patient, faxed, or mailed PCS will then close the documentation only encounter.

## 2021-09-21 NOTE — PROGRESS NOTES
Assessment & Plan     Type 2 diabetes mellitus with microalbuminuria, without long-term current use of insulin (H)  Last visit, empagliflozin (Jardiance) was initiated to improve blood glucose control. Today, A1c was 9.1 (increased from 6.9 3mo ago) and blood sugar was 250. At this time, we recommend increasing Jardiance dosing to 25mg and continue metformin.  - Albumin Random Urine Quantitative with Creat Ratio  - Basic metabolic panel  - Lipid Profile  - Hemoglobin A1c  - empagliflozin (JARDIANCE) 25 MG TABS tablet  Dispense: 90 tablet; Refill: 1    Dysuria  Pyelonephritis  Pt has a 1-day history of dysuria, suprapubic pain, fevers, chills, and fatigue. Urinalysis today showed moderate blood in urine, positive leukocyte esterase, and negative nitrate. Thus, we recommend that Ms. Ch should be treated for pyelonephritis. Urine culture is pending, but work-up for a previous UTI suggested sensitivity to ciprofloxacin. Discussed with patient that she should call our office if no improvement in symptoms by Thursday.  - UA reflex to Microscopic and Culture  - Urine Microscopic Exam  - Urine Culture  - ciprofloxacin (CIPRO) 500 MG tablet  Dispense: 14 tablet; Refill: 0    Follow up in 2-3 days if symptoms not improving, otherwise in 6 weeks for diabetes follow up visit.      Manuela Reyes, MS3    Amrita Carballo MD  [facility] [place]       accompanied by Radha  Silvino Banks present in person.      HARI Cano is a 56 year old with a past medical history of type 2 DM, PTSD, and hypertension that presents today with a 1-day history of headaches, abdominal bloating, chills, fevers, dysuria, and generalized achiness. Pt states that he has had some aching of her lower back yesterday. Symptoms began yesterday at approximately 3pm. Pt denies sinus pain, nasal congestion, or hematuria. She has increased her fluid intake since yesterday.    Pt was last seen 8/4/21 for diabetes  follow-up visit. At that time, empagliflozin was added to her medication list, beginning at 10mg daily for better blood glucose control. She reports taking her medications as indicated and a nursing aid organizes the medications into pill boxes. She notes no new side effects. Today, she notes still having high blood sugars in the morning, noting that this morning, her blood glucose was greater than 200 before breakfast. Pt denies lower extremity edema, increased urinary frequency (other than within the last 24h), headaches, changes in vision, SOB, or palpitations.    Ms. Ch notes that she has had indigestion over the past couple years that is significantly improved with tums. She denies nausea, vomiting, or diarrhea. She also has left knee pain that began after an injury. It has been bothering her for a long time and she sometimes notices some swelling. Her knee is the limiting factor in her mobility, but she can walk around the block slowly. Tylenol helps with this knee pain.    During her previous visit, fatigue and difficulty sleeping was discussed. Pt continues to take hydroxyzine scheduled at night, and states that her sleep has been improving.    Pt is interested in the influenza vaccine after she improves from her acute illness.      Review of Systems   Constitutional, HEENT, cardiovascular, pulmonary, gi and gu systems are negative, except as otherwise noted.      Objective    /75 (BP Location: Left arm, Patient Position: Sitting, Cuff Size: Adult Regular)   Pulse 89   Temp 99.4  F (37.4  C) (Oral)   Resp 16   Wt 60.9 kg (134 lb 3.2 oz)   SpO2 100%   BMI 28.54 kg/m    Body mass index is 28.54 kg/m .  Physical Exam   GENERAL: alert and no acute distress, looks generally ill  EYES: Eyes grossly normal to inspection, conjunctivae and sclerae normal, no drainage  RESP: lungs clear to auscultation - no rales, rhonchi or wheezes  CV: regular rate and rhythm, normal S1 S2, no S3 or S4, no murmur, click  or rub, no peripheral edema and peripheral pulses of lower extremities strong  ABDOMEN: soft, nontender, no hepatosplenomegaly, no masses and bowel sounds normal  MS: no edema  SKIN: no suspicious lesions or rashes  NEURO: Normal strength and tone, mentation intact and speech normal, sensitive to light touch on dorsal and plantar aspects of feet bilaterally.  PSYCH: mentation appears normal, fatigued    Results for orders placed or performed in visit on 09/21/21 (from the past 24 hour(s))   Basic metabolic panel   Result Value Ref Range    Sodium 137 133 - 144 mmol/L    Potassium 4.1 3.4 - 5.3 mmol/L    Chloride 99 mmol/L    Carbon Dioxide (CO2) 28 20 - 32 mmol/L    Anion Gap 10 3 - 14 mmol/L    Urea Nitrogen 18 7 - 30 mg/dL    Creatinine 1.50 mg/dL    Calcium 9.5 8.5 - 10.1 mg/dL    Glucose 250 (H) 70 - 99 mg/dL    GFR Estimate 38 (L) >60 mL/min/1.73m2   Hemoglobin A1c   Result Value Ref Range    Hemoglobin A1C 9.1 (H) 0.0 - 5.6 %   UA reflex to Microscopic and Culture    Specimen: Urine, NOS   Result Value Ref Range    Color Urine Yellow Colorless, Straw, Light Yellow, Yellow    Appearance Urine Slightly Cloudy (A) Clear    Glucose Urine 100  (A) Negative mg/dL    Bilirubin Urine Negative Negative    Ketones Urine Negative Negative mg/dL    Specific Gravity Urine 1.015 1.005 - 1.030    Blood Urine Moderate (A) Negative    pH Urine 7.0 5.0 - 8.0    Protein Albumin Urine 30  (A) Negative mg/dL    Urobilinogen Urine 0.2 0.2, 1.0 E.U./dL    Nitrite Urine Negative Negative    Leukocyte Esterase Urine Large (A) Negative   Urine Microscopic Exam   Result Value Ref Range    Bacteria Urine Moderate (A) None Seen /HPF    RBC Urine 0-2 0-2 /HPF /HPF    WBC Urine 25-50 (A) 0-5 /HPF /HPF    Squamous Epithelials Urine Few (A) None Seen /LPF    WBC Clumps Urine Present (A) None Seen /HPF       Preceptor Attestation:   I was present with the medical student who participated in the service and in the documentation of this note. I  have verified the history and personally performed the physical exam and medical decision making. I have verified the content of the note, which accurately reflects my assessment of the patient and the plan of care.   Supervising Physician:  Amrita Carballo MD.

## 2021-09-22 ASSESSMENT — PATIENT HEALTH QUESTIONNAIRE - PHQ9: SUM OF ALL RESPONSES TO PHQ QUESTIONS 1-9: 11

## 2021-09-24 LAB — BACTERIA UR CULT: ABNORMAL

## 2021-09-26 NOTE — RESULT ENCOUNTER NOTE
Rachael Bhatt-    Here is a copy of your lab results for your records.  Overall, things look good.  The medicine for your urine infection should be a good one to get rid of the infection.  It does look like you have some protein in your urine.  We should continue to watch this.  Please call the clinic at [phone] if you have any questions.      Amrita Carballo MD    Please send results to patient.    
detailed exam

## 2021-09-28 DIAGNOSIS — E11.9 TYPE 2 DIABETES MELLITUS WITHOUT COMPLICATION, WITHOUT LONG-TERM CURRENT USE OF INSULIN (H): ICD-10-CM

## 2021-09-30 DIAGNOSIS — I10 ESSENTIAL HYPERTENSION, BENIGN: ICD-10-CM

## 2021-09-30 RX ORDER — LISINOPRIL 40 MG/1
TABLET ORAL
Qty: 90 TABLET | Refills: 0 | OUTPATIENT
Start: 2021-09-30

## 2021-09-30 RX ORDER — METFORMIN HCL 500 MG
TABLET, EXTENDED RELEASE 24 HR ORAL
Qty: 120 TABLET | Refills: 0 | Status: SHIPPED | OUTPATIENT
Start: 2021-09-30 | End: 2022-05-04

## 2021-10-01 RX ORDER — LISINOPRIL 20 MG/1
TABLET ORAL
Qty: 30 TABLET | Refills: 3 | Status: SHIPPED | OUTPATIENT
Start: 2021-10-01 | End: 2022-05-04

## 2021-10-20 ENCOUNTER — OFFICE VISIT (OUTPATIENT)
Dept: FAMILY MEDICINE | Facility: CLINIC | Age: 56
End: 2021-10-20
Payer: COMMERCIAL

## 2021-10-20 VITALS
HEART RATE: 76 BPM | RESPIRATION RATE: 16 BRPM | SYSTOLIC BLOOD PRESSURE: 130 MMHG | DIASTOLIC BLOOD PRESSURE: 70 MMHG | OXYGEN SATURATION: 100 % | TEMPERATURE: 98.2 F | BODY MASS INDEX: 28.28 KG/M2 | WEIGHT: 133 LBS

## 2021-10-20 DIAGNOSIS — N12 PYELONEPHRITIS: ICD-10-CM

## 2021-10-20 DIAGNOSIS — E11.29 TYPE 2 DIABETES MELLITUS WITH MICROALBUMINURIA, WITHOUT LONG-TERM CURRENT USE OF INSULIN (H): Primary | ICD-10-CM

## 2021-10-20 DIAGNOSIS — I10 ESSENTIAL HYPERTENSION, BENIGN: ICD-10-CM

## 2021-10-20 DIAGNOSIS — R80.9 TYPE 2 DIABETES MELLITUS WITH MICROALBUMINURIA, WITHOUT LONG-TERM CURRENT USE OF INSULIN (H): Primary | ICD-10-CM

## 2021-10-20 PROCEDURE — 99214 OFFICE O/P EST MOD 30 MIN: CPT | Performed by: STUDENT IN AN ORGANIZED HEALTH CARE EDUCATION/TRAINING PROGRAM

## 2021-10-20 NOTE — PATIENT INSTRUCTIONS
Keep up the good work!    Keep all medications the same, except add one new medication:    Januvia, 50mg, 1 pill daily.     Keep checking blood sugars.     Blood pressure looks great.      Follow up in 6 weeks.  Recheck kidneys and A1c at that time.

## 2021-10-20 NOTE — NURSING NOTE
Due to patient being non-English speaking/uses sign language, an  was used for this visit. Only for face-to-face interpretation by an external agency, date and length of interpretation can be found on the scanned worksheet.       name: Silvino Banks (Htoo)  Language: Radha  Agency:  [employer]  Phone number: [phone]  Type of interpretation:  Face-to-face, spoken

## 2021-10-21 NOTE — PROGRESS NOTES
Nursing Notes:   Veronica Rosas CMA  10/20/2021  4:14 PM  Signed  Due to patient being non-English speaking/uses sign language, an  was used for this visit. Only for face-to-face interpretation by an external agency, date and length of interpretation can be found on the scanned worksheet.       name: Silvino Banks (Htoo)  Language: Radha  Agency:  [employer]  Phone number: [phone]  Type of interpretation:  Face-to-face, spoken        ASSESSMENT AND PLAN:      Rachael was seen today for follow up.    Diagnoses and all orders for this visit:    Type 2 diabetes mellitus with microalbuminuria, without long-term current use of insulin (H).  Blood sugars improved but still somewhat elevated.  -Continue Metformin 2000 mg daily.  -Continue Januvia 25 mg daily.  No side effects from this medication.  -Start sitagliptin (JANUVIA) 50 MG tablet; Take 1 tablet (50 mg) by mouth daily.  We will start at a low dose given her somewhat labile renal function.  Anticipate renal function will be back to normal with check next visit.  Plan for A1c and BMP at follow-up visit.    Essential hypertension, benign.  Blood pressure has been improved.  She is appropriately taking 20 mg of lisinopril and new medications from the pharmacy reflect this.    Pyelonephritis.  Symptoms have resolved.    Follow-up in 6 weeks for diabetes recheck with lab.    Patient Instructions   Keep up the good work!    Keep all medications the same, except add one new medication:    Januvia, 50mg, 1 pill daily.     Keep checking blood sugars.     Blood pressure looks great.      Follow up in 6 weeks.  Recheck kidneys and A1c at that time.        Amrita Carballo MD    SUBJECTIVE  Rachael Ch is a 56 year old female with past medical history significant for    Patient Active Problem List   Diagnosis     Essential hypertension, benign     Diabetes mellitus, type 2 (H)     Hyperlipidemia     Health Care Home     Helicobacter pylori  gastritis     PTSD (post-traumatic stress disorder)     Moderate episode of recurrent major depressive disorder (H)     Cognitive complaints     Screening for depression     Microalbuminuria     Plantar fasciitis     Left knee pain     Right knee pain, unspecified chronicity     Others present at the visit:   Silvino Banks    Presents for   Chief Complaint   Patient presents with     Follow Up     Pt is here for her 4 week follow up.     Patient presents for follow-up of diabetes, hypertension, and recent UTI with systemic symptoms.  She reports feeling much better today.  Is no longer having fevers, chills, and achiness.  She denies dysuria, hematuria, urine changes, or difficulties with urination or stool.  Her energy has been improved.  Still notes a decreased overall appetite.  She has occasional dizziness.    We reviewed her glucometer and blood sugars.  Blood sugars in the morning fasting range from 162-268.  She denies symptoms of low sugars.  Appetite has been down, but she still enjoys eating rice and vegetables.  Meats just do not taste very good.    She brings in all of her medications today, and these were compared to her medication list.  She continues to benefit from home nurse medication set up.    Also notes some difficulty with knee pain.  This has been improved since last visit as well.  She is wearing a knee sleeve.  Has had less swelling in her legs and feet and this also helps.  Describes 1 episode of numbness and tingling in her right hand, but it has not returned and is not bothering her.    OBJECTIVE:  Vitals: /70 (BP Location: Left arm, Patient Position: Sitting, Cuff Size: Adult Regular)   Pulse 76   Temp 98.2  F (36.8  C) (Oral)   Resp 16   Wt 60.3 kg (133 lb)   SpO2 100%   BMI 28.28 kg/m    BMI= Body mass index is 28.28 kg/m .  Objective:    Vitals:  Vitals are reviewed and are within the normal range  Gen:  Alert, pleasant, no acute distress  Cardiac:  Regular rate and  rhythm, no murmurs, rubs or gallops  Respiratory:  Lungs clear to auscultation bilaterally  Extremities:  Warm, well-perfused, pulses 2+/4, trace lower extremity edema.    No results found for any visits on 10/20/21.      Patient Instructions   Keep up the good work!    Keep all medications the same, except add one new medication:    Januvia, 50mg, 1 pill daily.     Keep checking blood sugars.     Blood pressure looks great.      Follow up in 6 weeks.  Recheck kidneys and A1c at that time.        Amrita Carballo MD

## 2021-10-27 ENCOUNTER — TELEPHONE (OUTPATIENT)
Dept: FAMILY MEDICINE | Facility: CLINIC | Age: 56
End: 2021-10-27

## 2021-10-27 ENCOUNTER — MEDICAL CORRESPONDENCE (OUTPATIENT)
Dept: HEALTH INFORMATION MANAGEMENT | Facility: CLINIC | Age: 56
End: 2021-10-27
Payer: COMMERCIAL

## 2021-10-27 DIAGNOSIS — M19.011 PRIMARY OSTEOARTHRITIS OF RIGHT SHOULDER: ICD-10-CM

## 2021-10-27 NOTE — TELEPHONE ENCOUNTER
Tessa, N calls with FYI that patient had a mechanical fall on 10/24/21. She tripped outside and landed on her hands. Did not hit head. No LOC. She was experiencing some pain on her left side which has since resolved with ointments and PRN Tylenol. Tessa states there are no visible abrasions or trauma to hands.     Given fall was mechanical, she is improving with home care, and has no visible trauma per home RN, did not recommend visit. Routed to Dr. Carballo. ./SEDRICK

## 2021-10-27 NOTE — TELEPHONE ENCOUNTER
Agree.  Okay to wait until 11/30 given mechanical nature and no significant injury noted.  Will readdress balance and fall risk with patient at visit on 11/30.    Amrita Carballo MD

## 2021-10-29 RX ORDER — ACETAMINOPHEN 500 MG
TABLET ORAL
Qty: 90 TABLET | Refills: 3 | Status: SHIPPED | OUTPATIENT
Start: 2021-10-29 | End: 2022-05-04

## 2021-11-04 ENCOUNTER — TELEPHONE (OUTPATIENT)
Dept: FAMILY MEDICINE | Facility: CLINIC | Age: 56
End: 2021-11-04

## 2021-11-04 ENCOUNTER — MEDICAL CORRESPONDENCE (OUTPATIENT)
Dept: HEALTH INFORMATION MANAGEMENT | Facility: CLINIC | Age: 56
End: 2021-11-04
Payer: COMMERCIAL

## 2021-11-04 NOTE — TELEPHONE ENCOUNTER
Tessa, N calls with update: Patient seen last week and scheduled for visit today for recertification of home nursing. Tessa called today and she is out of town in [place] for at least 2 weeks. Unfortunately, as she did not let home care know this, they will have to discharge her as they can only delay recertification by 5 days.     [facility] instructed her to call us when she returns to schedule with the doctor for a new home care referral. Routed to Dr. Carballo. ./SEDRICK

## 2021-11-05 NOTE — TELEPHONE ENCOUNTER
Message noted.  Will re-submit paperwork on patient's return from [place].    Amrita Carballo MD    Routed to FILOMENA Duomnt

## 2021-11-29 DIAGNOSIS — I10 ESSENTIAL HYPERTENSION, BENIGN: ICD-10-CM

## 2021-11-29 DIAGNOSIS — E11.29 TYPE 2 DIABETES MELLITUS WITH MICROALBUMINURIA, WITHOUT LONG-TERM CURRENT USE OF INSULIN (H): Primary | ICD-10-CM

## 2021-11-29 DIAGNOSIS — R80.9 TYPE 2 DIABETES MELLITUS WITH MICROALBUMINURIA, WITHOUT LONG-TERM CURRENT USE OF INSULIN (H): Primary | ICD-10-CM

## 2021-11-30 ENCOUNTER — OFFICE VISIT (OUTPATIENT)
Dept: FAMILY MEDICINE | Facility: CLINIC | Age: 56
End: 2021-11-30
Payer: COMMERCIAL

## 2021-11-30 VITALS
BODY MASS INDEX: 28.75 KG/M2 | DIASTOLIC BLOOD PRESSURE: 77 MMHG | OXYGEN SATURATION: 100 % | RESPIRATION RATE: 16 BRPM | WEIGHT: 135.2 LBS | HEART RATE: 77 BPM | SYSTOLIC BLOOD PRESSURE: 129 MMHG | TEMPERATURE: 98.3 F

## 2021-11-30 DIAGNOSIS — I10 ESSENTIAL HYPERTENSION, BENIGN: ICD-10-CM

## 2021-11-30 DIAGNOSIS — E11.29 TYPE 2 DIABETES MELLITUS WITH MICROALBUMINURIA, WITHOUT LONG-TERM CURRENT USE OF INSULIN (H): Primary | ICD-10-CM

## 2021-11-30 DIAGNOSIS — R80.9 TYPE 2 DIABETES MELLITUS WITH MICROALBUMINURIA, WITHOUT LONG-TERM CURRENT USE OF INSULIN (H): Primary | ICD-10-CM

## 2021-11-30 DIAGNOSIS — M54.2 NECK PAIN: ICD-10-CM

## 2021-11-30 DIAGNOSIS — Z23 NEED FOR PROPHYLACTIC VACCINATION AND INOCULATION AGAINST INFLUENZA: ICD-10-CM

## 2021-11-30 DIAGNOSIS — N18.31 CHRONIC KIDNEY DISEASE, STAGE 3A (H): ICD-10-CM

## 2021-11-30 LAB
ANION GAP SERPL CALCULATED.3IONS-SCNC: 8 MMOL/L (ref 3–14)
BUN SERPL-MCNC: 18 MG/DL (ref 7–30)
CALCIUM SERPL-MCNC: 9.9 MG/DL (ref 8.5–10.1)
CHLORIDE BLD-SCNC: 106 MMOL/L
CO2 SERPL-SCNC: 32 MMOL/L (ref 20–32)
CREAT SERPL-MCNC: 1.3 MG/DL
GFR SERPL CREATININE-BSD FRML MDRD: 45 ML/MIN/1.73M2
GLUCOSE BLD-MCNC: 150 MG/DL (ref 70–99)
HBA1C MFR BLD: 8.3 % (ref 0–5.6)
POTASSIUM BLD-SCNC: 5 MMOL/L (ref 3.4–5.3)
SODIUM SERPL-SCNC: 146 MMOL/L (ref 133–144)

## 2021-11-30 PROCEDURE — 99214 OFFICE O/P EST MOD 30 MIN: CPT | Mod: 25 | Performed by: STUDENT IN AN ORGANIZED HEALTH CARE EDUCATION/TRAINING PROGRAM

## 2021-11-30 PROCEDURE — 80048 BASIC METABOLIC PNL TOTAL CA: CPT | Performed by: STUDENT IN AN ORGANIZED HEALTH CARE EDUCATION/TRAINING PROGRAM

## 2021-11-30 PROCEDURE — 83036 HEMOGLOBIN GLYCOSYLATED A1C: CPT | Performed by: STUDENT IN AN ORGANIZED HEALTH CARE EDUCATION/TRAINING PROGRAM

## 2021-11-30 PROCEDURE — 90471 IMMUNIZATION ADMIN: CPT | Performed by: STUDENT IN AN ORGANIZED HEALTH CARE EDUCATION/TRAINING PROGRAM

## 2021-11-30 PROCEDURE — 36415 COLL VENOUS BLD VENIPUNCTURE: CPT | Performed by: STUDENT IN AN ORGANIZED HEALTH CARE EDUCATION/TRAINING PROGRAM

## 2021-11-30 PROCEDURE — 90686 IIV4 VACC NO PRSV 0.5 ML IM: CPT | Performed by: STUDENT IN AN ORGANIZED HEALTH CARE EDUCATION/TRAINING PROGRAM

## 2021-11-30 NOTE — PROGRESS NOTES
"  Assessment & Plan     Type 2 diabetes mellitus with microalbuminuria, without long-term current use of insulin (H)  Blood sugars on home monitor still above goal as his A1c. Reviewing the medication she brought with her today, it does not look that she is taking Jardiance. Refill sent. Note placed on AVS for home nurse to restart this medication. Continue with the Januvia and the Metformin as well. Pioglitazone has been stopped and should remain on her allergy list.  - Basic metabolic panel  - Hemoglobin A1c    Essential hypertension, benign  Blood pressure at goal. Continue lisinopril 20 and diltiazem 120 with nighttime [id number]  - Basic metabolic panel    Need for prophylactic vaccination and inoculation against influenza  Flu shot given today.   - INFLUENZA VACCINE IM > 6 MONTHS VALENT IIV4 (AFLURIA/FLUZONE)    Chronic kidney disease, stage 3a (H)  Creatinine remains above goal. We'll continue to monitor diabetes and hypertension control.    Neck pain  Appears MSK on exam and history. Recommended using Tylenol, heat, and massage. Return to clinic if not improving.    She should follow-up in 6 weeks for a diabetes recheck visit.         BMI:   Estimated body mass index is 28.75 kg/m  as calculated from the following:    Height as of 8/4/21: 1.461 m (4' 9.5\").    Weight as of this encounter: 61.3 kg (135 lb 3.2 oz).   Weight management plan: Discussed healthy diet and exercise guidelines    Follow up in 6 weeks to recheck diabetes.      Amrita Carballo MD  [facility]   [facility] 56 [facility] who presents for the following health issues  accompanied by her . Silvino NORIEGA     Presenting for evaluation of neck pain and follow-up on diabetes. Recent trip to [place] to visit family, 2 to 3 weeks ago. Noticed some development of bilateral neck pain at that time. Describes it as a dull ache. Has been drinking lime water and taking Tylenol and these help. " Also using icy hot. Still bothering her some. Feels a little groggy and tired because of this. Wonders if it could be from her diabetes or her blood pressure.    We reviewed her glucometer. Blood sugar average over the last week was 207. Blood sugars as high as 270, and is low as 150. She endorses taking her medications regularly but is not sure what she takes because a home nurse comes and sets them up. She brings a bag with her, but does not have the new supply from the pharmacy.    Review of Systems         Objective    /77   Pulse 77   Temp 98.3  F (36.8  C) (Oral)   Resp 16   Wt 61.3 kg (135 lb 3.2 oz)   SpO2 100%   BMI 28.75 kg/m    Body mass index is 28.75 kg/m .  Physical Exam   Objective:    Vitals:  Vitals are reviewed and are within the normal range  Gen:  Alert, pleasant, no acute distress  Neck.  Soft, non-tender, normal ROM, no skin changes.     Results for orders placed or performed in visit on 11/30/21 (from the past 24 hour(s))   Basic metabolic panel   Result Value Ref Range    Sodium 146 (H) 133 - 144 mmol/L    Potassium 5.0 3.4 - 5.3 mmol/L    Chloride 106 mmol/L    Carbon Dioxide (CO2) 32 20 - 32 mmol/L    Anion Gap 8 3 - 14 mmol/L    Urea Nitrogen 18 7 - 30 mg/dL    Creatinine 1.30 mg/dL    Calcium 9.9 8.5 - 10.1 mg/dL    Glucose 150 (H) 70 - 99 mg/dL    GFR Estimate 45 (L) >60 mL/min/1.73m2   Hemoglobin A1c   Result Value Ref Range    Hemoglobin A1C 8.3 (H) 0.0 - 5.6 %

## 2021-11-30 NOTE — PATIENT INSTRUCTIONS
Use heat and massage and tylenol for the neck pain.      For diabetes, you should be taking 3 different medications:    Metformin 2000mg per day  Januvia 50mg per day  Jardiance 25mg per day (THis one was not in patient's medication bag)  New prescription was resent.      Medication list attached is correct.

## 2022-05-03 DIAGNOSIS — E11.29 TYPE 2 DIABETES MELLITUS WITH MICROALBUMINURIA, WITHOUT LONG-TERM CURRENT USE OF INSULIN (H): Primary | ICD-10-CM

## 2022-05-03 DIAGNOSIS — R80.9 TYPE 2 DIABETES MELLITUS WITH MICROALBUMINURIA, WITHOUT LONG-TERM CURRENT USE OF INSULIN (H): Primary | ICD-10-CM

## 2022-05-03 DIAGNOSIS — I10 ESSENTIAL HYPERTENSION, BENIGN: ICD-10-CM

## 2022-05-04 ENCOUNTER — TELEPHONE (OUTPATIENT)
Dept: CARDIOLOGY | Facility: CLINIC | Age: 57
End: 2022-05-04

## 2022-05-04 ENCOUNTER — OFFICE VISIT (OUTPATIENT)
Dept: FAMILY MEDICINE | Facility: CLINIC | Age: 57
End: 2022-05-04
Payer: COMMERCIAL

## 2022-05-04 VITALS
BODY MASS INDEX: 27.77 KG/M2 | RESPIRATION RATE: 16 BRPM | OXYGEN SATURATION: 100 % | DIASTOLIC BLOOD PRESSURE: 71 MMHG | HEART RATE: 84 BPM | SYSTOLIC BLOOD PRESSURE: 127 MMHG | WEIGHT: 130.6 LBS | TEMPERATURE: 98.1 F

## 2022-05-04 DIAGNOSIS — R80.9 TYPE 2 DIABETES MELLITUS WITH MICROALBUMINURIA, WITHOUT LONG-TERM CURRENT USE OF INSULIN (H): ICD-10-CM

## 2022-05-04 DIAGNOSIS — Z00.00 ROUTINE GENERAL MEDICAL EXAMINATION AT A HEALTH CARE FACILITY: ICD-10-CM

## 2022-05-04 DIAGNOSIS — F41.9 ANXIETY: ICD-10-CM

## 2022-05-04 DIAGNOSIS — Z00.00 PREVENTATIVE HEALTH CARE: Primary | ICD-10-CM

## 2022-05-04 DIAGNOSIS — E11.65 TYPE 2 DIABETES MELLITUS WITH HYPERGLYCEMIA, WITHOUT LONG-TERM CURRENT USE OF INSULIN (H): ICD-10-CM

## 2022-05-04 DIAGNOSIS — E11.9 TYPE 2 DIABETES MELLITUS WITHOUT COMPLICATION, WITHOUT LONG-TERM CURRENT USE OF INSULIN (H): ICD-10-CM

## 2022-05-04 DIAGNOSIS — R07.9 CHEST PAIN, UNSPECIFIED TYPE: ICD-10-CM

## 2022-05-04 DIAGNOSIS — K59.00 CONSTIPATION, UNSPECIFIED CONSTIPATION TYPE: ICD-10-CM

## 2022-05-04 DIAGNOSIS — M19.011 PRIMARY OSTEOARTHRITIS OF RIGHT SHOULDER: ICD-10-CM

## 2022-05-04 DIAGNOSIS — R07.9 CHEST PAIN: ICD-10-CM

## 2022-05-04 DIAGNOSIS — E55.9 VITAMIN D DEFICIENCY: ICD-10-CM

## 2022-05-04 DIAGNOSIS — F43.10 PTSD (POST-TRAUMATIC STRESS DISORDER): ICD-10-CM

## 2022-05-04 DIAGNOSIS — R94.31 ABNORMAL ELECTROCARDIOGRAM: Primary | ICD-10-CM

## 2022-05-04 DIAGNOSIS — I10 ESSENTIAL HYPERTENSION, BENIGN: ICD-10-CM

## 2022-05-04 DIAGNOSIS — E11.29 TYPE 2 DIABETES MELLITUS WITH MICROALBUMINURIA, WITHOUT LONG-TERM CURRENT USE OF INSULIN (H): ICD-10-CM

## 2022-05-04 LAB
ANION GAP SERPL CALCULATED.3IONS-SCNC: 14 MMOL/L (ref 5–18)
BUN SERPL-MCNC: 15 MG/DL (ref 8–22)
CALCIUM SERPL-MCNC: 9.7 MG/DL (ref 8.5–10.5)
CHLORIDE BLD-SCNC: 101 MMOL/L (ref 98–107)
CO2 SERPL-SCNC: 24 MMOL/L (ref 22–31)
CREAT SERPL-MCNC: 1.04 MG/DL (ref 0.6–1.1)
GFR SERPL CREATININE-BSD FRML MDRD: 62 ML/MIN/1.73M2
GLUCOSE BLD-MCNC: 296 MG/DL (ref 70–125)
HBA1C MFR BLD: 8.9 % (ref 0–5.6)
POTASSIUM BLD-SCNC: 3.9 MMOL/L (ref 3.5–5)
SODIUM SERPL-SCNC: 139 MMOL/L (ref 136–145)

## 2022-05-04 PROCEDURE — 99214 OFFICE O/P EST MOD 30 MIN: CPT | Mod: 25 | Performed by: STUDENT IN AN ORGANIZED HEALTH CARE EDUCATION/TRAINING PROGRAM

## 2022-05-04 PROCEDURE — 36415 COLL VENOUS BLD VENIPUNCTURE: CPT | Performed by: STUDENT IN AN ORGANIZED HEALTH CARE EDUCATION/TRAINING PROGRAM

## 2022-05-04 PROCEDURE — 99396 PREV VISIT EST AGE 40-64: CPT | Performed by: STUDENT IN AN ORGANIZED HEALTH CARE EDUCATION/TRAINING PROGRAM

## 2022-05-04 PROCEDURE — 80048 BASIC METABOLIC PNL TOTAL CA: CPT | Performed by: STUDENT IN AN ORGANIZED HEALTH CARE EDUCATION/TRAINING PROGRAM

## 2022-05-04 PROCEDURE — 93000 ELECTROCARDIOGRAM COMPLETE: CPT | Performed by: STUDENT IN AN ORGANIZED HEALTH CARE EDUCATION/TRAINING PROGRAM

## 2022-05-04 PROCEDURE — 83036 HEMOGLOBIN GLYCOSYLATED A1C: CPT | Performed by: STUDENT IN AN ORGANIZED HEALTH CARE EDUCATION/TRAINING PROGRAM

## 2022-05-04 RX ORDER — AMOXICILLIN 250 MG
1 CAPSULE ORAL DAILY
Qty: 30 TABLET | Refills: 3 | Status: SHIPPED | OUTPATIENT
Start: 2022-05-04 | End: 2022-12-28

## 2022-05-04 RX ORDER — HYDROXYZINE PAMOATE 25 MG/1
CAPSULE ORAL
Qty: 90 CAPSULE | Refills: 5 | Status: SHIPPED | OUTPATIENT
Start: 2022-05-04 | End: 2022-12-28

## 2022-05-04 RX ORDER — ACETAMINOPHEN 500 MG
TABLET ORAL
Qty: 90 TABLET | Refills: 3 | Status: SHIPPED | OUTPATIENT
Start: 2022-05-04 | End: 2022-12-28

## 2022-05-04 RX ORDER — CHOLECALCIFEROL (VITAMIN D3) 50 MCG
1 TABLET ORAL DAILY
Qty: 100 TABLET | Refills: 3 | Status: SHIPPED | OUTPATIENT
Start: 2022-05-04 | End: 2022-12-28

## 2022-05-04 RX ORDER — PRAZOSIN HYDROCHLORIDE 2 MG/1
2 CAPSULE ORAL AT BEDTIME
Qty: 90 CAPSULE | Refills: 3 | Status: SHIPPED | OUTPATIENT
Start: 2022-05-04 | End: 2022-12-28

## 2022-05-04 RX ORDER — ATORVASTATIN CALCIUM 80 MG/1
80 TABLET, FILM COATED ORAL DAILY
Qty: 90 TABLET | Refills: 3 | Status: SHIPPED | OUTPATIENT
Start: 2022-05-04 | End: 2022-05-25

## 2022-05-04 RX ORDER — METFORMIN HCL 500 MG
TABLET, EXTENDED RELEASE 24 HR ORAL
Qty: 60 TABLET | Refills: 5 | Status: SHIPPED | OUTPATIENT
Start: 2022-05-04 | End: 2022-12-28

## 2022-05-04 RX ORDER — DILTIAZEM HYDROCHLORIDE 120 MG/1
CAPSULE, EXTENDED RELEASE ORAL
Qty: 90 CAPSULE | Refills: 3 | Status: SHIPPED | OUTPATIENT
Start: 2022-05-04 | End: 2022-12-28

## 2022-05-04 NOTE — PROGRESS NOTES
Received page from Dr. Us, that the cardiology team would be able to expedite the workup and get patient testing faster through the [facility].  Attempted to notify patient via telephone with .      Attempted to call patient with F  - call dropped.  Attempted to call patient with F  - no answer  Attempted to call patient with today's  - Fiorella Jensen, went straight to voicemail.  Requested call back to discuss with patient.   Attempted to call patient with F  - voicemail box is full on preferred number.  Attempted secondary number, no answer,     Will try to reach out again tomorrow.     Amrita Carballo MD

## 2022-05-04 NOTE — TELEPHONE ENCOUNTER
Msg sent to [facility]  Parris with request to contact patient to arrange next available [facility] appt - [facility] referral order placed per protocol.  mg

## 2022-05-04 NOTE — PROGRESS NOTES
Female Physical Note    Concerns today: Patient is here for physical.  She has no complaints at the beginning of the visit.    However, we find out she has run out of all but 3 of her medications.  She is only taking the Jardiance, Januvia, and rosuvastatin.  She is requesting refills on all of those medications today.  Has not been checking her sugars regularly, but checked them yesterday and sugar was 212.    She has not been taking any blood pressure medications.  Is not been checking her blood pressure at home.    Previously had a home nurse who is no longer coming.  She would like to be set up with a new nurse.    Additionally on review of systems she is complaining of chest pain.  She describes this as midsternal chest pain that comes on with exercise and radiates down both of her arms.  Accompanied by diaphoresis and shortness of breath.  Symptoms tend to last about 30 minutes.  She gets this nearly every time she exercises and sometimes gets this is at rest.  She is not having pain now.  Pain sometimes happens when she is not exercising.     She reports good control of her depression and anxiety.  Goes outside to walk when she has the symptoms and then feels better.    She is complaining of pain in her jaw and teeth.  Has developed recent dental pain.  Has not been to the dentist in a while.    She notes dizziness.  Has had glasses in the past, but has not had these checked in many years, and does note blurry vision.    Patient lives with her children and moves between them at times.  She feels safe at home.    A [employer]  was used for  this visit.     ROS:  CONSTITUTIONAL: no fatigue, no unexpected change in weight  SKIN: no worrisome rashes, no worrisome moles, no worrisome lesions  EYES: Endorses blurry vision.  ENT: no ear problems, no mouth problems, no throat problems.  Endorses chronic hearing loss with previous known tympanic membrane rupture  RESP: no significant cough, no shortness of  breath  CV:  no palpitations, no new or worsening peripheral edema.  Endorses rapid heart rate when she is anxious.  Endorses chest pain as stated above.  GI: no nausea, no vomiting, no constipation, no diarrhea.  Occasional constipation, takes senna.  : no frequency, no dysuria, no hematuria at this up and then just  MS: no claudication, no myalgias, no joint aches.  Endorses neck pain bilaterally with some headache.  NEURO: no weakness, chronic dizziness, no syncope, no headaches  ENDOCRINE: no temperature intolerance, no skin/hair changes  HEME: no easy bruising, no bleeding problems  PSYCHIATRIC: NEGATIVE for changes in mood or affect    Sexually Active: Yes  Sexual concerns: No   Contraception:not needed  Menarche:  No LMP recorded. Patient is postmenopausal.  Last menstrual cycle was in 2007.  Does get some hot flashes.  Occasional but bothersome for her.  STD History: Neg  Last Pap Smear Date: 2018 Pap smear was normal.   Abnormal Pap History: None    Patient Active Problem List   Diagnosis     Essential hypertension, benign     Diabetes mellitus, type 2 (H)     Hyperlipidemia     Health Care Home     Helicobacter pylori gastritis     PTSD (post-traumatic stress disorder)     Moderate episode of recurrent major depressive disorder (H)     Cognitive complaints     Screening for depression     Microalbuminuria     Plantar fasciitis     Left knee pain     Right knee pain, unspecified chronicity     Chronic kidney disease, stage 3a (H)       Current Outpatient Medications   Medication Sig Dispense Refill     acetaminophen (TYLENOL) 500 MG tablet TAKE 1 PILL IN IN THE MORNING, THEN 1 PILL IN THE EVENING, AND 1 ADDITIONAL PILL AS NEEDED 90 tablet 3     atorvastatin (LIPITOR) 80 MG tablet Take 1 tablet (80 mg) by mouth daily 90 tablet 3     blood glucose (NO BRAND SPECIFIED) lancets standard Use to test blood sugar 2 times daily or as directed. 100 each 3     blood glucose (NO BRAND SPECIFIED) test strip Use to  test blood sugar once or twice times weekly Patient has Contour test machine, please send [employer] CONTOUR test strips. 100 strip 3     diltiazem ER (DILT-XR) 120 MG 24 hr capsule TAKE 1 CAPSULE (120 MG) BY MOUTH DAILY 90 capsule 3     empagliflozin (JARDIANCE) 25 MG TABS tablet Take 1 tablet (25 mg) by mouth daily 90 tablet 1     hydrOXYzine (VISTARIL) 25 MG capsule TAKE 1 CAPSULE BY MOUTH AS NEEDED FOR PANIC, ANXIETY **MAY TAKE 1-2 CAPSULES AT NIGHT TO HELP WITH SLEEP.** 90 capsule 5     metFORMIN (GLUCOPHAGE XR) 500 MG 24 hr tablet TAKE 1 PILLS (500MG) BY MOUTH TWICE DAILY WITH MEALS FOR DIABETES 60 tablet 5     prazosin (MINIPRESS) 2 MG capsule Take 1 capsule (2 mg) by mouth At Bedtime 90 capsule 3     senna-docusate (STIMULANT LAXATIVE) 8.6-50 MG tablet Take 1 tablet by mouth daily As needed. 30 tablet 3     sitagliptin (JANUVIA) 50 MG tablet Take 1 tablet (50 mg) by mouth daily 90 tablet 1     vitamin D3 (VITAMIN D3) 50 mcg (2000 units) tablet Take 1 tablet (50 mcg) by mouth daily 100 tablet 3       Past Medical History:   Diagnosis Date     Aspirin contraindicated 11    young age/amp     Chronic kidney disease, stage 3a (H) 2021     Depressive disorder      Diabetes (H)      Hypertension         Family History     Problem (# of Occurrences) Relation (Name,Age of Onset)    Cancer (1) Mother    Diabetes (1) Father       Negative family history of: Coronary Artery Disease, Heart Disease, Obesity, Asthma          Problem List Medication List and Allergy List were reviewed and updated.    Patient is an established patient of this clinic..    Social History     Tobacco Use     Smoking status: Former Smoker     Packs/day: 0.50     Types: Cigarettes     Quit date: 2017     Years since quittin.6     Smokeless tobacco: Current User     Types: Chew   Substance Use Topics     Alcohol use: No       Children ? yes lives with her adult children    Has anyone hurt you physically, for example by  pushing, hitting, slapping or kicking you or forcing you to have sex? Denies  Do you feel threatened or controlled by a partner, ex-partner or anyone in your life? Denies    RISK BEHAVIORS AND HEALTHY HABITS:  Tobacco Use/Smoking: Details chews betel nut 2-3 times daily  Illicit Drug Use: None  Do you use alcohol? No  Diet (5-7 servings of fruits/veg daily): Yes   Exercise (30 min accumulated most days):No likes to walk but does not tolerate long distances  Dental Care: No   Calcium 1500 mg/d:  No    Cholesterol Level (>46 yo or at risk):  Recommended and patient accepted testing., Pap/HPV cotest every 5 years for women 30-65   Date done 2018  Result(s) normal  HIV screening:  Date done 2018  Result(s) negative   Dexa Scan (women>65 yrs or risk = that of a 66yo woman):  Testing not indicated   Colon CA Screening (>50-75 ):  Date done 2016  Result(s) normal.  Repeat in 2026 Breast CA Screening (>39 yo or 10 y before 1st degree relative diagnosis): Recommended and patient accepted testing.  CV Risk based on Pooled Cohort Risk:On rosuvastatin    Immunization History   Administered Date(s) Administered     COVID-19,PF,Judy 04/03/2021     Flu, Unspecified 09/09/2009, 10/22/2012     HepB 10/09/2006, 03/20/2007, 01/10/2008     HepB, Unspecified 10/09/2006, 03/20/2007     HepB-Adult 01/10/2008     Influenza (H1N1) 12/19/2009     Influenza (IIV3) PF 11/03/2008, 09/09/2009, 10/22/2012, 10/17/2013     Influenza Vaccine IM > 6 months Valent IIV4 (Alfuria,Fluzone) 10/18/2013, 10/30/2014, 11/06/2015, 10/18/2016, 11/12/2017, 09/02/2020, 11/30/2021     Influenza Vaccine, 6+MO IM (QUADRIVALENT W/PRESERVATIVES) 10/30/2014, 11/06/2015, 10/18/2016     MMR 10/09/2006, 03/06/2007     Mantoux Tuberculin Skin Test 03/13/2007     Pneumococcal 23 valent 08/20/2012     TD (ADULT, 7+) 10/09/2006, 01/10/2008     TDAP Vaccine (Adacel) 03/20/2007     TDAP Vaccine (Boostrix) 08/23/2018     Td (Adult), Adsorbed 10/09/2006     Tdap  (Adacel,Boostrix) 03/20/2007     Varicella 10/09/2006, 03/20/2007    Reviewed Immunization Record Today    EXAMINATION:   /71   Pulse 84   Temp 98.1  F (36.7  C) (Oral)   Resp 16   Wt 59.2 kg (130 lb 9.6 oz)   SpO2 100%   BMI 27.77 kg/m    GENERAL: healthy, alert and no distress  EYES: Eyes grossly normal to inspection, extraocular movements - intact, and PERRL  HENT: 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; TMs- normal; Nose- normal; Mouth- no ulcers, no lesions  NECK: no tenderness, tender lymphadenopathy on right side, patient endorses dental pain there as well., no asymmetry, no masses, no stiffness; thyroid- normal to palpation  RESP: lungs clear to auscultation - no rales, no rhonchi, no wheezes  CV: regular rates and rhythm, normal S1 S2, no S3 or S4 and no murmur, no click or rub -  ABDOMEN: soft, no tenderness, no  hepatosplenomegaly, no masses, normal bowel sounds  MS: extremities- no gross deformities noted, no edema  SKIN: no suspicious lesions, no rashes  NEURO: strength and tone- normal, sensory exam- grossly normal, mentation- intact, speech- normal, reflexes- symmetric  BACK: no CVA tenderness, no paralumbar tenderness  PSYCH: Alert and oriented times 3; speech- coherent , normal rate and volume; able to articulate logical thoughts, able to abstract reason, no tangential thoughts, no hallucinations or delusions, affect- normal  LYMPHATICS: ant. cervical- normal, post. cervical- normal, axillary- normal, supraclavicular- normal, inguinal- normal    Results for orders placed or performed in visit on 05/04/22   Hemoglobin A1c     Status: Abnormal   Result Value Ref Range    Hemoglobin A1C 8.9 (H) 0.0 - 5.6 %     EKG: Normal sinus rhythm, concern for potential Q waves in lead III but noncontiguous, and ST changes in lead V1 to V3.     ASSESSMENT:  1. Health Care Maintenance: Normal Physical Exam  2.  History of diabetes.  A1c checked today and is elevated 8.9.  She is out of some of her  medications.  These were refilled.  Refill test strips and lancets as well.  Likely needs an additional medication going forward.  3.  History of hypertension.  Blood pressure is in the normal range today.  She is not taking any of her medications.  We will restart the nifedipine, and hold on the lisinopril as we are awaiting her creatinine results.  4.  Hyperlipidemia.  She is on rosuvastatin 20.  5.  Preventative screening.  Up-to-date on colonoscopy, Pap, mammogram ordered today.  She does not qualify for lung cancer screening as she is not using enough betel not.  6.  Chest pain.  Associate with activity.  No pain today.  She had definitely has risk factors with hypertension and diabetes.  Does sound exertional.  Spoke with cardiology and they will help her access to rapid access clinic for further testing.  No chest pain at this time.  Appreciate recommendations.  7.  Recommended patient follow-up with the cardiologist, dentist, and see an ophthalmologist for further diabetic eye exam and further evaluation.    Amrita Carballo MD

## 2022-05-04 NOTE — PATIENT INSTRUCTIONS
Medications from the pharmacy.  List should be up to date.    We will work on the home nurse   We will call to schedule the mammogram.  Schedule visit with eye doctor  Schedule visit with dentist.   EKG heart test  FOllow up in 2 weeks to recheck medicaitons and heart.   Stop in lab for blood test.          Preventive Health Recommendations  Female Ages 50 - 64    Yearly exam: See your health care provider every year in order to  Review health changes.   Discuss preventive care.    Review your medicines if your doctor has prescribed any.    Get a Pap test every three years (unless you have an abnormal result and your provider advises testing more often).  If you get Pap tests with HPV test, you only need to test every 5 years, unless you have an abnormal result.   You do not need a Pap test if your uterus was removed (hysterectomy) and you have not had cancer.  You should be tested each year for STDs (sexually transmitted diseases) if you're at risk.   Have a mammogram every 1 to 2 years.  Have a colonoscopy at age 50, or have a yearly FIT test (stool test). These exams screen for colon cancer.    Have a cholesterol test every 5 years, or more often if advised.  Have a diabetes test (fasting glucose) every three years. If you are at risk for diabetes, you should have this test more often.   If you are at risk for osteoporosis (brittle bone disease), think about having a bone density scan (DEXA).    Shots: Get a flu shot each year. Get a tetanus shot every 10 years.    Nutrition:   Eat at least 5 servings of fruits and vegetables each day.  Eat whole-grain bread, whole-wheat pasta and brown rice instead of white grains and rice.  Get adequate Calcium and Vitamin D.     Lifestyle  Exercise at least 150 minutes a week (30 minutes a day, 5 days a week). This will help you control your weight and prevent disease.  Limit alcohol to one drink per day.  No smoking.   Wear sunscreen to prevent skin cancer.   See your dentist  every six months for an exam and cleaning.  See your eye doctor every 1 to 2 years.

## 2022-05-04 NOTE — TELEPHONE ENCOUNTER
----- Message from Maria Us MD sent at 5/4/2022  3:12 PM CDT -----  Called about an abnormal electrocardiogram and patient with occasional chest pain.  Recommend that this patient go to rapid access clinic, can you help facilitate this?  Patient is Radha speaking.

## 2022-05-09 ENCOUNTER — OFFICE VISIT (OUTPATIENT)
Dept: CARDIOLOGY | Facility: CLINIC | Age: 57
End: 2022-05-09
Attending: INTERNAL MEDICINE
Payer: COMMERCIAL

## 2022-05-09 VITALS
OXYGEN SATURATION: 97 % | RESPIRATION RATE: 17 BRPM | BODY MASS INDEX: 27.09 KG/M2 | WEIGHT: 127.4 LBS | SYSTOLIC BLOOD PRESSURE: 116 MMHG | HEART RATE: 78 BPM | DIASTOLIC BLOOD PRESSURE: 68 MMHG

## 2022-05-09 DIAGNOSIS — R07.9 CHEST PAIN, UNSPECIFIED TYPE: Primary | ICD-10-CM

## 2022-05-09 DIAGNOSIS — E78.00 HYPERCHOLESTEROLEMIA: ICD-10-CM

## 2022-05-09 DIAGNOSIS — E11.9 TYPE 2 DIABETES MELLITUS WITHOUT COMPLICATION, WITHOUT LONG-TERM CURRENT USE OF INSULIN (H): ICD-10-CM

## 2022-05-09 DIAGNOSIS — I10 PRIMARY HYPERTENSION: ICD-10-CM

## 2022-05-09 DIAGNOSIS — R94.31 ABNORMAL ELECTROCARDIOGRAM: ICD-10-CM

## 2022-05-09 PROCEDURE — 99204 OFFICE O/P NEW MOD 45 MIN: CPT | Performed by: INTERNAL MEDICINE

## 2022-05-09 NOTE — LETTER
5/9/2022    Amrita Carballo MD  [address] [place] [address]    RE: Rachael Ch       Dear Colleague,     I had the pleasure of seeing Rachael Ch in the [facility].      Thank you, Dr. Amrita Carballo, for asking the [facility] team to see Ms. Rachael Ch in the rapid access clinic to chest discomfort and abnormal ECG.    Assessment/Recommendations   Assessment:    1.  Chest discomfort, etiology unclear as the patient reported exertional discomfort to her primary care provider that via the  today tells me the discomfort occurs at random and not necessarily associated with physical activity or meals.  Does not appear to be associated with palpitations.  Her ECG did show nonspecific T wave abnormality in the anterior precordial leads and she has risk factors including diabetes, hypertension and hyperlipidemia.  Agree that further ischemic evaluation is indicated and have recommended pharmacologic nuclear stress test which she is agreeable to.  2.  Essential hypertension, currently well controlled  3.  Hypercholesterolemia, on simvastatin.  Goal LDL should be less than 70 given her diabetic status.  4.  Type 2 diabetes mellitus    Plan:  1.  Continue current medications  2.  Schedule pharmacologic nuclear stress test with further recommendations to follow       History of Present Illness    Ms. Rachael Ch is a 56 year old female with history of type 2 diabetes mellitus, essential hypertension, hypercholesterolemia who recently was seen by her primary care provider last week for a follow-up visit.  At that time, she admitted to running out of several of her medications including her antihypertensive, statin and diabetic medication.  She was restarted on her diabetic medication and statin as well as advised to restart her nifedipine.  Initially she reported no complaints, however, on further discussion admitted to some chest discomfort.  She told her primary care  provider that the chest discomfort appear to come on with activity and improved with rest.  An ECG was obtained demonstrating nonspecific T wave inversions in the anterior leads prompting her referral here today.    Via a formal , patient states her chest discomfort occurs at random.  She cannot specifically link it to physical activity or meals.  Does state that discomfort radiates towards her shoulder and occasionally to the back of her neck.  Is associated with shortness of breath.  Symptoms resolved spontaneously.  She also notes palpitations which come on after the onset of the chest discomfort.  No lightheadedness or near syncope.    ECG (personally reviewed): ECG demonstrated normal sinus rhythm, nonspecific T wave inversions in the anterior leads    Cardiac Imaging Studies (personally reviewed): No prior cardiac imaging     Physical Examination Review of Systems   /68 (BP Location: Left arm, Patient Position: Sitting, Cuff Size: Adult Regular)   Pulse 78   Resp 17   Wt 57.8 kg (127 lb 6.4 oz)   SpO2 97%   BMI 27.09 kg/m    Body mass index is 27.09 kg/m .  Wt Readings from Last 3 Encounters:   05/09/22 57.8 kg (127 lb 6.4 oz)   05/04/22 59.2 kg (130 lb 9.6 oz)   11/30/21 61.3 kg (135 lb 3.2 oz)     General Appearance:   Awake, Alert, No acute distress.   HEENT:  No scleral icterus; the mucous membranes were pink and moist.   Neck: No cervical bruits or jugular venous distention    Chest: The spine was straight. The chest was symmetric.   Lungs:   Respirations unlabored; the lungs are clear to auscultation. No wheezing   Cardiovascular:    Regular rate and rhythm.  S1, S2 normal.  No murmur or gallop   Abdomen:  No organomegaly, masses, bruits, or tenderness. Bowels sounds are present   Extremities:  No peripheral edema, clubbing or cyanosis   Skin: No xanthelasma. Warm, Dry.   Musculoskeletal: No tenderness.   Neurologic: Mood and affect are appropriate.    Enc Vitals  BP: 116/68  Pulse:  78  Resp: 17  SpO2: 97 %  Weight: 57.8 kg (127 lb 6.4 oz)                                         Medical History  Surgical History Family History Social History   Past Medical History:   Diagnosis Date     Aspirin contraindicated 2013    young age/amp     Chronic kidney disease, stage 3a (H) 2021     Depressive disorder      Diabetes (H)      Hyperlipidemia      Hypertension     No past surgical history on file. Family History   Problem Relation Age of Onset     Cancer Mother      Diabetes Father      Coronary Artery Disease No family hx of      Heart Disease No family hx of      Obesity No family hx of      Asthma No family hx of     Social History     Socioeconomic History     Marital status: Single     Spouse name: Not on file     Number of children: Not on file     Years of education: Not on file     Highest education level: Not on file   Occupational History     Not on file   Tobacco Use     Smoking status: Former Smoker     Packs/day: 0.50     Types: Cigarettes     Quit date: 2017     Years since quittin.7     Smokeless tobacco: Current User     Types: Chew   Substance and Sexual Activity     Alcohol use: No     Drug use: No     Sexual activity: Yes     Partners: Male   Other Topics Concern     Not on file   Social History Narrative     Not on file     Social Determinants of Health     Financial Resource Strain: Not on file   Food Insecurity: Not on file   Transportation Needs: Not on file   Physical Activity: Not on file   Stress: Not on file   Social Connections: Not on file   Intimate Partner Violence: Not on file   Housing Stability: Not on file          Medications  Allergies   Current Outpatient Medications   Medication Sig Dispense Refill     acetaminophen (TYLENOL) 500 MG tablet TAKE 1 PILL IN IN THE MORNING, THEN 1 PILL IN THE EVENING, AND 1 ADDITIONAL PILL AS NEEDED 90 tablet 3     atorvastatin (LIPITOR) 80 MG tablet Take 1 tablet (80 mg) by mouth daily 90 tablet 3     blood  glucose (NO BRAND SPECIFIED) lancets standard Use to test blood sugar 2 times daily or as directed. 100 each 3     blood glucose (NO BRAND SPECIFIED) test strip Use to test blood sugar once or twice times weekly Patient has Contour test machine, please send [employer]'s CONTOUR test strips. 100 strip 3     diltiazem ER (DILT-XR) 120 MG 24 hr capsule TAKE 1 CAPSULE (120 MG) BY MOUTH DAILY 90 capsule 3     empagliflozin (JARDIANCE) 25 MG TABS tablet Take 1 tablet (25 mg) by mouth daily 90 tablet 1     hydrOXYzine (VISTARIL) 25 MG capsule TAKE 1 CAPSULE BY MOUTH AS NEEDED FOR PANIC, ANXIETY **MAY TAKE 1-2 CAPSULES AT NIGHT TO HELP WITH SLEEP.** 90 capsule 5     metFORMIN (GLUCOPHAGE XR) 500 MG 24 hr tablet TAKE 1 PILLS (500MG) BY MOUTH TWICE DAILY WITH MEALS FOR DIABETES 60 tablet 5     prazosin (MINIPRESS) 2 MG capsule Take 1 capsule (2 mg) by mouth At Bedtime 90 capsule 3     senna-docusate (STIMULANT LAXATIVE) 8.6-50 MG tablet Take 1 tablet by mouth daily As needed. 30 tablet 3     sitagliptin (JANUVIA) 50 MG tablet Take 1 tablet (50 mg) by mouth daily 90 tablet 1     vitamin D3 (VITAMIN D3) 50 mcg (2000 units) tablet Take 1 tablet (50 mcg) by mouth daily 100 tablet 3      Allergies   Allergen Reactions     Gabapentin Other (See Comments)     Facial Swelling     Nkda [No Known Drug Allergies]      Venlafaxine      Causes abdominal pain, poor appetite and dizziness.           Lab Results    Chemistry/lipid CBC Cardiac Enzymes/BNP/TSH/INR   Recent Labs   Lab Test 05/04/22  1449 11/30/21  1609 09/21/21  0856   TRIG  --   --  169*   LDL  --   --  123   BUN 15   < > 18      < > 137   CO2 24   < > 28    < > = values in this interval not displayed.    Recent Labs   Lab Test 05/27/21  1043   HGB 11.8   HCT 36.5   MCV 86.1    Recent Labs   Lab Test 05/19/21  1557   BNP 46        A total of 25 minutes was spent reviewing patient's medical records, obtaining history and performing examination, as well as discussing diagnoses/  recommendations with patient and answering all questions.                  Thank you for allowing me to participate in the care of your patient.      Sincerely,     Li Giordano MD     [facility]  cc:   Maria Us MD  [address] [place] [id number]

## 2022-05-09 NOTE — PROGRESS NOTES
Thank you, Dr. Amrita Carballo, for asking the [facility] team to see Ms. Rachael Ch in the rapid access clinic to chest discomfort and abnormal ECG.    Assessment/Recommendations   Assessment:    1.  Chest discomfort, etiology unclear as the patient reported exertional discomfort to her primary care provider that via the  today tells me the discomfort occurs at random and not necessarily associated with physical activity or meals.  Does not appear to be associated with palpitations.  Her ECG did show nonspecific T wave abnormality in the anterior precordial leads and she has risk factors including diabetes, hypertension and hyperlipidemia.  Agree that further ischemic evaluation is indicated and have recommended pharmacologic nuclear stress test which she is agreeable to.  2.  Essential hypertension, currently well controlled  3.  Hypercholesterolemia, on simvastatin.  Goal LDL should be less than 70 given her diabetic status.  4.  Type 2 diabetes mellitus    Plan:  1.  Continue current medications  2.  Schedule pharmacologic nuclear stress test with further recommendations to follow       History of Present Illness    Ms. Rachael Ch is a 56 year old female with history of type 2 diabetes mellitus, essential hypertension, hypercholesterolemia who recently was seen by her primary care provider last week for a follow-up visit.  At that time, she admitted to running out of several of her medications including her antihypertensive, statin and diabetic medication.  She was restarted on her diabetic medication and statin as well as advised to restart her nifedipine.  Initially she reported no complaints, however, on further discussion admitted to some chest discomfort.  She told her primary care provider that the chest discomfort appear to come on with activity and improved with rest.  An ECG was obtained demonstrating nonspecific T wave inversions in the anterior leads prompting her  referral here today.    Via a formal , patient states her chest discomfort occurs at random.  She cannot specifically link it to physical activity or meals.  Does state that discomfort radiates towards her shoulder and occasionally to the back of her neck.  Is associated with shortness of breath.  Symptoms resolved spontaneously.  She also notes palpitations which come on after the onset of the chest discomfort.  No lightheadedness or near syncope.    ECG (personally reviewed): ECG demonstrated normal sinus rhythm, nonspecific T wave inversions in the anterior leads    Cardiac Imaging Studies (personally reviewed): No prior cardiac imaging     Physical Examination Review of Systems   /68 (BP Location: Left arm, Patient Position: Sitting, Cuff Size: Adult Regular)   Pulse 78   Resp 17   Wt 57.8 kg (127 lb 6.4 oz)   SpO2 97%   BMI 27.09 kg/m    Body mass index is 27.09 kg/m .  Wt Readings from Last 3 Encounters:   05/09/22 57.8 kg (127 lb 6.4 oz)   05/04/22 59.2 kg (130 lb 9.6 oz)   11/30/21 61.3 kg (135 lb 3.2 oz)     General Appearance:   Awake, Alert, No acute distress.   HEENT:  No scleral icterus; the mucous membranes were pink and moist.   Neck: No cervical bruits or jugular venous distention    Chest: The spine was straight. The chest was symmetric.   Lungs:   Respirations unlabored; the lungs are clear to auscultation. No wheezing   Cardiovascular:    Regular rate and rhythm.  S1, S2 normal.  No murmur or gallop   Abdomen:  No organomegaly, masses, bruits, or tenderness. Bowels sounds are present   Extremities:  No peripheral edema, clubbing or cyanosis   Skin: No xanthelasma. Warm, Dry.   Musculoskeletal: No tenderness.   Neurologic: Mood and affect are appropriate.    Enc Vitals  BP: 116/68  Pulse: 78  Resp: 17  SpO2: 97 %  Weight: 57.8 kg (127 lb 6.4 oz)                                         Medical History  Surgical History Family History Social History   Past Medical History:    Diagnosis Date     Aspirin contraindicated 2013    young age/amp     Chronic kidney disease, stage 3a (H) 2021     Depressive disorder      Diabetes (H)      Hyperlipidemia      Hypertension     No past surgical history on file. Family History   Problem Relation Age of Onset     Cancer Mother      Diabetes Father      Coronary Artery Disease No family hx of      Heart Disease No family hx of      Obesity No family hx of      Asthma No family hx of     Social History     Socioeconomic History     Marital status: Single     Spouse name: Not on file     Number of children: Not on file     Years of education: Not on file     Highest education level: Not on file   Occupational History     Not on file   Tobacco Use     Smoking status: Former Smoker     Packs/day: 0.50     Types: Cigarettes     Quit date: 2017     Years since quittin.7     Smokeless tobacco: Current User     Types: Chew   Substance and Sexual Activity     Alcohol use: No     Drug use: No     Sexual activity: Yes     Partners: Male   Other Topics Concern     Not on file   Social History Narrative     Not on file     Social Determinants of Health     Financial Resource Strain: Not on file   Food Insecurity: Not on file   Transportation Needs: Not on file   Physical Activity: Not on file   Stress: Not on file   Social Connections: Not on file   Intimate Partner Violence: Not on file   Housing Stability: Not on file          Medications  Allergies   Current Outpatient Medications   Medication Sig Dispense Refill     acetaminophen (TYLENOL) 500 MG tablet TAKE 1 PILL IN IN THE MORNING, THEN 1 PILL IN THE EVENING, AND 1 ADDITIONAL PILL AS NEEDED 90 tablet 3     atorvastatin (LIPITOR) 80 MG tablet Take 1 tablet (80 mg) by mouth daily 90 tablet 3     blood glucose (NO BRAND SPECIFIED) lancets standard Use to test blood sugar 2 times daily or as directed. 100 each 3     blood glucose (NO BRAND SPECIFIED) test strip Use to test blood sugar once  or twice times weekly Patient has Contour test machine, please send [employer] CONTOUR test strips. 100 strip 3     diltiazem ER (DILT-XR) 120 MG 24 hr capsule TAKE 1 CAPSULE (120 MG) BY MOUTH DAILY 90 capsule 3     empagliflozin (JARDIANCE) 25 MG TABS tablet Take 1 tablet (25 mg) by mouth daily 90 tablet 1     hydrOXYzine (VISTARIL) 25 MG capsule TAKE 1 CAPSULE BY MOUTH AS NEEDED FOR PANIC, ANXIETY **MAY TAKE 1-2 CAPSULES AT NIGHT TO HELP WITH SLEEP.** 90 capsule 5     metFORMIN (GLUCOPHAGE XR) 500 MG 24 hr tablet TAKE 1 PILLS (500MG) BY MOUTH TWICE DAILY WITH MEALS FOR DIABETES 60 tablet 5     prazosin (MINIPRESS) 2 MG capsule Take 1 capsule (2 mg) by mouth At Bedtime 90 capsule 3     senna-docusate (STIMULANT LAXATIVE) 8.6-50 MG tablet Take 1 tablet by mouth daily As needed. 30 tablet 3     sitagliptin (JANUVIA) 50 MG tablet Take 1 tablet (50 mg) by mouth daily 90 tablet 1     vitamin D3 (VITAMIN D3) 50 mcg (2000 units) tablet Take 1 tablet (50 mcg) by mouth daily 100 tablet 3      Allergies   Allergen Reactions     Gabapentin Other (See Comments)     Facial Swelling     Nkda [No Known Drug Allergies]      Venlafaxine      Causes abdominal pain, poor appetite and dizziness.           Lab Results    Chemistry/lipid CBC Cardiac Enzymes/BNP/TSH/INR   Recent Labs   Lab Test 05/04/22  1449 11/30/21  1609 09/21/21  0856   TRIG  --   --  169*   LDL  --   --  123   BUN 15   < > 18      < > 137   CO2 24   < > 28    < > = values in this interval not displayed.    Recent Labs   Lab Test 05/27/21  1043   HGB 11.8   HCT 36.5   MCV 86.1    Recent Labs   Lab Test 05/19/21  1557   BNP 46        A total of 25 minutes was spent reviewing patient's medical records, obtaining history and performing examination, as well as discussing diagnoses/ recommendations with patient and answering all questions.

## 2022-05-12 ENCOUNTER — ANCILLARY PROCEDURE (OUTPATIENT)
Dept: MAMMOGRAPHY | Facility: CLINIC | Age: 57
End: 2022-05-12
Attending: STUDENT IN AN ORGANIZED HEALTH CARE EDUCATION/TRAINING PROGRAM
Payer: COMMERCIAL

## 2022-05-12 DIAGNOSIS — Z00.00 PREVENTATIVE HEALTH CARE: ICD-10-CM

## 2022-05-12 PROCEDURE — 77067 SCR MAMMO BI INCL CAD: CPT | Mod: TC | Performed by: RADIOLOGY

## 2022-05-12 NOTE — LETTER
May 17, 2022      Rachael Bhatt  [address] [place] [address]        Dear ,    We are writing to inform you of your test results.    Your mammogram results have come back normal.  This is good news. Please call the clinic at [phone] if you have any questions.       Resulted Orders   MA SCREENING DIGITAL BILAT    Narrative    BILATERAL FULL FIELD DIGITAL SCREENING MAMMOGRAM    Performed on: 5/12/22    Compared to: 08/30/2018, 03/30/2016, 07/18/2013, and 08/11/2011    Technique: This study was evaluated with the assistance of Computer-Aided   Detection.    Findings: The breasts have scattered areas of fibroglandular density.    There is no radiographic evidence of malignancy.     IMPRESSION: ACR BI-RADS Category 1: Negative    RECOMMENDED FOLLOW-UP: Annual routine screening mammogram    The results and recommendations of this examination will be communicated   to the patient.         If you have any questions or concerns, please call the clinic at the number listed above.       Sincerely,      Amrita Carballo MD

## 2022-05-12 NOTE — LETTER
May 17, 2022      [facility]  [address] [place] [address]        Dear ,    We are writing to inform you of your test results.    Here is a copy of your mammogram results.  Results are normal.  This is good news.  Please call the clinic at [phone] if you have any questions.         Resulted Orders   MA SCREENING DIGITAL BILAT    Narrative    BILATERAL FULL FIELD DIGITAL SCREENING MAMMOGRAM    Performed on: 5/12/22    Compared to: 08/30/2018, 03/30/2016, 07/18/2013, and 08/11/2011    Technique: This study was evaluated with the assistance of Computer-Aided   Detection.    Findings: The breasts have scattered areas of fibroglandular density.    There is no radiographic evidence of malignancy.     IMPRESSION: ACR BI-RADS Category 1: Negative    RECOMMENDED FOLLOW-UP: Annual routine screening mammogram    The results and recommendations of this examination will be communicated   to the patient.         If you have any questions or concerns, please call the clinic at the number listed above.       Sincerely,      Amrita Carballo MD

## 2022-05-14 ENCOUNTER — TRANSFERRED RECORDS (OUTPATIENT)
Dept: HEALTH INFORMATION MANAGEMENT | Facility: CLINIC | Age: 57
End: 2022-05-14
Payer: COMMERCIAL

## 2022-05-14 LAB — RETINOPATHY: NEGATIVE

## 2022-05-16 NOTE — RESULT ENCOUNTER NOTE
Rachael Ch-    Your mammogram results have come back normal.  This is good news. Please call the clinic at [phone] if you have any questions.

## 2022-05-16 NOTE — RESULT ENCOUNTER NOTE
Rachael Bhatt-    Here is a copy of your mammogram results.  Results are normal.  This is good news.  Please call the clinic at [phone] if you have any questions.      Amrita Carballo MD    Please send results to patient.  .

## 2022-05-17 ENCOUNTER — HOSPITAL ENCOUNTER (OUTPATIENT)
Dept: NUCLEAR MEDICINE | Facility: HOSPITAL | Age: 57
Discharge: HOME | End: 2022-05-17
Attending: INTERNAL MEDICINE
Payer: COMMERCIAL

## 2022-05-17 ENCOUNTER — HOSPITAL ENCOUNTER (OUTPATIENT)
Dept: CARDIOLOGY | Facility: HOSPITAL | Age: 57
Discharge: HOME | End: 2022-05-17
Attending: INTERNAL MEDICINE
Payer: COMMERCIAL

## 2022-05-17 DIAGNOSIS — R07.9 CHEST PAIN, UNSPECIFIED TYPE: ICD-10-CM

## 2022-05-17 DIAGNOSIS — R94.31 ABNORMAL ELECTROCARDIOGRAM: ICD-10-CM

## 2022-05-17 DIAGNOSIS — I10 PRIMARY HYPERTENSION: ICD-10-CM

## 2022-05-17 DIAGNOSIS — E11.9 TYPE 2 DIABETES MELLITUS WITHOUT COMPLICATION, WITHOUT LONG-TERM CURRENT USE OF INSULIN (H): ICD-10-CM

## 2022-05-17 LAB
CV STRESS CURRENT BP HE: NORMAL
CV STRESS CURRENT HR HE: 102
CV STRESS CURRENT HR HE: 102
CV STRESS CURRENT HR HE: 67
CV STRESS CURRENT HR HE: 70
CV STRESS CURRENT HR HE: 73
CV STRESS CURRENT HR HE: 73
CV STRESS CURRENT HR HE: 75
CV STRESS CURRENT HR HE: 78
CV STRESS CURRENT HR HE: 81
CV STRESS CURRENT HR HE: 81
CV STRESS CURRENT HR HE: 83
CV STRESS CURRENT HR HE: 87
CV STRESS CURRENT HR HE: 87
CV STRESS CURRENT HR HE: 90
CV STRESS CURRENT HR HE: 90
CV STRESS CURRENT HR HE: 91
CV STRESS CURRENT HR HE: 91
CV STRESS CURRENT HR HE: 98
CV STRESS CURRENT HR HE: 98
CV STRESS CURRENT HR HE: 99
CV STRESS CURRENT HR HE: 99
CV STRESS DEVIATION TIME HE: NORMAL
CV STRESS ECHO PERCENT HR HE: NORMAL
CV STRESS EXERCISE STAGE HE: NORMAL
CV STRESS FINAL RESTING BP HE: NORMAL
CV STRESS FINAL RESTING HR HE: 73
CV STRESS MAX HR HE: 102
CV STRESS MAX TREADMILL GRADE HE: 0
CV STRESS MAX TREADMILL SPEED HE: 0
CV STRESS PEAK DIA BP HE: NORMAL
CV STRESS PEAK SYS BP HE: NORMAL
CV STRESS PHASE HE: NORMAL
CV STRESS PROTOCOL HE: NORMAL
CV STRESS RESTING PT POSITION HE: NORMAL
CV STRESS ST DEVIATION AMOUNT HE: NORMAL
CV STRESS ST DEVIATION ELEVATION HE: NORMAL
CV STRESS ST EVELATION AMOUNT HE: NORMAL
CV STRESS TEST TYPE HE: NORMAL
CV STRESS TOTAL STAGE TIME MIN 1 HE: NORMAL
NUC STRESS EJECTION FRACTION: 77 %
RATE PRESSURE PRODUCT: NORMAL
STRESS ECHO BASELINE DIASTOLIC HE: 85
STRESS ECHO BASELINE HR: 67
STRESS ECHO BASELINE SYSTOLIC BP: 178
STRESS ECHO CALCULATED PERCENT HR: 63 %
STRESS ECHO LAST STRESS DIASTOLIC BP: 91
STRESS ECHO LAST STRESS HR: 98
STRESS ECHO LAST STRESS SYSTOLIC BP: 192
STRESS ECHO TARGET HR: 162
STRESS/REST PERFUSION RATIO: 1.39

## 2022-05-17 PROCEDURE — A9500 TC99M SESTAMIBI: HCPCS | Performed by: INTERNAL MEDICINE

## 2022-05-17 PROCEDURE — 343N000001 HC RX 343: Performed by: INTERNAL MEDICINE

## 2022-05-17 PROCEDURE — 93017 CV STRESS TEST TRACING ONLY: CPT | Performed by: INTERNAL MEDICINE

## 2022-05-17 PROCEDURE — 93018 CV STRESS TEST I&R ONLY: CPT | Performed by: INTERNAL MEDICINE

## 2022-05-17 PROCEDURE — 93016 CV STRESS TEST SUPVJ ONLY: CPT | Performed by: INTERNAL MEDICINE

## 2022-05-17 PROCEDURE — 78452 HT MUSCLE IMAGE SPECT MULT: CPT

## 2022-05-17 PROCEDURE — 78452 HT MUSCLE IMAGE SPECT MULT: CPT | Mod: 26 | Performed by: INTERNAL MEDICINE

## 2022-05-17 PROCEDURE — 250N000011 HC RX IP 250 OP 636: Performed by: INTERNAL MEDICINE

## 2022-05-17 PROCEDURE — 93017 CV STRESS TEST TRACING ONLY: CPT

## 2022-05-17 RX ORDER — REGADENOSON 0.08 MG/ML
0.4 INJECTION, SOLUTION INTRAVENOUS ONCE
Status: COMPLETED | OUTPATIENT
Start: 2022-05-17 | End: 2022-05-17

## 2022-05-17 RX ORDER — AMINOPHYLLINE 25 MG/ML
50 INJECTION, SOLUTION INTRAVENOUS
Status: DISCONTINUED | OUTPATIENT
Start: 2022-05-17 | End: 2022-05-17 | Stop reason: HOSPADM

## 2022-05-17 RX ADMIN — Medication 9.29 MILLICURIE: at 08:08

## 2022-05-17 RX ADMIN — REGADENOSON 0.4 MG: 0.08 INJECTION, SOLUTION INTRAVENOUS at 08:50

## 2022-05-17 RX ADMIN — Medication 29.2 MILLICURIE: at 10:05

## 2022-05-17 RX ADMIN — AMINOPHYLLINE 50 MG: 25 INJECTION, SOLUTION INTRAVENOUS at 08:54

## 2022-05-18 DIAGNOSIS — R07.9 CHEST PAIN, UNSPECIFIED TYPE: Primary | ICD-10-CM

## 2022-05-18 DIAGNOSIS — R94.39 ABNORMAL NUCLEAR STRESS TEST: ICD-10-CM

## 2022-05-25 ENCOUNTER — OFFICE VISIT (OUTPATIENT)
Dept: FAMILY MEDICINE | Facility: CLINIC | Age: 57
End: 2022-05-25
Payer: COMMERCIAL

## 2022-05-25 VITALS
OXYGEN SATURATION: 100 % | BODY MASS INDEX: 27.6 KG/M2 | DIASTOLIC BLOOD PRESSURE: 79 MMHG | RESPIRATION RATE: 16 BRPM | HEART RATE: 75 BPM | TEMPERATURE: 98.3 F | WEIGHT: 129.8 LBS | SYSTOLIC BLOOD PRESSURE: 150 MMHG

## 2022-05-25 DIAGNOSIS — E78.5 HYPERLIPIDEMIA, UNSPECIFIED HYPERLIPIDEMIA TYPE: Primary | ICD-10-CM

## 2022-05-25 DIAGNOSIS — E11.9 TYPE 2 DIABETES MELLITUS WITHOUT COMPLICATION, WITHOUT LONG-TERM CURRENT USE OF INSULIN (H): ICD-10-CM

## 2022-05-25 DIAGNOSIS — I10 ESSENTIAL HYPERTENSION, BENIGN: ICD-10-CM

## 2022-05-25 DIAGNOSIS — N18.31 CHRONIC KIDNEY DISEASE, STAGE 3A (H): ICD-10-CM

## 2022-05-25 PROCEDURE — 99214 OFFICE O/P EST MOD 30 MIN: CPT | Performed by: STUDENT IN AN ORGANIZED HEALTH CARE EDUCATION/TRAINING PROGRAM

## 2022-05-25 RX ORDER — ATORVASTATIN CALCIUM 80 MG/1
80 TABLET, FILM COATED ORAL DAILY
Qty: 90 TABLET | Refills: 3 | Status: SHIPPED | OUTPATIENT
Start: 2022-05-25 | End: 2022-12-28

## 2022-05-25 RX ORDER — LISINOPRIL 20 MG/1
20 TABLET ORAL DAILY
Qty: 90 TABLET | Refills: 1 | Status: SHIPPED | OUTPATIENT
Start: 2022-05-25 | End: 2022-12-28

## 2022-05-25 ASSESSMENT — ANXIETY QUESTIONNAIRES
GAD7 TOTAL SCORE: 14
7. FEELING AFRAID AS IF SOMETHING AWFUL MIGHT HAPPEN: SEVERAL DAYS
6. BECOMING EASILY ANNOYED OR IRRITABLE: MORE THAN HALF THE DAYS
2. NOT BEING ABLE TO STOP OR CONTROL WORRYING: NEARLY EVERY DAY
5. BEING SO RESTLESS THAT IT IS HARD TO SIT STILL: SEVERAL DAYS
GAD7 TOTAL SCORE: 14
IF YOU CHECKED OFF ANY PROBLEMS ON THIS QUESTIONNAIRE, HOW DIFFICULT HAVE THESE PROBLEMS MADE IT FOR YOU TO DO YOUR WORK, TAKE CARE OF THINGS AT HOME, OR GET ALONG WITH OTHER PEOPLE: EXTREMELY DIFFICULT
1. FEELING NERVOUS, ANXIOUS, OR ON EDGE: MORE THAN HALF THE DAYS
3. WORRYING TOO MUCH ABOUT DIFFERENT THINGS: NEARLY EVERY DAY

## 2022-05-25 ASSESSMENT — PATIENT HEALTH QUESTIONNAIRE - PHQ9
5. POOR APPETITE OR OVEREATING: MORE THAN HALF THE DAYS
SUM OF ALL RESPONSES TO PHQ QUESTIONS 1-9: 11

## 2022-05-25 NOTE — PATIENT INSTRUCTIONS
Restart new medications today:    1)  Aspirin to help the blood flow well.    2)  Atorvastatin to help decrease cholesterol  3)  Lisinopril to help with blood pressure and the heart.      Follow up in 1 month, with in person .

## 2022-05-25 NOTE — COMMUNITY RESOURCES LIST (ENGLISH)
05/25/2022   [facility]  Veronica Rosas  For questions about this resource list or additional care needs, please contact your primary care clinic or care manager.  Phone: [phone]   Email: N/A   Address: [address] [place] [address]   Hours: N/A        Hotlines and Helplines       Hotline - Crisis help  1  [facility] - Crisis Text Line - Crisis Text Line Distance: 1.73 miles      COVID-19 Status: Phone/Virtual   [address] [place] [address]  Language: English  Hours: Mon - Sun Open 24 Hours   Website: https://[place].gov/dhs/partners-and-providers/policies-procedures/adult-mental-health/crisis-text-line/     2  [facility] - Adult Mental Health Urgent Care Distance: 1.73 miles      COVID-19 Status: Phone/Virtual   [address] [place] [address]  Language: English, Hmong, Slovenian  Hours: Mon - Sun Open 24 Hours   Phone: (820) 602-8086 Website: https://www.[facility]/health-medical/clinics-services/mental-behavorial-health/adult-mental-health-chemical-health/urgent-care-adult-mental-health          Mental Health       Individual counseling  3  [facility] Distance: 0.54 miles      COVID-19 Status: Regular Operations, COVID-19 Status: Phone/Virtual   [address] [place] [address]  Language: English, Hmong, Slovenian  Hours: Mon 8:00 AM - 8:00 PM , Tue 8:00 AM - 5:00 PM , Wed - Thu 8:00 AM - 8:00 PM , Fri 8:00 AM - 5:00 PM , Sat 8:00 AM - 12:00 PM  Fees: Insurance, Self Pay, Sliding Fee   Phone: (460) 779-1108 Website: https://www.[employer].org     4  [facility] Distance: 0.83 miles      COVID-19 Status: Phone/Virtual   [address] [place] [address]  Language: English, Kazakh, Slovenian  Hours: Mon - Fri 8:30 AM - 5:00 PM  Fees: Insurance, Self Pay   Phone: (386) 790-8136 Email: info@[employer].org Website:  http://www.clues.org     Mental health crisis care  5  [facility] - Adult Program Distance: 1.73 miles      COVID-19 Status: Regular Operations   [address] [place] [address]  Language: English, Hmong, Tamazight  Hours: Mon - Fri 8:00 AM - 5:30 PM  Fees: Insurance, Self Pay, Sliding Fee   Phone: (969) 934-2588 Website: https://www.[facility]/health-medical/clinics-services/mental-behavorial-health/adult-mental-health-chemical-health/urgent-care-adult-mental-health     6  [facility] - Crisis Stabilization Services ([facility]) Distance: 5.45 miles      COVID-19 Status: Regular Operations   [address] [place] [address]  Language: English, Djiboutian  Hours: Mon - Sun Open 24 Hours  Fees: Insurance   Phone: (326) 261-7528 Email: info@[employer] [facility].org Website: https://[employer].org/services-programs/residential-services/darrion-higinio-house/     Mental health support group  7  [facility] Distance: 2.34 miles      COVID-19 Status: Phone/Virtual   [address] [facility] [address] [place] [address]  Language: English  Hours: Mon - Fri 8:00 AM - 4:30 PM  Fees: Free   Phone: (225) 569-2485 Website: https://www.[place].gov/find-locations/facility/vc_0416V     8  [facility] Distance: 5.21 miles      COVID-19 Status: Phone/Virtual   [address] [place] [address]  Language: English  Hours: Mon - Thu 7:00 AM - 8:00 PM , Fri 7:00 AM - 5:00 PM  Fees: Insurance, Self Pay   Phone: (790) 111-4061 Email: arnulfo@[employer] Website: https://www.[employer]/our-locations/[place]/[facility]/          Important Numbers & Websites       Emergency Services   911  City Services   311  Poison Control   (814) 136-5887  Suicide Prevention Lifeline   (176) 664-4849 (TALK)  Child Abuse Hotline   (881) 973-3379 (4-A-Child)  Sexual Assault Hotline   (248) 572-7055  (HOPE)  National Runaway Safeline   (607) 271-5414 (RUNAWAY)  All-Options Talkline   (379) 994-6511  Substance Abuse Referral   (991) 280-6288 (HELP)

## 2022-05-26 NOTE — PROGRESS NOTES
There are no exam notes on file for this visit.    ASSESSMENT AND PLAN:      Rachael was seen today for recheck medication.    Diagnoses and all orders for this visit:    Essential hypertension, benign  Hyperlipidemia, unspecified hyperlipidemia type  Chronic kidney disease, stage 3a (H)  Type 2 diabetes mellitus without complication, without long-term current use of insulin (H)  Presenting for follow-up visit.  She has resumed her medications again.  I compared her medication bottles to the list, and she is still missing the atorvastatin.  Refilled this today.  Additionally given elevated blood pressure, will add lisinopril.  She was on this previously.  We will start aspirin as it does appear that she has underlying cardiac disease.  She plans to follow-up with a cardiologist and move forward with testing but does have some anxiety about this.  We discussed the risks and benefits of moving forward and that I think the procedure will help her have more energy and feel better.  She will follow-up in a month.  -     aspirin (ASA) 81 MG EC tablet; Take 1 tablet (81 mg) by mouth daily  -     atorvastatin (LIPITOR) 80 MG tablet; Take 1 tablet (80 mg) by mouth daily  -     lisinopril (ZESTRIL) 20 MG tablet; Take 1 tablet (20 mg) by mouth daily          Patient Instructions   Restart new medications today:    1)  Aspirin to help the blood flow well.    2)  Atorvastatin to help decrease cholesterol  3)  Lisinopril to help with blood pressure and the heart.      Follow up in 1 month, with in person .        Amrita Carballo MD    SUBJECTIVE  Rachael Ch is a 56 year old female with past medical history significant for    Patient Active Problem List   Diagnosis     Essential hypertension, benign     Diabetes mellitus, type 2 (H)     Hyperlipidemia     Health Care Home     Helicobacter pylori gastritis     PTSD (post-traumatic stress disorder)     Moderate episode of recurrent major depressive disorder (H)      Cognitive complaints     Screening for depression     Microalbuminuria     Plantar fasciitis     Left knee pain     Right knee pain, unspecified chronicity     Chronic kidney disease, stage 3a (H)     Others present at the visit:  None.   present via telephone, Radha.      Presents for   Chief Complaint   Patient presents with     Recheck Medication     Pt is here for a medication check today.     Patient presents for follow-up visit.  Overall has been feeling well.  She continues to have difficulty with pain and stiffness in the back of her neck.  Takes Tylenol and this helps.  She is denying any chest pain currently.  Does describe some soreness.  Shares that the pain and pressure got better after the recent stress test, but since the test she has felt more tired.  Notes that at the test she felt nauseated, got medicine for that, and that medicine made her sleepy.  Still feels more sleepy from this.  She denies any shortness of breath, denies lower extremity edema, denies dizziness or lightheadedness.  While she is not experiencing chest pain, she does endorse increased fatigue with exercising, so she has been limiting her activities.  Does get somewhat short of breath when moving around.    We discussed the results of the stress test, and neck steps.  She shares that she is nervous about the procedure.  Is concerned that something bad might happen.  Is not worried about anything in particular.  No specific questions however.    We reviewed her medications together.  She has been taking Tylenol, diltiazem, Jardiance, hydroxyzine, prazosin, metformin, Januvia, and vitamin D.  She has her glucometer with her.  Sugars are typically between 175 and 250.  Blood pressure is slightly elevated today.  She has not been taking the atorvastatin.      OBJECTIVE:  Vitals: BP (!) 150/79   Pulse 75   Temp 98.3  F (36.8  C) (Oral)   Resp 16   Wt 58.9 kg (129 lb 12.8 oz)   SpO2 100%   BMI 27.60 kg/m    BMI= Body  mass index is 27.6 kg/m .  Objective:    Vitals:  Vitals are reviewed and blood pressure is elevated.    Gen:  Alert, pleasant, no acute distress  Cardiac:  Regular rate and rhythm, no murmurs, rubs or gallops  Respiratory:  Lungs clear to auscultation bilaterally  Abdomen:  Soft, non-tender, non-distended, bowel sounds positive  Extremities:  Warm, well-perfused, pulses 2+/4, no lower extremity edema    No results found for any visits on 05/25/22.        Patient Instructions   Restart new medications today:    1)  Aspirin to help the blood flow well.    2)  Atorvastatin to help decrease cholesterol  3)  Lisinopril to help with blood pressure and the heart.      Follow up in 1 month, with in person .        Amrita Carballo MD

## 2022-06-01 ENCOUNTER — OFFICE VISIT (OUTPATIENT)
Dept: CARDIOLOGY | Facility: CLINIC | Age: 57
End: 2022-06-01
Payer: COMMERCIAL

## 2022-06-01 VITALS
RESPIRATION RATE: 16 BRPM | SYSTOLIC BLOOD PRESSURE: 144 MMHG | DIASTOLIC BLOOD PRESSURE: 60 MMHG | WEIGHT: 130 LBS | HEART RATE: 78 BPM | BODY MASS INDEX: 27.64 KG/M2

## 2022-06-01 DIAGNOSIS — E78.00 HYPERCHOLESTEROLEMIA: ICD-10-CM

## 2022-06-01 DIAGNOSIS — I10 PRIMARY HYPERTENSION: ICD-10-CM

## 2022-06-01 DIAGNOSIS — R94.39 ABNORMAL NUCLEAR STRESS TEST: Primary | ICD-10-CM

## 2022-06-01 DIAGNOSIS — R07.9 CHEST PAIN, UNSPECIFIED TYPE: ICD-10-CM

## 2022-06-01 PROCEDURE — 99214 OFFICE O/P EST MOD 30 MIN: CPT | Performed by: INTERNAL MEDICINE

## 2022-06-01 NOTE — LETTER
6/1/2022    Amrita Carballo MD  [address] [place] [address]    RE: Rachael Ch       Dear Colleague,     I had the pleasure of seeing Rachael Ch in the [facility].      Thank you, Dr. Amrita Carballo, for asking the [facility] team in the rapid access clinic to see Ms. Rachael Ch to follow-up on abnormal nuclear stress test.    Assessment/Recommendations   Assessment:    1.  Abnormal nuclear stress test.  This demonstrated a small area of mild ischemia in the mid to distal anterior wall as well as increased stress to rest wall cavity which can be a marker for multivessel coronary artery disease but also may be related to her elevated blood pressure.  This puts her at intermediate risk for future ischemic events.  In light of this, recommended coronary angiography to define her anatomy.  Discussed options of direct coronary angiography versus CT coronary angiography.  She is not open to an invasive procedure at this point but is agreeable to undergoing CT imaging and so we will arrange for CT coronary angiography.  2.  Essential hypertension, inadequately controlled.  Her systolic blood pressure remains mildly elevated here today.  I did not make any change in medication at this point but may want to consider increasing lisinopril to 40 mg daily.  3.  Hypercholesterolemia, now on high-dose atorvastatin.    Plan:  1.  Continue current medications  2.  Schedule CT coronary angiogram with further recommendations to follow       History of Present Illness    Ms. Rachael Ch is a 57 year old female with history of essential hypertension which has been intermittently controlled, type 2 diabetes mellitus, hypercholesterolemia who presents to the [facility] access clinic today for follow-up of recent nuclear stress test.  I saw her approximately 1 month ago for symptoms of chest discomfort.  Per the primary care's note, she reported exertional chest discomfort but when I saw her later  that week in clinic, she stated her chest discomfort occurred at random.  Given her risk factors for coronary artery disease, I did agree with pursuing further testing and scheduled a pharmacologic nuclear stress test.  This demonstrated a small area of mild ischemia involving the mid to distal anterior wall as well as increased stress to rest cavity ratio which is a nonspecific finding but may be seen in patients with multivessel disease.  Given her elevated blood pressures at the time of the stress study, cannot exclude this as a cause for this increased stress to rest cavity ratio.  Stress test was felt to be intermediate risk for future ischemic events and thus I thought it best to bring her back in and discuss further recommendations.    ECG (personally reviewed): No ECG today    Cardiac Imaging Studies (personally reviewed): Nuclear stress test as reported above     Physical Examination Review of Systems   BP (!) 144/60 (BP Location: Left arm, Patient Position: Sitting, Cuff Size: Adult Regular)   Pulse 78   Resp 16   Wt 59 kg (130 lb)   BMI 27.64 kg/m    Body mass index is 27.64 kg/m .  Wt Readings from Last 3 Encounters:   06/01/22 59 kg (130 lb)   05/25/22 58.9 kg (129 lb 12.8 oz)   05/09/22 57.8 kg (127 lb 6.4 oz)     General Appearance:   Awake, Alert, No acute distress.   HEENT:  No scleral icterus; the mucous membranes were pink and moist.   Neck: No cervical bruits or jugular venous distention    Chest: The spine was straight. The chest was symmetric.   Lungs:   Respirations unlabored; the lungs are clear to auscultation. No wheezing   Cardiovascular:    Regular rate and rhythm.  S1, S2 normal.  No murmur or gallop   Abdomen:  No organomegaly, masses, bruits, or tenderness. Bowels sounds are present   Extremities:  No peripheral edema bilaterally   Skin: No xanthelasma. Warm, Dry.   Musculoskeletal: No tenderness.   Neurologic: Mood and affect are appropriate.    Enc Vitals  BP: (!) 144/60  Pulse:  78  Resp: 16  Weight: 59 kg (130 lb)                                         Medical History  Surgical History Family History Social History   Past Medical History:   Diagnosis Date     Aspirin contraindicated 2013    young age/amp     Chronic kidney disease, stage 3a (H) 2021     Depressive disorder      Diabetes (H)      Hyperlipidemia      Hypertension     No past surgical history on file. Family History   Problem Relation Age of Onset     Cancer Mother      Diabetes Father      Coronary Artery Disease No family hx of      Heart Disease No family hx of      Obesity No family hx of      Asthma No family hx of      Breast Cancer No family hx of      Ovarian Cancer No family hx of     Social History     Socioeconomic History     Marital status: Single     Spouse name: Not on file     Number of children: Not on file     Years of education: Not on file     Highest education level: Not on file   Occupational History     Not on file   Tobacco Use     Smoking status: Former Smoker     Packs/day: 0.50     Types: Cigarettes     Quit date: 2017     Years since quittin.7     Smokeless tobacco: Current User     Types: Chew   Substance and Sexual Activity     Alcohol use: No     Drug use: No     Sexual activity: Yes     Partners: Male   Other Topics Concern     Not on file   Social History Narrative     Not on file     Social Determinants of Health     Financial Resource Strain: Not on file   Food Insecurity: Not on file   Transportation Needs: Not on file   Physical Activity: Not on file   Stress: Not on file   Social Connections: Not on file   Intimate Partner Violence: Not on file   Housing Stability: Not on file          Medications  Allergies   Current Outpatient Medications   Medication Sig Dispense Refill     acetaminophen (TYLENOL) 500 MG tablet TAKE 1 PILL IN IN THE MORNING, THEN 1 PILL IN THE EVENING, AND 1 ADDITIONAL PILL AS NEEDED 90 tablet 3     aspirin (ASA) 81 MG EC tablet Take 1 tablet (81  mg) by mouth daily 90 tablet 1     atorvastatin (LIPITOR) 80 MG tablet Take 1 tablet (80 mg) by mouth daily 90 tablet 3     blood glucose (NO BRAND SPECIFIED) lancets standard Use to test blood sugar 2 times daily or as directed. 100 each 3     blood glucose (NO BRAND SPECIFIED) test strip Use to test blood sugar once or twice times weekly Patient has Contour test machine, please send [employer]'s CONTOUR test strips. 100 strip 3     diltiazem ER (DILT-XR) 120 MG 24 hr capsule TAKE 1 CAPSULE (120 MG) BY MOUTH DAILY 90 capsule 3     empagliflozin (JARDIANCE) 25 MG TABS tablet Take 1 tablet (25 mg) by mouth daily 90 tablet 1     hydrOXYzine (VISTARIL) 25 MG capsule TAKE 1 CAPSULE BY MOUTH AS NEEDED FOR PANIC, ANXIETY **MAY TAKE 1-2 CAPSULES AT NIGHT TO HELP WITH SLEEP.** 90 capsule 5     lisinopril (ZESTRIL) 20 MG tablet Take 1 tablet (20 mg) by mouth daily 90 tablet 1     metFORMIN (GLUCOPHAGE XR) 500 MG 24 hr tablet TAKE 1 PILLS (500MG) BY MOUTH TWICE DAILY WITH MEALS FOR DIABETES 60 tablet 5     prazosin (MINIPRESS) 2 MG capsule Take 1 capsule (2 mg) by mouth At Bedtime 90 capsule 3     senna-docusate (STIMULANT LAXATIVE) 8.6-50 MG tablet Take 1 tablet by mouth daily As needed. 30 tablet 3     sitagliptin (JANUVIA) 50 MG tablet Take 1 tablet (50 mg) by mouth daily 90 tablet 1     vitamin D3 (VITAMIN D3) 50 mcg (2000 units) tablet Take 1 tablet (50 mcg) by mouth daily 100 tablet 3      Allergies   Allergen Reactions     Gabapentin Other (See Comments)     Facial Swelling     Venlafaxine      Causes abdominal pain, poor appetite and dizziness.           Lab Results    Chemistry/lipid CBC Cardiac Enzymes/BNP/TSH/INR   Recent Labs   Lab Test 05/04/22  1449 11/30/21  1609 09/21/21  0856   TRIG  --   --  169*   LDL  --   --  123   BUN 15   < > 18      < > 137   CO2 24   < > 28    < > = values in this interval not displayed.    Recent Labs   Lab Test 05/27/21  1043   HGB 11.8   HCT 36.5   MCV 86.1    Recent Labs   Lab  Test 05/19/21  1557   BNP 46        A total of 34 minutes was spent reviewing patient's medical records, obtaining history and performing examination, as well as discussing diagnoses/ recommendations with patient and answering all questions.                      Thank you for allowing me to participate in the care of your patient.      Sincerely,     Li Giordano MD     [facility]  cc:   Li Giordano MD  [address] [phone]

## 2022-06-01 NOTE — PROGRESS NOTES
Thank you, Dr. Amrita Carballo, for asking the [facility] team in the rapid access clinic to see Ms. Rachael Ch to follow-up on abnormal nuclear stress test.    Assessment/Recommendations   Assessment:    1.  Abnormal nuclear stress test.  This demonstrated a small area of mild ischemia in the mid to distal anterior wall as well as increased stress to rest wall cavity which can be a marker for multivessel coronary artery disease but also may be related to her elevated blood pressure.  This puts her at intermediate risk for future ischemic events.  In light of this, recommended coronary angiography to define her anatomy.  Discussed options of direct coronary angiography versus CT coronary angiography.  She is not open to an invasive procedure at this point but is agreeable to undergoing CT imaging and so we will arrange for CT coronary angiography.  2.  Essential hypertension, inadequately controlled.  Her systolic blood pressure remains mildly elevated here today.  I did not make any change in medication at this point but may want to consider increasing lisinopril to 40 mg daily.  3.  Hypercholesterolemia, now on high-dose atorvastatin.    Plan:  1.  Continue current medications  2.  Schedule CT coronary angiogram with further recommendations to follow       History of Present Illness    Ms. Rachael Ch is a 57 year old female with history of essential hypertension which has been intermittently controlled, type 2 diabetes mellitus, hypercholesterolemia who presents to the rapid access clinic today for follow-up of recent nuclear stress test.  I saw her approximately 1 month ago for symptoms of chest discomfort.  Per the primary care's note, she reported exertional chest discomfort but when I saw her later that week in clinic, she stated her chest discomfort occurred at random.  Given her risk factors for coronary artery disease, I did agree with pursuing further testing and scheduled a  pharmacologic nuclear stress test.  This demonstrated a small area of mild ischemia involving the mid to distal anterior wall as well as increased stress to rest cavity ratio which is a nonspecific finding but may be seen in patients with multivessel disease.  Given her elevated blood pressures at the time of the stress study, cannot exclude this as a cause for this increased stress to rest cavity ratio.  Stress test was felt to be intermediate risk for future ischemic events and thus I thought it best to bring her back in and discuss further recommendations.    ECG (personally reviewed): No ECG today    Cardiac Imaging Studies (personally reviewed): Nuclear stress test as reported above     Physical Examination Review of Systems   BP (!) 144/60 (BP Location: Left arm, Patient Position: Sitting, Cuff Size: Adult Regular)   Pulse 78   Resp 16   Wt 59 kg (130 lb)   BMI 27.64 kg/m    Body mass index is 27.64 kg/m .  Wt Readings from Last 3 Encounters:   06/01/22 59 kg (130 lb)   05/25/22 58.9 kg (129 lb 12.8 oz)   05/09/22 57.8 kg (127 lb 6.4 oz)     General Appearance:   Awake, Alert, No acute distress.   HEENT:  No scleral icterus; the mucous membranes were pink and moist.   Neck: No cervical bruits or jugular venous distention    Chest: The spine was straight. The chest was symmetric.   Lungs:   Respirations unlabored; the lungs are clear to auscultation. No wheezing   Cardiovascular:    Regular rate and rhythm.  S1, S2 normal.  No murmur or gallop   Abdomen:  No organomegaly, masses, bruits, or tenderness. Bowels sounds are present   Extremities:  No peripheral edema bilaterally   Skin: No xanthelasma. Warm, Dry.   Musculoskeletal: No tenderness.   Neurologic: Mood and affect are appropriate.    Enc Vitals  BP: (!) 144/60  Pulse: 78  Resp: 16  Weight: 59 kg (130 lb)                                         Medical History  Surgical History Family History Social History   Past Medical History:   Diagnosis Date      Aspirin contraindicated 2013    young age/amp     Chronic kidney disease, stage 3a (H) 2021     Depressive disorder      Diabetes (H)      Hyperlipidemia      Hypertension     No past surgical history on file. Family History   Problem Relation Age of Onset     Cancer Mother      Diabetes Father      Coronary Artery Disease No family hx of      Heart Disease No family hx of      Obesity No family hx of      Asthma No family hx of      Breast Cancer No family hx of      Ovarian Cancer No family hx of     Social History     Socioeconomic History     Marital status: Single     Spouse name: Not on file     Number of children: Not on file     Years of education: Not on file     Highest education level: Not on file   Occupational History     Not on file   Tobacco Use     Smoking status: Former Smoker     Packs/day: 0.50     Types: Cigarettes     Quit date: 2017     Years since quittin.7     Smokeless tobacco: Current User     Types: Chew   Substance and Sexual Activity     Alcohol use: No     Drug use: No     Sexual activity: Yes     Partners: Male   Other Topics Concern     Not on file   Social History Narrative     Not on file     Social Determinants of Health     Financial Resource Strain: Not on file   Food Insecurity: Not on file   Transportation Needs: Not on file   Physical Activity: Not on file   Stress: Not on file   Social Connections: Not on file   Intimate Partner Violence: Not on file   Housing Stability: Not on file          Medications  Allergies   Current Outpatient Medications   Medication Sig Dispense Refill     acetaminophen (TYLENOL) 500 MG tablet TAKE 1 PILL IN IN THE MORNING, THEN 1 PILL IN THE EVENING, AND 1 ADDITIONAL PILL AS NEEDED 90 tablet 3     aspirin (ASA) 81 MG EC tablet Take 1 tablet (81 mg) by mouth daily 90 tablet 1     atorvastatin (LIPITOR) 80 MG tablet Take 1 tablet (80 mg) by mouth daily 90 tablet 3     blood glucose (NO BRAND SPECIFIED) lancets standard Use to  test blood sugar 2 times daily or as directed. 100 each 3     blood glucose (NO BRAND SPECIFIED) test strip Use to test blood sugar once or twice times weekly Patient has Contour test machine, please send [email or web address]'s CONTOUR test strips. 100 strip 3     diltiazem ER (DILT-XR) 120 MG 24 hr capsule TAKE 1 CAPSULE (120 MG) BY MOUTH DAILY 90 capsule 3     empagliflozin (JARDIANCE) 25 MG TABS tablet Take 1 tablet (25 mg) by mouth daily 90 tablet 1     hydrOXYzine (VISTARIL) 25 MG capsule TAKE 1 CAPSULE BY MOUTH AS NEEDED FOR PANIC, ANXIETY **MAY TAKE 1-2 CAPSULES AT NIGHT TO HELP WITH SLEEP.** 90 capsule 5     lisinopril (ZESTRIL) 20 MG tablet Take 1 tablet (20 mg) by mouth daily 90 tablet 1     metFORMIN (GLUCOPHAGE XR) 500 MG 24 hr tablet TAKE 1 PILLS (500MG) BY MOUTH TWICE DAILY WITH MEALS FOR DIABETES 60 tablet 5     prazosin (MINIPRESS) 2 MG capsule Take 1 capsule (2 mg) by mouth At Bedtime 90 capsule 3     senna-docusate (STIMULANT LAXATIVE) 8.6-50 MG tablet Take 1 tablet by mouth daily As needed. 30 tablet 3     sitagliptin (JANUVIA) 50 MG tablet Take 1 tablet (50 mg) by mouth daily 90 tablet 1     vitamin D3 (VITAMIN D3) 50 mcg (2000 units) tablet Take 1 tablet (50 mcg) by mouth daily 100 tablet 3      Allergies   Allergen Reactions     Gabapentin Other (See Comments)     Facial Swelling     Venlafaxine      Causes abdominal pain, poor appetite and dizziness.           Lab Results    Chemistry/lipid CBC Cardiac Enzymes/BNP/TSH/INR   Recent Labs   Lab Test 05/04/22  1449 11/30/21  1609 09/21/21  0856   TRIG  --   --  169*   LDL  --   --  123   BUN 15   < > 18      < > 137   CO2 24   < > 28    < > = values in this interval not displayed.    Recent Labs   Lab Test 05/27/21  1043   HGB 11.8   HCT 36.5   MCV 86.1    Recent Labs   Lab Test 05/19/21  1557   BNP 46        A total of 34 minutes was spent reviewing patient's medical records, obtaining history and performing examination, as well as discussing diagnoses/  recommendations with patient and answering all questions.

## 2022-10-22 NOTE — Clinical Note
Discharge Planner Post-Acute Rehab PT:     Discharge Plan: home with family assist, continue outpatient PT as needed TBA    Precautions: fall, special precautions COVID, TBI, L distal humerus fracture WBAT and no formal restrictions per chart review, pt and mom    Current Status:  Bed Mobility: sba  Transfer: cga  Gait: cga  Stairs: TBA  Balance: feet together eyes closed > 30 seconds, L LE SLS > 10 seconds, R LE SLS <3 seconds    Assessment:  Pt presents s/p TBI with impaired motor control, balance and gait;  Progressing well, anticipate goals to be met or re assessed by 10/26.  Goal to return home with family assist.      Other Barriers to Discharge (DME, Family Training, etc): no device likely, continue to assess balance formally and recommend as appropriate                          Racheal Araujo,  Just wanted to give you a head's up that this group meeting only had two group members present, in case that has an impact on billing processes/procedures.  Luigi, blu

## 2022-12-26 DIAGNOSIS — N18.31 CHRONIC KIDNEY DISEASE, STAGE 3A (H): ICD-10-CM

## 2022-12-26 DIAGNOSIS — E11.9 TYPE 2 DIABETES MELLITUS WITHOUT COMPLICATION, WITHOUT LONG-TERM CURRENT USE OF INSULIN (H): Primary | ICD-10-CM

## 2022-12-26 DIAGNOSIS — I10 ESSENTIAL HYPERTENSION, BENIGN: ICD-10-CM

## 2022-12-26 DIAGNOSIS — E78.5 HYPERLIPIDEMIA, UNSPECIFIED HYPERLIPIDEMIA TYPE: ICD-10-CM

## 2022-12-28 ENCOUNTER — OFFICE VISIT (OUTPATIENT)
Dept: FAMILY MEDICINE | Facility: CLINIC | Age: 57
End: 2022-12-28
Payer: COMMERCIAL

## 2022-12-28 VITALS
TEMPERATURE: 98.8 F | WEIGHT: 130.2 LBS | HEART RATE: 74 BPM | BODY MASS INDEX: 27.33 KG/M2 | OXYGEN SATURATION: 99 % | RESPIRATION RATE: 16 BRPM | DIASTOLIC BLOOD PRESSURE: 81 MMHG | HEIGHT: 58 IN | SYSTOLIC BLOOD PRESSURE: 135 MMHG

## 2022-12-28 DIAGNOSIS — I20.0 UNSTABLE ANGINA (H): Primary | ICD-10-CM

## 2022-12-28 DIAGNOSIS — E11.65 TYPE 2 DIABETES MELLITUS WITH HYPERGLYCEMIA, WITHOUT LONG-TERM CURRENT USE OF INSULIN (H): ICD-10-CM

## 2022-12-28 DIAGNOSIS — Z12.11 SCREEN FOR COLON CANCER: ICD-10-CM

## 2022-12-28 DIAGNOSIS — K21.9 GASTROESOPHAGEAL REFLUX DISEASE WITHOUT ESOPHAGITIS: ICD-10-CM

## 2022-12-28 DIAGNOSIS — K59.00 CONSTIPATION, UNSPECIFIED CONSTIPATION TYPE: ICD-10-CM

## 2022-12-28 DIAGNOSIS — M19.011 PRIMARY OSTEOARTHRITIS OF RIGHT SHOULDER: ICD-10-CM

## 2022-12-28 DIAGNOSIS — E11.29 TYPE 2 DIABETES MELLITUS WITH MICROALBUMINURIA, WITHOUT LONG-TERM CURRENT USE OF INSULIN (H): ICD-10-CM

## 2022-12-28 DIAGNOSIS — I10 ESSENTIAL HYPERTENSION, BENIGN: ICD-10-CM

## 2022-12-28 DIAGNOSIS — R80.9 TYPE 2 DIABETES MELLITUS WITH MICROALBUMINURIA, WITHOUT LONG-TERM CURRENT USE OF INSULIN (H): ICD-10-CM

## 2022-12-28 DIAGNOSIS — F41.9 ANXIETY: ICD-10-CM

## 2022-12-28 DIAGNOSIS — E78.5 HYPERLIPIDEMIA, UNSPECIFIED HYPERLIPIDEMIA TYPE: ICD-10-CM

## 2022-12-28 DIAGNOSIS — E55.9 VITAMIN D DEFICIENCY: ICD-10-CM

## 2022-12-28 DIAGNOSIS — N18.31 CHRONIC KIDNEY DISEASE, STAGE 3A (H): ICD-10-CM

## 2022-12-28 DIAGNOSIS — E11.9 TYPE 2 DIABETES MELLITUS WITHOUT COMPLICATION, WITHOUT LONG-TERM CURRENT USE OF INSULIN (H): ICD-10-CM

## 2022-12-28 DIAGNOSIS — F33.1 MODERATE EPISODE OF RECURRENT MAJOR DEPRESSIVE DISORDER (H): ICD-10-CM

## 2022-12-28 DIAGNOSIS — Z23 NEED FOR PROPHYLACTIC VACCINATION AND INOCULATION AGAINST INFLUENZA: ICD-10-CM

## 2022-12-28 LAB
ANION GAP SERPL CALCULATED.3IONS-SCNC: 6 MMOL/L (ref 3–14)
BUN SERPL-MCNC: 15 MG/DL (ref 7–30)
CALCIUM SERPL-MCNC: 10.2 MG/DL (ref 8.5–10.1)
CHLORIDE BLD-SCNC: 101 MMOL/L (ref 94–109)
CHOLEST SERPL-MCNC: 320 MG/DL
CO2 SERPL-SCNC: 31 MMOL/L (ref 20–32)
CREAT SERPL-MCNC: 1 MG/DL (ref 0.52–1.04)
CREAT UR-MCNC: 26.9 MG/DL
ERYTHROCYTE [DISTWIDTH] IN BLOOD BY AUTOMATED COUNT: 12.3 % (ref 10–15)
GFR SERPL CREATININE-BSD FRML MDRD: 65 ML/MIN/1.73M2
GLUCOSE BLD-MCNC: 307 MG/DL (ref 70–99)
HBA1C MFR BLD: 12.1 % (ref 0–5.6)
HCT VFR BLD AUTO: 36.6 % (ref 35–47)
HDLC SERPL-MCNC: 39 MG/DL
HGB BLD-MCNC: 12.5 G/DL (ref 11.7–15.7)
LDLC SERPL CALC-MCNC: 220 MG/DL
MCH RBC QN AUTO: 27.5 PG (ref 26.5–33)
MCHC RBC AUTO-ENTMCNC: 34.2 G/DL (ref 31.5–36.5)
MCV RBC AUTO: 81 FL (ref 78–100)
MICROALBUMIN UR-MCNC: 32 MG/L
MICROALBUMIN/CREAT UR: 118.96 MG/G CR (ref 0–25)
NONHDLC SERPL-MCNC: 281 MG/DL
PLATELET # BLD AUTO: 282 10E3/UL (ref 150–450)
POTASSIUM BLD-SCNC: 4.9 MMOL/L (ref 3.4–5.3)
RBC # BLD AUTO: 4.54 10E6/UL (ref 3.8–5.2)
SODIUM SERPL-SCNC: 138 MMOL/L (ref 133–144)
TRIGL SERPL-MCNC: 303 MG/DL
WBC # BLD AUTO: 7.3 10E3/UL (ref 4–11)

## 2022-12-28 PROCEDURE — 36415 COLL VENOUS BLD VENIPUNCTURE: CPT | Performed by: STUDENT IN AN ORGANIZED HEALTH CARE EDUCATION/TRAINING PROGRAM

## 2022-12-28 PROCEDURE — 82570 ASSAY OF URINE CREATININE: CPT | Performed by: STUDENT IN AN ORGANIZED HEALTH CARE EDUCATION/TRAINING PROGRAM

## 2022-12-28 PROCEDURE — 80061 LIPID PANEL: CPT | Performed by: STUDENT IN AN ORGANIZED HEALTH CARE EDUCATION/TRAINING PROGRAM

## 2022-12-28 PROCEDURE — 99215 OFFICE O/P EST HI 40 MIN: CPT | Performed by: STUDENT IN AN ORGANIZED HEALTH CARE EDUCATION/TRAINING PROGRAM

## 2022-12-28 PROCEDURE — 82043 UR ALBUMIN QUANTITATIVE: CPT | Performed by: STUDENT IN AN ORGANIZED HEALTH CARE EDUCATION/TRAINING PROGRAM

## 2022-12-28 PROCEDURE — 80048 BASIC METABOLIC PNL TOTAL CA: CPT | Performed by: STUDENT IN AN ORGANIZED HEALTH CARE EDUCATION/TRAINING PROGRAM

## 2022-12-28 PROCEDURE — 83036 HEMOGLOBIN GLYCOSYLATED A1C: CPT | Performed by: STUDENT IN AN ORGANIZED HEALTH CARE EDUCATION/TRAINING PROGRAM

## 2022-12-28 PROCEDURE — 85027 COMPLETE CBC AUTOMATED: CPT | Performed by: STUDENT IN AN ORGANIZED HEALTH CARE EDUCATION/TRAINING PROGRAM

## 2022-12-28 RX ORDER — ACETAMINOPHEN 500 MG
TABLET ORAL
Qty: 180 TABLET | Refills: 1 | Status: SHIPPED | OUTPATIENT
Start: 2022-12-28 | End: 2023-05-30

## 2022-12-28 RX ORDER — NITROGLYCERIN 0.4 MG/1
TABLET SUBLINGUAL
Qty: 12 TABLET | Refills: 1 | Status: SHIPPED | OUTPATIENT
Start: 2022-12-28 | End: 2023-05-30

## 2022-12-28 RX ORDER — FAMOTIDINE 20 MG/1
20 TABLET, FILM COATED ORAL AT BEDTIME
Qty: 90 TABLET | Refills: 1 | Status: SHIPPED | OUTPATIENT
Start: 2022-12-28 | End: 2023-05-30

## 2022-12-28 RX ORDER — ATORVASTATIN CALCIUM 80 MG/1
80 TABLET, FILM COATED ORAL DAILY
Qty: 90 TABLET | Refills: 1 | Status: SHIPPED | OUTPATIENT
Start: 2022-12-28 | End: 2023-05-30

## 2022-12-28 RX ORDER — LISINOPRIL 10 MG/1
10 TABLET ORAL DAILY
Qty: 90 TABLET | Refills: 1 | Status: SHIPPED | OUTPATIENT
Start: 2022-12-28 | End: 2023-02-14

## 2022-12-28 RX ORDER — METFORMIN HCL 500 MG
TABLET, EXTENDED RELEASE 24 HR ORAL
Qty: 180 TABLET | Refills: 0 | Status: SHIPPED | OUTPATIENT
Start: 2022-12-28 | End: 2023-01-10

## 2022-12-28 RX ORDER — HYDROXYZINE PAMOATE 25 MG/1
CAPSULE ORAL
Qty: 90 CAPSULE | Refills: 1 | Status: SHIPPED | OUTPATIENT
Start: 2022-12-28 | End: 2023-05-30

## 2022-12-28 RX ORDER — AMOXICILLIN 250 MG
1 CAPSULE ORAL DAILY
Qty: 30 TABLET | Refills: 3 | Status: SHIPPED | OUTPATIENT
Start: 2022-12-28 | End: 2023-03-10

## 2022-12-28 NOTE — PROGRESS NOTES
There are no exam notes on file for this visit.    ASSESSMENT AND PLAN:      Rachael was seen today for follow up, gas, medication reconciliation and imm/inj.    Diagnoses and all orders for this visit:    Unstable angina (H)  Patient having classical anginal symptoms although she describes it as gas pain.  They are present with exertion, improved with rest, in the setting of a previous abnormal stress test.  Prescribed nitro to try taking with pain.  Orders placed intending to have a CT coronary angiogram, but instead placed a CT chest angio with and without contrast.  Appreciate help from our referral coordinator and yet to get this scheduled correctly.  This has been scheduled for tomorrow.  She will follow-up in 2 weeks to discuss these results.  -     nitroGLYcerin (NITROSTAT) 0.4 MG sublingual tablet; For chest pain place 1 tablet under the tongue every 5 minutes for 3 doses. If symptoms persist 5 minutes after 1st dose call 911.  -     CT Chest Angio w/o & w Contrast; Future  -     CT Angiogram coronary artery; Future    Type 2 diabetes mellitus without complication, without long-term current use of insulin (H)  Type 2 diabetes poorly controlled.  She has been out of medications for a month, but likely was poorly controlled before then.  We had not titrated up to appropriate medication dosage for her diabetes.  We will restart the metformin but plan to start this after her CT coronary angiogram.  We will also restart the Januvia, and Jardiance today.  We will have her bring her medications in with her for next visit.  Sent her home with a pillbox.  Refilled her diabetes testing supplies.  -     Basic metabolic panel  -     Hemoglobin A1c  -     Lipid Profile  -     Albumin Random Urine Quantitative with Creat Ratio  -     lisinopril (ZESTRIL) 10 MG tablet; Take 1 tablet (10 mg) by mouth daily  -     metFORMIN (GLUCOPHAGE XR) 500 MG 24 hr tablet; TAKE 1 PILLS (500MG) BY MOUTH TWICE DAILY WITH MEALS FOR  DIABETES  -     blood glucose monitoring (NO BRAND SPECIFIED) meter device kit; Use to test blood sugar 1 times daily or as directed.  -     Alcohol Swabs (ALCOHOL WIPES) 70 % PADS; 1 pad daily  -     aspirin (ASA) 81 MG EC tablet; Take 1 tablet (81 mg) by mouth daily  -     atorvastatin (LIPITOR) 80 MG tablet; Take 1 tablet (80 mg) by mouth daily    Chronic kidney disease, stage 3a (H)  Creatinine is stable.  Restarted lisinopril.  -     Basic metabolic panel  -     CBC with platelets  -     Cancel: Hemoglobin; Future    Essential hypertension, benign  Pressure overall well controlled.  -     Basic metabolic panel    Hyperlipidemia, unspecified hyperlipidemia type  Restarted atorvastatin  -     Lipid Profile    Need for prophylactic vaccination and inoculation against influenza  Other orders  -     INFLUENZA VACCINE IM > 6 MONTHS VALENT IIV4 (AFLURIA/FLUZONE)  -     PNEUMOCOCCAL 20 VALENT CONJUGATE (PREVNAR 20)  Planned to do shots today however he did not have time with end of the visit.  We will do these at her follow-up visit next time.    Type 2 diabetes mellitus with microalbuminuria, without long-term current use of insulin (H)  -     sitagliptin (JANUVIA) 50 MG tablet; Take 1 tablet (50 mg) by mouth daily  -     blood glucose (NO BRAND SPECIFIED) test strip; Use to test blood sugar once or twice times weekly Patient has Contour test machine, please send [facility] CONTOUR test strips.    Primary osteoarthritis of right shoulder  Refill Tylenol for pain  -     acetaminophen (TYLENOL) 500 MG tablet; TAKE 1 PILL IN IN THE MORNING, THEN 1 PILL IN THE EVENING, AND 1 ADDITIONAL PILL AS NEEDED    Constipation, unspecified constipation type  Refilled for constipation  -     senna-docusate (STIMULANT LAXATIVE) 8.6-50 MG tablet; Take 1 tablet by mouth daily As needed.    Anxiety  Moderate episode of recurrent major depressive disorder (H)  -     hydrOXYzine (VISTARIL) 25 MG capsule; TAKE 1 CAPSULE BY MOUTH AS NEEDED FOR  PANIC, ANXIETY **MAY TAKE 1-2 CAPSULES AT NIGHT TO HELP WITH SLEEP.**    Gastroesophageal reflux disease without esophagitis  We will start famotidine in case the gas pain is associated with acid reflux  -     famotidine (PEPCID) 20 MG tablet; Take 1 tablet (20 mg) by mouth At Bedtime    We again discussed potential interventions for chest pain, including angiogram and stent placement.  She is very nervous about invasive procedures, but I do not think she fully understands the clinical situation and the risks to her overall health.  We will continue to discuss this and I will make sure  schedule in person for next visit.  She will follow-up with me in 2 weeks.      Patient Instructions   We have sent in refills on your medication to [facility].  Please  the new medications and start taking them daily.      You have a new pill box.  Have your family help set this up for you.     We have sent in new diabetes supplies to check blood sugars.  I would like you to check sugars 1x per day in the morning before eating.      There is a NEW medication.  It will come in a small glass bottle and is called Nitroglycerin.  This medication helps with heart pain.  I would like you to take it when you get the gas sensation in your chest/neck/arm.  I hope it will help you with the pain.     We are going to set you up with an appointment to get pictures of your heart.  This will help us better know what is going on so we can make the best next decision for you.      Please schedule a follow up visit with Dr. Carballo in 2 weeks.  Let's make sure we have an in person .      DO NOT TAKE YOUR METFORMIN UNTIL after the test.      Follow up on January 10th at 3:50      Amrita Carballo MD    SUBJECTIVE  Rachael Ch is a 57 year old female with past medical history significant for    Patient Active Problem List   Diagnosis     Essential hypertension, benign     Diabetes mellitus, type 2 (H)      Hyperlipidemia     Health Care Home     Helicobacter pylori gastritis     PTSD (post-traumatic stress disorder)     Moderate episode of recurrent major depressive disorder (H)     Cognitive complaints     Screening for depression     Microalbuminuria     Plantar fasciitis     Left knee pain     Right knee pain, unspecified chronicity     Chronic kidney disease, stage 3a (H)       Others present at the visit:  Patient's daughter    Presents for   Chief Complaint   Patient presents with     Follow Up     Med recheck and needs refill     Gas     Shoulder, neck and hand when she's having gas     Medication Reconciliation     Did not review, provider to review     Imm/Inj     Flu Shot     Patient presents today for medication renewal.  She has been unable to get her medications for the last month.  Has not been taking any of her medications.  Would like refills on all the medications that she needs.  She is also requesting refills on her glucose monitoring supplies, test strips, lancets, new glucometer, and alcohol wipes.    Additionally her biggest pain today is of gas pain.  She describes this as a pain that starts in her mid chest, travels up into her upper chest to her neck and her arms, on the left side.  She drinks warm water and sometimes gets better.  She notes the pain every time she gets up to move around, and therefore does not move around much and has to move back to her chair fairly quickly.  Even with walking the pain starts.    Has a history of hypertension, diabetes, and abnormal cardiac testing in the past.  She had a Lexiscan done in May of 2022 that was abnormal.  It showed a small area of mild ischemia in the mid to distal anterior segment of the left ventricle with a normal ejection fraction at stress of 77%.  At that time the cardiologist discussed options and recommended an angiogram however patient was concerned about invasive procedures, so a CT coronary angiogram was ordered.  Patient did not  "follow through this test however.  Her chest pain with exertion has been worsening.  And it is severely limiting her activity.    We briefly discussed an angiogram versus a CT angiogram, and she would like to move forward with a CT angiogram right now.  Discussed my concerns about the significance of her symptoms and that she is at risk for further cardiac complications.      OBJECTIVE:  Vitals: /81 (BP Location: Left arm, Patient Position: Sitting, Cuff Size: Adult Regular)   Pulse 74   Temp 98.8  F (37.1  C) (Oral)   Resp 16   Ht 1.467 m (4' 9.75\")   Wt 59.1 kg (130 lb 3.2 oz)   SpO2 99%   BMI 27.45 kg/m    BMI= Body mass index is 27.45 kg/m .  Objective:    Vitals:  Vitals are reviewed and are within the normal range  Gen:  Alert, pleasant, no acute distress  Cardiac:  Regular rate and rhythm, no murmurs, rubs or gallops  Respiratory:  Lungs clear to auscultation bilaterally.  No pain with palpation to the chest.  No pain on palpation to the shoulder.   Abdomen:  Soft, non-tender, non-distended, bowel sounds positive  Extremities:  Warm, well-perfused, pulses 2+/4, no lower extremity edema    Results for orders placed or performed in visit on 12/28/22   Basic metabolic panel     Status: Abnormal   Result Value Ref Range    Sodium 138 133 - 144 mmol/L    Potassium 4.9 3.4 - 5.3 mmol/L    Chloride 101 94 - 109 mmol/L    Carbon Dioxide (CO2) 31 20 - 32 mmol/L    Anion Gap 6 3 - 14 mmol/L    Urea Nitrogen 15 7 - 30 mg/dL    Creatinine 1.00 0.52 - 1.04 mg/dL    Calcium 10.2 (H) 8.5 - 10.1 mg/dL    Glucose 307 (H) 70 - 99 mg/dL    GFR Estimate 65 >60 mL/min/1.73m2   Hemoglobin A1c     Status: Abnormal   Result Value Ref Range    Hemoglobin A1C 12.1 (H) 0.0 - 5.6 %   CBC with platelets     Status: Normal   Result Value Ref Range    WBC Count 7.3 4.0 - 11.0 10e3/uL    RBC Count 4.54 3.80 - 5.20 10e6/uL    Hemoglobin 12.5 11.7 - 15.7 g/dL    Hematocrit 36.6 35.0 - 47.0 %    MCV 81 78 - 100 fL    MCH 27.5 26.5 " - 33.0 pg    MCHC 34.2 31.5 - 36.5 g/dL    RDW 12.3 10.0 - 15.0 %    Platelet Count 282 150 - 450 10e3/uL           Patient Instructions   We have sent in refills on your medication to [facility].  Please  the new medications and start taking them daily.      You have a new pill box.  Have your family help set this up for you.     We have sent in new diabetes supplies to check blood sugars.  I would like you to check sugars 1x per day in the morning before eating.      There is a NEW medication.  It will come in a small glass bottle and is called Nitroglycerin.  This medication helps with heart pain.  I would like you to take it when you get the gas sensation in your chest/neck/arm.  I hope it will help you with the pain.     We are going to set you up with an appointment to get pictures of your heart.  This will help us better know what is going on so we can make the best next decision for you.      Please schedule a follow up visit with Dr. Carballo in 2 weeks.  Let's make sure we have an in person .      DO NOT TAKE YOUR METFORMIN UNTIL after the test.      Follow up on January 10th at 3:50      Amrita Carballo MD

## 2022-12-28 NOTE — PATIENT INSTRUCTIONS
We have sent in refills on your medication to [facility].  Please  the new medications and start taking them daily.      You have a new pill box.  Have your family help set this up for you.     We have sent in new diabetes supplies to check blood sugars.  I would like you to check sugars 1x per day in the morning before eating.      There is a NEW medication.  It will come in a small glass bottle and is called Nitroglycerin.  This medication helps with heart pain.  I would like you to take it when you get the gas sensation in your chest/neck/arm.  I hope it will help you with the pain.     We are going to set you up with an appointment to get pictures of your heart.  This will help us better know what is going on so we can make the best next decision for you.      Please schedule a follow up visit with Dr. Carballo in 2 weeks.  Let's make sure we have an in person .      DO NOT TAKE YOUR METFORMIN UNTIL after the test.      Follow up on January 10th at 3:50

## 2022-12-29 NOTE — RESULT ENCOUNTER NOTE
Results reviewed.  Medications restarted at last visit and will discuss with patent at follow up visit in 2 weeks.  No action needed now.    Amrita Carballo MD

## 2023-01-10 ENCOUNTER — OFFICE VISIT (OUTPATIENT)
Dept: FAMILY MEDICINE | Facility: CLINIC | Age: 58
End: 2023-01-10
Payer: COMMERCIAL

## 2023-01-10 VITALS
WEIGHT: 129.6 LBS | RESPIRATION RATE: 20 BRPM | TEMPERATURE: 98.7 F | BODY MASS INDEX: 27.21 KG/M2 | DIASTOLIC BLOOD PRESSURE: 68 MMHG | HEART RATE: 66 BPM | OXYGEN SATURATION: 99 % | SYSTOLIC BLOOD PRESSURE: 109 MMHG | HEIGHT: 58 IN

## 2023-01-10 DIAGNOSIS — E11.9 TYPE 2 DIABETES MELLITUS WITHOUT COMPLICATION, WITHOUT LONG-TERM CURRENT USE OF INSULIN (H): ICD-10-CM

## 2023-01-10 DIAGNOSIS — Z23 NEED FOR VACCINATION: Primary | ICD-10-CM

## 2023-01-10 DIAGNOSIS — R14.1 FLATULENCE, ERUCTATION AND GAS PAIN: ICD-10-CM

## 2023-01-10 DIAGNOSIS — I20.0 UNSTABLE ANGINA (H): ICD-10-CM

## 2023-01-10 DIAGNOSIS — I10 ESSENTIAL HYPERTENSION, BENIGN: ICD-10-CM

## 2023-01-10 DIAGNOSIS — R14.3 FLATULENCE, ERUCTATION AND GAS PAIN: ICD-10-CM

## 2023-01-10 DIAGNOSIS — R14.2 FLATULENCE, ERUCTATION AND GAS PAIN: ICD-10-CM

## 2023-01-10 PROCEDURE — 90686 IIV4 VACC NO PRSV 0.5 ML IM: CPT | Performed by: STUDENT IN AN ORGANIZED HEALTH CARE EDUCATION/TRAINING PROGRAM

## 2023-01-10 PROCEDURE — 90471 IMMUNIZATION ADMIN: CPT | Performed by: STUDENT IN AN ORGANIZED HEALTH CARE EDUCATION/TRAINING PROGRAM

## 2023-01-10 PROCEDURE — 99214 OFFICE O/P EST MOD 30 MIN: CPT | Mod: 25 | Performed by: STUDENT IN AN ORGANIZED HEALTH CARE EDUCATION/TRAINING PROGRAM

## 2023-01-10 RX ORDER — SIMETHICONE 80 MG
80 TABLET,CHEWABLE ORAL EVERY 6 HOURS PRN
Qty: 30 TABLET | Refills: 0 | Status: SHIPPED | OUTPATIENT
Start: 2023-01-10 | End: 2023-03-10

## 2023-01-10 RX ORDER — METFORMIN HCL 500 MG
1000 TABLET, EXTENDED RELEASE 24 HR ORAL 2 TIMES DAILY WITH MEALS
Qty: 360 TABLET | Refills: 1 | Status: SHIPPED | OUTPATIENT
Start: 2023-01-10 | End: 2023-05-30

## 2023-01-10 ASSESSMENT — PATIENT HEALTH QUESTIONNAIRE - PHQ9: SUM OF ALL RESPONSES TO PHQ QUESTIONS 1-9: 1

## 2023-01-10 NOTE — NURSING NOTE
name: Dima Nicole  Language: Radha  Agency: [employer]  Phone number: [phone]  Face to face spoken

## 2023-01-10 NOTE — PATIENT INSTRUCTIONS
Make sure you attend the upcoming CT angiogram test:  --On the day of the test DO NOT take your Metformin diabetes medicine.  You can take all of the rest.    --Otherwise, 2 in AM and 2 at night of the Metformin.   --Talk about injectables next time.     New medication for gassiness is called Simethicone.  Chew tab, you can take up to 3x per day if needed.

## 2023-01-11 NOTE — PROGRESS NOTES
Nursing Notes:   ESTRADA KABA  1/10/2023  3:52 PM  Signed   name: Dima Nicole  Language: Radha  Agency: [facility]  Phone number: [phone]  Face to face spoken      ASSESSMENT AND PLAN:      Rachael was seen today for other.    Diagnoses and all orders for this visit:    Need for vaccination  Flu shot given today.  -     INFLUENZA VACCINE IM > 6 MONTHS VALENT IIV4 (AFLURIA/FLUZONE)    Type 2 diabetes mellitus without complication, without long-term current use of insulin (H)  Blood sugars are still above goal.  We will continue with Jardiance 25, Januvia 50, and will increase her metformin 2000 mg twice daily.  Discussed starting an injectable medication at next visit, and I think we should use a GLP-1 once weekly option.  Patient is considering this and is open to this if needed.  She is scared of doing injections.  They will continue to check her sugars as they have been at home.  -     metFORMIN (GLUCOPHAGE XR) 500 MG 24 hr tablet; Take 2 tablets (1,000 mg) by mouth 2 times daily (with meals) TAKE 1 PILLS (500MG) BY MOUTH TWICE DAILY WITH MEALS FOR DIABETES    Flatulence, eructation and gas pain  Although I think her gassiness symptoms are cardiac, we will try simethicone and see if this helps symptoms.  Continue taking Pepcid regularly.  -     simethicone (MYLICON) 80 MG chewable tablet; Take 1 tablet (80 mg) by mouth every 6 hours as needed for flatulence or cramping    Unstable angina (H)  Continues to endorse symptoms of gassiness associated with exercise on the left side which I am concerned are cardiac.  She has an upcoming CT coronary angiogram.  We again discussed that further interventions may be needed.  She will schedule follow-up in 1 week after the test to discuss neck steps including doing a angiogram and returning to cardiology for evaluation.  She has not had improvement in symptoms with use of oral nitroglycerin.    Essential hypertension, benign  Blood pressure is slightly low.  We will  continue with the lisinopril 10.    She will schedule follow-up the week of February 10 to rediscuss symptoms, titrate up diabetes medications, and discuss her CT angiogram.    Patient Instructions   Make sure you attend the upcoming CT angiogram test:  --On the day of the test DO NOT take your Metformin diabetes medicine.  You can take all of the rest.    --Otherwise, 2 in AM and 2 at night of the Metformin.   --Talk about injectables next time.     New medication for gassiness is called Simethicone.  Chew tab, you can take up to 3x per day if needed.        Amrita Carballo MD    SUBJECTIVE  Rachael Ch is a 57 year old female with past medical history significant for    Patient Active Problem List   Diagnosis     Essential hypertension, benign     Diabetes mellitus, type 2 (H)     Hyperlipidemia     Health Care Home     Helicobacter pylori gastritis     PTSD (post-traumatic stress disorder)     Moderate episode of recurrent major depressive disorder (H)     Cognitive complaints     Screening for depression     Microalbuminuria     Plantar fasciitis     Left knee pain     Right knee pain, unspecified chronicity     Chronic kidney disease, stage 3a (H)     Others present at the visit:    1)  Patient's daughter  2)   Dima Nicole, present in person      Presents for   Chief Complaint   Patient presents with     Other     Follow-up recheck heart.  Decline 3 booster pfizer.  No MS     Patient presents today to recheck her gassiness/heart/abdominal symptoms.  She endorses both good and bad days.  Today is a better day.  She notes that her symptoms are better but she still having significant gassiness.  She again endorses this as gassiness that starts in her left lower chest and abdomen, and radiates up into the chest.  It is worse with physical activity and moving.  It is not associated with food.  She has episodes multiple times per day.  Endorses improvement in symptoms with massage.  It is better when she  "drinks more fluid.  She does note the sensation of a dry throat, and endorses intermittent swelling in her extremities.  She Jose her stomach is getting bigger and more bloated.    She has tried taking the nitroglycerin and this has not improved her symptoms.  Massage to her upper chest shoulder and back seems to help.    She continues to endorse difficulty with activities, poor appetite.    She has resumed her medications, and daughter brings them all in today including her pillbox.  We rechecked these and they look appropriate.  She is taking 2 metformin in the morning, as well as Jardiance and Januvia.  She is taking lisinopril.,  And Pepcid for acid reflux.  Has tried taking the nitroglycerin when she has symptoms and again denies any improvement with this medication.  She would like to try a new medication for gassiness.    She is scheduled for an upper coming CT coronary angiogram on February 2.    She has been checking her blood sugars.  They range from 200-4 50.  She denies symptoms of increased urinary frequency, does endorse dry throat, and has not noticed any improvement in the symptoms with starting the medication.  No side effects or abdominal pain with taking the medication.  We discussed that sugars are still high and we may need to add an injectable medication.  She is open to doing a once weekly injectable option.  She is also open to increasing her metformin today.      OBJECTIVE:  Vitals: /68   Pulse 66   Temp 98.7  F (37.1  C) (Oral)   Resp 20   Ht 1.463 m (4' 9.6\")   Wt 58.8 kg (129 lb 9.6 oz)   SpO2 99%   BMI 27.46 kg/m    BMI= Body mass index is 27.46 kg/m .  Objective:    Vitals:  Vitals are reviewed and are within the normal range  Gen:  Alert, pleasant, no acute distress  Cardiac:  Regular rate and rhythm, no murmurs, rubs or gallops  Respiratory:  Lungs clear to auscultation bilaterally  Abdomen:  Soft, non-tender, non-distended, bowel sounds positive  Extremities:  Warm, " well-perfused, pulses 2+/4, no lower extremity edema, monofilament testing was completed today and was normal.    No new labs are available for today.  We did review her blood sugars on her home glucometer.      Patient Instructions   Make sure you attend the upcoming CT angiogram test:  --On the day of the test DO NOT take your Metformin diabetes medicine.  You can take all of the rest.    --Otherwise, 2 in AM and 2 at night of the Metformin.   --Talk about injectables next time.     New medication for gassiness is called Simethicone.  Chew tab, you can take up to 3x per day if needed.        Amrita Carballo MD

## 2023-02-08 ENCOUNTER — TRANSCRIBE ORDERS (OUTPATIENT)
Dept: OTHER | Age: 58
End: 2023-02-08

## 2023-02-08 DIAGNOSIS — Z95.1 S/P CABG (CORONARY ARTERY BYPASS GRAFT): Primary | ICD-10-CM

## 2023-02-13 ENCOUNTER — TRANSCRIBE ORDERS (OUTPATIENT)
Dept: OTHER | Age: 58
End: 2023-02-13

## 2023-02-14 ENCOUNTER — OFFICE VISIT (OUTPATIENT)
Dept: PHARMACY | Facility: CLINIC | Age: 58
End: 2023-02-14

## 2023-02-14 ENCOUNTER — DOCUMENTATION ONLY (OUTPATIENT)
Dept: FAMILY MEDICINE | Facility: CLINIC | Age: 58
End: 2023-02-14

## 2023-02-14 ENCOUNTER — OFFICE VISIT (OUTPATIENT)
Dept: FAMILY MEDICINE | Facility: CLINIC | Age: 58
End: 2023-02-14
Payer: COMMERCIAL

## 2023-02-14 VITALS
BODY MASS INDEX: 26.23 KG/M2 | SYSTOLIC BLOOD PRESSURE: 105 MMHG | TEMPERATURE: 98 F | HEART RATE: 78 BPM | WEIGHT: 123.8 LBS | RESPIRATION RATE: 22 BRPM | DIASTOLIC BLOOD PRESSURE: 70 MMHG | OXYGEN SATURATION: 97 %

## 2023-02-14 DIAGNOSIS — F33.1 MODERATE EPISODE OF RECURRENT MAJOR DEPRESSIVE DISORDER (H): Primary | ICD-10-CM

## 2023-02-14 DIAGNOSIS — F43.10 PTSD (POST-TRAUMATIC STRESS DISORDER): ICD-10-CM

## 2023-02-14 DIAGNOSIS — F41.9 ANXIETY: ICD-10-CM

## 2023-02-14 DIAGNOSIS — I10 ESSENTIAL HYPERTENSION, BENIGN: ICD-10-CM

## 2023-02-14 DIAGNOSIS — I25.810 CORONARY ARTERY DISEASE INVOLVING AUTOLOGOUS ARTERY CORONARY BYPASS GRAFT WITHOUT ANGINA PECTORIS: ICD-10-CM

## 2023-02-14 DIAGNOSIS — E78.5 HYPERLIPIDEMIA, UNSPECIFIED HYPERLIPIDEMIA TYPE: ICD-10-CM

## 2023-02-14 DIAGNOSIS — I25.810 CORONARY ARTERY DISEASE INVOLVING AUTOLOGOUS ARTERY CORONARY BYPASS GRAFT WITHOUT ANGINA PECTORIS: Primary | ICD-10-CM

## 2023-02-14 DIAGNOSIS — E11.29 TYPE 2 DIABETES MELLITUS WITH MICROALBUMINURIA, WITHOUT LONG-TERM CURRENT USE OF INSULIN (H): ICD-10-CM

## 2023-02-14 DIAGNOSIS — K21.9 GASTROESOPHAGEAL REFLUX DISEASE WITHOUT ESOPHAGITIS: ICD-10-CM

## 2023-02-14 DIAGNOSIS — R80.9 TYPE 2 DIABETES MELLITUS WITH MICROALBUMINURIA, WITHOUT LONG-TERM CURRENT USE OF INSULIN (H): ICD-10-CM

## 2023-02-14 DIAGNOSIS — R41.9 COGNITIVE COMPLAINTS: ICD-10-CM

## 2023-02-14 LAB
ANION GAP SERPL CALCULATED.3IONS-SCNC: 17 MMOL/L (ref 7–15)
BUN SERPL-MCNC: 21.5 MG/DL (ref 8–23)
CALCIUM SERPL-MCNC: 10.3 MG/DL (ref 8.6–10)
CHLORIDE SERPL-SCNC: 97 MMOL/L (ref 98–107)
CREAT SERPL-MCNC: 1 MG/DL (ref 0.51–0.95)
DEPRECATED HCO3 PLAS-SCNC: 24 MMOL/L (ref 22–29)
ERYTHROCYTE [DISTWIDTH] IN BLOOD BY AUTOMATED COUNT: 14.6 % (ref 10–15)
GFR SERPL CREATININE-BSD FRML MDRD: 65 ML/MIN/1.73M2
GLUCOSE SERPL-MCNC: 213 MG/DL (ref 70–99)
HCT VFR BLD AUTO: 35.2 % (ref 35–47)
HGB BLD-MCNC: 11.2 G/DL (ref 11.7–15.7)
MCH RBC QN AUTO: 27.9 PG (ref 26.5–33)
MCHC RBC AUTO-ENTMCNC: 31.8 G/DL (ref 31.5–36.5)
MCV RBC AUTO: 88 FL (ref 78–100)
PLATELET # BLD AUTO: 865 10E3/UL (ref 150–450)
POTASSIUM SERPL-SCNC: 4.7 MMOL/L (ref 3.4–5.3)
RBC # BLD AUTO: 4.02 10E6/UL (ref 3.8–5.2)
SODIUM SERPL-SCNC: 138 MMOL/L (ref 136–145)
WBC # BLD AUTO: 16.5 10E3/UL (ref 4–11)

## 2023-02-14 PROCEDURE — 99214 OFFICE O/P EST MOD 30 MIN: CPT | Performed by: STUDENT IN AN ORGANIZED HEALTH CARE EDUCATION/TRAINING PROGRAM

## 2023-02-14 PROCEDURE — 85027 COMPLETE CBC AUTOMATED: CPT | Performed by: STUDENT IN AN ORGANIZED HEALTH CARE EDUCATION/TRAINING PROGRAM

## 2023-02-14 PROCEDURE — 99207 PR NO CHARGE LOS: CPT | Performed by: PHARMACIST

## 2023-02-14 PROCEDURE — 36415 COLL VENOUS BLD VENIPUNCTURE: CPT | Performed by: STUDENT IN AN ORGANIZED HEALTH CARE EDUCATION/TRAINING PROGRAM

## 2023-02-14 PROCEDURE — 80048 BASIC METABOLIC PNL TOTAL CA: CPT | Performed by: STUDENT IN AN ORGANIZED HEALTH CARE EDUCATION/TRAINING PROGRAM

## 2023-02-14 RX ORDER — OXYCODONE HYDROCHLORIDE 5 MG/1
5 TABLET ORAL EVERY 6 HOURS PRN
Qty: 12 TABLET | Refills: 0 | Status: SHIPPED | OUTPATIENT
Start: 2023-02-14 | End: 2023-02-17

## 2023-02-14 RX ORDER — CLOPIDOGREL BISULFATE 75 MG/1
75 TABLET ORAL DAILY
COMMUNITY
Start: 2023-02-14 | End: 2023-05-30

## 2023-02-14 NOTE — PATIENT INSTRUCTIONS
Stop in lab for blood tests.      Medication changes recommended by Dr. Carballo:    -Take the last pill of furosemide and then STOP  -Keep taking the Clopidogrel everyday  -Refill oxycodone for 10 pills for pain.   -Use tylenol to also help with pain- up to 3 times per day  - STOP Januvia 50mg daily (bottle has red X)  - START new Januvia 100mg daily  -Use heat and compression on the areas of the leg.     Stop in lab on your way out.

## 2023-02-14 NOTE — LETTER
February 16, 2023      [facility]  [address] [place] [address]        Dear ,    We are writing to inform you of your test results.    Here is a copy of your lab results.  Your kidney tests are normal.  Your blood counts are improving.  It will take some time, but you will feel better in time.  Please call the clinic at [phone] if you have any questions.      Resulted Orders   CBC with platelets   Result Value Ref Range    WBC Count 16.5 (H) 4.0 - 11.0 10e3/uL    RBC Count 4.02 3.80 - 5.20 10e6/uL    Hemoglobin 11.2 (L) 11.7 - 15.7 g/dL    Hematocrit 35.2 35.0 - 47.0 %    MCV 88 78 - 100 fL    MCH 27.9 26.5 - 33.0 pg    MCHC 31.8 31.5 - 36.5 g/dL    RDW 14.6 10.0 - 15.0 %    Platelet Count 865 (H) 150 - 450 10e3/uL   Basic metabolic panel   Result Value Ref Range    Sodium 138 136 - 145 mmol/L    Potassium 4.7 3.4 - 5.3 mmol/L    Chloride 97 (L) 98 - 107 mmol/L    Carbon Dioxide (CO2) 24 22 - 29 mmol/L    Anion Gap 17 (H) 7 - 15 mmol/L    Urea Nitrogen 21.5 8.0 - 23.0 mg/dL    Creatinine 1.00 (H) 0.51 - 0.95 mg/dL    Calcium 10.3 (H) 8.6 - 10.0 mg/dL    Glucose 213 (H) 70 - 99 mg/dL    GFR Estimate 65 >60 mL/min/1.73m2      Comment:      eGFR calculated using 2021 CKD-EPI equation.       If you have any questions or concerns, please call the clinic at the number listed above.       Sincerely,      Amrita Carballo MD

## 2023-02-14 NOTE — Clinical Note
Ivonne Osborne didn't get a chance to discuss this patient with you. Please see my note. I wasn't able to di much more than med rec but she scheduled follow up with both you and Mimi.  And I saw a note that you wanted to talk to me about a patient and I don't know if it was this pt or another one but I only saw the note later. 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 Génesis

## 2023-02-14 NOTE — PROGRESS NOTES
Due to patient being non-English speaking/uses sign language, an  was used for this visit. Only for face-to-face interpretation by an external agency, date and length of interpretation can be found on the scanned worksheet.     name: Fiorella Byrd  Agency: [employer]  Language: Radha   Telephone number: [phone]  Type of interpretation: Face-to-face, spoken

## 2023-02-14 NOTE — PROGRESS NOTES
Medication Therapy Management (MTM) Encounter    ASSESSMENT:                            Medication Adherence/Access: No issues identified    NSTEMI/post-CABG:  Taking appropriate clopidogrel, ASA and high intensity statin. B Blocker could not be started and ACEi stopped due to low BP.  Told her she can take more tylenol if needed; up to 3 times daily for post-surgical pain.    Diabetes:   Goal A1C: <7%  Meeting goal A1C: no  Januvia was increased to 100mg daily by Dr. Carballo today.   She would benefit from Freestyle Home. No time to discuss today.    Hyperlipidemia:   On appropriate intensity statin: yes  With LDL being >190, she likely has familial hypercholesterolemia. Based on refill history, she likely wasn't taking her atorvastatin at the time the lipid profile was checked 12/28/22. Would be beneficial to check lipid profile soon. If LDL > 70, ezetimibe should be added.    Edema: Dr. Carballo discontinuing furosemide after 1 more dose.    GERD:  No time to assess today    Anxiety/Depression:  Dr. Carballo assessed.    Constipation:  No time to assess today      PLAN:                              Medication reconciliation completed and EHR med list updated accordingly    Dr. Carballo increased oxycodone to twice daily for a short time    Dr. Carballo stopped furosemide- she will take 1 more day then stop    Dr. Carballo increased Januvia to 100mg daily today    Educated patient that she can take more Tylenol if needed, up to 3 times daily.    Follow-up: 2 weeks with Dr. Carballo and PharmD      Medication issues to be addressed at a future visit:      Start Freestyle Home 2    Check lipid profile; if LDL >70, add ezetimibe    Consider change Januvia to GLP-1 if she is willing to take injections as GLP-1 agonists have better evidence in heart disease    Titrate metformin    Assess GERD, anxiety, constipation    SUBJECTIVE/OBJECTIVE:                          Rachael Ch is a 57 year old female seen for a transitions of  care (TCM) visit. She was referred to me from Dr. Carballo. Today's visit is a co-visit with Dr. Carballo. She was discharged from [facility] on 23 for NSTEMI/CABG x5  Daughter accompanied. She helps with medicines.    FV Language line use to help interpret: ID #[id number]    Reason for visit: Hospital follow-up    Medication changes from the hospital:  START   clopidogrel 75mg once daily  Furosemide 20mg once daily- per Dr. Carballo- she will take one more pill then stop  Oxycodone 5mg as needed    STOP  Lisinopril 10mg daily    Allergies/ADRs: Reviewed in chart  Past Medical History: Reviewed in chart  Social History     Tobacco Use     Smoking status: Former     Packs/day: 0.50     Types: Cigarettes     Quit date: 2017     Years since quittin.4     Smokeless tobacco: Former     Types: Chew   Substance Use Topics     Alcohol use: No     Drug use: No     Tobacco: She reports that she quit smoking about 5 years ago. Her smoking use included cigarettes. She smoked an average of .5 packs per day. She has quit using smokeless tobacco.  Her smokeless tobacco use included chew.  ^Reviewed today    Medication Adherence/Access: no issues reported  Her grandson sets up meds in a pill box. Her daughter helps with her medications.    NSTEMI/post-CABG:  Started on clopidogrel 75mg/d (to be continued for 1 year per discharge summary); continued on ASA 81mg/d (to be continued for life). BBlk was unable to be started due to low BP. She was started on oxycodone due to post-surgical pain. Also currently taking acetaminophen 500mg 3 times daily plus as needed. She is taking 1 pill once per day. She has SL NTG to use PRN.    Diabetes  Type 2 Diabetes:  Currently taking Jardiance 25mg daily, Januvia 50mg daily, metformin XR 500mg- 1 tablet twice daily. Patient is not experiencing side effects.  Blood sugar monitoring:   Dr. Carballo discussed her blood sugars and diabetes today.  Aspirin: Taking 81mg daily    Statin: Yes:  atorvastatin 80mg/d   ACEi/ARB: No, lisinopril stopped in the hospital due to low BP  Urine Albumin:   Lab Results   Component Value Date    UMALCR 118.96 (H) 12/28/2022      Lab Results   Component Value Date    A1C 12.1 12/28/2022    A1C 8.9 05/04/2022    A1C 8.3 11/30/2021    A1C 9.1 09/21/2021    A1C 6.9 06/16/2021    A1C 6.7 03/05/2021    A1C 6.8 08/06/2020    A1C 6.2 04/22/2020    A1C 6.2 02/11/2020     Most Recent Immunizations   Administered Date(s) Administered     COVID-19 Vaccine ([employer]) 04/03/2021     Flu, Unspecified 10/22/2012     HepB 01/10/2008     HepB, Unspecified 03/20/2007     HepB-Adult 01/10/2008     Influenza (H1N1) 12/19/2009     Influenza (IIV3) PF 10/17/2013     Influenza Vaccine >6 months (Alfuria,Fluzone) 01/10/2023     Influenza Vaccine, 6+MO IM (QUADRIVALENT W/PRESERVATIVES) 11/10/2019     MMR 03/06/2007     Mantoux Tuberculin Skin Test 03/13/2007     Pneumococcal 23 valent 08/20/2012     TD (ADULT, 7+) 01/10/2008     TDAP Vaccine (Adacel) 03/20/2007     TDAP Vaccine (Boostrix) 08/23/2018     Td (Adult), Adsorbed 10/09/2006     Tdap (Adacel,Boostrix) 03/20/2007     Varicella 03/20/2007     Hyperlipidemia:   Currently taking atorvastatin 80mg daily (was likely not taking when lipids checked 12/28/22). Patient is not experiencing side effects.  Recent Labs   Lab Test 12/28/22  1525 09/21/21  0856 08/06/20  1613 04/17/19  1349   CHOL 320* 206* 133.5 114.1   HDL 39* 49* 40.1 42.7   * 123 73 59   TRIG 303* 169* 104.6 61.4   CHOLHDLRATIO  --   --  3.3 2.7     Edema: Started on furosemide 20mg daily in the hospital.    GERD:   Currently taking famotidine 20mg once daily at bedtime and simethicone 80mg every 6 hours as needed.. She took a PPI in the hospital but was switched back to famotidine at discharge. Patient is not experiencing side effects.    Anxiety/depression:   Currently taking hydroxyzine 25mg as needed. Patient is not experiencing side effects. She used to take citalopram  "but discharge summary said it was discontinued at admission med rec as patient reported not taking. Dr. Carballo had concerns today about her depression worsening after her hospitalization/CABG.    PHQ 9/22/2021 5/25/2022 1/10/2023   PHQ-9 Total Score 11 11 1   Q9: Thoughts of better off dead/self-harm past 2 weeks Several days Not at all Not at all       Constipation:  Taking senna-doc 8.6-50mg once daily as needed.       BP Readings from Last 1 Encounters:   02/14/23 105/70     Pulse Readings from Last 1 Encounters:   02/14/23 78     Wt Readings from Last 1 Encounters:   02/14/23 123 lb 12.8 oz (56.2 kg)     Ht Readings from Last 1 Encounters:   01/10/23 4' 9.6\" (1.463 m)     Estimated body mass index is 26.23 kg/m  as calculated from the following:    Height as of 1/10/23: 4' 9.6\" (1.463 m).    Weight as of an earlier encounter on 2/14/23: 123 lb 12.8 oz (56.2 kg).    Temp Readings from Last 1 Encounters:   02/14/23 98  F (36.7  C) (Oral)       ----------------  Post Discharge Medication Reconciliation Status: discharge medications reconciled and changed, per note/orders.    I spent 30 minutes with this patient today. Dr. Carballo was provided the recommendations above  via routed note and is the authorizing prescriber for this visit through the pharmacist collaborative practice agreement. A copy of the visit note was provided to the patient's provider(s).    A summary of these recommendations was given to the patient (see AVS from today's appointment with Dr. Carballo).    Génesis Ruiz, Pharm.D.         Medication Therapy Recommendations  Coronary artery disease involving autologous artery coronary bypass graft without angina pectoris    Current Medication: acetaminophen (TYLENOL) 500 MG tablet   Rationale: Does not understand instructions - Adherence - Adherence   Recommendation: Provide Education   Status: Patient Agreed - Adherence/Education   Note: Incision pain post-CABG                     "

## 2023-02-14 NOTE — PROGRESS NOTES
Interprofessional Team Consultation Note     Requesting Provider: Dr. Carballo    Consultants:  Behavioral Health: Dayana Pang (s)  Care Coordination: Ramses  PharmD: none  Family Medicine Physicians: Dr. Arti (s)    Identifying Data/Reason for Referral:  Patient Rachael Ch, preferred name Rachael, is 57 year old female who is cared for by Dr. Chi. Provider is requesting consultation related to development of care plan. Relevant clinical information obtained from requesting provider, interprofessional team members noted above, and review of the medical record. Amrita Carballo is the primary care provider assigned in Epic.    Patient Active Problem List   Diagnosis     Essential hypertension, benign     Diabetes mellitus, type 2 (H)     Hyperlipidemia     Health Care Home     Helicobacter pylori gastritis     PTSD (post-traumatic stress disorder)     Moderate episode of recurrent major depressive disorder (H)     Cognitive complaints     Screening for depression     Microalbuminuria     Plantar fasciitis     Left knee pain     Right knee pain, unspecified chronicity     Chronic kidney disease, stage 3a (H)     Current Outpatient Medications   Medication     acetaminophen (TYLENOL) 500 MG tablet     Alcohol Swabs (ALCOHOL WIPES) 70 % PADS     aspirin (ASA) 81 MG EC tablet     atorvastatin (LIPITOR) 80 MG tablet     blood glucose (NO BRAND SPECIFIED) lancets standard     blood glucose (NO BRAND SPECIFIED) test strip     blood glucose monitoring (NO BRAND SPECIFIED) meter device kit     clopidogrel (PLAVIX) 75 MG tablet     empagliflozin (JARDIANCE) 25 MG TABS tablet     famotidine (PEPCID) 20 MG tablet     hydrOXYzine (VISTARIL) 25 MG capsule     metFORMIN (GLUCOPHAGE XR) 500 MG 24 hr tablet     nitroGLYcerin (NITROSTAT) 0.4 MG sublingual tablet     oxyCODONE (ROXICODONE) 5 MG tablet     senna-docusate (STIMULANT LAXATIVE) 8.6-50 MG tablet     simethicone (MYLICON) 80 MG chewable tablet     sitagliptin  (JANUVIA) 100 MG tablet     sitagliptin (JANUVIA) 50 MG tablet     No current facility-administered medications for this visit.     Topics Discussed:  Dr. Carballo shares concern for increased depression in the wake of heart attack 1/31/23.  Discussed options for increased mental health support (see below).  Patient had history of depression prior to heart attack and attended several sessions of [facility] group at [place] (starting with Dr. Lombardi in 2018) but had been doing well prior to cardiac event.    PHQ 9/22/2021 5/25/2022 1/10/2023   PHQ-9 Total Score 11 11 1   Q9: Thoughts of better off dead/self-harm past 2 weeks Several days Not at all Not at all     LAYNE-7 SCORE 2/11/2020 8/4/2021 5/25/2022   Total Score - - -   Total Score 7 5 14     Recommendations/Action Items:  1. Will be seeing Dr. Carballo on the afternoon of 3/10/23.  Have yellow dotted the visit for integrated  support with Dr. Lucero who will be covering ICC that afternoon.  2. Consider referral to [facility] vs. [facility] for in home therapy.  3. Consider reaching out to cardiology at [facility] to see if they have any integrated supports for mental health for patients recovering from heart attack.    Maria Moon, PhD     Disclaimer:  The above treatment recommendations are based on consultation with the patient's primary care provider and a review of relevant information in EPIC. I have not personally examined the patient. All recommendations should be implemented with considerations of the patient's relevant prior history and current clinical status. Please contact me with any questions about the care of this patient.  '

## 2023-02-15 ENCOUNTER — TRANSFERRED RECORDS (OUTPATIENT)
Dept: HEALTH INFORMATION MANAGEMENT | Facility: CLINIC | Age: 58
End: 2023-02-15
Payer: COMMERCIAL

## 2023-02-15 PROBLEM — I25.810 CORONARY ARTERY DISEASE INVOLVING AUTOLOGOUS ARTERY CORONARY BYPASS GRAFT WITHOUT ANGINA PECTORIS: Status: ACTIVE | Noted: 2023-02-15

## 2023-02-15 NOTE — RESULT ENCOUNTER NOTE
Rachael Bhatt-    Here is a copy of your lab results.  Your kidney tests are normal.  Your blood counts are improving.  It will take some time, but you will feel better in time.  Please call the clinic at [phone] if you have any questions.      Amrita Carballo MD    Please send results to patient.

## 2023-02-15 NOTE — PROGRESS NOTES
There are no exam notes on file for this visit.    ASSESSMENT AND PLAN:      Rachael was seen today for hospital f/u.    Diagnoses and all orders for this visit:    Coronary artery disease involving autologous artery coronary bypass graft without angina pectoris  Type 2 diabetes mellitus with microalbuminuria, without long-term current use of insulin (H)  Essential hypertension, benign  Seen for hospital follow-up status post CABG.  She is gradually improving.  Still having some pain across the incision site, some cough, but lungs are clear, heart sounds are good and she has no lower extremity edema.  We discussed her discharge medications.  I did give her some additional oxycodone to take as needed.  We will stop the furosemide as she does not have any lower extremity edema and there is no evidence of fluid overload on exam.  Echo done in the hospital showed normal EF.  We will recheck renal function and hemoglobin as she had some anemia postprocedure.  Continue with the clopidogrel.  She is on aspirin.  Blood pressure too low to start beta-blocker again today.  Diabetes remains poorly controlled.  We will increase the Januvia from .  Continue with metformin and Jardiance as well.  She will likely need an additional agent in the long-term for appropriate control.  -     sitagliptin (JANUVIA) 100 MG tablet; Take 1 tablet (100 mg) by mouth daily  -     oxyCODONE (ROXICODONE) 5 MG tablet; Take 1 tablet (5 mg) by mouth every 6 hours as needed for pain  -     Basic metabolic panel; Future  -     CBC with platelets; Future  -     CBC with platelets  -     Basic metabolic panel      Cognitive complaints  Anxiety  PTSD (post-traumatic stress disorder)  Does appear to have some worsening in her underlying mental health from this hospitalization and challenging recovery.  We outlined the normal process of recovery.  Look into additional mental health resources for her to use as needed.  She and daughter were able to  meet with her care coordinator to submit a request for increased PCA hours as they are needing more support at home.    We will follow-up again in 3 to 4 weeks.      Patient Instructions   Stop in lab for blood tests.      Medication changes recommended by Dr. Carballo:    -Take the last pill of furosemide and then STOP  -Keep taking the Clopidogrel everyday  -Refill oxycodone for 10 pills for pain.   -Use tylenol to also help with pain- up to 3 times per day  - STOP Januvia 50mg daily (bottle has red X)  - START new Januvia 100mg daily  -Use heat and compression on the areas of the leg.     Stop in lab on your way out.        Amrita Carballo MD    RAYSA Ch is a 57 year old female with past medical history significant for    Patient Active Problem List   Diagnosis     Essential hypertension, benign     Diabetes mellitus, type 2 (H)     Hyperlipidemia     Health Care Home     Helicobacter pylori gastritis     PTSD (post-traumatic stress disorder)     Moderate episode of recurrent major depressive disorder (H)     Cognitive complaints     Screening for depression     Microalbuminuria     Plantar fasciitis     Left knee pain     Right knee pain, unspecified chronicity     Chronic kidney disease, stage 3a (H)     Others present at the visit:  Patient;s daughter, and  Fiorella Carson, present in person.      Presents for   Chief Complaint   Patient presents with     Hospital F/U     Follow-up hospital     Patient presents accompanied by her daughter for hospital follow-up visit.  She was recently admitted to [facility] for evaluation of chest pain and found to have an NSTEMI.  She was admitted on January 31 and discharged on February 8 to home.  Family has been helping care for her at home.    She had a coronary artery bypass graft x5 with left internal mammary artery to left anterior descending coronary, saphenous vein graft to the diagonal vessel, saphenous vein graft to the obtuse marginal and  a sequential saphenous vein graft to the posterior lateral branch #1 into the posterior lateral branch #2.    She was discharged on the following new medications:   --20 mg of furosemide, number 7 pills,  -- 5 mg of oxycodone, number 10 pills,  -- Clopidogrel, 75 mg daily for the next year.    She has follow-up scheduled on March 9 with [facility], Dr. Sears.  She has follow-up scheduled with CV surgery, Dr. Menchaca on on March 1.    Since her hospitalization, she has been doing okay but not great.  Still feels very fatigued.  Notes some pain in her chest but more notably in her left leg.  Has been experiencing some swelling there near one of the vein grafts.  She continues to have a cough, although endorses that it is getting better.  It is painful when she coughs.  Still gets short of breath with walking more than 15 feet.  Has pain on the chest in the area of the incision but hurts when she pushes on it.    Pain medications have been helpful.  She has 3 pills of oxycodone left.  She takes all 3 of her new pills together every day in the morning.    No lower extremity edema, no lightheadedness or dizziness, they have been checking blood sugars at home and they are typically between 203 100.  No difficulty with low sugars.    She was not started on a beta-blocker during her hospitalization because her blood pressure was too low to tolerate it.    She has been scheduled for cardiac rehab at [facility] in March.    OBJECTIVE:  Vitals: /70   Pulse 78   Temp 98  F (36.7  C) (Oral)   Resp 22   Wt 56.2 kg (123 lb 12.8 oz)   SpO2 97%   BMI 26.23 kg/m    BMI= Body mass index is 26.23 kg/m .  Objective:    Vitals:  Vitals are reviewed and are within the normal range  Gen: She is alert, appears fatigued, older than stated age.  Somewhat down.  Cardiac:  Regular rate and rhythm, no murmurs, rubs or gallops  Respiratory:  Lungs clear to auscultation bilaterally, scattered rhonchi,  no wheezes, no crackles in the bases.  Chest: Surgical site is clean dry and intact.  There is no extending erythema or drainage.  Abdomen:  Soft, non-tender, non-distended, bowel sounds positive  Extremities: Areas from vein harvest station look good.  The one on her medial left leg has an area of swelling and firmness below the distal end of the incision site.  It is not warm, it is not red, there is no drainage.  Feels most consistent with a hematoma.  Her bilateral lower extremities are warm, well-perfused, pulses 2+/4.  There is no lower extremity edema.    PHQ 9/22/2021 5/25/2022 1/10/2023   PHQ-9 Total Score 11 11 1   Q9: Thoughts of better off dead/self-harm past 2 weeks Several days Not at all Not at all       Results for orders placed or performed in visit on 02/14/23   CBC with platelets     Status: Abnormal   Result Value Ref Range    WBC Count 16.5 (H) 4.0 - 11.0 10e3/uL    RBC Count 4.02 3.80 - 5.20 10e6/uL    Hemoglobin 11.2 (L) 11.7 - 15.7 g/dL    Hematocrit 35.2 35.0 - 47.0 %    MCV 88 78 - 100 fL    MCH 27.9 26.5 - 33.0 pg    MCHC 31.8 31.5 - 36.5 g/dL    RDW 14.6 10.0 - 15.0 %    Platelet Count 865 (H) 150 - 450 10e3/uL   Basic metabolic panel     Status: Abnormal   Result Value Ref Range    Sodium 138 136 - 145 mmol/L    Potassium 4.7 3.4 - 5.3 mmol/L    Chloride 97 (L) 98 - 107 mmol/L    Carbon Dioxide (CO2) 24 22 - 29 mmol/L    Anion Gap 17 (H) 7 - 15 mmol/L    Urea Nitrogen 21.5 8.0 - 23.0 mg/dL    Creatinine 1.00 (H) 0.51 - 0.95 mg/dL    Calcium 10.3 (H) 8.6 - 10.0 mg/dL    Glucose 213 (H) 70 - 99 mg/dL    GFR Estimate 65 >60 mL/min/1.73m2           Patient Instructions   Stop in lab for blood tests.      Medication changes recommended by Dr. Carballo:    -Take the last pill of furosemide and then STOP  -Keep taking the Clopidogrel everyday  -Refill oxycodone for 10 pills for pain.   -Use tylenol to also help with pain- up to 3 times per day  - STOP Januvia 50mg daily (bottle has red X)  -  START new Januvia 100mg daily  -Use heat and compression on the areas of the leg.     Stop in lab on your way out.        Amrita Carballo MD

## 2023-03-09 ENCOUNTER — HOSPITAL ENCOUNTER (OUTPATIENT)
Dept: CARDIAC REHAB | Facility: HOSPITAL | Age: 58
Discharge: HOME | End: 2023-03-09
Attending: THORACIC SURGERY (CARDIOTHORACIC VASCULAR SURGERY)
Payer: COMMERCIAL

## 2023-03-09 DIAGNOSIS — Z95.1 S/P CABG (CORONARY ARTERY BYPASS GRAFT): ICD-10-CM

## 2023-03-09 PROCEDURE — 93798 PHYS/QHP OP CAR RHAB W/ECG: CPT

## 2023-03-09 PROCEDURE — 93797 PHYS/QHP OP CAR RHAB WO ECG: CPT

## 2023-03-10 ENCOUNTER — OFFICE VISIT (OUTPATIENT)
Dept: FAMILY MEDICINE | Facility: CLINIC | Age: 58
End: 2023-03-10
Payer: COMMERCIAL

## 2023-03-10 ENCOUNTER — OFFICE VISIT (OUTPATIENT)
Dept: PHARMACY | Facility: CLINIC | Age: 58
End: 2023-03-10
Payer: COMMERCIAL

## 2023-03-10 VITALS
OXYGEN SATURATION: 98 % | TEMPERATURE: 98.1 F | HEIGHT: 58 IN | BODY MASS INDEX: 26.49 KG/M2 | HEART RATE: 85 BPM | SYSTOLIC BLOOD PRESSURE: 106 MMHG | WEIGHT: 126.2 LBS | RESPIRATION RATE: 16 BRPM | DIASTOLIC BLOOD PRESSURE: 73 MMHG

## 2023-03-10 DIAGNOSIS — I10 ESSENTIAL HYPERTENSION, BENIGN: ICD-10-CM

## 2023-03-10 DIAGNOSIS — F43.10 PTSD (POST-TRAUMATIC STRESS DISORDER): ICD-10-CM

## 2023-03-10 DIAGNOSIS — R14.1 FLATULENCE, ERUCTATION AND GAS PAIN: ICD-10-CM

## 2023-03-10 DIAGNOSIS — I25.810 CORONARY ARTERY DISEASE INVOLVING AUTOLOGOUS ARTERY CORONARY BYPASS GRAFT WITHOUT ANGINA PECTORIS: ICD-10-CM

## 2023-03-10 DIAGNOSIS — K59.00 CONSTIPATION, UNSPECIFIED CONSTIPATION TYPE: ICD-10-CM

## 2023-03-10 DIAGNOSIS — R13.10 DYSPHAGIA, UNSPECIFIED TYPE: ICD-10-CM

## 2023-03-10 DIAGNOSIS — R80.9 TYPE 2 DIABETES MELLITUS WITH MICROALBUMINURIA, WITHOUT LONG-TERM CURRENT USE OF INSULIN (H): ICD-10-CM

## 2023-03-10 DIAGNOSIS — E11.9 TYPE 2 DIABETES MELLITUS WITHOUT COMPLICATION, WITHOUT LONG-TERM CURRENT USE OF INSULIN (H): Primary | ICD-10-CM

## 2023-03-10 DIAGNOSIS — E11.29 TYPE 2 DIABETES MELLITUS WITH MICROALBUMINURIA, WITHOUT LONG-TERM CURRENT USE OF INSULIN (H): ICD-10-CM

## 2023-03-10 DIAGNOSIS — R14.3 FLATULENCE, ERUCTATION AND GAS PAIN: ICD-10-CM

## 2023-03-10 DIAGNOSIS — K21.00 GASTROESOPHAGEAL REFLUX DISEASE WITH ESOPHAGITIS, UNSPECIFIED WHETHER HEMORRHAGE: Primary | ICD-10-CM

## 2023-03-10 DIAGNOSIS — N18.31 CHRONIC KIDNEY DISEASE, STAGE 3A (H): ICD-10-CM

## 2023-03-10 DIAGNOSIS — R07.89 CHEST WALL PAIN: ICD-10-CM

## 2023-03-10 DIAGNOSIS — R14.2 FLATULENCE, ERUCTATION AND GAS PAIN: ICD-10-CM

## 2023-03-10 PROCEDURE — 99607 MTMS BY PHARM ADDL 15 MIN: CPT | Performed by: PHARMACIST

## 2023-03-10 PROCEDURE — 99214 OFFICE O/P EST MOD 30 MIN: CPT | Performed by: STUDENT IN AN ORGANIZED HEALTH CARE EDUCATION/TRAINING PROGRAM

## 2023-03-10 PROCEDURE — 99605 MTMS BY PHARM NP 15 MIN: CPT | Performed by: PHARMACIST

## 2023-03-10 RX ORDER — GLIPIZIDE 5 MG/1
5 TABLET, FILM COATED, EXTENDED RELEASE ORAL DAILY
Qty: 90 TABLET | Refills: 1 | Status: SHIPPED | OUTPATIENT
Start: 2023-03-10 | End: 2023-05-30

## 2023-03-10 RX ORDER — OMEPRAZOLE 40 MG/1
40 CAPSULE, DELAYED RELEASE ORAL DAILY
Qty: 7 CAPSULE | Refills: 0 | Status: SHIPPED | OUTPATIENT
Start: 2023-03-10 | End: 2023-03-22

## 2023-03-10 RX ORDER — SIMETHICONE 80 MG
80 TABLET,CHEWABLE ORAL EVERY 6 HOURS PRN
Qty: 60 TABLET | Refills: 3 | Status: SHIPPED | OUTPATIENT
Start: 2023-03-10 | End: 2023-05-30

## 2023-03-10 RX ORDER — AMOXICILLIN 250 MG
1 CAPSULE ORAL DAILY
Qty: 30 TABLET | Refills: 3 | Status: SHIPPED | OUTPATIENT
Start: 2023-03-10 | End: 2023-05-30

## 2023-03-10 NOTE — PATIENT INSTRUCTIONS
Cream:  Voltaren - to chest 4x per day for pain.     Omeprazole- Stomach medicine (7 days)  Take 30-60 minutes before a meal for 1 week.      Glipizide XL - NEW blood sugar medication.  Take in the evening.     Come back in 4 weeks.      Pharmacist has sent in NEW sensors for your diabetes.  Come back for that appointment.     Follow up in 1 month.

## 2023-03-10 NOTE — Clinical Note
FYI only - pharmacy department requires that I route this note to you.  We discussed in person today.  sandra

## 2023-03-10 NOTE — PROGRESS NOTES
Medication Therapy Management (MTM) Encounter    ASSESSMENT:                            Medication Adherence/Access: No issues identified    Type 2 Diabetes: Not meeting HbA1c goal of <8% (higher goal picked based on comorbidities).  Patient is maxxed out metformin, Jardiance, and Januvia.  Patient is not interested in an injectable medication at this time.  Based on patient's recent myocardial infarction pioglitazone is probably not a good option for her.  Based on this we will start a low-dose glipizide ER for her to take with her largest meal.  Prescription for Freestyle Home 2 was sent to patient's pharmacy and patient will follow-up in 1 week with myself for education.    Status postmyocardial infarction: Stable.  I am concerned about her utilization of nitroglycerin and would like Dr. Carballo to elicit more about exactly what this chest pain is.  Additionally patient could use a lipid panel after 3 solid months of atorvastatin use, which would be after the end of this month.    Insomnia: Stable.    Constipation with gas: Needs improvement.  Patient could benefit from refills of senna S and simethicone.    PLAN:                            Start glipizide ER 5 mg once daily    freestyle home from the pharmacy  Refilled senna S and simethicone    Follow-up: 1 week for Freestyle education    SUBJECTIVE/OBJECTIVE:                          Rachael Ch is a 57 year old female seen for a follow-up visit. Patient was accompanied by daughter. Patient is seeing provider after our visit today. Patient seen with Radha herrera. Today's visit is a follow-up MTM visit from 2/14/23     Reason for visit: Diabetes, MI follow up.    Allergies/ADRs: Reviewed in chart  Past Medical History: Reviewed in chart  Tobacco: She reports that she quit smoking about 5 years ago. Her smoking use included cigarettes. She smoked an average of .5 packs per day. She has quit using smokeless tobacco.  Her smokeless tobacco use included  chew.  Social History     Tobacco Use     Smoking status: Former     Packs/day: 0.50     Types: Cigarettes     Quit date: 2017     Years since quittin.5     Smokeless tobacco: Former     Types: Chew   Substance Use Topics     Alcohol use: No     Drug use: No       Medication Adherence/Access: Daughter (not the one with her here) sets up her medications in a pill box.  Reports not missing doses, but does not know her medications that well.     Type 2 Diabetes:  Currently taking metformin 2000mg daily, Jardiance 25mg daily and Januvia 100mg daily.  Metformin and Januvia were increased at last visit with Dr. Ruiz.  Patient is not experiencing side effects. Patient is not interested in injections at this time.   Blood sugar monitorin time daily. Ranges (based on glucometer readings): See below  Date AM PM   3/10 188    3/9 350    3/8  475   3/6 195    3/4 174    3/1 289     266     223      386     Patient was supposed to get a Freestyle Home at the last visit, but it was not sent.  Patient is interested in using this.     Aspirin: Taking 81mg daily for secondary prevention   Statin: Yes: atorvastatin 80mg daIly   ACEi/ARB: No. - stopped in hospital due to low blood pressures   Urine Albumin:   Lab Results   Component Value Date    UMALCR 118.96 (H) 2022            Lab Results   Component Value Date    A1C 12.1 2022    A1C 8.9 2022    A1C 8.3 2021    A1C 9.1 2021    A1C 6.9 2021    A1C 6.7 2021    A1C 6.8 2020    A1C 6.2 2020    A1C 6.2 2020       S/p myocardial infarction: Current medications are aspirin 81mg daily, clopidogrel 75mg daily, atorvastatin 80mg daily, and nitroglycerin SL as needed.  Patient reports taking the nitroglycerin almost nightly for chest pain.  It is hard to tell if it is the same chest pain that caused her to go to the hospital though.  Patient knows to place under tongue to dissolve.    Lab Results  "  Component Value Date    CHOL 320 12/28/2022    CHOL 133.5 08/06/2020     Lab Results   Component Value Date    HDL 39 12/28/2022    HDL 40.1 08/06/2020     Lab Results   Component Value Date     12/28/2022    LDL 73 08/06/2020     Lab Results   Component Value Date    TRIG 303 12/28/2022    TRIG 104.6 08/06/2020     Lab Results   Component Value Date    CHOLHDLRATIO 3.3 08/06/2020     Insomnia: Patient has hydroxyzine 25mg that she takes about three times per week for sleep. She doesn't think that it always helps.     Constipation with gas: Patient has a history of constipation with gas, and somehow ran out of her medications for this. She would like them refilled.      Today's Vitals:   BP Readings from Last 1 Encounters:   03/10/23 106/73     Pulse Readings from Last 1 Encounters:   03/10/23 85     Wt Readings from Last 1 Encounters:   03/10/23 57.2 kg (126 lb 3.2 oz)     Ht Readings from Last 1 Encounters:   03/10/23 1.461 m (4' 9.5\")     Estimated body mass index is 26.84 kg/m  as calculated from the following:    Height as of an earlier encounter on 3/10/23: 1.461 m (4' 9.5\").    Weight as of an earlier encounter on 3/10/23: 57.2 kg (126 lb 3.2 oz).    Temp Readings from Last 1 Encounters:   03/10/23 98.1  F (36.7  C) (Oral)     ----------------  Post Discharge Medication Reconciliation Status: medication reconcilation previously completed during another office visit.    I spent 45 minutes with this patient today. All changes were made via collaborative practice agreement with Dr Carballo. A copy of the visit note was provided to the patient's provider(s).    Due to time constraints, I was only able to assess the above with the patient today. Will follow-up on other disease states in future encounters    A summary of these recommendations was given to the patient (see AVS from today's appointment with Dr Carballo).    Mimi Hernandez, PharmD      Medication Therapy Recommendations  Diabetes mellitus, type " 2 (H)    Current Medication: metFORMIN (GLUCOPHAGE XR) 500 MG 24 hr tablet   Rationale: Medication requires monitoring - Needs additional monitoring   Recommendation: Self-Monitoring   Status: Patient Agreed - Adherence/Education          Rationale: Synergistic therapy - Needs additional medication therapy - Indication   Recommendation: Start Medication - glipiZIDE 5 MG 24 hr tablet   Status: Accepted per Provider

## 2023-03-10 NOTE — LETTER
March 10, 2023      Rachael Ch  [address] [place] [address]        To whom this may concern;    Rachael Ch is a long-term primary care patient of mine here at [facility].  On January 31, 2023, she was seen in the emergency room at [facility] for a NSTEMI.  Following evaluation she had a 5 vessel CABG completed.  Because of her significant heart surgery, she is needing additional support from her PCA.  She has limitations with ambulation, bending and twisting, and struggles with continued fatigue, decreased exercise tolerance, and ability to complete activities of daily living at home.  I am requesting that you complete a reassessment for PCA hours due to the significant change in her overall medical function and a new diagnosis of coronary artery disease with 5 vessel bypass surgery completed this winter.  Please call the clinic at [phone] if you have any questions.        Sincerely,            Amrita Carballo MD

## 2023-03-12 NOTE — PROGRESS NOTES
There are no exam notes on file for this visit.    ASSESSMENT AND PLAN:      Rachael was seen today for follow up and medication reconciliation.    Diagnoses and all orders for this visit:    Coronary artery disease involving autologous artery coronary bypass graft without angina pectoris  Essential hypertension, benign  Overall, she is recovering well from her surgery.  Continues to engage in cardiac rehab.  No physical exam signs of heart failure today.  Her blood pressure remains well controlled although low.  I would not feel comfortable adding medications now.  She is taking her medications regularly.    Chest wall pain  She does have tenderness palpable over her muscle body in her left upper chest.  This is musculoskeletal and I have recommended Voltaren gel.  Okay to sleep on her back.  -     diclofenac (VOLTAREN) 1 % topical gel; Apply 2 g topically 4 times daily    Gastroesophageal reflux disease with esophagitis, unspecified whether hemorrhage  Dysphagia, unspecified type  She is having some difficulty with swallowing.  Has a history of GERD.  Did have an intubation with her surgery.  She is on famotidine with Tums.  We will do a 1 week trial of omeprazole.  This can interact with the clopidogrel so we will need to do this only short-term.  We will see if this helps with symptoms.  She still having trouble would recommend a swallow study going forward.  -     omeprazole (PRILOSEC) 40 MG DR capsule; Take 1 capsule (40 mg) by mouth daily    Type 2 diabetes mellitus with microalbuminuria, without long-term current use of insulin (H)  Diabetes is still poorly controlled.  Pharm.D.'s have sent in a freestyle emerita monitor for her.  Think this will help with management overall.  We will continue with the 2 g of metformin, the 25 mg of empagliflozin, and the 100 mg of Januvia.  I would like to avoid pioglitazone given her recent heart concerns, so we will start glipizide 5 mg XL daily, with plans to increase to  10.  We will continue to discuss injectable medications as this may be our only option in the future.  Appreciate Pharm D input.    -     glipiZIDE (GLUCOTROL XL) 5 MG 24 hr tablet; Take 1 tablet (5 mg) by mouth daily    Chronic kidney disease, stage 3a (H)  Renal function has been stable.  On lisinopril     PTSD (post-traumatic stress disorder)  Mood and affect significantly brighter today.  I think she has recovered some from the trauma of being in the hospital on top of her underlying PTSD.  Continues to connect with family.  Her family is requesting a new PCA evaluation.  I have written a letter indicating that she has a change in health status and that I would recommend a reevaluation.  This was given to the family.    Follow-up in 1 month for recheck.    Patient Instructions   Cream:  Voltaren - to chest 4x per day for pain.     Omeprazole- Stomach medicine (7 days)  Take 30-60 minutes before a meal for 1 week.      Glipizide XL - NEW blood sugar medication.  Take in the evening.     Come back in 4 weeks.      Pharmacist has sent in NEW sensors for your diabetes.  Come back for that appointment.     Follow up in 1 month.        Amrita Carballo MD    RAYSA Ch is a 57 year old female with past medical history significant for    Patient Active Problem List   Diagnosis     Essential hypertension, benign     Diabetes mellitus, type 2 (H)     Hyperlipidemia     Health Care Home     Helicobacter pylori gastritis     PTSD (post-traumatic stress disorder)     Moderate episode of recurrent major depressive disorder (H)     Cognitive complaints     Screening for depression     Microalbuminuria     Plantar fasciitis     Left knee pain     Right knee pain, unspecified chronicity     Chronic kidney disease, stage 3a (H)     Coronary artery disease involving autologous artery coronary bypass graft without angina pectoris     Others present at the visit:  Patient's daughter.   Silvino Banks, present in  person.  Patient also seen by Pharm.D. Dr. Mimi Hernandez prior to our visit.    Presents for   Chief Complaint   Patient presents with     Follow Up     Medication Reconciliation     Did not review, seeing pharm d     Patient presents for follow-up.  This is her second visit after her 5 vessel CABG completed at [facility] in early February 2023.  She endorses continued tenderness over her surgical site in the mid chest, as well as an area of tenderness in her left upper chest.  She describes the pain as tender, on the surface, and painful when she pushes.  It is not accompanied by diaphoresis, shortness of breath, and there is no radiation.  She has not been using any creams for this but is taking Tylenol and it helps.  She shares that she has been having difficulty getting comfortable when she sleeps because she is worried about sleeping on her side.  Was told by a friend that she could affect her sternotomy healing if she does not sleep on her back.  She feels like there is still some stitches in her chest that pole when she moves and turns.    She notes some difficulty with swallowing her food.  Has to chew more and drink water between each bite.  This is new since the surgery.  Does note some burning and irritation in her throat sometimes.  Medications help with this.  Does have gassiness in her stomach as well as some constipations, but medication helps with this as well.    She continues to have fatigue.  Continues to get tired with ambulation, but this is getting better.  She is engaging in cardiac rehab.  She can walk a little further every day.  Denies any chest pain or significant shortness of breath with walking.  Mostly just fatigue.  She endorses some swelling around the area where her vein was harvested but no lower extremity edema.  No lower leg pain.  She continues to have pain in her knees which is chronic.    She has been taking her medications regularly.  Her daughter sets up her meds in her  "pillbox and she endorses taking them every day.    Blood sugars have been up and down.  She has not had any low sugars.  No numbness and tingling in her feet.  She does have some blurry vision but this is only when she does not wear her glasses.  Renal function was stable at last visit.  She does not want to start an injectable medicine for her diabetes.  Would be willing to take an additional pill.  Sugars run between 175 and 300 in the morning and the occasional evening checks are in the 300-400 range.      OBJECTIVE:  Vitals: /73 (BP Location: Right arm, Patient Position: Sitting, Cuff Size: Adult Regular)   Pulse 85   Temp 98.1  F (36.7  C) (Oral)   Resp 16   Ht 1.461 m (4' 9.5\")   Wt 57.2 kg (126 lb 3.2 oz)   SpO2 98%   BMI 26.84 kg/m    BMI= Body mass index is 26.84 kg/m .  Objective:    Vitals:  Vitals are reviewed and are within the normal range.  Blood pressure mildly low  Gen:  Alert, pleasant, no acute distress, engaged and positive today.  Cardiac:  Regular rate and rhythm, no murmurs, rubs or gallops.  Occasional irregular beat noted consistent with likely PVC.  Respiratory:  Lungs clear to auscultation bilaterally, no crackles  Chest: She has some mild tenderness over her sternotomy scar, but no erythema, no drainage, and the area looks clean dry and intact.  In the left upper chest she has palpable tension over 1 muscle area.  This is reproducible with palpation.  Abdomen:  Soft, non-tender, non-distended, bowel sounds positive  Extremities:  Warm, well-perfused, pulses 2+/4, trace lower extremity edema, well-healed surgical wounds on her left calf from vein harvest.  Psych: Mood and affect are significantly brighter than last visit.  She is cheerful, interacting appropriately and engaged.    PHQ 9/22/2021 5/25/2022 1/10/2023   PHQ-9 Total Score 11 11 1   Q9: Thoughts of better off dead/self-harm past 2 weeks Several days Not at all Not at all       No results found for any visits on " 03/10/23.        Patient Instructions   Cream:  Voltaren - to chest 4x per day for pain.     Omeprazole- Stomach medicine (7 days)  Take 30-60 minutes before a meal for 1 week.      Glipizide XL - NEW blood sugar medication.  Take in the evening.     Come back in 4 weeks.      Pharmacist has sent in NEW sensors for your diabetes.  Come back for that appointment.     Follow up in 1 month.        Amrita Carballo MD

## 2023-03-13 ENCOUNTER — HOSPITAL ENCOUNTER (OUTPATIENT)
Dept: CARDIAC REHAB | Facility: HOSPITAL | Age: 58
Discharge: HOME | End: 2023-03-13
Attending: INTERNAL MEDICINE
Payer: COMMERCIAL

## 2023-03-13 PROCEDURE — 93798 PHYS/QHP OP CAR RHAB W/ECG: CPT

## 2023-03-17 ENCOUNTER — HOSPITAL ENCOUNTER (OUTPATIENT)
Dept: CARDIAC REHAB | Facility: HOSPITAL | Age: 58
Discharge: HOME | End: 2023-03-17
Attending: INTERNAL MEDICINE
Payer: COMMERCIAL

## 2023-03-17 ENCOUNTER — OFFICE VISIT (OUTPATIENT)
Dept: PHARMACY | Facility: CLINIC | Age: 58
End: 2023-03-17

## 2023-03-17 VITALS
TEMPERATURE: 98.4 F | OXYGEN SATURATION: 97 % | HEART RATE: 91 BPM | DIASTOLIC BLOOD PRESSURE: 77 MMHG | RESPIRATION RATE: 20 BRPM | SYSTOLIC BLOOD PRESSURE: 127 MMHG

## 2023-03-17 DIAGNOSIS — E11.9 TYPE 2 DIABETES MELLITUS WITHOUT COMPLICATION, WITHOUT LONG-TERM CURRENT USE OF INSULIN (H): Primary | ICD-10-CM

## 2023-03-17 PROCEDURE — 99207 PR NO CHARGE LOS: CPT | Performed by: PHARMACIST

## 2023-03-17 PROCEDURE — 93798 PHYS/QHP OP CAR RHAB W/ECG: CPT

## 2023-03-17 NOTE — PROGRESS NOTES
Clinical Pharmacy Consult:                                                    aRchael Ch is a 57 year old female seen for a clinical pharmacist consult.  She was referred to me from Dr Carballo.     Reason for Consult: Medication Education - Freestyle Home 2    Discussion: Patient requires Freestyle Home education to ensure understanding and safety for blood sugar monitoring.     Plan:  1. Instructed the patient on proper sensor placement (back of upper arm) and cleaning with an alcohol wipe before application.     2. Guided patient on the proper steps to apply the sensor and observed the patient while they appropriately applied the sensor.    3. Instructed the patient on how to sync a new sensor to the Freestyle Home Castell and adjust alarms.     4. Instructed the patient on how to check blood sugar with Freestyle Home, including how to interpret trend arrows. Emphasized to swipe at least every 8 hours.    5. Instructed patient to change sensor every 14 days.    6. Instructed patient to bring back Castell to every visit.    By the end of this education session, patient was able to verbalize understanding of this information and was able to appropriately self-apply the sensor under my supervision.    Mimi Hernandez, PharmD    Due to patient being non-English speaking/uses sign language, an  was used for this visit. Only for face-to-face interpretation by an external agency, date and length of interpretation can be found on the scanned worksheet.     name: Silvino Banks  Agency: [employer]  Language: Radha   Telephone number:   Type of interpretation: Face-to-face, spoken

## 2023-03-17 NOTE — NURSING NOTE
Due to patient being non-English speaking/uses sign language, an  was used for this visit. Only for face-to-face interpretation by an external agency, date and length of interpretation can be found on the scanned worksheet.       name: Silvino Banks (Htoo)  Language: Radha  Agency:  [employer]  Phone number: [phone]  Type of interpretation:  Face-to-face, spoken

## 2023-03-21 ENCOUNTER — HOSPITAL ENCOUNTER (OUTPATIENT)
Dept: CARDIAC REHAB | Facility: HOSPITAL | Age: 58
Discharge: HOME | End: 2023-03-21
Attending: INTERNAL MEDICINE
Payer: COMMERCIAL

## 2023-03-21 PROCEDURE — 93798 PHYS/QHP OP CAR RHAB W/ECG: CPT

## 2023-03-23 ENCOUNTER — HOSPITAL ENCOUNTER (OUTPATIENT)
Dept: CARDIAC REHAB | Facility: HOSPITAL | Age: 58
Discharge: HOME | End: 2023-03-23
Attending: INTERNAL MEDICINE
Payer: COMMERCIAL

## 2023-03-23 PROCEDURE — 93798 PHYS/QHP OP CAR RHAB W/ECG: CPT

## 2023-03-28 ENCOUNTER — HOSPITAL ENCOUNTER (EMERGENCY)
Facility: HOSPITAL | Age: 58
Discharge: HOME | End: 2023-03-28
Attending: EMERGENCY MEDICINE | Admitting: EMERGENCY MEDICINE
Payer: COMMERCIAL

## 2023-03-28 ENCOUNTER — HOSPITAL ENCOUNTER (OUTPATIENT)
Dept: CARDIAC REHAB | Facility: HOSPITAL | Age: 58
Discharge: HOME | End: 2023-03-28
Attending: INTERNAL MEDICINE
Payer: COMMERCIAL

## 2023-03-28 VITALS
DIASTOLIC BLOOD PRESSURE: 73 MMHG | RESPIRATION RATE: 21 BRPM | WEIGHT: 126 LBS | TEMPERATURE: 98.3 F | HEART RATE: 75 BPM | BODY MASS INDEX: 26.79 KG/M2 | OXYGEN SATURATION: 99 % | SYSTOLIC BLOOD PRESSURE: 126 MMHG

## 2023-03-28 DIAGNOSIS — R42 LIGHTHEADEDNESS: ICD-10-CM

## 2023-03-28 DIAGNOSIS — N39.0 URINARY TRACT INFECTION WITHOUT HEMATURIA, SITE UNSPECIFIED: ICD-10-CM

## 2023-03-28 LAB
ALBUMIN UR-MCNC: NEGATIVE MG/DL
ANION GAP SERPL CALCULATED.3IONS-SCNC: 13 MMOL/L (ref 7–15)
APPEARANCE UR: CLEAR
B-OH-BUTYR SERPL-SCNC: <0.2 MMOL/L
BACTERIA #/AREA URNS HPF: ABNORMAL /HPF
BASE EXCESS BLDV CALC-SCNC: -3.3 MMOL/L
BASOPHILS # BLD AUTO: 0 10E3/UL (ref 0–0.2)
BASOPHILS NFR BLD AUTO: 0 %
BILIRUB UR QL STRIP: NEGATIVE
BUN SERPL-MCNC: 28.3 MG/DL (ref 8–23)
CALCIUM SERPL-MCNC: 9.7 MG/DL (ref 8.6–10)
CHLORIDE SERPL-SCNC: 101 MMOL/L (ref 98–107)
COLOR UR AUTO: ABNORMAL
CREAT SERPL-MCNC: 0.79 MG/DL (ref 0.51–0.95)
DEPRECATED HCO3 PLAS-SCNC: 21 MMOL/L (ref 22–29)
EOSINOPHIL # BLD AUTO: 0.1 10E3/UL (ref 0–0.7)
EOSINOPHIL NFR BLD AUTO: 2 %
ERYTHROCYTE [DISTWIDTH] IN BLOOD BY AUTOMATED COUNT: 13.8 % (ref 10–15)
GFR SERPL CREATININE-BSD FRML MDRD: 86 ML/MIN/1.73M2
GLUCOSE BLDC GLUCOMTR-MCNC: 304 MG/DL (ref 70–99)
GLUCOSE BLDC GLUCOMTR-MCNC: 386 MG/DL (ref 70–99)
GLUCOSE BLDC GLUCOMTR-MCNC: 437 MG/DL (ref 70–99)
GLUCOSE BLDC GLUCOMTR-MCNC: 487 MG/DL (ref 70–99)
GLUCOSE SERPL-MCNC: 439 MG/DL (ref 70–99)
GLUCOSE UR STRIP-MCNC: >1000 MG/DL
HCO3 BLDV-SCNC: 23 MMOL/L (ref 24–30)
HCT VFR BLD AUTO: 40 % (ref 35–47)
HGB BLD-MCNC: 12.8 G/DL (ref 11.7–15.7)
HGB UR QL STRIP: NEGATIVE
HOLD SPECIMEN: NORMAL
HOLD SPECIMEN: NORMAL
IMM GRANULOCYTES # BLD: 0 10E3/UL
IMM GRANULOCYTES NFR BLD: 0 %
KETONES UR STRIP-MCNC: NEGATIVE MG/DL
LEUKOCYTE ESTERASE UR QL STRIP: ABNORMAL
LYMPHOCYTES # BLD AUTO: 2.4 10E3/UL (ref 0.8–5.3)
LYMPHOCYTES NFR BLD AUTO: 34 %
MAGNESIUM SERPL-MCNC: 2 MG/DL (ref 1.7–2.3)
MCH RBC QN AUTO: 27.2 PG (ref 26.5–33)
MCHC RBC AUTO-ENTMCNC: 32 G/DL (ref 31.5–36.5)
MCV RBC AUTO: 85 FL (ref 78–100)
MONOCYTES # BLD AUTO: 0.4 10E3/UL (ref 0–1.3)
MONOCYTES NFR BLD AUTO: 6 %
NEUTROPHILS # BLD AUTO: 4 10E3/UL (ref 1.6–8.3)
NEUTROPHILS NFR BLD AUTO: 58 %
NITRATE UR QL: NEGATIVE
NRBC # BLD AUTO: 0 10E3/UL
NRBC BLD AUTO-RTO: 0 /100
OXYHGB MFR BLDV: 89.8 % (ref 70–75)
PCO2 BLDV: 42 MM HG (ref 35–50)
PH BLDV: 7.34 [PH] (ref 7.35–7.45)
PH UR STRIP: 6 [PH] (ref 5–7)
PLATELET # BLD AUTO: 236 10E3/UL (ref 150–450)
PO2 BLDV: 62 MM HG (ref 25–47)
POTASSIUM SERPL-SCNC: 4 MMOL/L (ref 3.4–5.3)
RBC # BLD AUTO: 4.7 10E6/UL (ref 3.8–5.2)
RBC URINE: 9 /HPF
SAO2 % BLDV: 90.7 % (ref 70–75)
SODIUM SERPL-SCNC: 135 MMOL/L (ref 136–145)
SP GR UR STRIP: 1.03 (ref 1–1.03)
SQUAMOUS EPITHELIAL: 2 /HPF
TROPONIN T SERPL HS-MCNC: 32 NG/L
TROPONIN T SERPL HS-MCNC: 33 NG/L
UROBILINOGEN UR STRIP-MCNC: <2 MG/DL
WBC # BLD AUTO: 6.9 10E3/UL (ref 4–11)
WBC URINE: 135 /HPF

## 2023-03-28 PROCEDURE — 82310 ASSAY OF CALCIUM: CPT | Performed by: EMERGENCY MEDICINE

## 2023-03-28 PROCEDURE — 85025 COMPLETE CBC W/AUTO DIFF WBC: CPT | Performed by: EMERGENCY MEDICINE

## 2023-03-28 PROCEDURE — 82962 GLUCOSE BLOOD TEST: CPT

## 2023-03-28 PROCEDURE — 83735 ASSAY OF MAGNESIUM: CPT | Performed by: EMERGENCY MEDICINE

## 2023-03-28 PROCEDURE — 82010 KETONE BODYS QUAN: CPT | Performed by: EMERGENCY MEDICINE

## 2023-03-28 PROCEDURE — 99285 EMERGENCY DEPT VISIT HI MDM: CPT

## 2023-03-28 PROCEDURE — 82805 BLOOD GASES W/O2 SATURATION: CPT | Performed by: EMERGENCY MEDICINE

## 2023-03-28 PROCEDURE — 87088 URINE BACTERIA CULTURE: CPT | Performed by: EMERGENCY MEDICINE

## 2023-03-28 PROCEDURE — 81001 URINALYSIS AUTO W/SCOPE: CPT | Performed by: EMERGENCY MEDICINE

## 2023-03-28 PROCEDURE — 36415 COLL VENOUS BLD VENIPUNCTURE: CPT | Performed by: EMERGENCY MEDICINE

## 2023-03-28 PROCEDURE — 999N000104 HC STATISTIC NO CHARGE

## 2023-03-28 PROCEDURE — 93005 ELECTROCARDIOGRAM TRACING: CPT | Performed by: EMERGENCY MEDICINE

## 2023-03-28 PROCEDURE — 84484 ASSAY OF TROPONIN QUANT: CPT | Mod: 91 | Performed by: EMERGENCY MEDICINE

## 2023-03-28 PROCEDURE — 84484 ASSAY OF TROPONIN QUANT: CPT | Performed by: EMERGENCY MEDICINE

## 2023-03-28 PROCEDURE — 93798 PHYS/QHP OP CAR RHAB W/ECG: CPT

## 2023-03-28 RX ORDER — NITROFURANTOIN 25; 75 MG/1; MG/1
100 CAPSULE ORAL 2 TIMES DAILY
Qty: 10 CAPSULE | Refills: 0 | Status: SHIPPED | OUTPATIENT
Start: 2023-03-28 | End: 2023-04-02

## 2023-03-28 ASSESSMENT — ENCOUNTER SYMPTOMS
FEVER: 0
COUGH: 0
DIZZINESS: 1
SHORTNESS OF BREATH: 0

## 2023-03-28 ASSESSMENT — ACTIVITIES OF DAILY LIVING (ADL)
ADLS_ACUITY_SCORE: 35
ADLS_ACUITY_SCORE: 35

## 2023-03-28 NOTE — DISCHARGE INSTRUCTIONS
Fortunately all of the heart tests are normal.  It does look like you have a urinary tract infection and this might be why you are feeling dizzy.  I am prescribing an antibiotic.  The cardiologists feel it is safe for you to go home, and take this medication.

## 2023-03-28 NOTE — ED TRIAGE NOTES
Patient Radha speaking, daughter here helping with language. Patient had CAB at [facility] one month ago, here today at cardiac rehab. Patient was 9 mins into rehab and stated she was dizzy. Internal Code 9 called.    Patient is diabetic, blood sugar done in rehab at 487 and 437. Daughter states her sugar has been elevated, she is unaware how high. Patient is on oral and insulin. Did take oral med this am, ate light breakfast. Per daughter, has been dizzy for last 2-3 days, worse in evening and with movement. WC to ER triage for further eval.

## 2023-03-28 NOTE — ED PROVIDER NOTES
EMERGENCY DEPARTMENT ENCOUNTER     NAME: Rachael Ch   AGE: 57 year old female   YOB: 1965   MRN: [id number]   EVALUATION DATE & TIME: No admission date for patient encounter.   PCP: Amrita Carballo     Chief Complaint   Patient presents with     Chest Pain     Dizziness   :    FINAL IMPRESSION       1. Urinary tract infection without hematuria, site unspecified    2. Lightheadedness           ED COURSE & MEDICAL DECISION MAKING      Pertinent Labs & Imaging studies reviewed. (See chart for details)   57 year old female  presents to the Emergency Department for evaluation of lightheadedness that occurred while at cardiac rehab today.  On chart review, patient had an admission at [facility] with a CABG 1 month ago after ACS.  Family notes that she is an insulin-dependent diabetic and her sugars have been running a little high over the weekend, and she did not yet have her insulin this morning. Initial Vitals Reviewed. Initial exam notable for patient with normal vitals, a healing sternotomy scar with glue still in place on her chest, no signs of dehiscence or purulence.  She has no neurologic deficits and contrary to what the initial triage complaint was put as she denies any chest pain.  I did a work-up to look for ACS and her EKG while abnormal is unchanged over several EKGs, troponins are not rising as I checked serial troponin here, and I discussed her case with Dr. Chung of cardiology and he does not see any acute changes on her EKG nor does it seem that this was another coronary event.  I did check for something like DKA, dehydration, electrolyte abnormalities and fortunately this looks reassuring without significant acidosis, no anion gap, no significant ketones.  I checked a urinalysis and it does look like she has a urinary tract infection with white blood cells, bacteria, leukocyte Estrace.  This alone can make someone feel lightheaded or dizzy, and it could also be affecting her blood sugar.   She is otherwise quite well-appearing and with cardiology being amenable to outpatient follow-up and discharge for her, her and family are as well.  Therefore, I am prescribing some antibiotics and I will cautiously discharge her with return precautions.        8:46 AM I performed my initial interview and exam.  12:09 PM I paged for cardiology.  12:22 PM I spoke with Dr. Chung, Cardiology.  12:28 PM I rechecked and updated the patient prior to discharge.    At the conclusion of the encounter I discussed the results of all of the tests and the disposition. The questions were answered. The patient or family acknowledged understanding and was agreeable with the care plan.         MEDICATIONS GIVEN IN THE EMERGENCY:   Medications - No data to display   NEW PRESCRIPTIONS STARTED AT TODAY'S ER VISIT   Discharge Medication List as of 3/28/2023 12:41 PM      START taking these medications    Details   nitroFURantoin macrocrystal-monohydrate (MACROBID) 100 MG capsule Take 1 capsule (100 mg) by mouth 2 times daily for 5 days, Disp-10 capsule, R-0, Local Print             Medical Decision Making    History:    Supplemental history from: Family member    External Record(s) reviewed: Documented in chart, if applicable.    Work Up:    Chart documentation includes differential considered and any EKGs or imaging independently interpreted by provider, where specified.    In additional to work up documented, I considered the following work up: Documented in chart, if applicable.    External consultation:    Discussion of management with another provider: Cardiology    Complicating factors:    Care impacted by chronic illness: Coronary artery disease, diabetes    Care affected by social determinants of health: N/A    Disposition considerations: Discharge. I prescribed additional prescription strength medication(s) as charted. I considered admission, but ultimately discharged patient With collaboration with cardiology and  reassuring work-up.    ================================================================   HISTORY OF PRESENT ILLNESS       Patient information was obtained from: Patient   Use of Intrepreter: Yes (In Person) - Language - Radha Ch is a 57 year old female with history of NSTEMI, s/p CABG x5 , coronary artery disease, GERD, CKD stage 3a, DM Type II, and essential hypertension who presents to the ED by wheelchair for evaluation of chest pain and dizziness.    Per Chart Review,  1/31/23 The patient was admitted to [facility] for further evaluation of chest pain. Coronary angiogram showed severe MVCAD. Echo showed preserved LV and valvular function. CV surgery was consulted for surgical revascularization. Patient underwent CABG x5 on 2/3/23 by Dr. Menchaca. PATIENT tolerated the procedure well and was transferred to the ICU extubated. At the time of discharge the patient was without chest pain, shortness of breath, abdominal pain, or nausea. Her incision was healing well and her pain was controlled with Tylenol and occasional oxycodone. Patient was to follow-up with her PCP and cardiology.    Patient reports that she was at cardiac rehab this morning and then about 9 minutes into her rehab she became dizzy. Patient reports that she is also having a burning chest pain that she says is similar to her typical gas pains. She notes that her blood sugar has been elevated recently and it was 487 this morning at the rehab office. She notes that she is on an oral medication and insulin for her diabetes and she took her oral medication this morning. Patient denies fevers, cough, shortness of breath, or any other concerns at this time.    ================================================================    REVIEW OF SYSTEMS     Review of Systems   Constitutional: Negative for fever.   Respiratory: Negative for cough and shortness of breath.    Cardiovascular: Negative for chest pain.   Neurological: Positive for  dizziness.   All other systems reviewed and are negative.        PAST HISTORY     PAST MEDICAL HISTORY:   Past Medical History:   Diagnosis Date     Aspirin contraindicated 11/07/2013    young age/amp     Chronic kidney disease, stage 3a (H) 11/30/2021     Coronary artery disease involving autologous artery coronary bypass graft without angina pectoris 2/15/2023     Depressive disorder      Diabetes (H)      Hyperlipidemia      Hypertension       PAST SURGICAL HISTORY:   No past surgical history on file.   CURRENT MEDICATIONS:   acetaminophen (TYLENOL) 500 MG tablet  Alcohol Swabs (ALCOHOL WIPES) 70 % PADS  aspirin (ASA) 81 MG EC tablet  atorvastatin (LIPITOR) 80 MG tablet  blood glucose (NO BRAND SPECIFIED) lancets standard  blood glucose (NO BRAND SPECIFIED) test strip  blood glucose monitoring (NO BRAND SPECIFIED) meter device kit  clopidogrel (PLAVIX) 75 MG tablet  Continuous Blood Gluc Sensor (FREESTYLE RENAN 2 SENSOR) MISC  diclofenac (VOLTAREN) 1 % topical gel  empagliflozin (JARDIANCE) 25 MG TABS tablet  famotidine (PEPCID) 20 MG tablet  glipiZIDE (GLUCOTROL XL) 5 MG 24 hr tablet  hydrOXYzine (VISTARIL) 25 MG capsule  metFORMIN (GLUCOPHAGE XR) 500 MG 24 hr tablet  nitroGLYcerin (NITROSTAT) 0.4 MG sublingual tablet  omeprazole (PRILOSEC) 40 MG DR capsule  senna-docusate (STIMULANT LAXATIVE) 8.6-50 MG tablet  simethicone (MYLICON) 80 MG chewable tablet  sitagliptin (JANUVIA) 100 MG tablet      ALLERGIES:   Allergies   Allergen Reactions     Gabapentin Other (See Comments)     Facial Swelling     Venlafaxine      Causes abdominal pain, poor appetite and dizziness.        FAMILY HISTORY:   Family History   Problem Relation Age of Onset     Cancer Mother      Diabetes Father      Coronary Artery Disease No family hx of      Heart Disease No family hx of      Obesity No family hx of      Asthma No family hx of      Breast Cancer No family hx of      Ovarian Cancer No family hx of       SOCIAL HISTORY:   Social  History     Socioeconomic History     Marital status: Single   Tobacco Use     Smoking status: Former     Packs/day: 0.50     Types: Cigarettes     Quit date: 2017     Years since quittin.5     Smokeless tobacco: Former     Types: Chew   Substance and Sexual Activity     Alcohol use: No     Drug use: No     Sexual activity: Yes     Partners: Male        VITALS  Patient Vitals for the past 24 hrs:   BP Temp Temp src Pulse Resp SpO2 Weight   23 0833 139/73 98.3  F (36.8  C) Oral 81 17 99 % 57.2 kg (126 lb)        ================================================================    PHYSICAL EXAM     VITAL SIGNS: /73   Pulse 81   Temp 98.3  F (36.8  C) (Oral)   Resp 17   Wt 57.2 kg (126 lb)   SpO2 99%   BMI 26.79 kg/m     Constitutional:  Awake, no acute distress   HENT:  Atraumatic, oropharynx without exudate or erythema, membranes moist  Lymph:  No adenopathy  Eyes: EOM intact, PERRL, no injection  Neck: Supple  Respiratory:  Clear to auscultation bilaterally, no wheezes or crackles   Cardiovascular:  Regular rate and rhythm, single S1 and S2   GI:  Soft, nontender, nondistended, no rebound or guarding   Musculoskeletal:  Moves all extremities, no lower extremity edema, no deformities.   Skin:  Warm, dry. Healing sternotomy scar on her chest.  Neurologic:  Alert and oriented x3, no focal deficits noted     ================================================================  LAB       All pertinent labs reviewed and interpreted.   Labs Ordered and Resulted from Time of ED Arrival to Time of ED Departure   BASIC METABOLIC PANEL - Abnormal       Result Value    Sodium 135 (*)     Potassium 4.0      Chloride 101      Carbon Dioxide (CO2) 21 (*)     Anion Gap 13      Urea Nitrogen 28.3 (*)     Creatinine 0.79      Calcium 9.7      Glucose 439 (*)     GFR Estimate 86     TROPONIN T, HIGH SENSITIVITY - Abnormal    Troponin T, High Sensitivity 32 (*)    BLOOD GAS VENOUS - Abnormal    pH Venous 7.34 (*)      pCO2 Venous 42      pO2 Venous 62 (*)     Bicarbonate Venous 23 (*)     Base Excess/Deficit (+/-) -3.3      Oxyhemoglobin Venous 89.8 (*)     O2 Sat, Venous 90.7 (*)    ROUTINE UA WITH MICROSCOPIC REFLEX TO CULTURE - Abnormal    Color Urine Light Yellow      Appearance Urine Clear      Glucose Urine >1000 (*)     Bilirubin Urine Negative      Ketones Urine Negative      Specific Gravity Urine 1.029      Blood Urine Negative      pH Urine 6.0      Protein Albumin Urine Negative      Urobilinogen Urine <2.0      Nitrite Urine Negative      Leukocyte Esterase Urine 500 Marci/uL (*)     Bacteria Urine Few (*)     RBC Urine 9 (*)     WBC Urine 135 (*)     Squamous Epithelials Urine 2 (*)    GLUCOSE BY METER - Abnormal    GLUCOSE BY METER POCT 386 (*)    GLUCOSE BY METER - Abnormal    GLUCOSE BY METER POCT 304 (*)    TROPONIN T, HIGH SENSITIVITY - Abnormal    Troponin T, High Sensitivity 33 (*)    KETONE BETA-HYDROXYBUTYRATE QUANTITATIVE, RAPID - Normal    Ketone (Beta-Hydroxybutyrate) Quantitative <0.20     MAGNESIUM - Normal    Magnesium 2.0     GLUCOSE MONITOR NURSING POCT   CBC WITH PLATELETS AND DIFFERENTIAL    WBC Count 6.9      RBC Count 4.70      Hemoglobin 12.8      Hematocrit 40.0      MCV 85      MCH 27.2      MCHC 32.0      RDW 13.8      Platelet Count 236      % Neutrophils 58      % Lymphocytes 34      % Monocytes 6      % Eosinophils 2      % Basophils 0      % Immature Granulocytes 0      NRBCs per 100 WBC 0      Absolute Neutrophils 4.0      Absolute Lymphocytes 2.4      Absolute Monocytes 0.4      Absolute Eosinophils 0.1      Absolute Basophils 0.0      Absolute Immature Granulocytes 0.0      Absolute NRBCs 0.0     URINE CULTURE        ===============================================================  RADIOLOGY       Reviewed all pertinent imaging. Please see official radiology report.   No orders to display         ================================================================  EKG       EKG reviewed  interpreted by me shows sinus rhythm with rate of 70, normal axis, QTc 449 with flattened T waves in lateral leads, no STEMI criteria    Repeat EKG reviewed interpreted by me shows sinus rhythm with a rate of 81, normal axis, QTc 466 with morphology that is unchanged since previous    I have independently reviewed and interpreted the EKG(s) documented above.     ================================================================  PROCEDURES         I, Iam Glez, am serving as a scribe to document services personally performed by Dr. Forrester based on my observation and the provider's statements to me. I, Maine Forrester MD attest that Iam Glez is acting in a scribe capacity, has observed my performance of the services and has documented them in accordance with my direction.     Maine Forrester M.D.   Emergency Medicine   [facility] EMERGENCY DEPARTMENT  [address] [place] [id number]  [phone]  Dept: [phone]        Maine Forrester MD  03/28/23 3402

## 2023-03-29 LAB
ATRIAL RATE - MUSE: 70 BPM
ATRIAL RATE - MUSE: 81 BPM
DIASTOLIC BLOOD PRESSURE - MUSE: 72 MMHG
DIASTOLIC BLOOD PRESSURE - MUSE: NORMAL MMHG
INTERPRETATION ECG - MUSE: NORMAL
INTERPRETATION ECG - MUSE: NORMAL
P AXIS - MUSE: 37 DEGREES
P AXIS - MUSE: 47 DEGREES
PR INTERVAL - MUSE: 136 MS
PR INTERVAL - MUSE: 138 MS
QRS DURATION - MUSE: 110 MS
QRS DURATION - MUSE: 96 MS
QT - MUSE: 402 MS
QT - MUSE: 416 MS
QTC - MUSE: 449 MS
QTC - MUSE: 466 MS
R AXIS - MUSE: -17 DEGREES
R AXIS - MUSE: 2 DEGREES
SYSTOLIC BLOOD PRESSURE - MUSE: 136 MMHG
SYSTOLIC BLOOD PRESSURE - MUSE: NORMAL MMHG
T AXIS - MUSE: 104 DEGREES
T AXIS - MUSE: 99 DEGREES
VENTRICULAR RATE- MUSE: 70 BPM
VENTRICULAR RATE- MUSE: 81 BPM

## 2023-03-30 ENCOUNTER — HOSPITAL ENCOUNTER (OUTPATIENT)
Dept: CARDIAC REHAB | Facility: HOSPITAL | Age: 58
Discharge: HOME | End: 2023-03-30
Attending: INTERNAL MEDICINE
Payer: COMMERCIAL

## 2023-03-30 LAB — BACTERIA UR CULT: ABNORMAL

## 2023-03-30 PROCEDURE — 93798 PHYS/QHP OP CAR RHAB W/ECG: CPT

## 2023-03-30 RX ORDER — CEFDINIR 300 MG/1
300 CAPSULE ORAL 2 TIMES DAILY
Qty: 20 CAPSULE | Refills: 0 | Status: SHIPPED | OUTPATIENT
Start: 2023-03-30 | End: 2023-04-09

## 2023-04-04 ENCOUNTER — HOSPITAL ENCOUNTER (OUTPATIENT)
Dept: CARDIAC REHAB | Facility: HOSPITAL | Age: 58
Discharge: HOME | End: 2023-04-04
Attending: INTERNAL MEDICINE
Payer: COMMERCIAL

## 2023-04-04 PROCEDURE — 93798 PHYS/QHP OP CAR RHAB W/ECG: CPT

## 2023-04-06 ENCOUNTER — HOSPITAL ENCOUNTER (OUTPATIENT)
Dept: CARDIAC REHAB | Facility: HOSPITAL | Age: 58
Discharge: HOME | End: 2023-04-06
Attending: INTERNAL MEDICINE
Payer: COMMERCIAL

## 2023-04-06 PROCEDURE — 93798 PHYS/QHP OP CAR RHAB W/ECG: CPT

## 2023-04-11 ENCOUNTER — HOSPITAL ENCOUNTER (OUTPATIENT)
Dept: CARDIAC REHAB | Facility: HOSPITAL | Age: 58
Discharge: HOME | End: 2023-04-11
Attending: INTERNAL MEDICINE
Payer: COMMERCIAL

## 2023-04-11 PROCEDURE — 93798 PHYS/QHP OP CAR RHAB W/ECG: CPT

## 2023-04-13 ENCOUNTER — HOSPITAL ENCOUNTER (OUTPATIENT)
Dept: CARDIAC REHAB | Facility: HOSPITAL | Age: 58
Discharge: HOME | End: 2023-04-13
Attending: INTERNAL MEDICINE
Payer: COMMERCIAL

## 2023-04-13 PROCEDURE — 93798 PHYS/QHP OP CAR RHAB W/ECG: CPT

## 2023-04-15 DIAGNOSIS — E11.29 TYPE 2 DIABETES MELLITUS WITH MICROALBUMINURIA, WITHOUT LONG-TERM CURRENT USE OF INSULIN (H): Primary | ICD-10-CM

## 2023-04-15 DIAGNOSIS — R80.9 TYPE 2 DIABETES MELLITUS WITH MICROALBUMINURIA, WITHOUT LONG-TERM CURRENT USE OF INSULIN (H): Primary | ICD-10-CM

## 2023-04-18 ENCOUNTER — OFFICE VISIT (OUTPATIENT)
Dept: FAMILY MEDICINE | Facility: CLINIC | Age: 58
End: 2023-04-18
Payer: COMMERCIAL

## 2023-04-18 ENCOUNTER — HOSPITAL ENCOUNTER (OUTPATIENT)
Dept: CARDIAC REHAB | Facility: HOSPITAL | Age: 58
Discharge: HOME | End: 2023-04-18
Attending: INTERNAL MEDICINE
Payer: COMMERCIAL

## 2023-04-18 VITALS
HEIGHT: 59 IN | HEART RATE: 82 BPM | SYSTOLIC BLOOD PRESSURE: 113 MMHG | DIASTOLIC BLOOD PRESSURE: 75 MMHG | RESPIRATION RATE: 30 BRPM | TEMPERATURE: 98.3 F | OXYGEN SATURATION: 98 % | BODY MASS INDEX: 26 KG/M2 | WEIGHT: 129 LBS

## 2023-04-18 DIAGNOSIS — I25.810 CORONARY ARTERY DISEASE INVOLVING AUTOLOGOUS ARTERY CORONARY BYPASS GRAFT WITHOUT ANGINA PECTORIS: Primary | ICD-10-CM

## 2023-04-18 DIAGNOSIS — R80.9 TYPE 2 DIABETES MELLITUS WITH MICROALBUMINURIA, WITHOUT LONG-TERM CURRENT USE OF INSULIN (H): ICD-10-CM

## 2023-04-18 DIAGNOSIS — I10 ESSENTIAL HYPERTENSION, BENIGN: ICD-10-CM

## 2023-04-18 DIAGNOSIS — E11.29 TYPE 2 DIABETES MELLITUS WITH MICROALBUMINURIA, WITHOUT LONG-TERM CURRENT USE OF INSULIN (H): ICD-10-CM

## 2023-04-18 LAB — HBA1C MFR BLD: 10.9 % (ref 0–5.6)

## 2023-04-18 PROCEDURE — 36415 COLL VENOUS BLD VENIPUNCTURE: CPT | Performed by: STUDENT IN AN ORGANIZED HEALTH CARE EDUCATION/TRAINING PROGRAM

## 2023-04-18 PROCEDURE — 83036 HEMOGLOBIN GLYCOSYLATED A1C: CPT | Performed by: STUDENT IN AN ORGANIZED HEALTH CARE EDUCATION/TRAINING PROGRAM

## 2023-04-18 PROCEDURE — 99214 OFFICE O/P EST MOD 30 MIN: CPT | Performed by: STUDENT IN AN ORGANIZED HEALTH CARE EDUCATION/TRAINING PROGRAM

## 2023-04-18 PROCEDURE — 93798 PHYS/QHP OP CAR RHAB W/ECG: CPT

## 2023-04-18 NOTE — PROGRESS NOTES
Assessment & Plan     Type 2 diabetes mellitus with microalbuminuria, without long-term current use of insulin (H)  Recently saw the endocrinologist at [facility].  She endorses taking Lantus 10 units twice daily although the chart says 10 units once daily.  She continues to take metformin, empagliflozin, sitagliptin, and glipizide.  Daughter is helping with meds.  We will increase her Lantus to 12 units twice daily  -Increase Lantus from 10 units twice daily to 12 units twice daily.  - Hemoglobin A1c  -Metformin 2 g daily  -Sitagliptin 100 mg daily  -Empagliflozin 25 mg daily  -Glipizide XL 5 mg daily    Coronary artery disease involving autologous artery coronary bypass graft without angina pectoris  Symptoms are stable.  No chest pain at this time.    Essential hypertension, benign  Blood pressure well controlled.    Patient Instructions     Your diabetes numbers are much better!  But they are still too high.      Your A1c number is better.  This is good!    Results for orders placed or performed in visit on 04/18/23   Hemoglobin A1c     Status: Abnormal   Result Value Ref Range    Hemoglobin A1C 10.9 (H) 0.0 - 5.6 %     Keep taking same pills.    Increase the Lantus insulin to 12 Units.   Keep taking 2x per day.    Follow up with Dr. Carballo in 1 month.      Keep your appointment with the endocrinologist.          Amrita Carballo MD  [facility] VERONICA Cano is a 57 year old, presenting for the following health issues:  Diabetes (Dm follow up )        4/18/2023     4:13 PM   Additional Questions   Roomed by TIM Rivas MA   Accompanied by Self     Patient seen with Medical Student, Anjali Bunn MS3, and Radha  Silvino Banks, present in person.      HPI     Diabetes Follow-up    How often are you checking your blood sugar? Continuous glucose monitor  What time of day are you checking your blood sugars (select all that apply)?  Not applicable  Have you had any blood sugars  "above 200?  Yes   Have you had any blood sugars below 70?  No    What symptoms do you notice when your blood sugar is low?  None and Not applicable    What concerns do you have today about your diabetes? Blood sugar is often over 200     Do you have any of these symptoms? (Select all that apply)  Blurry vision    Have you had a diabetic eye exam in the last 12 months? Yes.  Recently saw the eye doctor.      She overall has been feeling well.  Is taking her medications regularly without difficulty.  Has been using her glucometer, and sugars in the morning are typically below 200, between 150 and 200 and postmeal are in the 2 50-2 80 range.  She shares that she saw a different doctor, and was started on insulin.  This has been going well.  Is not as painful or challenging as she thought.  She shares that she does 10 units of Lantus twice daily.    BP Readings from Last 2 Encounters:   04/18/23 113/75   03/28/23 126/73     Hemoglobin A1C (%)   Date Value   04/18/2023 10.9 (H)   12/28/2022 12.1 (H)   06/16/2021 6.9 (H)   03/05/2021 6.7 (H)     LDL Cholesterol Calculated (mg/dL)   Date Value   12/28/2022 220 (H)   09/21/2021 123   08/06/2020 73   04/17/2019 59         Objective    /75   Pulse 82   Temp 98.3  F (36.8  C) (Oral)   Resp 30   Ht 1.486 m (4' 10.5\")   Wt 58.5 kg (129 lb)   SpO2 98%   BMI 26.50 kg/m    Body mass index is 26.5 kg/m .  Physical Exam   Objective:    Vitals:  Vitals are reviewed and are within the normal range  Gen:  Alert, pleasant, no acute distress  Cardiac:  Regular rate and rhythm, no murmurs, rubs or gallops  Respiratory:  Lungs clear to auscultation bilaterally  Abdomen:  Soft, non-tender, non-distended, bowel sounds positive  Extremities:  Warm, well-perfused, pulses 2+/4, no lower extremity edema    Results for orders placed or performed in visit on 04/18/23   Hemoglobin A1c     Status: Abnormal   Result Value Ref Range    Hemoglobin A1C 10.9 (H) 0.0 - 5.6 %                 "

## 2023-04-18 NOTE — PATIENT INSTRUCTIONS
Your diabetes numbers are much better!  But they are still too high.      Your A1c number is better.  This is good!    Results for orders placed or performed in visit on 04/18/23   Hemoglobin A1c     Status: Abnormal   Result Value Ref Range    Hemoglobin A1C 10.9 (H) 0.0 - 5.6 %     Keep taking same pills.    Increase the Lantus insulin to 12 Units.   Keep taking 2x per day.    Follow up with Dr. Carballo in 1 month.      Keep your appointment with the endocrinologist.

## 2023-04-20 ENCOUNTER — HOSPITAL ENCOUNTER (OUTPATIENT)
Dept: CARDIAC REHAB | Facility: HOSPITAL | Age: 58
Discharge: HOME | End: 2023-04-20
Attending: INTERNAL MEDICINE
Payer: COMMERCIAL

## 2023-04-20 PROCEDURE — 93798 PHYS/QHP OP CAR RHAB W/ECG: CPT

## 2023-04-22 NOTE — NURSING NOTE
Due to patient being non-English speaking/uses sign language, an  was used for this visit. Only for face-to-face interpretation by an external agency, date and length of interpretation can be found on the scanned worksheet.       name: Silvino Banks (Htoo)  Language: Radha  Agency:  [employer]  Phone number: [phone]  Type of interpretation:  Telephone, spoken     intra cardiac mass

## 2023-04-25 ENCOUNTER — HOSPITAL ENCOUNTER (OUTPATIENT)
Dept: CARDIAC REHAB | Facility: HOSPITAL | Age: 58
Discharge: HOME | End: 2023-04-25
Attending: INTERNAL MEDICINE
Payer: COMMERCIAL

## 2023-04-25 PROCEDURE — 93798 PHYS/QHP OP CAR RHAB W/ECG: CPT

## 2023-04-27 ENCOUNTER — HOSPITAL ENCOUNTER (OUTPATIENT)
Dept: CARDIAC REHAB | Facility: HOSPITAL | Age: 58
Discharge: HOME | End: 2023-04-27
Attending: INTERNAL MEDICINE
Payer: COMMERCIAL

## 2023-04-27 PROCEDURE — 93798 PHYS/QHP OP CAR RHAB W/ECG: CPT

## 2023-05-09 ENCOUNTER — MEDICAL CORRESPONDENCE (OUTPATIENT)
Dept: CARDIAC REHAB | Facility: HOSPITAL | Age: 58
End: 2023-05-09
Payer: COMMERCIAL

## 2023-05-30 ENCOUNTER — OFFICE VISIT (OUTPATIENT)
Dept: FAMILY MEDICINE | Facility: CLINIC | Age: 58
End: 2023-05-30
Payer: COMMERCIAL

## 2023-05-30 VITALS
SYSTOLIC BLOOD PRESSURE: 108 MMHG | OXYGEN SATURATION: 98 % | HEART RATE: 72 BPM | BODY MASS INDEX: 27.61 KG/M2 | RESPIRATION RATE: 22 BRPM | TEMPERATURE: 98.1 F | DIASTOLIC BLOOD PRESSURE: 72 MMHG | WEIGHT: 128 LBS | HEIGHT: 57 IN

## 2023-05-30 DIAGNOSIS — E11.29 TYPE 2 DIABETES MELLITUS WITH MICROALBUMINURIA, WITHOUT LONG-TERM CURRENT USE OF INSULIN (H): ICD-10-CM

## 2023-05-30 DIAGNOSIS — I20.0 UNSTABLE ANGINA (H): ICD-10-CM

## 2023-05-30 DIAGNOSIS — F41.9 ANXIETY: ICD-10-CM

## 2023-05-30 DIAGNOSIS — Z12.4 CERVICAL CANCER SCREENING: ICD-10-CM

## 2023-05-30 DIAGNOSIS — R22.1 NECK SWELLING: ICD-10-CM

## 2023-05-30 DIAGNOSIS — Z00.00 ROUTINE GENERAL MEDICAL EXAMINATION AT A HEALTH CARE FACILITY: ICD-10-CM

## 2023-05-30 DIAGNOSIS — K21.00 GASTROESOPHAGEAL REFLUX DISEASE WITH ESOPHAGITIS, UNSPECIFIED WHETHER HEMORRHAGE: ICD-10-CM

## 2023-05-30 DIAGNOSIS — I25.118 CORONARY ARTERY DISEASE OF NATIVE HEART WITH STABLE ANGINA PECTORIS, UNSPECIFIED VESSEL OR LESION TYPE (H): ICD-10-CM

## 2023-05-30 DIAGNOSIS — E11.9 TYPE 2 DIABETES MELLITUS WITHOUT COMPLICATION, WITHOUT LONG-TERM CURRENT USE OF INSULIN (H): ICD-10-CM

## 2023-05-30 DIAGNOSIS — Z12.11 SCREEN FOR COLON CANCER: ICD-10-CM

## 2023-05-30 DIAGNOSIS — M19.011 PRIMARY OSTEOARTHRITIS OF RIGHT SHOULDER: ICD-10-CM

## 2023-05-30 DIAGNOSIS — R80.9 TYPE 2 DIABETES MELLITUS WITH MICROALBUMINURIA, WITHOUT LONG-TERM CURRENT USE OF INSULIN (H): ICD-10-CM

## 2023-05-30 DIAGNOSIS — K59.00 CONSTIPATION, UNSPECIFIED CONSTIPATION TYPE: ICD-10-CM

## 2023-05-30 DIAGNOSIS — R53.83 OTHER FATIGUE: Primary | ICD-10-CM

## 2023-05-30 LAB
ANION GAP SERPL CALCULATED.3IONS-SCNC: 12 MMOL/L (ref 7–15)
BASOPHILS # BLD AUTO: 0 10E3/UL (ref 0–0.2)
BASOPHILS NFR BLD AUTO: 0 %
BUN SERPL-MCNC: 23.4 MG/DL (ref 8–23)
CALCIUM SERPL-MCNC: 9.9 MG/DL (ref 8.6–10)
CHLORIDE SERPL-SCNC: 103 MMOL/L (ref 98–107)
CHOLEST SERPL-MCNC: 251 MG/DL
CREAT SERPL-MCNC: 0.85 MG/DL (ref 0.51–0.95)
DEPRECATED CALCIDIOL+CALCIFEROL SERPL-MC: 21 UG/L (ref 20–75)
DEPRECATED HCO3 PLAS-SCNC: 21 MMOL/L (ref 22–29)
EOSINOPHIL # BLD AUTO: 0.1 10E3/UL (ref 0–0.7)
EOSINOPHIL NFR BLD AUTO: 2 %
ERYTHROCYTE [DISTWIDTH] IN BLOOD BY AUTOMATED COUNT: 13.5 % (ref 10–15)
GFR SERPL CREATININE-BSD FRML MDRD: 78 ML/MIN/1.73M2
GLUCOSE SERPL-MCNC: 258 MG/DL (ref 70–99)
HBA1C MFR BLD: 11.7 % (ref 0–5.6)
HCT VFR BLD AUTO: 37.8 % (ref 35–47)
HDLC SERPL-MCNC: 40 MG/DL
HGB BLD-MCNC: 12.4 G/DL (ref 11.7–15.7)
IMM GRANULOCYTES # BLD: 0 10E3/UL
IMM GRANULOCYTES NFR BLD: 0 %
LDLC SERPL CALC-MCNC: 173 MG/DL
LYMPHOCYTES # BLD AUTO: 2.3 10E3/UL (ref 0.8–5.3)
LYMPHOCYTES NFR BLD AUTO: 28 %
MCH RBC QN AUTO: 26.3 PG (ref 26.5–33)
MCHC RBC AUTO-ENTMCNC: 32.8 G/DL (ref 31.5–36.5)
MCV RBC AUTO: 80 FL (ref 78–100)
MONOCYTES # BLD AUTO: 0.6 10E3/UL (ref 0–1.3)
MONOCYTES NFR BLD AUTO: 7 %
NEUTROPHILS # BLD AUTO: 5.2 10E3/UL (ref 1.6–8.3)
NEUTROPHILS NFR BLD AUTO: 63 %
NONHDLC SERPL-MCNC: 211 MG/DL
PLATELET # BLD AUTO: 232 10E3/UL (ref 150–450)
POTASSIUM SERPL-SCNC: 4.5 MMOL/L (ref 3.4–5.3)
RBC # BLD AUTO: 4.71 10E6/UL (ref 3.8–5.2)
SODIUM SERPL-SCNC: 136 MMOL/L (ref 136–145)
TRIGL SERPL-MCNC: 192 MG/DL
TSH SERPL DL<=0.005 MIU/L-ACNC: 1.4 UIU/ML (ref 0.3–4.2)
WBC # BLD AUTO: 8.2 10E3/UL (ref 4–11)

## 2023-05-30 PROCEDURE — 99214 OFFICE O/P EST MOD 30 MIN: CPT | Mod: 25 | Performed by: STUDENT IN AN ORGANIZED HEALTH CARE EDUCATION/TRAINING PROGRAM

## 2023-05-30 PROCEDURE — 36415 COLL VENOUS BLD VENIPUNCTURE: CPT | Performed by: STUDENT IN AN ORGANIZED HEALTH CARE EDUCATION/TRAINING PROGRAM

## 2023-05-30 PROCEDURE — 99396 PREV VISIT EST AGE 40-64: CPT | Performed by: STUDENT IN AN ORGANIZED HEALTH CARE EDUCATION/TRAINING PROGRAM

## 2023-05-30 PROCEDURE — 80061 LIPID PANEL: CPT | Performed by: STUDENT IN AN ORGANIZED HEALTH CARE EDUCATION/TRAINING PROGRAM

## 2023-05-30 PROCEDURE — 85025 COMPLETE CBC W/AUTO DIFF WBC: CPT | Performed by: STUDENT IN AN ORGANIZED HEALTH CARE EDUCATION/TRAINING PROGRAM

## 2023-05-30 PROCEDURE — 83036 HEMOGLOBIN GLYCOSYLATED A1C: CPT | Performed by: STUDENT IN AN ORGANIZED HEALTH CARE EDUCATION/TRAINING PROGRAM

## 2023-05-30 PROCEDURE — 84443 ASSAY THYROID STIM HORMONE: CPT | Performed by: STUDENT IN AN ORGANIZED HEALTH CARE EDUCATION/TRAINING PROGRAM

## 2023-05-30 PROCEDURE — 82306 VITAMIN D 25 HYDROXY: CPT | Performed by: STUDENT IN AN ORGANIZED HEALTH CARE EDUCATION/TRAINING PROGRAM

## 2023-05-30 PROCEDURE — 80048 BASIC METABOLIC PNL TOTAL CA: CPT | Performed by: STUDENT IN AN ORGANIZED HEALTH CARE EDUCATION/TRAINING PROGRAM

## 2023-05-30 RX ORDER — OMEPRAZOLE 40 MG/1
40 CAPSULE, DELAYED RELEASE ORAL DAILY
Qty: 21 CAPSULE | Refills: 0 | Status: SHIPPED | OUTPATIENT
Start: 2023-05-30 | End: 2023-07-20

## 2023-05-30 RX ORDER — ATORVASTATIN CALCIUM 80 MG/1
80 TABLET, FILM COATED ORAL DAILY
Qty: 90 TABLET | Refills: 1 | Status: SHIPPED | OUTPATIENT
Start: 2023-05-30 | End: 2023-08-25

## 2023-05-30 RX ORDER — ACETAMINOPHEN 500 MG
TABLET ORAL
Qty: 180 TABLET | Refills: 1 | Status: SHIPPED | OUTPATIENT
Start: 2023-05-30 | End: 2023-07-20

## 2023-05-30 RX ORDER — AMOXICILLIN 250 MG
1 CAPSULE ORAL DAILY
Qty: 30 TABLET | Refills: 3 | Status: SHIPPED | OUTPATIENT
Start: 2023-05-30 | End: 2023-07-20

## 2023-05-30 RX ORDER — CLOPIDOGREL BISULFATE 75 MG/1
75 TABLET ORAL DAILY
Qty: 90 TABLET | Refills: 1 | Status: SHIPPED | OUTPATIENT
Start: 2023-05-30 | End: 2024-05-28

## 2023-05-30 RX ORDER — METFORMIN HCL 500 MG
500 TABLET, EXTENDED RELEASE 24 HR ORAL 2 TIMES DAILY WITH MEALS
Qty: 180 TABLET | Refills: 1 | Status: SHIPPED | OUTPATIENT
Start: 2023-05-30 | End: 2023-07-20

## 2023-05-30 RX ORDER — HYDROXYZINE PAMOATE 25 MG/1
CAPSULE ORAL
Qty: 90 CAPSULE | Refills: 1 | Status: SHIPPED | OUTPATIENT
Start: 2023-05-30 | End: 2024-04-04

## 2023-05-30 RX ORDER — NITROGLYCERIN 0.4 MG/1
TABLET SUBLINGUAL
Qty: 12 TABLET | Refills: 1 | Status: SHIPPED | OUTPATIENT
Start: 2023-05-30

## 2023-05-30 ASSESSMENT — ENCOUNTER SYMPTOMS
EYE PAIN: 0
MYALGIAS: 0
WEAKNESS: 1
PALPITATIONS: 1
HEMATOCHEZIA: 0
HEADACHES: 1
HEMATURIA: 0
CONSTIPATION: 0
BREAST MASS: 0
DYSURIA: 0
FREQUENCY: 0
FEVER: 0
NERVOUS/ANXIOUS: 1
CHILLS: 0
ARTHRALGIAS: 0
JOINT SWELLING: 0
SORE THROAT: 0
DIARRHEA: 0
ABDOMINAL PAIN: 1
COUGH: 0
PARESTHESIAS: 0
SHORTNESS OF BREATH: 1
HEARTBURN: 0
NAUSEA: 0
DIZZINESS: 1

## 2023-05-30 ASSESSMENT — PATIENT HEALTH QUESTIONNAIRE - PHQ9
5. POOR APPETITE OR OVEREATING: SEVERAL DAYS
SUM OF ALL RESPONSES TO PHQ QUESTIONS 1-9: 14

## 2023-05-30 ASSESSMENT — ANXIETY QUESTIONNAIRES
GAD7 TOTAL SCORE: 4
IF YOU CHECKED OFF ANY PROBLEMS ON THIS QUESTIONNAIRE, HOW DIFFICULT HAVE THESE PROBLEMS MADE IT FOR YOU TO DO YOUR WORK, TAKE CARE OF THINGS AT HOME, OR GET ALONG WITH OTHER PEOPLE: NOT DIFFICULT AT ALL
GAD7 TOTAL SCORE: 4
6. BECOMING EASILY ANNOYED OR IRRITABLE: SEVERAL DAYS
5. BEING SO RESTLESS THAT IT IS HARD TO SIT STILL: NOT AT ALL
3. WORRYING TOO MUCH ABOUT DIFFERENT THINGS: SEVERAL DAYS
7. FEELING AFRAID AS IF SOMETHING AWFUL MIGHT HAPPEN: NOT AT ALL
1. FEELING NERVOUS, ANXIOUS, OR ON EDGE: NOT AT ALL
2. NOT BEING ABLE TO STOP OR CONTROL WORRYING: SEVERAL DAYS

## 2023-05-30 NOTE — PROGRESS NOTES
SUBJECTIVE:   CC: Rachael is an 57 year old who presents for preventive health visit.       5/30/2023     8:53 AM   Additional Questions   Roomed by yanet   Accompanied by daughter   Patient has been advised of split billing requirements and indicates understanding: Yes  Healthy Habits:     Getting at least 3 servings of Calcium per day:  Yes    Bi-annual eye exam:  Yes    Dental care twice a year:  NO    Sleep apnea or symptoms of sleep apnea:  Sleep apnea    Diet:  Regular (no restrictions)    Frequency of exercise:  None    Taking medications regularly:  Not Applicable    Medication side effects:  None    PHQ-2 Total Score: 2    Additional concerns today:  No    Patient presents today for complete physical, however becomes aware that she has acute concerns today.  She is out of all of her pill medications.  Has been feeling tired and fatigued.  Describes tension in her neck, heaviness in her chest, and feeling like she has no energy.  Has been spending time with family and goes out fishing, but has to move slowly.  She does describe some burning in her lower abdomen.  This radiates up into her chest.  She has some pain and burning along her sternotomy site as well.  No pain to the left side in her chest.  No shortness of breath.  She describes a muscle lump that was present on the left side of the chest that is now moved to the central chest and is located just below her sternum.  It is nontender.  No redness.    Also describes a sensation of her heart beating hard and fast in her lower chest.  This happens occasionally for short periods of time.  Has had improvement with use of her to hydroxyzine.  She would like a refill on this.    She has a complex past medical history including coronary artery disease with recent CABG in February 2023.  Also with significant mental health and PTSD symptoms.    She has changed her insulin.  Saw the endocrinologist at [facility] and is now taking 70/30 insulin.   Currently she is taking 12 units in the morning and at night.  Sugars are typically just above the 100s.  She denies low sugars.  Occasional sugars in the 200s.  She has been unable to  her freestyle emerita sensors, but would like refills on these.    She is not taking any of her medications including her diabetes pills.  They have had some difficulty with picking things up from the pharmacy.  Would like new prescription sent in for everything today.    We did not have time to talk about remainder preventative testing.  She does not want a COVID-vaccine.  Is okay with getting the pneumonia shot today.    Today's PHQ-2 Score:       2023     8:53 AM   PHQ-2 (  [employer])   Q1: Little interest or pleasure in doing things 1   Q2: Feeling down, depressed or hopeless 1   PHQ-2 Score 2       Have you ever done Advance Care Planning? (For example, a Health Directive, POLST, or a discussion with a medical provider or your loved ones about your wishes): No, advance care planning information given to patient to review.  Patient plans to discuss their wishes with loved ones or provider.      Social History     Tobacco Use     Smoking status: Former     Packs/day: 0.50     Types: Cigarettes     Quit date: 2017     Years since quittin.7     Smokeless tobacco: Former     Types: Chew   Vaping Use     Vaping status: Not on file   Substance Use Topics     Alcohol use: No             2023     8:41 AM   Alcohol Use   Prescreen: >3 drinks/day or >7 drinks/week? Not Applicable   No alcohol use.    Breast Cancer Screening: This was not discussed today.    FHS-7:       2022    10:54 AM   Breast CA Risk Assessment (FHS-7)   Did any of your first-degree relatives have breast or ovarian cancer? No   Did any of your relatives have bilateral breast cancer? No   Did any man in your family have breast cancer? No   Did any woman in your family have breast and ovarian cancer? No   Did any woman in your family have  breast cancer before age 50 y? No   Do you have 2 or more relatives with breast and/or ovarian cancer? No   Do you have 2 or more relatives with breast and/or bowel cancer? No     Pertinent mammograms are reviewed under the imaging tab.  Most recent mammogram was done in May 2022.  This was normal.    History of abnormal Pap smear: NO - age 30-65 PAP every 5 years with negative HPV co-testing recommended  No history of abnormal Pap.  Most recent Pap was completed in 2018.  Will be due for this at the end of the summer.      Latest Ref Rng & Units 2018     1:32 PM   PAP / HPV   HPV 16 DNA NEG Negative     HPV 18 DNA NEG Negative     Other HR HPV NEG Negative        History of abnormal FIT test with normal colonoscopy.  This was completed in .  She is due for her next screening in .      Reviewed and updated as needed this visit by clinical staff   Tobacco  Allergies               Reviewed and updated as needed this visit by Provider                 Past Medical History:   Diagnosis Date     Aspirin contraindicated 2013    young age/amp     Chronic kidney disease, stage 3a (H) 2021     Coronary artery disease involving autologous artery coronary bypass graft without angina pectoris 2/15/2023     Depressive disorder      Diabetes (H)      Hyperlipidemia      Hypertension       No past surgical history on file.  OB History    Para Term  AB Living   6 6 6 0 0 0   SAB IAB Ectopic Multiple Live Births   0 0 0 0 0      # Outcome Date GA Lbr Nitish/2nd Weight Sex Delivery Anes PTL Lv   6 Term            5 Term            4 Term            3 Term            2 Term            1 Term                Review of Systems   Constitutional: Negative for chills and fever.   HENT: Negative for congestion, ear pain, hearing loss and sore throat.    Eyes: Negative for pain and visual disturbance.   Respiratory: Positive for shortness of breath. Negative for cough.    Cardiovascular: Positive  for chest pain and palpitations. Negative for peripheral edema.   Gastrointestinal: Positive for abdominal pain. Negative for constipation, diarrhea, heartburn, hematochezia and nausea.   Breasts:  Negative for tenderness, breast mass and discharge.   Genitourinary: Negative for dysuria, frequency, genital sores, hematuria, pelvic pain, urgency, vaginal bleeding and vaginal discharge.   Musculoskeletal: Negative for arthralgias, joint swelling and myalgias.   Skin: Negative for rash.   Neurological: Positive for dizziness, weakness and headaches. Negative for paresthesias.   Psychiatric/Behavioral: Positive for mood changes. The patient is nervous/anxious.         OBJECTIVE:   There were no vitals taken for this visit.  Physical Exam  Constitutional:       Appearance: Normal appearance.   HENT:      Head: Normocephalic.      Nose: Nose normal.      Mouth/Throat:      Mouth: Mucous membranes are dry.   Cardiovascular:      Rate and Rhythm: Normal rate and regular rhythm.      Pulses: Normal pulses.      Heart sounds: Normal heart sounds.   Pulmonary:      Effort: Pulmonary effort is normal. No respiratory distress.      Breath sounds: Normal breath sounds. No wheezing.   Musculoskeletal:         General: No swelling. Normal range of motion.      Cervical back: Normal range of motion and neck supple.   Skin:     General: Skin is warm and dry.   Neurological:      General: No focal deficit present.      Mental Status: She is alert. Mental status is at baseline.   Psychiatric:         Mood and Affect: Mood normal.         Behavior: Behavior normal.         Thought Content: Thought content normal.         Judgment: Judgment normal.       Results for orders placed or performed in visit on 05/30/23   Basic metabolic panel     Status: Abnormal   Result Value Ref Range    Sodium 136 136 - 145 mmol/L    Potassium 4.5 3.4 - 5.3 mmol/L    Chloride 103 98 - 107 mmol/L    Carbon Dioxide (CO2) 21 (L) 22 - 29 mmol/L    Anion Gap 12  7 - 15 mmol/L    Urea Nitrogen 23.4 (H) 8.0 - 23.0 mg/dL    Creatinine 0.85 0.51 - 0.95 mg/dL    Calcium 9.9 8.6 - 10.0 mg/dL    Glucose 258 (H) 70 - 99 mg/dL    GFR Estimate 78 >60 mL/min/1.73m2   Hemoglobin A1c     Status: Abnormal   Result Value Ref Range    Hemoglobin A1C 11.7 (H) 0.0 - 5.6 %   Lipid Profile     Status: Abnormal   Result Value Ref Range    Cholesterol 251 (H) <200 mg/dL    Triglycerides 192 (H) <150 mg/dL    Direct Measure HDL 40 (L) >=50 mg/dL    LDL Cholesterol Calculated 173 (H) <=100 mg/dL    Non HDL Cholesterol 211 (H) <130 mg/dL    Narrative    Cholesterol  Desirable:  <200 mg/dL    Triglycerides  Normal:  Less than 150 mg/dL  Borderline High:  150-199 mg/dL  High:  200-499 mg/dL  Very High:  Greater than or equal to 500 mg/dL    Direct Measure HDL  Female:  Greater than or equal to 50 mg/dL   Male:  Greater than or equal to 40 mg/dL    LDL Cholesterol  Desirable:  <100mg/dL  Above Desirable:  100-129 mg/dL   Borderline High:  130-159 mg/dL   High:  160-189 mg/dL   Very High:  >= 190 mg/dL    Non HDL Cholesterol  Desirable:  130 mg/dL  Above Desirable:  130-159 mg/dL  Borderline High:  160-189 mg/dL  High:  190-219 mg/dL  Very High:  Greater than or equal to 220 mg/dL   Vitamin D deficiency screening     Status: Normal   Result Value Ref Range    Vitamin D, Total (25-Hydroxy) 21 20 - 75 ug/L    Narrative    Season, race, dietary intake, and treatment affect the concentration of 25-hydroxy-Vitamin D. Values may decrease during winter months and increase during summer months. Values 20-29 ug/L may indicate Vitamin D insufficiency and values <20 ug/L may indicate Vitamin D deficiency.    Vitamin D determination is routinely performed by an immunoassay specific for 25 hydroxyvitamin D3.  If an individual is on vitamin D2(ergocalciferol) supplementation, please specify 25 OH vitamin D2 and D3 level determination by LCMSMS test [id number].     TSH with free T4 reflex     Status: Normal   Result  Value Ref Range    TSH 1.40 0.30 - 4.20 uIU/mL   CBC with platelets and differential     Status: Abnormal   Result Value Ref Range    WBC Count 8.2 4.0 - 11.0 10e3/uL    RBC Count 4.71 3.80 - 5.20 10e6/uL    Hemoglobin 12.4 11.7 - 15.7 g/dL    Hematocrit 37.8 35.0 - 47.0 %    MCV 80 78 - 100 fL    MCH 26.3 (L) 26.5 - 33.0 pg    MCHC 32.8 31.5 - 36.5 g/dL    RDW 13.5 10.0 - 15.0 %    Platelet Count 232 150 - 450 10e3/uL    % Neutrophils 63 %    % Lymphocytes 28 %    % Monocytes 7 %    % Eosinophils 2 %    % Basophils 0 %    % Immature Granulocytes 0 %    Absolute Neutrophils 5.2 1.6 - 8.3 10e3/uL    Absolute Lymphocytes 2.3 0.8 - 5.3 10e3/uL    Absolute Monocytes 0.6 0.0 - 1.3 10e3/uL    Absolute Eosinophils 0.1 0.0 - 0.7 10e3/uL    Absolute Basophils 0.0 0.0 - 0.2 10e3/uL    Absolute Immature Granulocytes 0.0 <=0.4 10e3/uL   CBC with Platelets & Differential     Status: Abnormal    Narrative    The following orders were created for panel order CBC with Platelets & Differential.  Procedure                               Abnormality         Status                     ---------                               -----------         ------                     CBC with platelets and d...[[id number]]  Abnormal            Final result                 Please view results for these tests on the individual orders.         ASSESSMENT/PLAN:     Rachael was seen today for physical.  Patient presents today for complete physical and preventative visit, however she has multiple acute concerns which were also addressed today.  Most concerning is this generalized fatigue, heaviness and difficulty with ambulation.  She has a complex medical history including a recent CABG done in February.  Has underlying significant mental health.  Has poorly controlled diabetes.  Also noted to have a soft mobile mass present on her upper sternal area, concerning for persistent perhaps an accessory thyroid gland.  We will get labs today.  We will refill her  medications as she is only been taking her insulin and nothing else.  We will have her follow-up closely in 2 to 3 weeks.  She may need to return to the cardiologist as well as endocrinologist for further monitoring.    Diagnoses and all orders for this visit:    Preventative recommendations.  --She is due for a mammogram is doing them annually.  Last one was in May 2022.  Should be discussed next visit.  --She is up-to-date on her colon cancer screening, and should have a colonoscopy or fit test done in 2026.  --She is due for a Pap smear in August of this year.  We can do testing at a future date.  --Pneumonia shot was ordered but was not given today so we should do this at next visit.  She declines additional COVID shots at this time.    Other fatigue  Thyroid, hemoglobin, vitamin D were all normal to rule out potential causes of her fatigue and palpitations.  -     TSH with free T4 reflex; Future  -     CBC with Platelets & Differential; Future  -     Vitamin D deficiency screening; Future  -     Vitamin D deficiency screening  -     CBC with Platelets & Differential  -     TSH with free T4 reflex      Type 2 diabetes mellitus with microalbuminuria, without long-term current use of insulin (H)  We sent in refills for all of her diabetes testing supplies, oral medications.  We will decrease Jardiance from 25-10 and she has not been taking it.  We will decrease metformin from 1000 twice daily to 500 twice daily as she has not been taking it.  Refill aspirin and Lipitor.  She is taking the NPH 70/30 insulin per endocrinology at 12 units twice daily.  This likely needs to be titrated up but I was unsure about her blood sugars and her A1c at the time of the visit.  -     Continuous Blood Gluc Sensor (FREESTYLE RENAN 2 SENSOR) [facility] [place]; Inject 1 each Subcutaneous every 14 days Use 1 sensor every 14 days. Use to read blood sugars per 's instructions.  -     Hemoglobin A1c; Future  -     Basic metabolic panel;  Future  -     Basic metabolic panel  -     Hemoglobin A1c  -     empagliflozin (JARDIANCE) 10 MG TABS tablet; Take 1 tablet (10 mg) by mouth daily  -     metFORMIN (GLUCOPHAGE XR) 500 MG 24 hr tablet; Take 1 tablet (500 mg) by mouth 2 times daily (with meals) TAKE 1 PILLS (500MG) BY MOUTH TWICE DAILY WITH MEALS FOR DIABETES  -     aspirin (ASA) 81 MG EC tablet; Take 1 tablet (81 mg) by mouth daily  -     atorvastatin (LIPITOR) 80 MG tablet; Take 1 tablet (80 mg) by mouth daily  -     Alcohol Swabs (ALCOHOL WIPES) 70 % PADS; 1 pad. Daily    Gastroesophageal reflux disease with esophagitis, unspecified whether hemorrhage  Some of the burning pain in her chest could be from her sternotomy scar but also has history of acid reflux.  We will treat with omeprazole.  This can have interactions with Plavix.  She did not have a stent placed so if its outweigh the risks to help rule out GERD etiology for her pain.  -     omeprazole (PRILOSEC) 40 MG DR capsule; Take 1 capsule (40 mg) by mouth daily      Primary osteoarthritis of right shoulder  Refill Tylenol for neck and shoulder pain.  -     acetaminophen (TYLENOL) 500 MG tablet; TAKE 1 PILL IN IN THE MORNING, THEN 1 PILL IN THE EVENING, AND 1 ADDITIONAL PILL AS NEEDED    Unstable angina (H)  Refilled her aspirin, statin, and nitroglycerin for underlying coronary artery disease.  -     nitroGLYcerin (NITROSTAT) 0.4 MG sublingual tablet; For chest pain place 1 tablet under the tongue every 5 minutes for 3 doses. If symptoms persist 5 minutes after 1st dose call 911.    Constipation, unspecified constipation type  -     senna-docusate (STIMULANT LAXATIVE) 8.6-50 MG tablet; Take 1 tablet by mouth daily As needed.    Anxiety  Refilled hydroxyzine for anxiety.  -     hydrOXYzine (VISTARIL) 25 MG capsule; TAKE 1 CAPSULE BY MOUTH AS NEEDED FOR PANIC, ANXIETY **MAY TAKE 1-2 CAPSULES AT NIGHT TO HELP WITH SLEEP.**    Neck swelling  New swelling located just above her sternotomy scar.   "It is in a location typically inferior to the thyroid, but will order TSH with reflex T4 and a ultrasound to further assess.  -     US Head Neck Soft Tissue; Future  -     TSH with free T4 reflex; Future  -     TSH with free T4 reflex    Coronary artery disease of native heart with stable angina pectoris, unspecified vessel or lesion type (H)  Unstable angina (H)  Refilled her aspirin, Plavix, statin, and nitroglycerin for underlying coronary artery disease.  -     nitroGLYcerin (NITROSTAT) 0.4 MG sublingual tablet; For chest pain place 1 tablet under the tongue every 5 minutes for 3 doses. If symptoms persist 5 minutes after 1st dose call 911.  -     clopidogrel (PLAVIX) 75 MG tablet; Take 1 tablet (75 mg) by mouth daily  -     Lipid Profile; Future  -     Hemoglobin A1c; Future  -     Hemoglobin A1c  -     Lipid Profile    Other orders  Plan to do pneumonia shot and next follow-up visit.  -     Cancel: Pneumococcal 20 Valent Conjugate (Prevnar 20)          Patient has been advised of split billing requirements and indicates understanding: Yes      COUNSELING:  Reviewed preventive health counseling, as reflected in patient instructions       Regular exercise       Healthy diet/nutrition      BMI:   Estimated body mass index is 26.5 kg/m  as calculated from the following:    Height as of 4/18/23: 1.486 m (4' 10.5\").    Weight as of 4/18/23: 58.5 kg (129 lb).   Weight management plan: Discussed healthy diet and exercise guidelines      She reports that she quit smoking about 5 years ago. Her smoking use included cigarettes. She smoked an average of .5 packs per day. She has quit using smokeless tobacco.  Her smokeless tobacco use included chew.      Amrita Carballo MD  [facility]  "

## 2023-05-30 NOTE — PATIENT INSTRUCTIONS
Medications sent in:    Diabetes:  12 Units and 12 Units.  Restart the pills  Big pills (metformin) 1 in AM and 1 at night      Start the acid reflux medication:  Omeprazole.   Ultrasound of your neck  Check blood work, including your thyroid.     Pneumonia shot.    Follow up in 3 weeks.

## 2023-05-30 NOTE — LETTER
June 5, 2023      Rachael [facility]  [address] [place] [address]        Dear ,    We are writing to inform you of your test results.    It was nice to see you in clinic.  Your diabetes blood sugar numbers are still much higher than we would like - I think this is likely making you feel tired.  We'll talk more at your next visit, but the other testing looks okay for now.  Please call the clinic at [phone] if you have any questions.      Resulted Orders   Basic metabolic panel   Result Value Ref Range    Sodium 136 136 - 145 mmol/L    Potassium 4.5 3.4 - 5.3 mmol/L    Chloride 103 98 - 107 mmol/L    Carbon Dioxide (CO2) 21 (L) 22 - 29 mmol/L    Anion Gap 12 7 - 15 mmol/L    Urea Nitrogen 23.4 (H) 8.0 - 23.0 mg/dL    Creatinine 0.85 0.51 - 0.95 mg/dL    Calcium 9.9 8.6 - 10.0 mg/dL    Glucose 258 (H) 70 - 99 mg/dL    GFR Estimate 78 >60 mL/min/1.73m2      Comment:      eGFR calculated using 2021 CKD-EPI equation.   Hemoglobin A1c   Result Value Ref Range    Hemoglobin A1C 11.7 (H) 0.0 - 5.6 %      Comment:      Normal <5.7%   Prediabetes 5.7-6.4%    Diabetes 6.5% or higher     Note: Adopted from ADA consensus guidelines.   Lipid Profile   Result Value Ref Range    Cholesterol 251 (H) <200 mg/dL    Triglycerides 192 (H) <150 mg/dL    Direct Measure HDL 40 (L) >=50 mg/dL    LDL Cholesterol Calculated 173 (H) <=100 mg/dL    Non HDL Cholesterol 211 (H) <130 mg/dL    Narrative    Cholesterol  Desirable:  <200 mg/dL    Triglycerides  Normal:  Less than 150 mg/dL  Borderline High:  150-199 mg/dL  High:  200-499 mg/dL  Very High:  Greater than or equal to 500 mg/dL    Direct Measure HDL  Female:  Greater than or equal to 50 mg/dL   Male:  Greater than or equal to 40 mg/dL    LDL Cholesterol  Desirable:  <100mg/dL  Above Desirable:  100-129 mg/dL   Borderline High:  130-159 mg/dL   High:  160-189 mg/dL   Very High:  >= 190 mg/dL    Non HDL Cholesterol  Desirable:  130 mg/dL  Above Desirable:  130-159 mg/dL  Borderline  High:  160-189 mg/dL  High:  190-219 mg/dL  Very High:  Greater than or equal to 220 mg/dL   Vitamin D deficiency screening   Result Value Ref Range    Vitamin D, Total (25-Hydroxy) 21 20 - 75 ug/L    Narrative    Season, race, dietary intake, and treatment affect the concentration of 25-hydroxy-Vitamin D. Values may decrease during winter months and increase during summer months. Values 20-29 ug/L may indicate Vitamin D insufficiency and values <20 ug/L may indicate Vitamin D deficiency.    Vitamin D determination is routinely performed by an immunoassay specific for 25 hydroxyvitamin D3.  If an individual is on vitamin D2(ergocalciferol) supplementation, please specify 25 OH vitamin D2 and D3 level determination by LCMSMS test [id number].     TSH with free T4 reflex   Result Value Ref Range    TSH 1.40 0.30 - 4.20 uIU/mL   CBC with platelets and differential   Result Value Ref Range    WBC Count 8.2 4.0 - 11.0 10e3/uL    RBC Count 4.71 3.80 - 5.20 10e6/uL    Hemoglobin 12.4 11.7 - 15.7 g/dL    Hematocrit 37.8 35.0 - 47.0 %    MCV 80 78 - 100 fL    MCH 26.3 (L) 26.5 - 33.0 pg    MCHC 32.8 31.5 - 36.5 g/dL    RDW 13.5 10.0 - 15.0 %    Platelet Count 232 150 - 450 10e3/uL    % Neutrophils 63 %    % Lymphocytes 28 %    % Monocytes 7 %    % Eosinophils 2 %    % Basophils 0 %    % Immature Granulocytes 0 %    Absolute Neutrophils 5.2 1.6 - 8.3 10e3/uL    Absolute Lymphocytes 2.3 0.8 - 5.3 10e3/uL    Absolute Monocytes 0.6 0.0 - 1.3 10e3/uL    Absolute Eosinophils 0.1 0.0 - 0.7 10e3/uL    Absolute Basophils 0.0 0.0 - 0.2 10e3/uL    Absolute Immature Granulocytes 0.0 <=0.4 10e3/uL       If you have any questions or concerns, please call the clinic at the number listed above.       Sincerely,      Amrita Carballo MD

## 2023-05-31 NOTE — RESULT ENCOUNTER NOTE
Rachael Ch-    It was nice to see you in clinic.  Your diabetes blood sugar numbers are still much higher than we would like - I think this is likely making you feel tired.  We'll talk more at your next visit, but the other testing looks okay for now.  Please call the clinic at [phone] if you have any questions.      Amrita Carballo MD    Please send results to patient.

## 2023-06-19 ENCOUNTER — ANCILLARY PROCEDURE (OUTPATIENT)
Dept: ULTRASOUND IMAGING | Facility: CLINIC | Age: 58
End: 2023-06-19
Attending: STUDENT IN AN ORGANIZED HEALTH CARE EDUCATION/TRAINING PROGRAM
Payer: COMMERCIAL

## 2023-06-19 DIAGNOSIS — R22.1 NECK SWELLING: ICD-10-CM

## 2023-06-19 PROCEDURE — 76536 US EXAM OF HEAD AND NECK: CPT | Mod: TC | Performed by: RADIOLOGY

## 2023-06-19 NOTE — NURSING NOTE
Due to patient being non-English speaking/uses sign language, an  was used for this visit. Only for face-to-face interpretation by an external agency, date and length of interpretation can be found on the scanned worksheet.     name: Chiquis  Agency: [employer]  Language: Radha   Telephone number: .  Type of interpretation: Face-to-face, spoken

## 2023-06-19 NOTE — LETTER
June 20, 2023      Rachael [facility]  [address] [place] [address]        Dear ,    We are writing to inform you of your test results.    Your ultrasound results have come back looking okay.  Nothing to worry about right now.  We will talk more about the results at your follow up visit in July.    Resulted Orders   US Head Neck Soft Tissue    Narrative    EXAM: US HEAD NECK SOFT TISSUE  LOCATION: [facility]  DATE: 6/19/2023    INDICATION: swelling in lower neck   below thyroid, above sternotomy scar.  ? Thyroid tissue?  Other?  COMPARISON: None.  TECHNIQUE: Thyroid ultrasound.     FINDINGS:  RIGHT lobe: 4.6 x 1.5 x 1.6 cm. Homogeneous echotexture.  Isthmus: 3 mm.  LEFT lobe: 4.9 x 1.4 x 1.3 cm. Homogeneous echotexture.    NECK: No cervical lymphadenopathy.    NODULES:    Nodule 1: 0.9 x 0.6 x 0.8 cm nodule noted within the mid right thyroid   Composition: Solid or almost completely solid, 2 points   Echogenicity: Hyperechoic or isoechoic, 1 point   Shape: Wider-than-tall, 0 points   Margin: Smooth, 0 points   Echogenic Foci: None, or large comet-tail artifacts, 0 points   Point Total: 3 points. TI-RADS 3. If 2.5 cm or larger, recommend FNA; if 1.5 cm or larger, recommend follow up US at 1, 3, and 5 years.      Nodule 2: 1.1 x 0.8 x 1 cm nodule noted within the superior pole of the left lobe of the thyroid  Composition: Solid or almost completely solid, 2 points   Echogenicity: Hyperechoic or isoechoic, 1 point   Shape: Wider-than-tall, 0 points   Margin: Smooth, 0 points   Echogenic Foci: None, or large comet-tail artifacts, 0 points   Point Total: 3 points. TI-RADS 3. If 2.5 cm or larger, recommend FNA; if 1.5 cm or larger, recommend follow up US at 1, 3, and 5 years.    Nodule 3: 0.5 x 0.7 x 0.4 cm nodule noted within the inferior pole left lobe  Composition: Solid or almost completely solid, 2 points   Echogenicity: Hyperechoic or isoechoic, 1 point   Shape: Wider-than-tall, 0 points    Margin: Smooth, 0 points   Echogenic Foci: None, or large comet-tail artifacts, 0 points   Point Total: 3 points. TI-RADS 3. If 2.5 cm or larger, recommend FNA; if 1.5 cm or larger, recommend follow up US at 1, 3, and 5 years.      Impression    IMPRESSION:  1.  Small nodules are noted within the thyroid gland which does not meet criteria for follow-up or FNA biopsy. Area of concern inferior midline of the lower neck is unremarkable.      Nodules are characterized per  ACR Thyroid Imaging, Reporting and Data System (TI-RADS): White Paper of the ACR TI-RADS Committee  Ozzy Hudson et al. Journal of the American College of Radiology 2017. Volume 14 (2017), Issue 5, 587595.          If you have any questions or concerns, please call the clinic at the number listed above.       Sincerely,      Amrita Carballo MD

## 2023-06-20 NOTE — RESULT ENCOUNTER NOTE
Rachael Ch-    Your ultrasound results have come back looking okay.  Nothing to worry about right now.  We will talk more about the results at your follow up visit in July.  Please call the clinic at [phone] if you have any questions.      Amrita Carballo MD    Please send results to patient.

## 2023-07-07 ENCOUNTER — OFFICE VISIT (OUTPATIENT)
Dept: FAMILY MEDICINE | Facility: CLINIC | Age: 58
End: 2023-07-07
Payer: COMMERCIAL

## 2023-07-07 VITALS
TEMPERATURE: 97.5 F | SYSTOLIC BLOOD PRESSURE: 109 MMHG | RESPIRATION RATE: 22 BRPM | HEIGHT: 57 IN | OXYGEN SATURATION: 100 % | WEIGHT: 130.4 LBS | DIASTOLIC BLOOD PRESSURE: 69 MMHG | BODY MASS INDEX: 28.13 KG/M2 | HEART RATE: 79 BPM

## 2023-07-07 DIAGNOSIS — Z12.4 CERVICAL CANCER SCREENING: ICD-10-CM

## 2023-07-07 DIAGNOSIS — F43.10 PTSD (POST-TRAUMATIC STRESS DISORDER): ICD-10-CM

## 2023-07-07 DIAGNOSIS — R80.9 TYPE 2 DIABETES MELLITUS WITH MICROALBUMINURIA, WITHOUT LONG-TERM CURRENT USE OF INSULIN (H): Primary | ICD-10-CM

## 2023-07-07 DIAGNOSIS — R22.1 NECK SWELLING: ICD-10-CM

## 2023-07-07 DIAGNOSIS — R41.9 COGNITIVE COMPLAINTS: ICD-10-CM

## 2023-07-07 DIAGNOSIS — Z12.11 SCREEN FOR COLON CANCER: ICD-10-CM

## 2023-07-07 DIAGNOSIS — F33.1 MODERATE EPISODE OF RECURRENT MAJOR DEPRESSIVE DISORDER (H): ICD-10-CM

## 2023-07-07 DIAGNOSIS — I20.0 UNSTABLE ANGINA (H): ICD-10-CM

## 2023-07-07 DIAGNOSIS — E11.29 TYPE 2 DIABETES MELLITUS WITH MICROALBUMINURIA, WITHOUT LONG-TERM CURRENT USE OF INSULIN (H): Primary | ICD-10-CM

## 2023-07-07 LAB
CHOLEST SERPL-MCNC: 112 MG/DL
HDLC SERPL-MCNC: 34 MG/DL
LDLC SERPL CALC-MCNC: 55 MG/DL
NONHDLC SERPL-MCNC: 78 MG/DL
TRIGL SERPL-MCNC: 115 MG/DL

## 2023-07-07 PROCEDURE — 36415 COLL VENOUS BLD VENIPUNCTURE: CPT | Performed by: STUDENT IN AN ORGANIZED HEALTH CARE EDUCATION/TRAINING PROGRAM

## 2023-07-07 PROCEDURE — 90471 IMMUNIZATION ADMIN: CPT | Performed by: STUDENT IN AN ORGANIZED HEALTH CARE EDUCATION/TRAINING PROGRAM

## 2023-07-07 PROCEDURE — 90677 PCV20 VACCINE IM: CPT | Performed by: STUDENT IN AN ORGANIZED HEALTH CARE EDUCATION/TRAINING PROGRAM

## 2023-07-07 PROCEDURE — 99214 OFFICE O/P EST MOD 30 MIN: CPT | Mod: 25 | Performed by: STUDENT IN AN ORGANIZED HEALTH CARE EDUCATION/TRAINING PROGRAM

## 2023-07-07 PROCEDURE — 80061 LIPID PANEL: CPT | Performed by: STUDENT IN AN ORGANIZED HEALTH CARE EDUCATION/TRAINING PROGRAM

## 2023-07-07 ASSESSMENT — ANXIETY QUESTIONNAIRES
7. FEELING AFRAID AS IF SOMETHING AWFUL MIGHT HAPPEN: NOT AT ALL
5. BEING SO RESTLESS THAT IT IS HARD TO SIT STILL: NOT AT ALL
2. NOT BEING ABLE TO STOP OR CONTROL WORRYING: SEVERAL DAYS
1. FEELING NERVOUS, ANXIOUS, OR ON EDGE: SEVERAL DAYS
IF YOU CHECKED OFF ANY PROBLEMS ON THIS QUESTIONNAIRE, HOW DIFFICULT HAVE THESE PROBLEMS MADE IT FOR YOU TO DO YOUR WORK, TAKE CARE OF THINGS AT HOME, OR GET ALONG WITH OTHER PEOPLE: NOT DIFFICULT AT ALL
GAD7 TOTAL SCORE: 3
3. WORRYING TOO MUCH ABOUT DIFFERENT THINGS: NOT AT ALL
6. BECOMING EASILY ANNOYED OR IRRITABLE: SEVERAL DAYS
GAD7 TOTAL SCORE: 3

## 2023-07-07 ASSESSMENT — PATIENT HEALTH QUESTIONNAIRE - PHQ9
5. POOR APPETITE OR OVEREATING: NOT AT ALL
SUM OF ALL RESPONSES TO PHQ QUESTIONS 1-9: 5

## 2023-07-07 NOTE — LETTER
July 12, 2023      [facility]  [address] [place] [address]        Dear ,    We are writing to inform you of your test results.    This is Dr. Castro.  I am covering for Dr. Carballo while she is away.  I wanted to let you know that your blood tests were all fine.  Let us know if you have further questions or concerns.     Resulted Orders   Lipid Profile   Result Value Ref Range    Cholesterol 112 <200 mg/dL    Triglycerides 115 <150 mg/dL    Direct Measure HDL 34 (L) >=50 mg/dL    LDL Cholesterol Calculated 55 <=100 mg/dL    Non HDL Cholesterol 78 <130 mg/dL    Narrative    Cholesterol  Desirable:  <200 mg/dL    Triglycerides  Normal:  Less than 150 mg/dL  Borderline High:  150-199 mg/dL  High:  200-499 mg/dL  Very High:  Greater than or equal to 500 mg/dL    Direct Measure HDL  Female:  Greater than or equal to 50 mg/dL   Male:  Greater than or equal to 40 mg/dL    LDL Cholesterol  Desirable:  <100mg/dL  Above Desirable:  100-129 mg/dL   Borderline High:  130-159 mg/dL   High:  160-189 mg/dL   Very High:  >= 190 mg/dL    Non HDL Cholesterol  Desirable:  130 mg/dL  Above Desirable:  130-159 mg/dL  Borderline High:  160-189 mg/dL  High:  190-219 mg/dL  Very High:  Greater than or equal to 220 mg/dL       If you have any questions or concerns, please call the clinic at the number listed above.       Sincerely,      Amrita Carballo MD

## 2023-07-07 NOTE — NURSING NOTE
Prior to immunization administration, verified patients identity using patient s name and date of birth. Please see Immunization Activity for additional information.     Screening Questionnaire for Adult Immunization    Are you sick today?   No   Do you have allergies to medications, food, a vaccine component or latex?   No   Have you ever had a serious reaction after receiving a vaccination?   No   Do you have a long-term health problem with heart, lung, kidney, or metabolic disease (e.g., diabetes), asthma, a blood disorder, no spleen, complement component deficiency, a cochlear implant, or a spinal fluid leak?  Are you on long-term aspirin therapy?   Yes   Do you have cancer, leukemia, HIV/AIDS, or any other immune system problem?   No   Do you have a parent, brother, or sister with an immune system problem?   No   In the past 3 months, have you taken medications that affect  your immune system, such as prednisone, other steroids, or anticancer drugs; drugs for the treatment of rheumatoid arthritis, Crohn s disease, or psoriasis; or have you had radiation treatments?   No   Have you had a seizure, or a brain or other nervous system problem?   No   During the past year, have you received a transfusion of blood or blood    products, or been given immune (gamma) globulin or antiviral drug?   No   For women: Are you pregnant or is there a chance you could become       pregnant during the next month?   No   Have you received any vaccinations in the past 4 weeks?   No     Immunization questionnaire was positive for at least one answer.  Notified Dr. Carballo, she's ok to go head with the vaccine.      Patient instructed to remain in clinic for 15 minutes afterwards, and to report any adverse reactions.     Screening performed by Barak Salazar on 7/7/2023 at 3:05 PM.

## 2023-07-07 NOTE — PATIENT INSTRUCTIONS
Great job bringing your medications in today.     Increase your insulin to 15 Units twice daily.    Keep taking all of your other medication.     Schedule a follow up with our pharmacist for 2 weeks.      Schedule a follow up with Dr. Carballo in 6 weeks.

## 2023-07-07 NOTE — PROGRESS NOTES
Nursing Notes:   BARAK SALAZAR  7/7/2023  3:07 PM  Signed  Prior to immunization administration, verified patients identity using patient s name and date of birth. Please see Immunization Activity for additional information.     Screening Questionnaire for Adult Immunization    Are you sick today?   No   Do you have allergies to medications, food, a vaccine component or latex?   No   Have you ever had a serious reaction after receiving a vaccination?   No   Do you have a long-term health problem with heart, lung, kidney, or metabolic disease (e.g., diabetes), asthma, a blood disorder, no spleen, complement component deficiency, a cochlear implant, or a spinal fluid leak?  Are you on long-term aspirin therapy?   Yes   Do you have cancer, leukemia, HIV/AIDS, or any other immune system problem?   No   Do you have a parent, brother, or sister with an immune system problem?   No   In the past 3 months, have you taken medications that affect  your immune system, such as prednisone, other steroids, or anticancer drugs; drugs for the treatment of rheumatoid arthritis, Crohn s disease, or psoriasis; or have you had radiation treatments?   No   Have you had a seizure, or a brain or other nervous system problem?   No   During the past year, have you received a transfusion of blood or blood    products, or been given immune (gamma) globulin or antiviral drug?   No   For women: Are you pregnant or is there a chance you could become       pregnant during the next month?   No   Have you received any vaccinations in the past 4 weeks?   No     Immunization questionnaire was positive for at least one answer.  Notified Dr. Carballo, she's ok to go head with the vaccine.      Patient instructed to remain in clinic for 15 minutes afterwards, and to report any adverse reactions.     Screening performed by Barak Salazar on 7/7/2023 at 3:05 PM.            ASSESSMENT AND PLAN:      Rachael was seen today for diabetes.  Multiple issues were  discussed today.    Diagnoses and all orders for this visit:    Type 2 diabetes mellitus with microalbuminuria, without long-term current use of insulin (H)  She has been on and off her medications due to forgetting them in [place], lack of access temporarily, but endorses taking her insulin regularly when it is available and when she has the needles.  Freestyle emerita shows consistent very high sugars.  We will continue with 500 mg twice daily of metformin, and empagliflozin 10 mg daily.  Elected to simply increase her insulin today in order to simplify the number of medication changes.  We will increase the 70/30 insulin from 12 units twice daily to 15 units twice daily.  She is needing a lipid panel for her cardiologist and endocrinologist.  This was ordered.  Will plan to schedule follow up with our pharmacy team to help titrate up on the insulin and other diabetes medications.    -     insulin NPH-Regular 70/30 (HUMULIN 70/30;NOVOLIN 70/30) (70-30) 100 UNIT/ML vial; Inject 15 Units Subcutaneous 2 times daily (with meals)  -     Lipid Profile; Future    Screen for colon cancer  -We will discuss this at her upcoming complete physical.    Cervical cancer screening  We will discuss this at her upcoming complete physical.    Unstable angina (H)  Reviewed recommendations from the cardiologist.  They are aware of the upcoming echo and stress test on July 18.    Neck swelling  Discussed essentially normal ultrasound results.  No follow-up needed or additional thyroid monitoring.    Cognitive complaints  Moderate episode of recurrent major depressive disorder (H)  PTSD (post-traumatic stress disorder)  Mental health is stable, but does make management and understanding of medications more complicated.  Daughter remains very involved in her care.  They report they are comfortable with requesting refills.  Are aware of the follow-up appointments coming up with cardiology and endocrinology, and will schedule a follow-up  appointment with our pharmacy team in 2 weeks.    Other orders  Pneumonia shot given today.  -     PNEUMOCOCCAL 20 VALENT CONJUGATE (PREVNAR 20)        Patient Instructions   Great job bringing your medications in today.     Increase your insulin to 15 Units twice daily.    Keep taking all of your other medication.     Schedule a follow up with our pharmacist for 2 weeks.      Schedule a follow up with Dr. Carballo in 6 weeks.            Amrita Carballo MD    RAYSA Ch is a 58 year old female with past medical history significant for    Patient Active Problem List   Diagnosis     Essential hypertension, benign     Diabetes mellitus, type 2 (H)     Hyperlipidemia     Health Care Home     Helicobacter pylori gastritis     PTSD (post-traumatic stress disorder)     Moderate episode of recurrent major depressive disorder (H)     Cognitive complaints     Screening for depression     Microalbuminuria     Plantar fasciitis     Left knee pain     Right knee pain, unspecified chronicity     Chronic kidney disease, stage 3a (H)     Coronary artery disease involving autologous artery coronary bypass graft without angina pectoris       Others present at the visit:    Diabetes: The patient has type 2 diabetes, not currently controlled on medication. She currently takes empaglifozin and metformin, as well as 70/30 insulin, 12 units twice daily. She had stopped taking her medications briefly around 05/30 as she was unable to pick them up, but has since restarted them.  Did not have her medication recently for short period of time as she was out of town with family and the medication was in [place].    DM: Saw endocrine on 06/08. Had stopped taking diabetes meds 05/30 b/c couldn't pick them up, is now taking all meds? Is on 70/30 insulin--was told insurance wouldn't cover it last time- taking now? Yes. No shakiness or lightheadedness--maybe once in awhile.  Last A1c was 11.3.  She has her glucometer with her today,  "and over 70% of them are in the high range.   Reports not eating that well--no appetite. Eats rice, meats, vegetables, spicy foods. Some tingling/burning at base of middle three toes on left foot. Sometimes feels unsteady on feet.     Chest pain/Cardio: NSTEMI and CABG in 02/23. Saw cardiology 06/08. had exertional mid-sternal chest pain that was worse at cardiac rehab, which she then quit. Supposed to f/u TTE and stress test--this is scheduled for July 18 and family is aware.  She does continue to have some chest pain with moving around.  He is no longer doing cardiac rehab because of this.  Has taken nitroglycerin recently, 2 times in the last month, but is unsure if this was really helpful for her.    Thyroid: US showed small nodules in thyroid gland, does not meet criteria for f/u or FNA biopsy.     Preventative health: Mammogram, Pap, Pneumonia vaccine--schedule physical for August, discuss/do at that time.  Interested in scheduling a vaccine for today.        Presents for   Chief Complaint   Patient presents with     Diabetes     Follow-up dm           OBJECTIVE:  Vitals: /69   Pulse 79   Temp 97.5  F (36.4  C) (Oral)   Resp 22   Ht 1.453 m (4' 9.2\")   Wt 59.1 kg (130 lb 6.4 oz)   SpO2 100%   BMI 28.02 kg/m    BMI= Body mass index is 28.02 kg/m .  Objective:    Vitals:  Vitals are reviewed and are within the normal range.  Blood pressure on the lower end of normal.  Gen:  Alert, pleasant, no acute distress  Cardiac:  Regular rate and rhythm, no murmurs, rubs or gallops, surgical scar present.  Respiratory:  Lungs clear to auscultation bilaterally  Extremities: She does endorse some numbness and tingling.  Has very mild to 1+ edema in her right foot none in her left.  Pulses are strong.    No results found for any visits on 07/07/23.        Patient Instructions   Great job bringing your medications in today.     Increase your insulin to 15 Units twice daily.    Keep taking all of your other " medication.     Schedule a follow up with our pharmacist for 2 weeks.      Schedule a follow up with Dr. Carballo in 6 weeks.            Amrita Carballo MD

## 2023-07-11 NOTE — RESULT ENCOUNTER NOTE
This is Dr. Castro.  I am covering for Dr. Carballo while she is away.  I wanted to let you know that your blood tests were all fine.  Let us know if you have further questions or concerns.

## 2023-07-20 ENCOUNTER — OFFICE VISIT (OUTPATIENT)
Dept: PHARMACY | Facility: CLINIC | Age: 58
End: 2023-07-20
Payer: COMMERCIAL

## 2023-07-20 VITALS
DIASTOLIC BLOOD PRESSURE: 74 MMHG | TEMPERATURE: 98.2 F | SYSTOLIC BLOOD PRESSURE: 116 MMHG | OXYGEN SATURATION: 99 % | HEART RATE: 78 BPM | RESPIRATION RATE: 16 BRPM

## 2023-07-20 DIAGNOSIS — K59.00 CONSTIPATION, UNSPECIFIED CONSTIPATION TYPE: ICD-10-CM

## 2023-07-20 DIAGNOSIS — F33.1 MODERATE EPISODE OF RECURRENT MAJOR DEPRESSIVE DISORDER (H): ICD-10-CM

## 2023-07-20 DIAGNOSIS — R80.9 TYPE 2 DIABETES MELLITUS WITH MICROALBUMINURIA, WITHOUT LONG-TERM CURRENT USE OF INSULIN (H): Primary | ICD-10-CM

## 2023-07-20 DIAGNOSIS — I25.810 CORONARY ARTERY DISEASE INVOLVING AUTOLOGOUS ARTERY CORONARY BYPASS GRAFT WITHOUT ANGINA PECTORIS: ICD-10-CM

## 2023-07-20 DIAGNOSIS — I25.2 HISTORY OF MYOCARDIAL INFARCTION: ICD-10-CM

## 2023-07-20 DIAGNOSIS — G47.00 INSOMNIA, UNSPECIFIED TYPE: ICD-10-CM

## 2023-07-20 DIAGNOSIS — E11.29 TYPE 2 DIABETES MELLITUS WITH MICROALBUMINURIA, WITHOUT LONG-TERM CURRENT USE OF INSULIN (H): Primary | ICD-10-CM

## 2023-07-20 PROCEDURE — 99606 MTMS BY PHARM EST 15 MIN: CPT

## 2023-07-20 PROCEDURE — 99607 MTMS BY PHARM ADDL 15 MIN: CPT

## 2023-07-20 RX ORDER — AMOXICILLIN 250 MG
1 CAPSULE ORAL 2 TIMES DAILY PRN
Qty: 60 TABLET | Refills: 3 | Status: SHIPPED | OUTPATIENT
Start: 2023-07-20 | End: 2024-05-28

## 2023-07-20 NOTE — PROGRESS NOTES
I have verified the content of the note, which accurately reflects my assessment of the patient and the plan of care.   Mmii Hernandez, [facility], PharmD

## 2023-07-20 NOTE — PROGRESS NOTES
Medication Therapy Management (MTM) Encounter    ASSESSMENT:                            Medication Adherence/Access: Patient is not taking medications as prescribed. After discussion with PCP, will plan to restart patient on the following medications at the next MTM visit on Monday:   - Jardiance 25mg every day   - Aspirin 81mg every day (needs to be held prior to cath procedure)  - Plavix 75mg every day (needs to be held prior to cath procedure)  - Humulin 70/30 15 units twice daily with meals   - Atorvastatin 80mg every day   - Senna/Docusate twice daily as needed  - Nitroglycerin as needed   - Hydroxyzine 25mg as needed      Prescriptions updated to reflect the above information     Type 2 Diabetes: Not meeting HbA1c goal of <8% given co morbidities. Will plan to restart Jardiance at max dose since patient is tolerating 20mg every day and continue with 70/30 insulin twice daily. Education provided to use insulin with meals and not 2-3 hours before eating. Discussed with PCP and will hold off on restarting metformin to limit medication confusion, but this could be added back in the future. Ultimate goal would be to have patient off of 70/30 insulin and instead use a once weekly GLP-1 and basal insulin.     Status postmyocardial infarction/CAD: Recommend restarting high-intensity statin and continued use of both Plavix and Aspirin as prescribed. Unfortunately, patient is not able to tolerate beta-blocker at this time given symptomatic hypotension. Recommend patient replace nitroglycerin every 6 months and keep this in her purse. Education provided on how to use nitroglycerin sl tablets.     Insomnia/Mood: Unable to fully assess today. Per PCP, patient is using hydroxyzine for both insomnia and panic attacks and has not wanted to stop this medication in the past.     Constipation with gas: Education provided on how to use as needed senna/docusate.    GERD: After discussion with PCP, will discontinue PPI at this  time. In the future, will trial H2RA if symptom control is needed as famotidine was helpful in the past.      PLAN:                            - Return to clinic on Monday will all medications     Follow-up: 7/24/23 with Dr. Denise and [facility] pharmacist     SUBJECTIVE/OBJECTIVE:                          Rachael Ch is a 57 year old female seen for a follow-up visit from 3/17/23. Patient was accompanied by daughter. Patient seen with Radha .      Reason for visit: Diabetes follow up.    Allergies/ADRs: Reviewed in chart  Past Medical History: Reviewed in chart  Tobacco: She reports that she quit smoking about 5 years ago. Her smoking use included cigarettes. She smoked an average of .5 packs per day. She has quit using smokeless tobacco.  Her smokeless tobacco use included chew.    Medication Adherence/Access: No longer using pillbox and is instead taking all her medications out of the vials twice daily. She is unaware of the indication for her medications and many prescribed medications are not with the patient.     Type 2 Diabetes:  Currently prescribed metformin XR 500mg 1 tablet twice daily daily, Jardiance 25mg daily, glipizide ER 5mg every day, and Januvia 100mg daily. She is also using Novolin 70/30 15 units 2-3 hours before meals. Patient has with her today Jardiance 10mg and Novolin 70/30. She is not sure if she is taking the other medications that are prescribed.   Blood sugar monitoring: CGM Freestyle Home     Aspirin: Taking 81mg twice daily but is prescribed only once daily for secondary prevention   Statin: Prescribed atorvastatin 80mg every day but this is not with the patient today and unclear if she has it at home   Urine Albumin: elevated but unable to be on ACEi/ARB due to low blood pressures   Lab Results   Component Value Date    UMALCR 118.96 (H) 12/28/2022      Lab Results   Component Value Date    A1C 11.7 05/30/2023    A1C 10.9 04/18/2023    A1C 12.1 12/28/2022     S/p myocardial  infarction: Prescribed  aspirin 81mg daily, clopidogrel 75mg daily, atorvastatin 80mg daily, and nitroglycerin SL as needed.  Unfortunately, patient only have the nitroglycerin (which she reports she does not know when to use this) and aspirin which she states she takes twice daily.   - MI in Jan 2023    Insomnia/Mood: She is prescribed hydroxyzine 25mg as needed for panic attacks and can take 1-2 capsules at night to help with sleep. It is prescribed as needed for panic attacks and insomnia. She states that she does not take it every day since it makes her sleepy.     Constipation with gas: Patient has senna/docusate to use daily as needed. She has been taking 1 tablet twice daily as she was not sure what this was for. She notes she does struggle with variable bowel movements and will go 1-2 times per day some days but there are other time she will go every other day.    GERD: prescribed omeprazole 40mg every day but does not have this with her.     Today's Vitals: /74 (BP Location: Left arm, Patient Position: Sitting, Cuff Size: Adult Regular)   Pulse 78   Temp 98.2  F (36.8  C) (Oral)   Resp 16   SpO2 99%   ----------------    I spent 30 minutes with this patient today. All changes were made via verbal approval with Amrita Carballo MD. A copy of the visit note was provided to the patient's provider(s).    A summary of these recommendations was declined by the patient.    Batsheva Perales, Pharm.D., MPH  MTM Pharmacist Resident   [facility]       Medication Therapy Recommendations  No medication therapy recommendations to display

## 2023-07-20 NOTE — NURSING NOTE
Due to patient being non-English speaking/uses sign language, an  was used for this visit. Only for face-to-face interpretation by an external agency, date and length of interpretation can be found on the scanned worksheet.     name: Silvino Banks  Agency: [employer]  Language: Radha   Telephone number: [phone]  Type of interpretation: Face-to-face, spoken

## 2023-07-20 NOTE — PROGRESS NOTES
Medication Therapy Management (MTM) Encounter    ASSESSMENT:                            Medication Adherence/Access: Patient is not taking medications as prescribed. Patient provided with a twice daily pillbox and education provided on how to use and set up pillbox.     Type 2 Diabetes: Not meeting HbA1c goal of <8% given co morbidities. Will plan to restart Jardiance at max dose since patient is tolerating 20mg every day and continue with 70/30 insulin twice daily. Education provided to use insulin with meals and not 2-3 hours before eating. Will additionally re-add metformin to patient's medication list as she is currently tolerating max dose (it was removed as it was unclear at last visit if patient was in fact taking it). Ultimate goal would be to have patient off of 70/30 insulin and glipizide and instead use a once weekly GLP-1 and basal insulin.     Status postmyocardial infarction/CAD: Recommend restarting high-intensity statin and continued use of both Plavix and Aspirin as prescribed. Patient does not have Plavix, recommend patient pick this up from pharmacy and start taking after her procedure. Unfortunately, patient is not able to tolerate beta-blocker at this time given symptomatic hypotension.     Insomnia/Mood:  Per PCP, patient is using hydroxyzine for both insomnia and panic attacks and has not wanted to stop this medication in the past.     Constipation with gas: Continue with as needed senna/docusate.    GERD: In the future, will trial H2RA if symptom control is needed as famotidine was helpful in the past.      PLAN:                            - Please make sure you are scanning your sensor at least every 8 hours. I recommend scanning when you wake up, in the afternoon, in the evening, and right before going to bed to ensure all data is transferred from the sensor to the reader.   - Set up medications in pillbox as shown today using the stickers  Medication AM Evening   Jardiance 25mg 1 tablet     Metformin 500mg 2 tablets 2 tablets   Aspirin 81mg 1 tablet    Clopidogrel 75mg 1 tablet     Atorvastatin 80mg  1 tablet     - Prior to your procedure:    Hold Jardiance 25mg every day 4 days before procedure    Hold Metformin ER 500mg twice daily the day of procedure   Hold Humulin 70/30 15 units twice daily with meals the day of procedure hold day of surgery  - Please  clopidogrel and start taking after your procedure     Medications/Disease States to be addressed at future visit:   - please make sure taking clopidogrel !!   - using insulin before meals (was using 2-3 hours before)  - GERD control off PPI  - GLP-1 start and taper off mealtime insulin & stop glipizide    Follow-up: 8/25 PCP (blue dotted) 9/27 Card    SUBJECTIVE/OBJECTIVE:                          Rachael Ch is a 57 year old female seen for a follow-up visit from 7/20/23. Patient was accompanied by daughter who is acting as .      Reason for visit: Diabetes follow up.    Allergies/ADRs: Reviewed in chart  Past Medical History: Reviewed in chart  Tobacco: She reports that she quit smoking about 5 years ago. Her smoking use included cigarettes. She smoked an average of .5 packs per day. She has quit using smokeless tobacco.  Her smokeless tobacco use included chew.    Medication Adherence/Access: No longer using pillbox but would like to start using one again.     Type 2 Diabetes:  Currently prescribed metformin XR 500mg 2 tablets twice daily (patient prefers to take twice daily), prescribed Jardiance 25mg daily but has been taking 2 tablets of the 10mg strength, glipizide ER 5mg every day and Novolin 70/30 15 units before meals.   Blood sugar monitoring: CGM Freestyle Home (patient does not have a sensor on now, but knows she needs to put one on when she gets home)    Aspirin: Taking 81mg daily for secondary prevention   Urine Albumin: elevated but unable to be on ACEi/ARB due to low blood pressures   Lab Results   Component Value  "Date    UMALCR 118.96 (H) 12/28/2022      Lab Results   Component Value Date    A1C 11.7 05/30/2023    A1C 10.9 04/18/2023    A1C 12.1 12/28/2022     S/p myocardial infarction: Prescribed  aspirin 81mg daily (recommended lifelong), clopidogrel 75mg daily (recommended for a year but patient does not have this medication with her and notes it does not sound familiar), atorvastatin 80mg daily, and nitroglycerin SL as needed.   - MI in Jan 2023    Insomnia/Mood: She is prescribed hydroxyzine 25mg as needed for panic attacks and can take 1-2 capsules at night to help with sleep.     Constipation with gas: Patient has senna/docusate to use daily as needed. She has been taking 1 tablet twice daily as she was not sure what this was for. She notes she does struggle with variable bowel movements and will go 1-2 times per day some days but there are other time she will go every other day.    GERD: Previously was taking omeprazole, but this was stopped at last [facility] visit.     Today's Vitals:   BP Readings from Last 1 Encounters:   07/24/23 138/78     Pulse Readings from Last 1 Encounters:   07/24/23 71     Wt Readings from Last 1 Encounters:   07/24/23 131 lb (59.4 kg)     Ht Readings from Last 1 Encounters:   07/24/23 4' 9.25\" (1.454 m)     Estimated body mass index is 28.1 kg/m  as calculated from the following:    Height as of an earlier encounter on 7/24/23: 4' 9.25\" (1.454 m).    Weight as of an earlier encounter on 7/24/23: 131 lb (59.4 kg).    Temp Readings from Last 1 Encounters:   07/24/23 98.1  F (36.7  C) (Oral)     ----------------    I spent 30 minutes with this patient today. All changes were made via verbal approval with Amrita Carballo MD. A copy of the visit note was provided to the patient's provider(s).    A summary of these recommendations was given to the patient with Dr. Denise's AVS     Batsheva Perales, Pharm.D., MPH  [facility] Pharmacist Resident   [facility] / [facility]       Medication " Therapy Recommendations  Diabetes mellitus, type 2 (H)    Current Medication: insulin NPH-Regular 70/30 (HUMULIN 70/30;NOVOLIN 70/30) (70-30) 100 UNIT/ML vial   Rationale: Does not understand instructions - Adherence - Adherence   Recommendation: Provide Adherence Intervention   Status: Patient Agreed - Adherence/Education

## 2023-07-20 NOTE — Clinical Note
FYI only - pharmacy department requires that I route this note to you.  We discussed in person today. Thanks!  Batsheva Perales, Pharm.D., MPH MTM Pharmacist Resident

## 2023-07-20 NOTE — PATIENT INSTRUCTIONS
Patient parking instructions for cath procedure:    [facility] and the Outpatient Care Unit are located near the  [place] entrance.  The parking ramp entrance is on [facility]. Take the [facility]  elevator to level two. Follow the signs to information (patient services)  or the Heart Center. Ask patient services for directions to the Out Patient Care   Unit(OPCU). This is located on the third floor of the [facility]    To receive a reduction in the parking fee, please as the  to  stamp you parking ticket.    Will the procedure be painful?  You will feel very little discomfort. You will be given a shot to numb the skin and medications to make you feel more comfortable.    What happens after my coronary angiogram?  For your safety, for 48 hours after your procedure you can not drive or operate heavy equipment. For 24 hours after your procedure you can not do tasks that require balance, drink alcohol, or make important decisions. You must have a responsible adult with you for 24 hours after your procedure.  You can expect 2 to 6 hours of bed-rest after your procedure.  You may go home the same day or you may be required to stay overnight. The cardiologist performing the procedure will decide which discharge plan is the best for you.  If you are required to stay overnight in the hospital, you may be on extended recovery or observation status. It is your responsibility to inform your insurance company and to understand your coverage.  If you have any questions about your post-procedure care, please call [phone] and ask to speak with a nurse.

## 2023-07-24 ENCOUNTER — OFFICE VISIT (OUTPATIENT)
Dept: FAMILY MEDICINE | Facility: CLINIC | Age: 58
End: 2023-07-24
Payer: COMMERCIAL

## 2023-07-24 ENCOUNTER — OFFICE VISIT (OUTPATIENT)
Dept: PHARMACY | Facility: CLINIC | Age: 58
End: 2023-07-24
Payer: COMMERCIAL

## 2023-07-24 VITALS
WEIGHT: 131 LBS | RESPIRATION RATE: 18 BRPM | BODY MASS INDEX: 28.26 KG/M2 | HEART RATE: 71 BPM | HEIGHT: 57 IN | TEMPERATURE: 98.1 F | SYSTOLIC BLOOD PRESSURE: 138 MMHG | OXYGEN SATURATION: 100 % | DIASTOLIC BLOOD PRESSURE: 78 MMHG

## 2023-07-24 DIAGNOSIS — E11.29 TYPE 2 DIABETES MELLITUS WITH MICROALBUMINURIA, WITHOUT LONG-TERM CURRENT USE OF INSULIN (H): Primary | ICD-10-CM

## 2023-07-24 DIAGNOSIS — Z01.818 PREOP GENERAL PHYSICAL EXAM: Primary | ICD-10-CM

## 2023-07-24 DIAGNOSIS — K59.00 CONSTIPATION, UNSPECIFIED CONSTIPATION TYPE: ICD-10-CM

## 2023-07-24 DIAGNOSIS — K21.9 GASTROESOPHAGEAL REFLUX DISEASE WITHOUT ESOPHAGITIS: ICD-10-CM

## 2023-07-24 DIAGNOSIS — I25.810 CORONARY ARTERY DISEASE INVOLVING AUTOLOGOUS ARTERY CORONARY BYPASS GRAFT WITHOUT ANGINA PECTORIS: ICD-10-CM

## 2023-07-24 DIAGNOSIS — I25.2 HISTORY OF MYOCARDIAL INFARCTION: ICD-10-CM

## 2023-07-24 DIAGNOSIS — F33.1 MODERATE EPISODE OF RECURRENT MAJOR DEPRESSIVE DISORDER (H): ICD-10-CM

## 2023-07-24 DIAGNOSIS — R80.9 TYPE 2 DIABETES MELLITUS WITH MICROALBUMINURIA, WITHOUT LONG-TERM CURRENT USE OF INSULIN (H): Primary | ICD-10-CM

## 2023-07-24 DIAGNOSIS — G47.00 INSOMNIA, UNSPECIFIED TYPE: ICD-10-CM

## 2023-07-24 LAB
ANION GAP SERPL CALCULATED.3IONS-SCNC: 13 MMOL/L (ref 7–15)
BUN SERPL-MCNC: 28.8 MG/DL (ref 8–23)
CALCIUM SERPL-MCNC: 9.9 MG/DL (ref 8.8–10.2)
CHLORIDE SERPL-SCNC: 99 MMOL/L (ref 98–107)
CREAT SERPL-MCNC: 1.38 MG/DL (ref 0.51–0.95)
DEPRECATED HCO3 PLAS-SCNC: 25 MMOL/L (ref 22–29)
ERYTHROCYTE [DISTWIDTH] IN BLOOD BY AUTOMATED COUNT: 14.3 % (ref 10–15)
GFR SERPL CREATININE-BSD FRML MDRD: 44 ML/MIN/1.73M2
GLUCOSE SERPL-MCNC: 498 MG/DL (ref 70–99)
HCT VFR BLD AUTO: 38.6 % (ref 35–47)
HGB BLD-MCNC: 11.7 G/DL (ref 11.7–15.7)
MCH RBC QN AUTO: 25.9 PG (ref 26.5–33)
MCHC RBC AUTO-ENTMCNC: 30.3 G/DL (ref 31.5–36.5)
MCV RBC AUTO: 86 FL (ref 78–100)
PLATELET # BLD AUTO: 230 10E3/UL (ref 150–450)
POTASSIUM SERPL-SCNC: 4.3 MMOL/L (ref 3.4–5.3)
RBC # BLD AUTO: 4.51 10E6/UL (ref 3.8–5.2)
SODIUM SERPL-SCNC: 137 MMOL/L (ref 136–145)
WBC # BLD AUTO: 8.5 10E3/UL (ref 4–11)

## 2023-07-24 PROCEDURE — 80048 BASIC METABOLIC PNL TOTAL CA: CPT

## 2023-07-24 PROCEDURE — 99607 MTMS BY PHARM ADDL 15 MIN: CPT

## 2023-07-24 PROCEDURE — 99214 OFFICE O/P EST MOD 30 MIN: CPT | Mod: GC

## 2023-07-24 PROCEDURE — 36415 COLL VENOUS BLD VENIPUNCTURE: CPT

## 2023-07-24 PROCEDURE — 99606 MTMS BY PHARM EST 15 MIN: CPT

## 2023-07-24 PROCEDURE — 85027 COMPLETE CBC AUTOMATED: CPT

## 2023-07-24 NOTE — PROGRESS NOTES
[facility] [address] [place] [address]  Phone: [phone]  Fax: [phone]  Primary Provider: Amrita Carballo  Pre-op Performing Provider: CAREN ZEPEDA    {Provider  Link to PREOP SmartSet  Use this to apply standard patient instructions to AVS; includes medication directions, common orders, guidelines for anemia, warfarin, additional testing   :990131}  PREOPERATIVE EVALUATION:  Today's date: 7/24/2023    Rachael Ch is a 58 year old female who presents for a preoperative evaluation.      7/24/2023     4:11 PM   Additional Questions   Roomed by shoua   Accompanied by daughter     Surgical Information:  Surgery/Procedure: ***  Surgery Location: ***  Surgeon: ***  Surgery Date: ***  Time of Surgery: ***  Where patient plans to recover: {Preop post recovery plans :690741}  Fax number for surgical facility: {:[phone]}    {2021 Provider Charting Preference for Preop :058904}    Subjective       HPI related to upcoming procedure: ***        7/24/2023     3:03 PM   Preop Questions   1. Have you ever had a heart attack or stroke? YES - ***   2. Have you ever had surgery on your heart or blood vessels, such as a stent placement, a coronary artery bypass, or surgery on an artery in your head, neck, heart, or legs? YES - ***   3. Do you have chest pain with activity? YES - ***   4. Do you have a history of  heart failure? No   5. Do you currently have a cold, bronchitis or symptoms of other infection? No   6. Do you have a cough, shortness of breath, or wheezing? No   7. Do you or anyone in your family have previous history of blood clots? UNKNOWN - ***   8. Do you or does anyone in your family have a serious bleeding problem such as prolonged bleeding following surgeries or cuts? No   9. Have you ever had problems with anemia or been told to take iron pills? UNKNOWN - ***   10. Have you had any abnormal blood loss such as black, tarry or bloody stools, or abnormal vaginal  bleeding? No   11. Have you ever had a blood transfusion? No   12. Are you willing to have a blood transfusion if it is medically needed before, during, or after your surgery? Yes   13. Have you or any of your relatives ever had problems with anesthesia? No   14. Do you have sleep apnea, excessive snoring or daytime drowsiness? UNKNOWN - ***   15. Do you have any artifical heart valves or other implanted medical devices like a pacemaker, defibrillator, or continuous glucose monitor? YES - ***   15a. What type of device do you have? heart valve   15b. Name of the clinic that manages your device:  heart clinic   16. Do you have artificial joints? No   17. Are you allergic to latex? No   18. Is there any chance that you may be pregnant? No       Health Care Directive:  Patient does not have a Health Care Directive or Living Will: {ADVANCE_DIRECTIVE_STATUS:257595}    Preoperative Review of :  {Mnpmpreport:641002}  {Review MNPMP for all patients per [employer] MNPMP Profile:582443}    {Chronic problem details (Optional) :215874}    Review of Systemsasdfasd  {ROS Preop Choices:689069}    Patient Active Problem List    Diagnosis Date Noted    Coronary artery disease involving autologous artery coronary bypass graft without angina pectoris 02/15/2023     Priority: Medium    Chronic kidney disease, stage 3a (H) 11/30/2021     Priority: Medium    Right knee pain, unspecified chronicity 09/02/2020     Priority: Medium    Left knee pain 08/06/2020     Priority: Medium    Plantar fasciitis 08/14/2019     Priority: Medium    Microalbuminuria 05/19/2016     Priority: Medium    Screening for depression 12/15/2014     Priority: Medium     [facility] Mental Health Screener:  12/15/14: 6 (Positive)      Cognitive complaints 07/01/2014     Priority: Medium     Pt discharged from group therapy at [place] on 1/29/15. She will continue with [facility]'s  program for follow-up mental health care and .      Moderate episode of recurrent  major depressive disorder (H) 03/17/2014     Priority: Medium     Pt discharged from group therapy at [place] on 1/29/15. She will continue with [facility]'s  program for follow-up mental health care and .      PTSD (post-traumatic stress disorder) 03/13/2014     Priority: Medium     Pt discharged from group therapy at [place] on 1/29/15. She will continue with [facility]'s  program for follow-up mental health care and .      Helicobacter pylori gastritis 03/10/2014     Priority: Medium    Essential hypertension, benign 12/27/2012     Priority: Medium    Diabetes mellitus, type 2 (H) 12/27/2012     Priority: Medium     Problem list name updated by automated process. Provider to review      Hyperlipidemia 12/27/2012     Priority: Medium     Problem list name updated by automated process. Provider to review      Health Care Home 12/27/2012     Priority: Medium     Tier 0  DX V65.8 REPLACED WITH [id number] HEALTH CARE HOME (04/08/2013)        Past Medical History:   Diagnosis Date    Aspirin contraindicated 11/07/2013    young age/amp    Chronic kidney disease, stage 3a (H) 11/30/2021    Coronary artery disease involving autologous artery coronary bypass graft without angina pectoris 2/15/2023    Depressive disorder     Diabetes (H)     Hyperlipidemia     Hypertension      No past surgical history on file.  Current Outpatient Medications   Medication Sig Dispense Refill    Alcohol Swabs (ALCOHOL WIPES) 70 % PADS 1 pad. daily 100 each 3    aspirin (ASA) 81 MG EC tablet Take 1 tablet (81 mg) by mouth daily 90 tablet 1    atorvastatin (LIPITOR) 80 MG tablet Take 1 tablet (80 mg) by mouth daily 90 tablet 1    blood glucose (NO BRAND SPECIFIED) lancets standard Use to test blood sugar 2 times daily or as directed. 100 each 3    blood glucose (NO BRAND SPECIFIED) test strip Use to test blood sugar once or twice times weekly Patient has Contour test machine, please send [employer]'s CONTOUR test strips. 100 strip  "3    blood glucose monitoring (NO BRAND SPECIFIED) meter device kit Use to test blood sugar 1 times daily or as directed. 1 kit 0    clopidogrel (PLAVIX) 75 MG tablet Take 1 tablet (75 mg) by mouth daily 90 tablet 1    Continuous Blood Gluc Sensor (FREESTYLE RENAN 2 SENSOR) MISC Inject 1 each Subcutaneous every 14 days Use 1 sensor every 14 days. Use to read blood sugars per 's instructions. 2 each 5    empagliflozin (JARDIANCE) 25 MG TABS tablet Take 1 tablet (25 mg) by mouth daily 90 tablet 1    hydrOXYzine (VISTARIL) 25 MG capsule TAKE 1 CAPSULE BY MOUTH AS NEEDED FOR PANIC, ANXIETY **MAY TAKE 1-2 CAPSULES AT NIGHT TO HELP WITH SLEEP.** 90 capsule 1    insulin NPH-Regular 70/30 (HUMULIN 70/30;NOVOLIN 70/30) (70-30) 100 UNIT/ML vial Inject 15 Units Subcutaneous 2 times daily (with meals) 10 mL 1    nitroGLYcerin (NITROSTAT) 0.4 MG sublingual tablet For chest pain place 1 tablet under the tongue every 5 minutes for 3 doses. If symptoms persist 5 minutes after 1st dose call 911. 12 tablet 1    senna-docusate (STIMULANT LAXATIVE) 8.6-50 MG tablet Take 1 tablet by mouth 2 times daily as needed for constipation As needed. 60 tablet 3       Allergies   Allergen Reactions    Gabapentin Other (See Comments)     Facial Swelling    Venlafaxine      Causes abdominal pain, poor appetite and dizziness.          Social History     Tobacco Use    Smoking status: Former     Packs/day: 0.50     Types: Cigarettes     Quit date: 2017     Years since quittin.9    Smokeless tobacco: Former     Types: Chew   Substance Use Topics    Alcohol use: No     {FAMILY HISTORY (Optional):076625589}  History   Drug Use No         Objective     /78 (BP Location: Left arm, Patient Position: Sitting, Cuff Size: Adult Regular)   Pulse 71   Temp 98.1  F (36.7  C) (Oral)   Resp 18   Ht 1.454 m (4' 9.25\")   Wt 59.4 kg (131 lb)   SpO2 100%   BMI 28.10 kg/m      Physical Exam  {EXAM Preop Choices:404590}    Recent Labs "   Lab Test 23  1009 23  1557 23  0854   HGB 12.4  --  12.8     --  236     --  135*   POTASSIUM 4.5  --  4.0   CR 0.85  --  0.79   A1C 11.7* 10.9*  --         Diagnostics:  {LABS:036543}   {EK}    Revised Cardiac Risk Index (RCRI):  The patient has the following serious cardiovascular risks for perioperative complications:  {PREOP REVISED CARDIAC RISK INDEX (RCRI) :427337}     RCRI Interpretation: {REVISED CARDIAC RISK INTERPRETATION :302540}         Signed Electronically by: Brian Denise DO  Copy of this evaluation report is provided to requesting physician.    {Provider Resources  Preop [facility] Preop Guidelines  Revised Cardiac Risk Index :734631}

## 2023-07-24 NOTE — PROGRESS NOTES
Preceptor Attestation:    I discussed the patient with the resident and evaluated the patient in person. I have verified the content of the note, which accurately reflects my assessment of the patient and the plan of care.   Supervising Physician:  Alberto Stratton MD.

## 2023-07-24 NOTE — PROGRESS NOTES
[facility] [address] [place] [address]  Phone: [phone]  Fax: [phone]  Primary Provider: Amrita Carballo  Pre-op Performing Provider: CAREN ZEPEDA    PREOPERATIVE EVALUATION:  Today's date: 7/24/2023    Rachael Ch is a 58 year old female who presents for a preoperative evaluation.      7/24/2023     4:11 PM   Additional Questions   Roomed by shoua   Accompanied by daughter     Surgical Information:  Surgery/Procedure: Cardiac cath  Surgery Location: [facility]  Surgeon: Hans  Surgery Date: 7/28/23  Time of Surgery: TBD  Where patient plans to recover: At home with family  Fax number for surgical facility: Note does not need to be faxed, will be available electronically in Epic.    Assessment & Plan     The proposed surgical procedure is considered Intermediate risk.    Preop general physical exam  Patient with complex cardiac history with recent CABG in 2/23 with continuing chest pain with exertion. To have cardiac cath on 7/28. Patient currently only taking ASA and no plavix for DAPT. Recommended she discuss this with her cardiologist.   - CBC with platelets; Future  - Basic metabolic panel; Future  - CBC with platelets  - Basic metabolic panel     - No identified additional risk factors other than previously addressed    Antiplatelet or Anticoagulation Medication Instructions:  Continue ASA on day of surgery.   Patient is not currently taking PLAVIX as she never picked up her medication.     Additional Medication Instructions:   - mixed insulin (70/30m 75/25, 50/50): HOLD day of surgery   - metformin: HOLD day of surgery.   - SGLT2 Inhibitor (ertugliflozin): HOLD 4 days before surgery.     RECOMMENDATION:  Patient currently medically optimized to proceed with proposed procedure, without further diagnostic evaluation.    Subjective    HPI related to upcoming procedure: Patient with history of CABG in 2/23 continuing to have chest pain with exertion. To have urgent  cardiac cath 7/28.         7/24/2023     3:03 PM   Preop Questions   1. Have you ever had a heart attack or stroke? YES - SP Bypass   2. Have you ever had surgery on your heart or blood vessels, such as a stent placement, a coronary artery bypass, or surgery on an artery in your head, neck, heart, or legs? YES - Recent bypass   3. Do you have chest pain with activity? YES    4. Do you have a history of  heart failure? No   5. Do you currently have a cold, bronchitis or symptoms of other infection? No   6. Do you have a cough, shortness of breath, or wheezing? No   7. Do you or anyone in your family have previous history of blood clots? UNKNOWN   8. Do you or does anyone in your family have a serious bleeding problem such as prolonged bleeding following surgeries or cuts? No   9. Have you ever had problems with anemia or been told to take iron pills? UNKNOWN   10. Have you had any abnormal blood loss such as black, tarry or bloody stools, or abnormal vaginal bleeding? No   11. Have you ever had a blood transfusion? No   12. Are you willing to have a blood transfusion if it is medically needed before, during, or after your surgery? Yes   13. Have you or any of your relatives ever had problems with anesthesia? No   14. Do you have sleep apnea, excessive snoring or daytime drowsiness? UNKNOWN   15. Do you have any artifical heart valves or other implanted medical devices like a pacemaker, defibrillator, or continuous glucose monitor? YES   15a. What type of device do you have? heart valve   15b. Name of the clinic that manages your device:  heart clinic   16. Do you have artificial joints? No   17. Are you allergic to latex? No   18. Is there any chance that you may be pregnant? No       Health Care Directive:  Patient does not have a Health Care Directive or Living Will: Discussed advance care planning with patient; however, patient declined at this time.    Review of Systems  CONSTITUTIONAL: NEGATIVE for fever,  chills, change in weight  INTEGUMENTARY/SKIN: NEGATIVE for worrisome rashes, moles or lesions  EYES: NEGATIVE for vision changes or irritation  ENT/MOUTH: NEGATIVE for ear, mouth and throat problems  RESP: NEGATIVE for significant cough or SOB  CV: NEGATIVE for chest pain, palpitations or peripheral edema  GI: NEGATIVE for nausea, abdominal pain, heartburn, or change in bowel habits  : NEGATIVE for frequency, dysuria, or hematuria  MUSCULOSKELETAL: NEGATIVE for significant arthralgias or myalgia  NEURO: NEGATIVE for weakness, dizziness or paresthesias  ENDOCRINE: NEGATIVE for temperature intolerance, skin/hair changes  HEME: NEGATIVE for bleeding problems  PSYCHIATRIC: NEGATIVE for changes in mood or affect    Patient Active Problem List    Diagnosis Date Noted     Coronary artery disease involving autologous artery coronary bypass graft without angina pectoris 02/15/2023     Priority: Medium     Chronic kidney disease, stage 3a (H) 11/30/2021     Priority: Medium     Right knee pain, unspecified chronicity 09/02/2020     Priority: Medium     Left knee pain 08/06/2020     Priority: Medium     Plantar fasciitis 08/14/2019     Priority: Medium     Microalbuminuria 05/19/2016     Priority: Medium     Screening for depression 12/15/2014     Priority: Medium     [facility] Mental Health Screener:  12/15/14: 6 (Positive)       Cognitive complaints 07/01/2014     Priority: Medium     Pt discharged from [facility] on 1/29/15. She will continue with [facility] program for follow-up mental health care and .       Moderate episode of recurrent major depressive disorder (H) 03/17/2014     Priority: Medium     Pt discharged from group therapy at [facility] on 1/29/15. She will continue with [facility] program for follow-up mental health care and .       PTSD (post-traumatic stress disorder) 03/13/2014     Priority: Medium     Pt discharged from group therapy at [facility] on 1/29/15. She will  continue with [facility] program for follow-up mental health care and .       Helicobacter pylori gastritis 03/10/2014     Priority: Medium     Essential hypertension, benign 12/27/2012     Priority: Medium     Diabetes mellitus, type 2 (H) 12/27/2012     Priority: Medium     Problem list name updated by automated process. Provider to review       Hyperlipidemia 12/27/2012     Priority: Medium     Problem list name updated by automated process. Provider to review       Health Care Home 12/27/2012     Priority: Medium     Tier 0  DX V65.8 REPLACED WITH [id number] [facility]ALTH CARE HOME (04/08/2013)        Past Medical History:   Diagnosis Date     Aspirin contraindicated 11/07/2013    young age/amp     Chronic kidney disease, stage 3a (H) 11/30/2021     Coronary artery disease involving autologous artery coronary bypass graft without angina pectoris 2/15/2023     Depressive disorder      Diabetes (H)      Hyperlipidemia      Hypertension      No past surgical history on file.  Current Outpatient Medications   Medication Sig Dispense Refill     Alcohol Swabs (ALCOHOL WIPES) 70 % PADS 1 pad. daily 100 each 3     aspirin (ASA) 81 MG EC tablet Take 1 tablet (81 mg) by mouth daily 90 tablet 1     atorvastatin (LIPITOR) 80 MG tablet Take 1 tablet (80 mg) by mouth daily 90 tablet 1     blood glucose (NO BRAND SPECIFIED) lancets standard Use to test blood sugar 2 times daily or as directed. 100 each 3     blood glucose (NO BRAND SPECIFIED) test strip Use to test blood sugar once or twice times weekly Patient has Contour test machine, please send [employer] CONTOUR test strips. 100 strip 3     blood glucose monitoring (NO BRAND SPECIFIED) meter device kit Use to test blood sugar 1 times daily or as directed. 1 kit 0     clopidogrel (PLAVIX) 75 MG tablet Take 1 tablet (75 mg) by mouth daily 90 tablet 1     Continuous Blood Gluc Sensor (FREESTYLE RENAN 2 SENSOR) MISC Inject 1 each Subcutaneous every 14 days Use 1 sensor every 14  "days. Use to read blood sugars per 's instructions. 2 each 5     empagliflozin (JARDIANCE) 25 MG TABS tablet Take 1 tablet (25 mg) by mouth daily 90 tablet 1     hydrOXYzine (VISTARIL) 25 MG capsule TAKE 1 CAPSULE BY MOUTH AS NEEDED FOR PANIC, ANXIETY **MAY TAKE 1-2 CAPSULES AT NIGHT TO HELP WITH SLEEP.** 90 capsule 1     insulin NPH-Regular 70/30 (HUMULIN 70/30;NOVOLIN 70/30) (70-30) 100 UNIT/ML vial Inject 15 Units Subcutaneous 2 times daily (with meals) 10 mL 1     nitroGLYcerin (NITROSTAT) 0.4 MG sublingual tablet For chest pain place 1 tablet under the tongue every 5 minutes for 3 doses. If symptoms persist 5 minutes after 1st dose call 911. 12 tablet 1     senna-docusate (STIMULANT LAXATIVE) 8.6-50 MG tablet Take 1 tablet by mouth 2 times daily as needed for constipation As needed. 60 tablet 3       Allergies   Allergen Reactions     Gabapentin Other (See Comments)     Facial Swelling     Venlafaxine      Causes abdominal pain, poor appetite and dizziness.          Social History     Tobacco Use     Smoking status: Former     Packs/day: 0.50     Types: Cigarettes     Quit date: 2017     Years since quittin.9     Smokeless tobacco: Former     Types: Chew   Substance Use Topics     Alcohol use: No     Family History   Problem Relation Age of Onset     Cancer Mother      Diabetes Father      Coronary Artery Disease No family hx of      Heart Disease No family hx of      Obesity No family hx of      Asthma No family hx of      Breast Cancer No family hx of      Ovarian Cancer No family hx of      History   Drug Use No         Objective     /78 (BP Location: Left arm, Patient Position: Sitting, Cuff Size: Adult Regular)   Pulse 71   Temp 98.1  F (36.7  C) (Oral)   Resp 18   Ht 1.454 m (4' 9.25\")   Wt 59.4 kg (131 lb)   SpO2 100%   BMI 28.10 kg/m      Physical Exam    GENERAL APPEARANCE: healthy, alert and no distress     EYES: EOMI, PERRL     HENT: ear canals and TM's normal and " nose and mouth without ulcers or lesions     NECK: no adenopathy, no asymmetry, masses, or scars and thyroid normal to palpation     RESP: lungs clear to auscultation - no rales, rhonchi or wheezes     CV: regular rates and rhythm, normal S1 S2, no S3 or S4 and no murmur, click or rub     ABDOMEN:  soft, nontender, no HSM or masses and bowel sounds normal     MS: extremities normal- no gross deformities noted, no evidence of inflammation in joints, FROM in all extremities.     SKIN: no suspicious lesions or rashes     NEURO: Normal strength and tone, sensory exam grossly normal, mentation intact and speech normal     PSYCH: mentation appears normal. and affect normal/bright     LYMPHATICS: No cervical adenopathy    Recent Labs   Lab Test 05/30/23  1009 04/18/23  1557 03/28/23  0854   HGB 12.4  --  12.8     --  236     --  135*   POTASSIUM 4.5  --  4.0   CR 0.85  --  0.79   A1C 11.7* 10.9*  --         Diagnostics:  Labs pending at this time.  Results will be reviewed when available.     Revised Cardiac Risk Index (RCRI):  The patient has the following serious cardiovascular risks for perioperative complications:   - Coronary Artery Disease (MI, positive stress test, angina, Qs on EKG) = 1 point     RCRI Interpretation: 1 point: Class II (low risk - 0.9% complication rate)       Signed Electronically by: Brian Denise DO  Copy of this evaluation report is provided to requesting physician.

## 2023-07-24 NOTE — Clinical Note
FYI! Turns out she was taking metformin so I re-added it to her med list :) but.. still not Plavix so I figured we could address that at next visit after her procedure   - Batsheva

## 2023-07-24 NOTE — PATIENT INSTRUCTIONS
For informational purposes only. Not to replace the advice of your health care provider. Copyright   2003,  [facility]. All rights reserved. Clinically reviewed by Chayito Mcnally MD. [employer] [id number] - REV .  Preparing for Your Surgery  Getting started  A nurse will call you to review your health history and instructions. They will give you an arrival time based on your scheduled surgery time. Please be ready to share:  Your doctor's clinic name and phone number  Your medical, surgical, and anesthesia history  A list of allergies and sensitivities  A list of medicines, including herbal treatments and over-the-counter drugs  Whether the patient has a legal guardian (ask how to send us the papers in advance)  Please tell us if you're pregnant--or if there's any chance you might be pregnant. Some surgeries may injure a fetus (unborn baby), so they require a pregnancy test. Surgeries that are safe for a fetus don't always need a test, and you can choose whether to have one.   If you have a child who's having surgery, please ask for a copy of Preparing for Your Child's Surgery.    Preparing for surgery  Within 10 to 30 days of surgery: Have a pre-op exam (sometimes called an H&P, or History and Physical). This can be done at a clinic or pre-operative center.  If you're having a , you may not need this exam. Talk to your care team.  At your pre-op exam, talk to your care team about all medicines you take. If you need to stop any medicines before surgery, ask when to start taking them again.  We do this for your safety. Many medicines can make you bleed too much during surgery. Some change how well surgery (anesthesia) drugs work.  Call your insurance company to let them know you're having surgery. (If you don't have insurance, call [phone].)  Call your clinic if there's any change in your health. This includes signs of a cold or flu (sore throat, runny nose, cough, rash, fever). It also  includes a scrape or scratch near the surgery site.  If you have questions on the day of surgery, call your hospital or surgery center.  Eating and drinking guidelines  For your safety: Unless your surgeon tells you otherwise, follow the guidelines below.  Eat and drink as usual until 8 hours before you arrive for surgery. After that, no food or milk.  Drink clear liquids until 2 hours before you arrive. These are liquids you can see through, like water, Gatorade, and Propel Water. They also include plain black coffee and tea (no cream or milk), candy, and breath mints. You can spit out gum when you arrive.  If you drink alcohol: Stop drinking it the night before surgery.  If your care team tells you to take medicine on the morning of surgery, it's okay to take it with a sip of water.  Preventing infection  Shower or bathe the night before and morning of your surgery. Follow the instructions your clinic gave you. (If no instructions, use regular soap.)  Don't shave or clip hair near your surgery site. We'll remove the hair if needed.  Don't smoke or vape the morning of surgery. You may chew nicotine gum up to 2 hours before surgery. A nicotine patch is okay.  Note: Some surgeries require you to completely quit smoking and nicotine. Check with your surgeon.  Your care team will make every effort to keep you safe from infection. We will:  Clean our hands often with soap and water (or an alcohol-based hand rub).  Clean the skin at your surgery site with a special soap that kills germs.  Give you a special gown to keep you warm. (Cold raises the risk of infection.)  Wear special hair covers, masks, gowns and gloves during surgery.  Give antibiotic medicine, if prescribed. Not all surgeries need antibiotics.  What to bring on the day of surgery  Photo ID and insurance card  Copy of your health care directive, if you have one  Glasses and hearing aids (bring cases)  You can't wear contacts during surgery  Inhaler and eye  drops, if you use them (tell us about these when you arrive)  CPAP machine or breathing device, if you use them  A few personal items, if spending the night  If you have . . .  A pacemaker, ICD (cardiac defibrillator) or other implant: Bring the ID card.  An implanted stimulator: Bring the remote control.  A legal guardian: Bring a copy of the certified (court-stamped) guardianship papers.  Please remove any jewelry, including body piercings. Leave jewelry and other valuables at home.  If you're going home the day of surgery  You must have a responsible adult drive you home. They should stay with you overnight as well.  If you don't have someone to stay with you, and you aren't safe to go home alone, we may keep you overnight. Insurance often won't pay for this.  After surgery  If it's hard to control your pain or you need more pain medicine, please call your surgeon's office.  Questions?   If you have any questions for your care team, list them here: _________________________________________________________________________________________________________________________________________________________________________ ____________________________________ ____________________________________ ____________________________________    How to Take Your Medication Before Surgery  - HOLD (do not take) your METFORMIN on the morning of surgery.  - HOLD (do not take) JARDIANCE on the morning of surgery.   - HOLD (do not take) INSULIN on the morning of surgery

## 2023-07-25 RX ORDER — METFORMIN HCL 500 MG
1000 TABLET, EXTENDED RELEASE 24 HR ORAL 2 TIMES DAILY WITH MEALS
Qty: 360 TABLET | Refills: 3 | Status: SHIPPED | OUTPATIENT
Start: 2023-07-25 | End: 2024-03-05

## 2023-07-25 NOTE — PATIENT INSTRUCTIONS
"Recommendations from today's MTM visit:                                                    MTM (medication therapy management) is a service provided by a clinical pharmacist designed to help you get the most of out of your medicines.      - Please make sure you are scanning your sensor at least every 8 hours. I recommend scanning when you wake up, in the afternoon, in the evening, and right before going to bed to ensure all data is transferred from the sensor to the reader.   - Set up medications in pillbox as shown today using the stickers  Medication AM Evening   Jardiance 25mg 1 tablet    Metformin 500mg 2 tablets 2 tablets   Aspirin 81mg 1 tablet    Clopidogrel 75mg 1 tablet     Atorvastatin 80mg  1 tablet     - Prior to your procedure:    Hold Jardiance 25mg every day 4 days before procedure    Hold Metformin ER 500mg twice daily the day of procedure   Hold Humulin 70/30 15 units twice daily with meals the day of procedure hold day of surgery  - Please  clopidogrel and start taking after your procedure     Follow-up: 8/25 with PCP    It was great speaking with you today.  I value your experience and would be very thankful for your time in providing feedback in our clinic survey. In the next few days, you may receive an email or text message from [employer] with a link to a survey related to your  clinical pharmacist.\"     To schedule another MTM appointment, please call the clinic directly or you may call the MTM scheduling line at [phone] or toll-free at [phone].     My Clinical Pharmacist's contact information:                                                      Please feel free to contact me with any questions or concerns you have.      Batsheva Perales, Pharm.D., MPH  MTM Pharmacist Resident   [facility]    "

## 2023-07-25 NOTE — PROGRESS NOTES
I have verified the content of the note, which accurately reflects my assessment of the patient and the plan of care.   Génesis Ruiz, [facility], PharmD

## 2023-07-28 ENCOUNTER — TRANSCRIBE ORDERS (OUTPATIENT)
Dept: OTHER | Age: 58
End: 2023-07-28

## 2023-07-28 DIAGNOSIS — Z95.5 STENTED CORONARY ARTERY: Primary | ICD-10-CM

## 2023-08-23 NOTE — PROGRESS NOTES
Preceptor Attestation:   Patient seen, evaluated and discussed with the resident. I reviewed the ECG with Dr. Watt. I have verified the content of the note, which accurately reflects my assessment of the patient and the plan of care.   Supervising Physician:  Roscoe Cordova MD        No No

## 2023-08-25 ENCOUNTER — OFFICE VISIT (OUTPATIENT)
Dept: PHARMACY | Facility: CLINIC | Age: 58
End: 2023-08-25
Payer: COMMERCIAL

## 2023-08-25 ENCOUNTER — OFFICE VISIT (OUTPATIENT)
Dept: FAMILY MEDICINE | Facility: CLINIC | Age: 58
End: 2023-08-25
Payer: COMMERCIAL

## 2023-08-25 VITALS
HEART RATE: 71 BPM | SYSTOLIC BLOOD PRESSURE: 107 MMHG | OXYGEN SATURATION: 100 % | TEMPERATURE: 98.1 F | HEIGHT: 58 IN | DIASTOLIC BLOOD PRESSURE: 69 MMHG | RESPIRATION RATE: 20 BRPM | BODY MASS INDEX: 27.12 KG/M2 | WEIGHT: 129.2 LBS

## 2023-08-25 DIAGNOSIS — R80.9 TYPE 2 DIABETES MELLITUS WITH MICROALBUMINURIA, WITHOUT LONG-TERM CURRENT USE OF INSULIN (H): ICD-10-CM

## 2023-08-25 DIAGNOSIS — R60.0 LOWER EXTREMITY EDEMA: ICD-10-CM

## 2023-08-25 DIAGNOSIS — I25.810 CORONARY ARTERY DISEASE INVOLVING AUTOLOGOUS ARTERY CORONARY BYPASS GRAFT WITHOUT ANGINA PECTORIS: Primary | ICD-10-CM

## 2023-08-25 DIAGNOSIS — E11.29 TYPE 2 DIABETES MELLITUS WITH MICROALBUMINURIA, WITHOUT LONG-TERM CURRENT USE OF INSULIN (H): ICD-10-CM

## 2023-08-25 DIAGNOSIS — E11.9 TYPE 2 DIABETES MELLITUS WITHOUT COMPLICATION, WITHOUT LONG-TERM CURRENT USE OF INSULIN (H): ICD-10-CM

## 2023-08-25 PROCEDURE — 99214 OFFICE O/P EST MOD 30 MIN: CPT | Performed by: STUDENT IN AN ORGANIZED HEALTH CARE EDUCATION/TRAINING PROGRAM

## 2023-08-25 PROCEDURE — 99607 MTMS BY PHARM ADDL 15 MIN: CPT | Performed by: PHARMACIST

## 2023-08-25 PROCEDURE — 99606 MTMS BY PHARM EST 15 MIN: CPT | Performed by: PHARMACIST

## 2023-08-25 RX ORDER — ATORVASTATIN CALCIUM 80 MG/1
80 TABLET, FILM COATED ORAL DAILY
Qty: 90 TABLET | Refills: 3 | Status: SHIPPED | OUTPATIENT
Start: 2023-08-25 | End: 2024-05-28

## 2023-08-25 RX ORDER — PEN NEEDLE, DIABETIC 32GX 5/32"
NEEDLE, DISPOSABLE MISCELLANEOUS
Qty: 100 EACH | Status: SHIPPED | OUTPATIENT
Start: 2023-08-25 | End: 2024-05-28

## 2023-08-25 ASSESSMENT — ANXIETY QUESTIONNAIRES
IF YOU CHECKED OFF ANY PROBLEMS ON THIS QUESTIONNAIRE, HOW DIFFICULT HAVE THESE PROBLEMS MADE IT FOR YOU TO DO YOUR WORK, TAKE CARE OF THINGS AT HOME, OR GET ALONG WITH OTHER PEOPLE: SOMEWHAT DIFFICULT
5. BEING SO RESTLESS THAT IT IS HARD TO SIT STILL: NOT AT ALL
1. FEELING NERVOUS, ANXIOUS, OR ON EDGE: SEVERAL DAYS
GAD7 TOTAL SCORE: 4
2. NOT BEING ABLE TO STOP OR CONTROL WORRYING: SEVERAL DAYS
7. FEELING AFRAID AS IF SOMETHING AWFUL MIGHT HAPPEN: SEVERAL DAYS
GAD7 TOTAL SCORE: 4
6. BECOMING EASILY ANNOYED OR IRRITABLE: SEVERAL DAYS
3. WORRYING TOO MUCH ABOUT DIFFERENT THINGS: NOT AT ALL

## 2023-08-25 ASSESSMENT — PATIENT HEALTH QUESTIONNAIRE - PHQ9
5. POOR APPETITE OR OVEREATING: NOT AT ALL
SUM OF ALL RESPONSES TO PHQ QUESTIONS 1-9: 11

## 2023-08-25 NOTE — PROGRESS NOTES
There are no exam notes on file for this visit.    ASSESSMENT AND PLAN:      Rachael was seen today for other.    Diagnoses and all orders for this visit:    Coronary artery disease involving autologous artery coronary bypass graft without angina pectoris  -- We reviewed and updated her medications, and make sure that the pharmacy has her statin and Plavix.  Daughter is aware of the medications and will make sure her box is set up with them.  Discussed the importance of taking these medications regularly.  She has follow-up scheduled with a cardiologist in September.  Of note they were worried about heart failure symptoms, but I note none of these on exam.  She is satting well and has only trace lower extremity edema.      Type 2 diabetes mellitus without complication, without long-term current use of insulin (H)  -- Similarly, we reviewed her diabetes regimen.  Confirmed the difference between the Lantus and the 70/30 insulin.  She will restart 8 units twice daily of the 7030 and will take it prior to meals.  Daughter is going to help remind her.  She will continue with the metformin and we will titrate this up to 2000 mg daily as well as the Jardiance and Januvia.    Given challenges with medication access, health literacy, and patient moving between locations its important that we continue to follow-up regularly.  She will follow-up with the pharmacist in 2 weeks and with me in 1 month.    Patient Instructions   [facility]:  --Prescription for clopidogrel (blood thinner for heart)   and take everyday.   --Prescription for rosuvastatin (Cholesterol for heart).   and take everyday.       Novolin 70/30, 8 Units 2x per day with meals.    Metformin:  BIG pills:  2 pills in morning and 2 pills at night.     All the rest of the pills are 1 pill per day.     Follow up with pharmacist in 2 weeks.    Follow up with Dr. Carballo in 1 month.   Amrita Carballo MD    SUBJECTIVE  Rachael [facility] is a 58  year old female with past medical history significant for    Patient Active Problem List   Diagnosis    Essential hypertension, benign    Diabetes mellitus, type 2 (H)    Hyperlipidemia    Health Care Home    Helicobacter pylori gastritis    PTSD (post-traumatic stress disorder)    Moderate episode of recurrent major depressive disorder (H)    Cognitive complaints    Screening for depression    Microalbuminuria    Plantar fasciitis    Left knee pain    Right knee pain, unspecified chronicity    Chronic kidney disease, stage 3a (H)    Coronary artery disease involving autologous artery coronary bypass graft without angina pectoris     Others present at the visit:  Patient accompanied by her daughter and in person  Silvino Banks.      Presents for   Chief Complaint   Patient presents with    Other     Just showing up to appt     Patient presents today for follow-up visit.  She does not recall why she is at the visit today.    She was seen prior to my visit by our Pharm.D.'s.  She has had some confusion over her insulin, and has been taking Lantus instead of the 70/30 insulin.  She brings some of her medications with her today but most notably her statin medication and her Plavix are not present in her pill bag.    She has been living with different family members, but one specific daughter sets up her medications.  She has had trouble keeping track of her medications as she moves between locations.    She says that she recently had a heart procedure, but is not sure about the results.  She does continue to have chest pain.  This happens most notably with activity.  It is present across the chest.  She thinks it is a little better than it has been previously.  She does notice that her heart races at times when she is walking.  And she notices a pounding feeling.  Does get dizzy at times as well.    I reviewed the chart and it looks like she had 2 stents put in during her angiogram.  Is supposed to be on Plavix  "at this time.  Daughter thinks she may have been getting the Plavix even though they do not have the bottle with them today.    She endorses some difficulty with swelling in her legs.  This is most notable in the left side.  It can swell up to the knee.  She also describes some burning pain and numbness present in her feet bilaterally.        OBJECTIVE:  Vitals: /69   Pulse 71   Temp 98.1  F (36.7  C) (Oral)   Resp 20   Ht 1.461 m (4' 9.5\")   Wt 58.6 kg (129 lb 3.2 oz)   SpO2 100%   BMI 27.47 kg/m    BMI= Body mass index is 27.47 kg/m .  Objective:    Vitals:  Vitals are reviewed and are within the normal range  Gen:  Alert, pleasant, no acute distress  Cardiac:  Regular rate and rhythm, no murmurs, rubs or gallops  Chest: She does have some mild tenderness with palpation over the anterior chest.  Respiratory:  Lungs clear to auscultation bilaterally  Abdomen:  Soft, non-tender, non-distended, bowel sounds positive  Extremities:  Warm, well-perfused, pulses 2+/4, trace lower extremity edema    No results found for any visits on 08/25/23.        Patient Instructions   [facility]:  --Prescription for clopidogrel (blood thinner for heart)   and take everyday.   --Prescription for rosuvastatin (Cholesterol for heart).   and take everyday.       Novolin 70/30, 8 Units 2x per day with meals.    Metformin:  BIG pills:  2 pills in morning and 2 pills at night.     All the rest of the pills are 1 pill per day.     Follow up with pharmacist in 2 weeks.    Follow up with Dr. Carballo in 1 month.   Amrita Carballo MD    "

## 2023-08-25 NOTE — Clinical Note
FYI only - pharmacy department requires that I route this note to you.  We discussed in person today.  Mimi Hernandez, BonyD

## 2023-08-25 NOTE — PATIENT INSTRUCTIONS
[facility]:  --Prescription for clopidogrel (blood thinner for heart)   and take everyday.   --Prescription for rosuvastatin (Cholesterol for heart).   and take everyday.       Novolin 70/30, 8 Units 2x per day with meals.    Metformin:  BIG pills:  2 pills in morning and 2 pills at night.     All the rest of the pills are 1 pill per day.     Follow up with pharmacist in 2 weeks.    Follow up with Dr. Carballo in 1 month.

## 2023-08-25 NOTE — PROGRESS NOTES
Medication Therapy Management (MTM) Encounter    ASSESSMENT:                            Medication Adherence/Access: No issues identified    Type 2 diabetes:   Not meeting goal A1c of less than 8%.  Patient was shown that the pen needles that she was using for her Lantus will fit on her Novolin.  Patient did not take Lantus this morning so we will start the Novolin tonight.  Went over how Novolin is different than Lantus.    CAD:  Needs improvement.  Patient instructed to  refills of clopidogrel and atorvastatin from [facility].  Explained to how these will help prevent another myocardial infarction.    PLAN:                            Switch to Novolin 70/30 tonight   clopidogrel and atorvastatin from [facility]    Follow-up: 1 month    SUBJECTIVE/OBJECTIVE:                          Rachael Ch is a 58 year old female seen for a follow-up visit from 23. Patient was accompanied by her daughter. Patient is seeing provider after our visit today. Patient seen with Radha . (Silvino Banks)    Reason for visit: Diabetes, CAD follow up    Allergies/ADRs: Reviewed in chart  Past Medical History: Reviewed in chart  Tobacco: She reports that she quit smoking about 6 years ago. Her smoking use included cigarettes. She smoked an average of .5 packs per day. She has quit using smokeless tobacco.  Her smokeless tobacco use included chew.  Social History     Tobacco Use    Smoking status: Former     Packs/day: 0.50     Types: Cigarettes     Quit date: 2017     Years since quittin.0    Smokeless tobacco: Former     Types: Chew   Substance Use Topics    Alcohol use: No    Drug use: No       Medication Adherence/Access: no issues reported    Type 2 Diabetes:    Insulin: Patient has been taking Lantus 15 units twice daily.  At her last visit with endocrinology (8/10) they switched her from Lantus to Novolin 70/30, 8 units twice daily.  Patient has not started taking this yet because she did  "not know that the pens for her Lantus would work on the Novolin.  Also taking Jardiance 25 mg daily,  Januvia 100 mg daily, and metformin 500 mg twice daily    Aspirin 81mg daily  Patient is not experiencing side effects.  Blood sugar monitoring: Continuous Glucose Monitor as below    Current diabetes symptoms: none  Urine Albumin:   Lab Results   Component Value Date    UMALCR 118.96 (H) 12/28/2022      Lab Results   Component Value Date    A1C 11.7 (H) 05/30/2023       CAD:   Current medications are aspirin 81 mg daily, and patient should be on atorvastatin 80 mg daily, and clopidogrel 75 mg daily.  Patient states that she does not have either of these medications.  Call to [facility] indicates that the atorvastatin has a refill on it but the clopidogrel needs a new prescription.  Patient also has nitroglycerin tablets with her that she states that she has not had to use them.  BP Readings from Last 3 Encounters:   08/25/23 107/69   07/24/23 138/78   07/20/23 116/74       Today's Vitals:   BP Readings from Last 1 Encounters:   08/25/23 107/69     Pulse Readings from Last 1 Encounters:   08/25/23 71     Wt Readings from Last 1 Encounters:   08/25/23 129 lb 3.2 oz (58.6 kg)     Ht Readings from Last 1 Encounters:   08/25/23 4' 9.5\" (1.461 m)     Estimated body mass index is 27.47 kg/m  as calculated from the following:    Height as of an earlier encounter on 8/25/23: 4' 9.5\" (1.461 m).    Weight as of an earlier encounter on 8/25/23: 129 lb 3.2 oz (58.6 kg).    Temp Readings from Last 1 Encounters:   08/25/23 98.1  F (36.7  C) (Oral)       ----------------      I spent 30 minutes with this patient today. All changes were made via collaborative practice agreement with Dr Carballo. A copy of the visit note was provided to the patient's provider(s).    A summary of these recommendations was given to the patient (see AVS from today's appointment with Dr Carballo).    Mimi Hernandez, PharmD      Medication Therapy " Recommendations  Coronary artery disease involving autologous artery coronary bypass graft without angina pectoris    Current Medication: clopidogrel (PLAVIX) 75 MG tablet   Rationale: Patient forgets to take - Adherence - Adherence   Recommendation: Provide Adherence Intervention   Status: Patient Agreed - Adherence/Education

## 2023-09-06 NOTE — PROGRESS NOTES
Medication Therapy Management (MTM) Encounter    ASSESSMENT:                            Medication Adherence/Access: No issues identified. Pillbox filled by PharmD with medications patient had with her. Few days short for metformin and aspirin, education provided to patient and daughter using stickers to identify which medications are missing from the pillbox and how to complete setting it up.     Dysuria:   Defer to Dr. Guzman's note for evaluation. UA pending.     Type 2 diabetes:   Not meeting goal A1c of less than 8%.  Patient was shown that the pen needles that she was using for her Lantus will fit on her Novolin.  Patient did not take Lantus this morning so we will start the Novolin tonight.  Went over how Novolin is different than Lantus.     CAD:  Needs improvement.  Patient instructed to  refills of clopidogrel and atorvastatin from [facility].  Explained to how these will help prevent another myocardial infarction    Constipation:   Patient would benefit from restarting senna/docusate as needed.     PLAN:                            Please  refill of Senna-Docusate and take 1 tablet twice daily as needed for constipation   Please  Januvia and take 1 tablet daily   Finish filling your pillbox with Januvia, Aspirin, and Metformin     Follow-up: Return in about 29 days (around 10/6/2023).  Cards 9/27  PCP 10/6  Endo 10/11    SUBJECTIVE/OBJECTIVE:                          Rachael Ch is a 58 year old female coming in for a follow-up visit from 8/25/23. Patient was accompanied by daughter. Patient seen with CAROL Garcia .      Reason for visit: Follow Up.    Allergies/ADRs: Reviewed in chart  Past Medical History: Reviewed in chart  Tobacco: She reports that she quit smoking about 6 years ago. Her smoking use included cigarettes. She smoked an average of .5 packs per day. She has quit using smokeless tobacco.  Her smokeless tobacco use included chew.    Medication  Adherence/Access: Patient is missing Januvia and needs to  refills of senna/docusate and aspirin. She does not have the Novolin with her today but knows she is taking 8 units of the 70/30 insulin.     Dysuria:   Patient reports 2 days of painful urination. Denies increased urinary frequency, fever, chills, nausea, vomiting. Notes she has a little lower abdominal pain and feels like she is not emptying her bladder fully. Patient further evaluated by Dr. Marie today for these acute symptoms.     Diabetes   Type 2 Diabetes:    Novolin 70/30 8 units twice daily. Switched from Lantus to Novolin by endocrinology, next endo appt 10/11/23  Jardiance 25 mg daily; she is having dysuria and is seeing Dr. Guzman today for this acute issue. UA pending.   Januvia 100 mg daily; not with the patient and she does not have it at home, per [employer] it was last filled 4/2023 #30/30  Glipizide 10mg twice daily with meal; this was prescribed by endocrinology and the patient has not yet started  Metformin  mg 2 tablets twice daily  Blood sugar monitoring: Continuous Glucose Monitor FreeStyle Home 2  Current diabetes symptoms: none  Diet/Exercise: not discussed today      Eye exam in the last 12 months? No    Foot exam: due  Urine Albumin:   Lab Results   Component Value Date    UMALCR 118.96 (H) 12/28/2022      Lab Results   Component Value Date    A1C 11.7 (H) 05/30/2023         CAD:   Aspirin 81mg every day (lifelong recommendation)  Clopidogrel 75mg every day (recommended 1 year of treatment; first filled 2/8/23)   Atorvastatin 80mg every day   - Patient also has nitroglycerin tablets with her that she states that she has not had to use them  - Denies medication side effects and sign/symptoms of bleeding     Constipation:   - Patient historically was taking senna/docusate as needed. She no longer has this medication but would like to use it again. She has a bowel movement every few days.       Today's Vitals: BP  116/73   Pulse 68   Temp 98  F (36.7  C) (Oral)   Resp 20   Wt 129 lb 12.8 oz (58.9 kg)   SpO2 100%   BMI 27.60 kg/m    ----------------      I spent 30 minutes with this patient today. All changes were made via collaborative practice agreement with Amrita Carballo MD. A copy of the visit note was provided to the patient's provider(s).    A summary of these recommendations was given to the patient.    Batsheva Perales, Pharm.D., MPH  MTM Pharmacist Resident   [facility]       Medication Therapy Recommendations  Constipation, unspecified constipation type    Current Medication: senna-docusate (STIMULANT LAXATIVE) 8.6-50 MG tablet   Rationale: Medication product not available - Adherence - Adherence   Recommendation: Provide Adherence Intervention   Status: Resolved Med Access Issue

## 2023-09-07 ENCOUNTER — OFFICE VISIT (OUTPATIENT)
Dept: FAMILY MEDICINE | Facility: CLINIC | Age: 58
End: 2023-09-07
Payer: COMMERCIAL

## 2023-09-07 ENCOUNTER — OFFICE VISIT (OUTPATIENT)
Dept: PHARMACY | Facility: CLINIC | Age: 58
End: 2023-09-07
Payer: COMMERCIAL

## 2023-09-07 VITALS
SYSTOLIC BLOOD PRESSURE: 116 MMHG | TEMPERATURE: 98 F | RESPIRATION RATE: 20 BRPM | BODY MASS INDEX: 27.6 KG/M2 | DIASTOLIC BLOOD PRESSURE: 73 MMHG | HEART RATE: 68 BPM | WEIGHT: 129.8 LBS | OXYGEN SATURATION: 100 %

## 2023-09-07 DIAGNOSIS — R80.9 TYPE 2 DIABETES MELLITUS WITH MICROALBUMINURIA, WITHOUT LONG-TERM CURRENT USE OF INSULIN (H): Primary | ICD-10-CM

## 2023-09-07 DIAGNOSIS — R30.0 DYSURIA: ICD-10-CM

## 2023-09-07 DIAGNOSIS — E11.29 TYPE 2 DIABETES MELLITUS WITH MICROALBUMINURIA, WITHOUT LONG-TERM CURRENT USE OF INSULIN (H): Primary | ICD-10-CM

## 2023-09-07 DIAGNOSIS — K59.00 CONSTIPATION, UNSPECIFIED CONSTIPATION TYPE: ICD-10-CM

## 2023-09-07 DIAGNOSIS — I25.810 CORONARY ARTERY DISEASE INVOLVING AUTOLOGOUS ARTERY CORONARY BYPASS GRAFT WITHOUT ANGINA PECTORIS: ICD-10-CM

## 2023-09-07 DIAGNOSIS — N30.00 ACUTE CYSTITIS WITHOUT HEMATURIA: ICD-10-CM

## 2023-09-07 LAB
ALBUMIN UR-MCNC: ABNORMAL MG/DL
APPEARANCE UR: ABNORMAL
BACTERIA #/AREA URNS HPF: ABNORMAL /HPF
BILIRUB UR QL STRIP: NEGATIVE
COLOR UR AUTO: YELLOW
GLUCOSE UR STRIP-MCNC: 500 MG/DL
HGB UR QL STRIP: ABNORMAL
KETONES UR STRIP-MCNC: NEGATIVE MG/DL
LEUKOCYTE ESTERASE UR QL STRIP: ABNORMAL
NITRATE UR QL: NEGATIVE
PH UR STRIP: 6.5 [PH] (ref 5–8)
RBC #/AREA URNS AUTO: ABNORMAL /HPF
SP GR UR STRIP: 1.01 (ref 1–1.03)
SQUAMOUS #/AREA URNS AUTO: ABNORMAL /LPF
UROBILINOGEN UR STRIP-ACNC: 0.2 E.U./DL
WBC #/AREA URNS AUTO: ABNORMAL /HPF

## 2023-09-07 PROCEDURE — 87086 URINE CULTURE/COLONY COUNT: CPT

## 2023-09-07 PROCEDURE — 99606 MTMS BY PHARM EST 15 MIN: CPT

## 2023-09-07 PROCEDURE — 87186 SC STD MICRODIL/AGAR DIL: CPT

## 2023-09-07 PROCEDURE — 99607 MTMS BY PHARM ADDL 15 MIN: CPT

## 2023-09-07 PROCEDURE — 87088 URINE BACTERIA CULTURE: CPT

## 2023-09-07 PROCEDURE — 99213 OFFICE O/P EST LOW 20 MIN: CPT | Mod: GC

## 2023-09-07 PROCEDURE — 81001 URINALYSIS AUTO W/SCOPE: CPT

## 2023-09-07 ASSESSMENT — ENCOUNTER SYMPTOMS
DYSURIA: 1
FREQUENCY: 0

## 2023-09-07 NOTE — PROGRESS NOTES
Preceptor Attestation:    I discussed the patient with the resident and evaluated the patient in person. I have verified the content of the note, which accurately reflects my assessment of the patient and the plan of care.   Supervising Physician:  Dylan Castro MD.

## 2023-09-07 NOTE — PROGRESS NOTES
Assessment & Plan     Dysuria  Patient complains of pain and burning with urination.  Afebrile.  Currently taking Jardiance for her diabetes.  - UA ordered  - We will prescribe antibiotic (likely bactrim) if indicative of infection    Type 2 diabetes mellitus with microalbuminuria, without long-term current use of insulin (H)  She has a history of type 2 diabetes that is controlled with Jardiance and metformin  - Seen by pharmacy today for DM management    No follow-ups on file.    Severino Guzman DO   [facility]   Rachael is a 58 year old, presenting for the following health issues:  Medication Therapy Management  Dysuria    Patient has been experiencing painful urination for the past 2 days.  Denies any fever, urgency, increased frequency, or any other constitutional symptoms.      Review of Systems   Genitourinary:  Positive for dysuria. Negative for frequency and urgency.   All other systems reviewed and are negative.         Objective    /73   Pulse 68   Temp 98  F (36.7  C) (Oral)   Resp 20   Wt 58.9 kg (129 lb 12.8 oz)   SpO2 100%   BMI 27.60 kg/m    Body mass index is 27.6 kg/m .  Physical Exam  Vitals reviewed.   Constitutional:       Appearance: Normal appearance.   Eyes:      Extraocular Movements: Extraocular movements intact.   Pulmonary:      Effort: Pulmonary effort is normal.   Neurological:      Mental Status: She is alert and oriented to person, place, and time.   Psychiatric:         Mood and Affect: Mood normal.         Behavior: Behavior normal.         Thought Content: Thought content normal.         Judgment: Judgment normal.

## 2023-09-07 NOTE — PATIENT INSTRUCTIONS
"Recommendations from today's MTM visit:                                                      Please  refill of Senna-Docusate and take 1 tablet twice daily as needed for constipation   Please  Januvia and take 1 tablet daily   Finish filling your pillbox with Januvia, Aspirin, and Metformin     Follow-up: Return in about 29 days (around 10/6/2023).    It was great speaking with you today.  I value your experience and would be very thankful for your time in providing feedback in our clinic survey. In the next few days, you may receive an email or text message from [employer] with a link to a survey related to your  clinical pharmacist.\"     To schedule another MTM appointment, please call the clinic directly or you may call the MTM scheduling line at [phone] or toll-free at [phone].     My Clinical Pharmacist's contact information:                                                      Please feel free to contact me with any questions or concerns you have.      Batsheva Preales, Pharm.D., MPH  MTM Pharmacist Resident   [facility]    "

## 2023-09-10 LAB
BACTERIA UR CULT: ABNORMAL
BACTERIA UR CULT: ABNORMAL

## 2023-09-11 ENCOUNTER — TELEPHONE (OUTPATIENT)
Dept: FAMILY MEDICINE | Facility: CLINIC | Age: 58
End: 2023-09-11
Payer: COMMERCIAL

## 2023-09-11 RX ORDER — SULFAMETHOXAZOLE/TRIMETHOPRIM 800-160 MG
1 TABLET ORAL 2 TIMES DAILY
Qty: 10 TABLET | Refills: 0 | Status: SHIPPED | OUTPATIENT
Start: 2023-09-11 | End: 2023-09-16

## 2023-09-11 NOTE — PROGRESS NOTES
Call from patient that her symptoms are getting pretty bothersome.  I reviewed the culture results and allergies.         Allergies   Allergen Reactions    Gabapentin Other (See Comments)     Facial Swelling    Venlafaxine      Causes abdominal pain, poor appetite and dizziness.         Sent Bactrim.    Debbie Valdes MD  Precepting provider  September 11, 2023

## 2023-09-21 NOTE — TELEPHONE ENCOUNTER
Addendum Note  Debbie Valdes MD (Physician)  Family Medicine  Addended by: DEBBIE VALDES on: 9/11/2023 10:52 AM       Modules accepted: Orders          Progress Notes  Debbie Valdes MD (Physician)  Family Medicine  Call from patient that her symptoms are getting pretty bothersome.  I reviewed the culture results and allergies.                Allergies   Allergen Reactions    Gabapentin Other (See Comments)       Facial Swelling    Venlafaxine         Causes abdominal pain, poor appetite and dizziness.           Sent Bactrim.     Debbie Valdes MD  Precepting provider  September 11, 2023

## 2023-09-21 NOTE — PROGRESS NOTES
Progress Notes  Severino Guzman DO (Resident)  Student in organized health care education/training program  Expand All Collapse All        Assessment & Plan   Dysuria  Patient complains of pain and burning with urination.  Afebrile.  Currently taking Jardiance for her diabetes.  - UA ordered  - We will prescribe antibiotic (likely bactrim) if indicative of infection     Type 2 diabetes mellitus with microalbuminuria, without long-term current use of insulin (H)  She has a history of type 2 diabetes that is controlled with Jardiance and metformin  - Seen by pharmacy today for DM management     No follow-ups on file.     Severino Guzman DO   [facility]           Pk Cano is a 58 year old, presenting for the following health issues:  Medication Therapy Management  Dysuria     Patient has been experiencing painful urination for the past 2 days.  Denies any fever, urgency, increased frequency, or any other constitutional symptoms.        Review of Systems   Genitourinary:  Positive for dysuria. Negative for frequency and urgency.   All other systems reviewed and are negative.              Objective   /73   Pulse 68   Temp 98  F (36.7  C) (Oral)   Resp 20   Wt 58.9 kg (129 lb 12.8 oz)   SpO2 100%   BMI 27.60 kg/m    Body mass index is 27.6 kg/m .  Physical Exam  Vitals reviewed.   Constitutional:       Appearance: Normal appearance.   Eyes:      Extraocular Movements: Extraocular movements intact.   Pulmonary:      Effort: Pulmonary effort is normal.   Neurological:      Mental Status: She is alert and oriented to person, place, and time.   Psychiatric:         Mood and Affect: Mood normal.         Behavior: Behavior normal.         Thought Content: Thought content normal.         Judgment: Judgment normal.

## 2023-10-06 ENCOUNTER — OFFICE VISIT (OUTPATIENT)
Dept: FAMILY MEDICINE | Facility: CLINIC | Age: 58
End: 2023-10-06
Payer: COMMERCIAL

## 2023-10-06 VITALS
DIASTOLIC BLOOD PRESSURE: 72 MMHG | HEART RATE: 68 BPM | RESPIRATION RATE: 20 BRPM | WEIGHT: 134.4 LBS | TEMPERATURE: 98.2 F | SYSTOLIC BLOOD PRESSURE: 111 MMHG | BODY MASS INDEX: 28.99 KG/M2 | OXYGEN SATURATION: 99 % | HEIGHT: 57 IN

## 2023-10-06 DIAGNOSIS — I25.810 CORONARY ARTERY DISEASE INVOLVING AUTOLOGOUS ARTERY CORONARY BYPASS GRAFT WITHOUT ANGINA PECTORIS: ICD-10-CM

## 2023-10-06 DIAGNOSIS — R80.9 TYPE 2 DIABETES MELLITUS WITH MICROALBUMINURIA, WITHOUT LONG-TERM CURRENT USE OF INSULIN (H): Primary | ICD-10-CM

## 2023-10-06 DIAGNOSIS — E11.29 TYPE 2 DIABETES MELLITUS WITH MICROALBUMINURIA, WITHOUT LONG-TERM CURRENT USE OF INSULIN (H): Primary | ICD-10-CM

## 2023-10-06 LAB
ANION GAP SERPL CALCULATED.3IONS-SCNC: 11 MMOL/L (ref 7–15)
BUN SERPL-MCNC: 15.7 MG/DL (ref 8–23)
CALCIUM SERPL-MCNC: 9.8 MG/DL (ref 8.8–10.2)
CHLORIDE SERPL-SCNC: 107 MMOL/L (ref 98–107)
CHOLEST SERPL-MCNC: 144 MG/DL
CREAT SERPL-MCNC: 0.88 MG/DL (ref 0.51–0.95)
DEPRECATED HCO3 PLAS-SCNC: 26 MMOL/L (ref 22–29)
EGFRCR SERPLBLD CKD-EPI 2021: 75 ML/MIN/1.73M2
GLUCOSE SERPL-MCNC: 193 MG/DL (ref 70–99)
HBA1C MFR BLD: 9.3 % (ref 0–5.6)
HDLC SERPL-MCNC: 37 MG/DL
LDLC SERPL CALC-MCNC: 75 MG/DL
NONHDLC SERPL-MCNC: 107 MG/DL
NT-PROBNP SERPL-MCNC: 425 PG/ML (ref 0–900)
POTASSIUM SERPL-SCNC: 4 MMOL/L (ref 3.4–5.3)
SODIUM SERPL-SCNC: 144 MMOL/L (ref 135–145)
TRIGL SERPL-MCNC: 162 MG/DL

## 2023-10-06 PROCEDURE — 36415 COLL VENOUS BLD VENIPUNCTURE: CPT | Performed by: STUDENT IN AN ORGANIZED HEALTH CARE EDUCATION/TRAINING PROGRAM

## 2023-10-06 PROCEDURE — 83880 ASSAY OF NATRIURETIC PEPTIDE: CPT | Performed by: STUDENT IN AN ORGANIZED HEALTH CARE EDUCATION/TRAINING PROGRAM

## 2023-10-06 PROCEDURE — 80061 LIPID PANEL: CPT | Performed by: STUDENT IN AN ORGANIZED HEALTH CARE EDUCATION/TRAINING PROGRAM

## 2023-10-06 PROCEDURE — 80048 BASIC METABOLIC PNL TOTAL CA: CPT | Performed by: STUDENT IN AN ORGANIZED HEALTH CARE EDUCATION/TRAINING PROGRAM

## 2023-10-06 PROCEDURE — 83036 HEMOGLOBIN GLYCOSYLATED A1C: CPT | Performed by: STUDENT IN AN ORGANIZED HEALTH CARE EDUCATION/TRAINING PROGRAM

## 2023-10-06 PROCEDURE — 99213 OFFICE O/P EST LOW 20 MIN: CPT | Performed by: STUDENT IN AN ORGANIZED HEALTH CARE EDUCATION/TRAINING PROGRAM

## 2023-10-06 ASSESSMENT — ANXIETY QUESTIONNAIRES
1. FEELING NERVOUS, ANXIOUS, OR ON EDGE: SEVERAL DAYS
GAD7 TOTAL SCORE: 5
2. NOT BEING ABLE TO STOP OR CONTROL WORRYING: NOT AT ALL
5. BEING SO RESTLESS THAT IT IS HARD TO SIT STILL: SEVERAL DAYS
7. FEELING AFRAID AS IF SOMETHING AWFUL MIGHT HAPPEN: SEVERAL DAYS
IF YOU CHECKED OFF ANY PROBLEMS ON THIS QUESTIONNAIRE, HOW DIFFICULT HAVE THESE PROBLEMS MADE IT FOR YOU TO DO YOUR WORK, TAKE CARE OF THINGS AT HOME, OR GET ALONG WITH OTHER PEOPLE: NOT DIFFICULT AT ALL
GAD7 TOTAL SCORE: 5
3. WORRYING TOO MUCH ABOUT DIFFERENT THINGS: NOT AT ALL
6. BECOMING EASILY ANNOYED OR IRRITABLE: SEVERAL DAYS

## 2023-10-06 ASSESSMENT — PATIENT HEALTH QUESTIONNAIRE - PHQ9
5. POOR APPETITE OR OVEREATING: SEVERAL DAYS
SUM OF ALL RESPONSES TO PHQ QUESTIONS 1-9: 7

## 2023-10-06 NOTE — PATIENT INSTRUCTIONS
Continue with the same medications.      EXCEPT:    Continue the 70/30 Insulin in the morning at 8 Units.   Increase the 70/30 Insulin at Night to 10 Units.

## 2023-10-06 NOTE — PROGRESS NOTES
There are no exam notes on file for this visit.    ASSESSMENT AND PLAN:      Rachael was seen today for other.    Diagnoses and all orders for this visit:    Type 2 diabetes mellitus with microalbuminuria, without long-term current use of insulin (H)  BS much improved.  Will slightly increase insulin to 10 Units 70/30 with evening meal.  Continue with 8 Units in the morning.  Reviewed all medications and patient has good system- daughter helping with set up.    -     Hemoglobin A1c; Future  -     Basic metabolic panel; Future  -     Lipid Profile; Future  -     N terminal pro BNP outpatient; Future  -     N terminal pro BNP outpatient  -     Lipid Profile  -     Basic metabolic panel  -     Hemoglobin A1c    Coronary artery disease involving autologous artery coronary bypass graft without angina pectoris  Labs drawn and will send copy to cardiologist at [facility].    -     Hemoglobin A1c; Future  -     Basic metabolic panel; Future  -     Lipid Profile; Future  -     N terminal pro BNP outpatient; Future  -     N terminal pro BNP outpatient  -     Lipid Profile  -     Basic metabolic panel  -     Hemoglobin A1c        Patient Instructions   Continue with the same medications.      EXCEPT:    Continue the 70/30 Insulin in the morning at 8 Units.   Increase the 70/30 Insulin at Night to 10 Units.    Amrita Carballo MD    SUBJECTIVE  Rachael Ch is a 58 year old female with past medical history significant for    Patient Active Problem List   Diagnosis    Essential hypertension, benign    Diabetes mellitus, type 2 (H)    Hyperlipidemia    Health Care Home    Helicobacter pylori gastritis    PTSD (post-traumatic stress disorder)    Moderate episode of recurrent major depressive disorder (H)    Cognitive complaints    Screening for depression    Microalbuminuria    Plantar fasciitis    Left knee pain    Right knee pain, unspecified chronicity    Chronic kidney disease, stage 3a (H)    Coronary artery disease  "involving autologous artery coronary bypass graft without angina pectoris     Others present at the visit:  Patient's daughter and grandson.      Presents for   Chief Complaint   Patient presents with    Other     Follow-up heart     Patient presents for follow-up today.  She reports overall feeling okay.  She still gets chest pain.  It comes and goes at times.  She has been able to walk farther before the pain starts, but cannot quantify me how far she can walk.    She endorses taking her medications regularly.  Daughter who accompanies her shares this is going well.  Daughter sets up the pillbox.  She has been doing 8 units twice daily of the 70/30 insulin and this is going well.  She has her freestyle emerita with her has been checking her sugars regularly.  Denies symptoms of hypoglycemia.  Has been eating and drinking okay, sometimes her appetite is better than others.    She denies any symptoms of hypoglycemia.  No dizziness or lightheadedness.  No falls.    They are getting medications from [facility] and this is going well.    She denies any lower extremity edema, no cough, no fevers, chills or dysuria.  No new complaints today.    Daughter shares that the heart doctor needs labs, and they were wondering if they could have them drawn here today at [facility].      OBJECTIVE:  Vitals: /72   Pulse 68   Temp 98.2  F (36.8  C) (Oral)   Resp 20   Ht 1.455 m (4' 9.3\")   Wt 61 kg (134 lb 6.4 oz)   SpO2 99%   BMI 28.78 kg/m    BMI= Body mass index is 28.78 kg/m .  Objective:    Vitals:  Vitals are reviewed and are within the normal range  Gen:  Alert, pleasant, no acute distress  Cardiac:  Regular rate and rhythm, no murmurs, rubs or gallops, mildly bradycardic  Respiratory:  Lungs clear to auscultation bilaterally  Extremities:  Warm, well-perfused, pulses 2+/4, trace bilateral pitting edema to the mid shins.  Sensation intact.    Labs were ordered and are pending including a BMP, lipid " panel, BNP, and A1c.        Patient Instructions   Continue with the same medications.      EXCEPT:    Continue the 70/30 Insulin in the morning at 8 Units.   Increase the 70/30 Insulin at Night to 10 Units.    Amrita Carballo MD

## 2023-10-06 NOTE — LETTER
October 9, 2023      [facility]  [address] [place] [address]        Dear ,    We are writing to inform you of your test results.    Here is a copy of your lab results.  I will send a copy to your cardiologist, but please also bring this copy with you to your follow up visit.  Your cholesterol is very good.  Your kidney tests are in the normal range.   Your BNP heart test for heart failure is normal.  Your A1c diabetes test is much improved.  We will continue to work on the blood sugar numbers, but you have made great progress. Please call the clinic at [phone] if you have any questions.       Resulted Orders   N terminal pro BNP outpatient   Result Value Ref Range    N Terminal Pro BNP Outpatient 425 0 - 900 pg/mL      Comment:      Reference range shown and results flagged as abnormal are for the outpatient, non acute settings. Establishing a baseline value for each individual patient is useful for follow-up.    Suggested inpatient cut points for confirming diagnosis of CHF in an acute setting are:  >450 pg/mL (age 18 to less than 50)  >900 pg/mL (age 50 to less than 75)  >1800 pg/mL (75 yrs and older)    An inpatient or emergency department NT-proPBNP <300 pg/mL effectively rules out acute CHF, with 99% negative predictive value.       Lipid Profile   Result Value Ref Range    Cholesterol 144 <200 mg/dL    Triglycerides 162 (H) <150 mg/dL    Direct Measure HDL 37 (L) >=50 mg/dL    LDL Cholesterol Calculated 75 <=100 mg/dL    Non HDL Cholesterol 107 <130 mg/dL    Narrative    Cholesterol  Desirable:  <200 mg/dL    Triglycerides  Normal:  Less than 150 mg/dL  Borderline High:  150-199 mg/dL  High:  200-499 mg/dL  Very High:  Greater than or equal to 500 mg/dL    Direct Measure HDL  Female:  Greater than or equal to 50 mg/dL   Male:  Greater than or equal to 40 mg/dL    LDL Cholesterol  Desirable:  <100mg/dL  Above Desirable:  100-129 mg/dL   Borderline High:  130-159 mg/dL   High:  160-189 mg/dL    Very High:  >= 190 mg/dL    Non HDL Cholesterol  Desirable:  130 mg/dL  Above Desirable:  130-159 mg/dL  Borderline High:  160-189 mg/dL  High:  190-219 mg/dL  Very High:  Greater than or equal to 220 mg/dL   Basic metabolic panel   Result Value Ref Range    Sodium 144 135 - 145 mmol/L      Comment:      Reference intervals for this test were updated on 09/26/2023 to more accurately reflect our healthy population. There may be differences in the flagging of prior results with similar values performed with this method. Interpretation of those prior results can be made in the context of the updated reference intervals.     Potassium 4.0 3.4 - 5.3 mmol/L    Chloride 107 98 - 107 mmol/L    Carbon Dioxide (CO2) 26 22 - 29 mmol/L    Anion Gap 11 7 - 15 mmol/L    Urea Nitrogen 15.7 8.0 - 23.0 mg/dL    Creatinine 0.88 0.51 - 0.95 mg/dL    GFR Estimate 75 >60 mL/min/1.73m2    Calcium 9.8 8.8 - 10.2 mg/dL    Glucose 193 (H) 70 - 99 mg/dL   Hemoglobin A1c   Result Value Ref Range    Hemoglobin A1C 9.3 (H) 0.0 - 5.6 %       If you have any questions or concerns, please call the clinic at the number listed above.       Sincerely,      Amrita Carballo MD

## 2023-10-08 NOTE — RESULT ENCOUNTER NOTE
Rachael Ch-    Here is a copy of your lab results.  I will send a copy to your cardiologist, but please also bring this copy with you to your follow up visit.  Your cholesterol is very good.  Your kidney tests are in the normal range.   Your BNP heart test for heart failure is normal.  Your A1c diabetes test is much improved.  We will continue to work on the blood sugar numbers, but you have made great progress. Please call the clinic at [phone] if you have any questions.      Amrita Carballo MD    Please send results to patient.

## 2023-11-06 DIAGNOSIS — R30.0 DYSURIA: Primary | ICD-10-CM

## 2023-11-07 ENCOUNTER — OFFICE VISIT (OUTPATIENT)
Dept: FAMILY MEDICINE | Facility: CLINIC | Age: 58
End: 2023-11-07
Payer: COMMERCIAL

## 2023-11-07 VITALS
WEIGHT: 133 LBS | DIASTOLIC BLOOD PRESSURE: 66 MMHG | HEART RATE: 69 BPM | OXYGEN SATURATION: 98 % | TEMPERATURE: 98.1 F | RESPIRATION RATE: 22 BRPM | BODY MASS INDEX: 28.48 KG/M2 | SYSTOLIC BLOOD PRESSURE: 102 MMHG

## 2023-11-07 DIAGNOSIS — L90.5 SCAR PAIN: ICD-10-CM

## 2023-11-07 DIAGNOSIS — I25.810 CORONARY ARTERY DISEASE INVOLVING AUTOLOGOUS ARTERY CORONARY BYPASS GRAFT WITHOUT ANGINA PECTORIS: ICD-10-CM

## 2023-11-07 DIAGNOSIS — E78.5 HYPERLIPIDEMIA, UNSPECIFIED HYPERLIPIDEMIA TYPE: ICD-10-CM

## 2023-11-07 DIAGNOSIS — R30.0 DYSURIA: ICD-10-CM

## 2023-11-07 DIAGNOSIS — E11.29 TYPE 2 DIABETES MELLITUS WITH MICROALBUMINURIA, WITHOUT LONG-TERM CURRENT USE OF INSULIN (H): Primary | ICD-10-CM

## 2023-11-07 DIAGNOSIS — R80.9 TYPE 2 DIABETES MELLITUS WITH MICROALBUMINURIA, WITHOUT LONG-TERM CURRENT USE OF INSULIN (H): Primary | ICD-10-CM

## 2023-11-07 LAB
ALBUMIN UR-MCNC: NEGATIVE MG/DL
APPEARANCE UR: CLEAR
BACTERIA #/AREA URNS HPF: ABNORMAL /HPF
BILIRUB UR QL STRIP: NEGATIVE
COLOR UR AUTO: YELLOW
GLUCOSE UR STRIP-MCNC: >=1000 MG/DL
HGB UR QL STRIP: ABNORMAL
KETONES UR STRIP-MCNC: NEGATIVE MG/DL
LEUKOCYTE ESTERASE UR QL STRIP: ABNORMAL
NITRATE UR QL: NEGATIVE
PH UR STRIP: 5.5 [PH] (ref 5–8)
RBC #/AREA URNS AUTO: ABNORMAL /HPF
SP GR UR STRIP: 1.01 (ref 1–1.03)
SQUAMOUS #/AREA URNS AUTO: ABNORMAL /LPF
UROBILINOGEN UR STRIP-ACNC: 0.2 E.U./DL
WBC #/AREA URNS AUTO: ABNORMAL /HPF

## 2023-11-07 PROCEDURE — 81001 URINALYSIS AUTO W/SCOPE: CPT | Performed by: STUDENT IN AN ORGANIZED HEALTH CARE EDUCATION/TRAINING PROGRAM

## 2023-11-07 PROCEDURE — 99214 OFFICE O/P EST MOD 30 MIN: CPT | Performed by: STUDENT IN AN ORGANIZED HEALTH CARE EDUCATION/TRAINING PROGRAM

## 2023-11-07 PROCEDURE — 87086 URINE CULTURE/COLONY COUNT: CPT | Performed by: STUDENT IN AN ORGANIZED HEALTH CARE EDUCATION/TRAINING PROGRAM

## 2023-11-07 RX ORDER — GLIPIZIDE 10 MG/1
10 TABLET ORAL
COMMUNITY
Start: 2023-11-07 | End: 2024-05-28

## 2023-11-07 RX ORDER — LISINOPRIL 5 MG/1
5 TABLET ORAL DAILY
Qty: 30 TABLET | Refills: 3 | Status: SHIPPED | OUTPATIENT
Start: 2023-11-07 | End: 2024-05-28

## 2023-11-07 NOTE — PROGRESS NOTES
There are no exam notes on file for this visit.    ASSESSMENT AND PLAN:      Rachael was seen today for follow-up of multiple issues.    Diagnoses and all orders for this visit:    Type 2 diabetes mellitus with microalbuminuria, without long-term current use of insulin (H)  Diabetes numbers remain above goal, but she does have some on the lower side as well.  We reviewed her pill bottles that she has right now and she is currently taking 2000 mg of metformin, 25 mg of Jardiance, 8 units of 70/30 in the morning and 10 units of 70/30 in the evening with meals.  Does not have Januvia with her.  Appetite goes up and down.  Ultimately would like to discontinue the glipizide, but making changes right now before she is about to leave had out-of-state seems like a bad plan.  Therefore, we will continue with what she is actually taking and medication list was updated to reflect this.  --10 Units BID 70/30  --2,000mg metformin  --25mg Jardiance  --glipizide 10mg BID.   Plan to titrate off of the glipizide at our next follow-up visit.    Dysuria  Symptoms have resolved.  Culture is pending.  There are white blood cells present in the urine today.  -     Urine Macroscopic with reflex to Microscopic  -     Urine Culture  -     Urine Microscopic Exam    Scar pain  Pain over her surgical scar across her chest.  We will try Voltaren gel for this.  -     diclofenac (VOLTAREN) 1 % topical gel; Apply 2 g topically 4 times daily    Coronary artery disease involving autologous artery coronary bypass graft without angina pectoris  -She is on aspirin, statin, Plavix, 5 mg of lisinopril, and completed cardiac rehab.  Chest pain has stabilized in description and sounds more scar related today.  We did decrease her lisinopril from 10-5 because of the dizziness.    History of hypertension now with hypotension.  Having symptoms of dizziness and blood pressure is quite low today.  We will decrease lisinopril from 10 mg to 5 mg  daily.        Patient Instructions   Decrease the lisinopril to 5mg per day.     Keep insulin at 10 and 10     Keep other medications the same for now.      Follow up in January when you get back.     Make sure you are drinking enough water.      Pain cream for area around scar.      Amrita Carballo MD    RAYSA Ch is a 58 year old female with past medical history significant for    Patient Active Problem List   Diagnosis    Essential hypertension, benign    Diabetes mellitus, type 2 (H)    Hyperlipidemia    Health Care Home    Helicobacter pylori gastritis    PTSD (post-traumatic stress disorder)    Moderate episode of recurrent major depressive disorder (H)    Cognitive complaints    Screening for depression    Microalbuminuria    Plantar fasciitis    Left knee pain    Right knee pain, unspecified chronicity    Chronic kidney disease, stage 3a (H)    Coronary artery disease involving autologous artery coronary bypass graft without angina pectoris     Others present at the visit:  Patient's daughter.  Radha  Silvino Banks also present    Presents for   Chief Complaint   Patient presents with    Other     Follow-up last visit     Patient presents for follow-up of multiple issues today.    Her chest pain has been slightly improved.  The pain is different now, and is in the location of her scar.  Hurts with turning over in bed.  She sometimes puts Band-Aids on it and this helps.  She is slowly increasing her exercise tolerance and can walk about 1 block.  She does feel tired easily.  Has no shortness of breath or pain.  Feels better when she goes outside.    She does have some dizziness, most notably when going from sitting to standing.  She gets flushed and sweaty when this happens.  Has been trying to drink more water.  Has no occasional low sugar, but this does not coincide with her dizziness.    We reviewed her diabetes.  She has been doing insulin twice daily.  Is not sure how many units she  is doing.  Typically eats between 10 and 11 and then around 7 PM and this is when she is taking her insulin.  She is also been taking Jardiance, metformin, and glipizide.  She taking the glipizide 10 mg twice daily.    She also has a new medication in her bag, lisinopril 10 mg.  It looks like this was started by her cardiologist in September.  She continues to take Plavix and aspirin as well as her rosuvastatin.    I reviewed the notes from the cardiology visit, as well as her visit with the endocrinologist, and they have a increased her 70/30 insulin to 10 units twice daily.    She shares she will be leaving at the end of the month to stay with her son in [place] for a while.  May not be able to come in to be seen again until January.    She brings her glucometer with her.  Sugars are typically in the lower to mid 200s.  She does have some in the 60s to 100s.  Shares that she tries to take her insulin regularly but misses on occasion.    Her urinary symptoms have resolved and she is no longer having dysuria.    OBJECTIVE:  Vitals: /66   Pulse 69   Temp 98.1  F (36.7  C) (Oral)   Resp 22   Wt 60.3 kg (133 lb)   SpO2 98%   BMI 28.48 kg/m    BMI= Body mass index is 28.48 kg/m .  Objective:    Vitals:  Vitals are reviewed and are within the normal range  Gen:  Alert, pleasant, no acute distress  Cardiac:  Regular rate and rhythm, no murmurs, rubs or gallops  Respiratory:  Lungs clear to auscultation bilaterally  Chest: Pain with palpation over surgical scar.  Abdomen:  Soft, non-tender, non-distended, bowel sounds positive  Extremities:  Warm, well-perfused, pulses 2+/4, no lower extremity edema, monofilament testing is normal.    Results for orders placed or performed in visit on 11/07/23   Urine Macroscopic with reflex to Microscopic     Status: Abnormal   Result Value Ref Range    Color Urine Yellow Colorless, Straw, Light Yellow, Yellow    Appearance Urine Clear Clear    Glucose Urine >=1000 (A)  Negative mg/dL    Bilirubin Urine Negative Negative    Ketones Urine Negative Negative mg/dL    Specific Gravity Urine 1.010 1.005 - 1.030    Blood Urine Large (A) Negative    pH Urine 5.5 5.0 - 8.0    Protein Albumin Urine Negative Negative mg/dL    Urobilinogen Urine 0.2 0.2, 1.0 E.U./dL    Nitrite Urine Negative Negative    Leukocyte Esterase Urine Small (A) Negative   Urine Microscopic Exam     Status: Abnormal   Result Value Ref Range    Bacteria Urine Few (A) None Seen /HPF    RBC Urine 5-10 (A) 0-2 /HPF /HPF    WBC Urine 10-25 (A) 0-5 /HPF /HPF    Squamous Epithelials Urine Few (A) None Seen /LPF           Patient Instructions   Decrease the lisinopril to 5mg per day.     Keep insulin at 10 and 10     Keep other medications the same for now.      Follow up in January when you get back.     Make sure you are drinking enough water.      Pain cream for area around scar.      Amrita Carballo MD

## 2023-11-07 NOTE — PATIENT INSTRUCTIONS
Decrease the lisinopril to 5mg per day.     Keep insulin at 10 and 10     Keep other medications the same for now.      Follow up in January when you get back.     Make sure you are drinking enough water.      Pain cream for area around scar.

## 2023-11-08 LAB — BACTERIA UR CULT: NORMAL

## 2024-01-15 NOTE — PROGRESS NOTES
Preceptor Attestation:   I talked to the patient on the phone. I discussed the patient with the resident. I have verified the content of the note, which accurately reflects my assessment of the patient and the plan of care.   Supervising Physician:  Alvarado Darby MD.                      15-Cy-2024 14:49

## 2024-02-28 ENCOUNTER — OFFICE VISIT (OUTPATIENT)
Dept: FAMILY MEDICINE | Facility: CLINIC | Age: 59
End: 2024-02-28
Payer: COMMERCIAL

## 2024-02-28 VITALS
HEART RATE: 75 BPM | TEMPERATURE: 98.7 F | DIASTOLIC BLOOD PRESSURE: 65 MMHG | OXYGEN SATURATION: 100 % | RESPIRATION RATE: 20 BRPM | WEIGHT: 135.8 LBS | BODY MASS INDEX: 29.3 KG/M2 | HEIGHT: 57 IN | SYSTOLIC BLOOD PRESSURE: 136 MMHG

## 2024-02-28 DIAGNOSIS — M79.605 LEG PAIN, BILATERAL: Primary | ICD-10-CM

## 2024-02-28 DIAGNOSIS — H57.9 ITCHY EYES: ICD-10-CM

## 2024-02-28 DIAGNOSIS — M54.2 NECK PAIN: ICD-10-CM

## 2024-02-28 DIAGNOSIS — M79.604 LEG PAIN, BILATERAL: Primary | ICD-10-CM

## 2024-02-28 PROCEDURE — 99214 OFFICE O/P EST MOD 30 MIN: CPT | Mod: GC

## 2024-02-28 RX ORDER — ACETAMINOPHEN 325 MG/1
325-650 TABLET ORAL EVERY 6 HOURS PRN
Qty: 90 TABLET | Refills: 0 | Status: SHIPPED | OUTPATIENT
Start: 2024-02-28 | End: 2024-05-28

## 2024-02-28 RX ORDER — TROLAMINE SALICYLATE 10 G/100G
CREAM TOPICAL PRN
Qty: 100 G | Refills: 0 | Status: SHIPPED | OUTPATIENT
Start: 2024-02-28 | End: 2024-05-28

## 2024-02-28 RX ORDER — POLYVINYL ALCOHOL, POVIDONE 14; 6 MG/ML; MG/ML
1-2 SOLUTION/ DROPS OPHTHALMIC
Qty: 50 EACH | Refills: 11 | Status: SHIPPED | OUTPATIENT
Start: 2024-02-28 | End: 2024-05-28

## 2024-02-28 RX ORDER — SITAGLIPTIN 100 MG/1
100 TABLET, FILM COATED ORAL DAILY
COMMUNITY
End: 2024-05-28

## 2024-02-28 ASSESSMENT — PATIENT HEALTH QUESTIONNAIRE - PHQ9: SUM OF ALL RESPONSES TO PHQ QUESTIONS 1-9: 8

## 2024-02-28 NOTE — PROGRESS NOTES
"  Assessment & Plan     Leg pain, bilateral  Many months of intermittent calf and anterior shin \"tightness\" after CABG with left greater saphenous vein harvest in February 2023.  Pain improves with walking and usually is more painful with rest.  Less concern for claudication.  Low suspicion for DVT given clinical exam.  May have underlying arthritis contributing to symptoms however pain is not localized to the joints.  Prefers not to use diclofenac secondary to smell.  Will trial Aspercreme.  Follow-up with PCP as scheduled.  - trolamine salicylate (ASPERCREME) 10 % external cream  Dispense: 100 g; Refill: 0    Neck pain  Mild intermittent neck pain with associated mild headache at times.  Tylenol as needed.  - acetaminophen (TYLENOL) 325 MG tablet  Dispense: 90 tablet; Refill: 0    Itchy eyes  Endorses bilateral itchy eyes with drainage at times.  Not clearly having allergic symptoms.  Will try artificial tears and have patient follow-up with PCP to monitor response.  - polyvinyl alcohol-povidone PF (REFRESH) 1.4-0.6 % ophthalmic solution  Dispense: 50 each; Refill: 11    Return in about 6 days (around 3/5/2024) for Follow up.    Pk Cano is a 58 year old, presenting for the following health issues:  Leg Pain (Bilateral leg pain. ) and Neck Pain        2/28/2024    11:15 AM   Additional Questions   Roomed by    Accompanied by daughter         2/28/2024    Information    services provided? Yes   Jesica Garcia   Type of interpretation provided Face-to-face    name ta    [employer]     HARI     Presents with leg pain, neck pain, and itchy eyes.  Patient shares she has had off-and-on \"tight\" pain in her legs since her CABG in February 2023.  The pain occurs both in the front and the back of her legs.  She states the pain is better when she walks and more painful when she is at rest.  She denies any redness or swelling in her legs.  She has used Tylenol some " "in the past but has not currently been using that.  She has used diclofenac in the past but does not like the smell of it.    She occasionally has discomfort in her chest around her CABG scar.  She denies chest discomfort that is associated with shortness of breath or with physical exertion.    She also shares that she has had mild neck pain from her shoulders into her head that comes and goes.  She is not having that presently.    She also has had bilateral \"itchy\" eyes with some \"drainage\".  Also endorses some nasal congestion that is mild.  Has not had symptoms like this before.        Objective    /65 (BP Location: Left arm, Patient Position: Sitting, Cuff Size: Adult Large)   Pulse 75   Temp 98.7  F (37.1  C) (Oral)   Resp 20   Ht 1.448 m (4' 9\")   Wt 61.6 kg (135 lb 12.8 oz)   SpO2 100%   BMI 29.39 kg/m    Body mass index is 29.39 kg/m .  Physical Exam   GENERAL: alert and no distress  EYES: Eyes grossly normal to inspection, PERRL and conjunctivae and sclerae normal  RESP: lungs clear to auscultation - no rales, rhonchi or wheezes  CV: regular rate and rhythm, normal S1 S2, no S3 or S4, no murmur, click or rub, no peripheral edema   MS: no gross musculoskeletal defects noted, no edema, strength and sensation intact, no tenderness palpation of calves  SKIN: Midsternal scar with no associated erythema, scar on left medial calf  NEURO: Normal strength and tone, mentation intact and speech normal        Signed Electronically by: Crystal Barahona MD  "

## 2024-02-28 NOTE — PATIENT INSTRUCTIONS
Thank you for coming into clinic today!     your prescriptions from the pharmacy  Follow up as scheduled with Dr. Carballo  Stay well hydrated  Heat to neck  Cream to legs  Eye drops to eyes  Tylenol as needed  Sign up for MyChart to receive results, view appointments, and communicate with your healthcare team  Call [facility] at [phone] with questions or concerns    Take care,  Crystal Barahona MD

## 2024-02-28 NOTE — PROGRESS NOTES
Preceptor Attestation:    I discussed the patient with the resident and evaluated the patient in person. I have verified the content of the note, which accurately reflects my assessment of the patient and the plan of care.   Supervising Physician:  Roscoe Cordova MD.

## 2024-03-03 DIAGNOSIS — R80.9 TYPE 2 DIABETES MELLITUS WITH MICROALBUMINURIA, WITHOUT LONG-TERM CURRENT USE OF INSULIN (H): Primary | ICD-10-CM

## 2024-03-03 DIAGNOSIS — E11.29 TYPE 2 DIABETES MELLITUS WITH MICROALBUMINURIA, WITHOUT LONG-TERM CURRENT USE OF INSULIN (H): Primary | ICD-10-CM

## 2024-03-05 ENCOUNTER — OFFICE VISIT (OUTPATIENT)
Dept: FAMILY MEDICINE | Facility: CLINIC | Age: 59
End: 2024-03-05
Payer: COMMERCIAL

## 2024-03-05 ENCOUNTER — TRANSFERRED RECORDS (OUTPATIENT)
Dept: HEALTH INFORMATION MANAGEMENT | Facility: CLINIC | Age: 59
End: 2024-03-05

## 2024-03-05 VITALS
WEIGHT: 132.6 LBS | RESPIRATION RATE: 18 BRPM | SYSTOLIC BLOOD PRESSURE: 104 MMHG | OXYGEN SATURATION: 100 % | BODY MASS INDEX: 27.84 KG/M2 | DIASTOLIC BLOOD PRESSURE: 66 MMHG | HEART RATE: 72 BPM | TEMPERATURE: 98.2 F | HEIGHT: 58 IN

## 2024-03-05 DIAGNOSIS — R80.9 TYPE 2 DIABETES MELLITUS WITH MICROALBUMINURIA, WITHOUT LONG-TERM CURRENT USE OF INSULIN (H): ICD-10-CM

## 2024-03-05 DIAGNOSIS — E11.29 TYPE 2 DIABETES MELLITUS WITH MICROALBUMINURIA, WITHOUT LONG-TERM CURRENT USE OF INSULIN (H): ICD-10-CM

## 2024-03-05 DIAGNOSIS — Z12.4 CERVICAL CANCER SCREENING: ICD-10-CM

## 2024-03-05 DIAGNOSIS — Z00.00 PREVENTATIVE HEALTH CARE: ICD-10-CM

## 2024-03-05 DIAGNOSIS — F33.1 MODERATE EPISODE OF RECURRENT MAJOR DEPRESSIVE DISORDER (H): ICD-10-CM

## 2024-03-05 DIAGNOSIS — Z12.11 SCREEN FOR COLON CANCER: Primary | ICD-10-CM

## 2024-03-05 LAB
CREAT UR-MCNC: 58.4 MG/DL
HBA1C MFR BLD: 8.2 % (ref 0–5.6)
MICROALBUMIN UR-MCNC: 32 MG/L
MICROALBUMIN/CREAT UR: 54.79 MG/G CR (ref 0–25)

## 2024-03-05 PROCEDURE — 82043 UR ALBUMIN QUANTITATIVE: CPT | Performed by: STUDENT IN AN ORGANIZED HEALTH CARE EDUCATION/TRAINING PROGRAM

## 2024-03-05 PROCEDURE — 82570 ASSAY OF URINE CREATININE: CPT | Performed by: STUDENT IN AN ORGANIZED HEALTH CARE EDUCATION/TRAINING PROGRAM

## 2024-03-05 PROCEDURE — 99214 OFFICE O/P EST MOD 30 MIN: CPT | Performed by: STUDENT IN AN ORGANIZED HEALTH CARE EDUCATION/TRAINING PROGRAM

## 2024-03-05 PROCEDURE — 83036 HEMOGLOBIN GLYCOSYLATED A1C: CPT | Performed by: STUDENT IN AN ORGANIZED HEALTH CARE EDUCATION/TRAINING PROGRAM

## 2024-03-05 PROCEDURE — 36415 COLL VENOUS BLD VENIPUNCTURE: CPT | Performed by: STUDENT IN AN ORGANIZED HEALTH CARE EDUCATION/TRAINING PROGRAM

## 2024-03-05 RX ORDER — METFORMIN HCL 500 MG
1000 TABLET, EXTENDED RELEASE 24 HR ORAL 2 TIMES DAILY WITH MEALS
Qty: 360 TABLET | Refills: 3 | Status: SHIPPED | OUTPATIENT
Start: 2024-03-05 | End: 2024-05-28

## 2024-03-05 ASSESSMENT — PATIENT HEALTH QUESTIONNAIRE - PHQ9
10. IF YOU CHECKED OFF ANY PROBLEMS, HOW DIFFICULT HAVE THESE PROBLEMS MADE IT FOR YOU TO DO YOUR WORK, TAKE CARE OF THINGS AT HOME, OR GET ALONG WITH OTHER PEOPLE: VERY DIFFICULT
SUM OF ALL RESPONSES TO PHQ QUESTIONS 1-9: 14
SUM OF ALL RESPONSES TO PHQ QUESTIONS 1-9: 14

## 2024-03-05 NOTE — LETTER
March 13, 2024      [facility]  [address] [place] [address]        Dear ,    We are writing to inform you of your test results.    Your FIT testing for colon cancer was negative.  This is good news.  We should repeat the test in 2026.  Please call the clinic at [phone] if you have any questions.     Resulted Orders   Hemoglobin A1c   Result Value Ref Range    Hemoglobin A1C 8.2 (H) 0.0 - 5.6 %      Comment:      Normal <5.7%   Prediabetes 5.7-6.4%    Diabetes 6.5% or higher     Note: Adopted from ADA consensus guidelines.   Albumin Random Urine Quantitative with Creat Ratio   Result Value Ref Range    Creatinine Urine mg/dL 58.4 mg/dL      Comment:      The reference ranges have not been established in urine creatinine. The results should be integrated into the clinical context for interpretation.    Albumin Urine mg/L 32.0 mg/L      Comment:      The reference ranges have not been established in urine albumin. The results should be integrated into the clinical context for interpretation.    Albumin Urine mg/g Cr 54.79 (H) 0.00 - 25.00 mg/g Cr      Comment:      Microalbuminuria is defined as an albumin:creatinine ratio of 17 to 299 for males and 25 to 299 for females. A ratio of albumin:creatinine of 300 or higher is indicative of overt proteinuria.  Due to biologic variability, positive results should be confirmed by a second, first-morning random or 24-hour timed urine specimen. If there is discrepancy, a third specimen is recommended. When 2 out of 3 results are in the microalbuminuria range, this is evidence for incipient nephropathy and warrants increased efforts at glucose control, blood pressure control, and institution of therapy with an angiotensin-converting-enzyme (ACE) inhibitor (if the patient can tolerate it).     Fecal colorectal cancer screen FIT   Result Value Ref Range    Occult Blood Screen FIT Negative Negative       If you have any questions or concerns, please call the clinic at  the number listed above.       Sincerely,      Amrita Carballo MD

## 2024-03-05 NOTE — PATIENT INSTRUCTIONS
Use the creams on your knees!  Do stand up/sit down exercises 10 reps 3x per day.       Increase your insulin to 14 Units in AM and  14 Units PM    Keep other  diabetes medications the same.     Watch the morning and night time medications.      Make sure your drinking enough water!    Keep following with endocrinologist.      Dr. Carballo will send results to endocrinologist for your next visit.      Make an appointment to see the eye doctor.     Follow up in 3 months.

## 2024-03-05 NOTE — PROGRESS NOTES
There are no exam notes on file for this visit.    ASSESSMENT AND PLAN:      Rachael was seen today for evaluation of multiple issues.    Diagnoses and all orders for this visit:    Screen for colon cancer    Type 2 diabetes mellitus with microalbuminuria, without long-term current use of insulin (H)  Diabetes numbers have improved.  She continues to see the endocrinologist at [facility].  We will increase her 70/30 insulin to 14 units in a.m. and p.m.  Continue with metformin, Januvia, and glipizide.  I agree with the recommendations to try to titrate her off of the glipizide as she is getting more stable with using the insulin regularly.  She will schedule appointment to see the eye doctor.  -     Hemoglobin A1c  -     Albumin Random Urine Quantitative with Creat Ratio  -     metFORMIN (GLUCOPHAGE XR) 500 MG 24 hr tablet; Take 2 tablets (1,000 mg) by mouth 2 times daily (with meals)  -   Continue Januvia 100 mg daily  -   Continue glipizide 10 mg daily  -   Continue empagliflozin daily.  -    Increase insulin to 14 units twice daily of the 70/30 insulin with meals.    Preventative health care  Screen for colon cancer  She is interested in FIT testing today.  -     Fecal colorectal cancer screen FIT; Future  -     Fecal colorectal cancer screen FIT    Moderate episode of recurrent major depressive disorder (H)  Mood, although she has chronic depression continues to be stable and is in fact improving.  She does have chronic thoughts of being better off dead but has no plan.  Gets good support from family and does not feel like she needs medications at this time.    Bilateral knee pain.  Continue with diclofenac and Aspercreme.    Follow-up in 3 months.    Patient Instructions   Use the creams on your knees!  Do stand up/sit down exercises 10 reps 3x per day.       Increase your insulin to 14 Units in AM and  14 Units PM    Keep other  diabetes medications the same.     Watch the morning and night time  medications.      Make sure your drinking enough water!    Keep following with endocrinologist.      Dr. Carballo will send results to endocrinologist for your next visit.      Make an appointment to see the eye doctor.     Follow up in 3 months.      Amrita Carballo MD    SUBJECTIVE  Rachael Ch is a 58 year old female with past medical history significant for    Patient Active Problem List   Diagnosis    Essential hypertension, benign    Diabetes mellitus, type 2 (H)    Hyperlipidemia    Health Care Home    Helicobacter pylori gastritis    PTSD (post-traumatic stress disorder)    Moderate episode of recurrent major depressive disorder (H)    Cognitive complaints    Screening for depression    Microalbuminuria    Plantar fasciitis    Left knee pain    Right knee pain, unspecified chronicity    Chronic kidney disease, stage 3a (H)    Coronary artery disease involving autologous artery coronary bypass graft without angina pectoris       Others present at the visit: Patient accompanied by her daughter and by  tested tired.    Presents for   Chief Complaint   Patient presents with    Other     Follow-up med and mood     She presents for evaluation of a couple concerns.    Continues to have pain bilaterally in her knees.  This is most bothersome when going from sitting to standing.  She has difficulty going downstairs.  Feels better after she walks.  Has been using diclofenac gel and this helps.  They recently picked up the Aspercreme but had not started using it.  Tylenol is also been helpful.  No recent falls.  No chest pain, no shortness of breath.    We reviewed her diabetes.  Sugars overall have been better.  She still having some highs up to the 300s in the evening time.  Is seeing an endocrinologist at [facility].  She has changed her regimen and is now taking 14 units in the morning and 12 units in the afternoon.  She notices sugars are most frequently high in the afternoon and evening time.  Has  "been taking the metformin, Januvia, and glipizide.  They are requesting a refill of her metformin.  Want to make sure that her labs get sent to her endocrinologist as well.    She does have some symptoms of hypoglycemia, but when she checks this her sugars are typically around 100.  Looking at her glucometer, her 7-day, and 14-day averages are in the mid 170s.  Does get some occasional dry mouth.    No new chest pain.  No increased shortness of breath.  No lower extremity edema.  Does feel somewhat worse when the weather is up and down.    Mood overall is stable.  She does endorse feeling unwell when she is \"thinking too much.  \"She feels tired and feels like she is not sleeping as well as she wants to.  She often go to bed at 10:00 and then wake up at 2 AM.  She describes her mood problem as \"not as bad as it has been before.\"  Being with family and going outside helps.    She has her pillbox and her bottles with her.  We reviewed these together.  Of note it looks like she is missing more of the nighttime than the daytime medicines, and says she sometimes takes her nighttime medicines accidentally during the day.  She administers her own insulin and medications right now.  Family helps with medication box set up.    She has had some blurry vision and would like to see an eye doctor for her annual check.    OBJECTIVE:  Vitals: /66   Pulse 72   Temp 98.2  F (36.8  C) (Oral)   Resp 18   Ht 1.463 m (4' 9.6\")   Wt 60.1 kg (132 lb 9.6 oz)   SpO2 100%   BMI 28.10 kg/m    BMI= Body mass index is 28.1 kg/m .  Objective:    Vitals:  Vitals are reviewed and are within the normal range  Gen:  Alert, pleasant, no acute distress  Cardiac:  Regular rate and rhythm, no murmurs, rubs or gallops  Respiratory:  Lungs clear to auscultation bilaterally  Abdomen:  Soft, non-tender, non-distended, bowel sounds positive  Extremities:  Warm, well-perfused, pulses 2+/4, no lower extremity edema, monofilament testing is " intact.  Psych: Mood and affect are bright, positive, appropriate.  She does endorse thoughts of being better off dead but has no plan for harm.    We reviewed her A1c which is much improved.      Results for orders placed or performed in visit on 03/05/24   Hemoglobin A1c     Status: Abnormal   Result Value Ref Range    Hemoglobin A1C 8.2 (H) 0.0 - 5.6 %   Albumin Random Urine Quantitative with Creat Ratio     Status: Abnormal   Result Value Ref Range    Creatinine Urine mg/dL 58.4 mg/dL    Albumin Urine mg/L 32.0 mg/L    Albumin Urine mg/g Cr 54.79 (H) 0.00 - 25.00 mg/g Cr           Patient Instructions   Use the creams on your knees!  Do stand up/sit down exercises 10 reps 3x per day.       Increase your insulin to 14 Units in AM and  14 Units PM    Keep other  diabetes medications the same.     Watch the morning and night time medications.      Make sure your drinking enough water!    Keep following with endocrinologist.      Dr. Carballo will send results to endocrinologist for your next visit.      Make an appointment to see the eye doctor.     Follow up in 3 months.      Amrita Carballo MD    Answers submitted by the patient for this visit:  Patient Health Questionnaire (Submitted on 3/5/2024)  If you checked off any problems, how difficult have these problems made it for you to do your work, take care of things at home, or get along with other people?: Very difficult  PHQ9 TOTAL SCORE: 14

## 2024-03-06 NOTE — RESULT ENCOUNTER NOTE
A1c discussed with patient in clinic.  Please also send results to patient's endocrinology team at [facility].     Amrita Carballo MD

## 2024-03-08 PROCEDURE — 82274 ASSAY TEST FOR BLOOD FECAL: CPT | Performed by: STUDENT IN AN ORGANIZED HEALTH CARE EDUCATION/TRAINING PROGRAM

## 2024-03-13 LAB — HEMOCCULT STL QL IA: NEGATIVE

## 2024-03-13 NOTE — RESULT ENCOUNTER NOTE
Rachael Ch-    Your FIT testing for colon cancer was negative.  This is good news.  We should repeat the test in 2016.  Please call the clinic at [phone] if you have any questions.      Amrita Carballo MD    Please send results to patient.

## 2024-04-20 ENCOUNTER — TRANSFERRED RECORDS (OUTPATIENT)
Dept: HEALTH INFORMATION MANAGEMENT | Facility: CLINIC | Age: 59
End: 2024-04-20

## 2024-05-24 DIAGNOSIS — R80.9 TYPE 2 DIABETES MELLITUS WITH DIABETIC MICROALBUMINURIA, WITHOUT LONG-TERM CURRENT USE OF INSULIN (H): ICD-10-CM

## 2024-05-24 DIAGNOSIS — I25.810 CORONARY ARTERY DISEASE INVOLVING AUTOLOGOUS ARTERY CORONARY BYPASS GRAFT WITHOUT ANGINA PECTORIS: Primary | ICD-10-CM

## 2024-05-24 DIAGNOSIS — E78.5 HYPERLIPIDEMIA, UNSPECIFIED HYPERLIPIDEMIA TYPE: ICD-10-CM

## 2024-05-24 DIAGNOSIS — E11.29 TYPE 2 DIABETES MELLITUS WITH DIABETIC MICROALBUMINURIA, WITHOUT LONG-TERM CURRENT USE OF INSULIN (H): ICD-10-CM

## 2024-05-24 DIAGNOSIS — I10 ESSENTIAL HYPERTENSION, BENIGN: ICD-10-CM

## 2024-05-24 DIAGNOSIS — N18.31 CHRONIC KIDNEY DISEASE, STAGE 3A (H): ICD-10-CM

## 2024-05-28 ENCOUNTER — OFFICE VISIT (OUTPATIENT)
Dept: FAMILY MEDICINE | Facility: CLINIC | Age: 59
End: 2024-05-28
Payer: COMMERCIAL

## 2024-05-28 VITALS
SYSTOLIC BLOOD PRESSURE: 100 MMHG | OXYGEN SATURATION: 98 % | DIASTOLIC BLOOD PRESSURE: 64 MMHG | WEIGHT: 131.6 LBS | BODY MASS INDEX: 28.39 KG/M2 | RESPIRATION RATE: 20 BRPM | HEART RATE: 69 BPM | HEIGHT: 57 IN | TEMPERATURE: 98 F

## 2024-05-28 DIAGNOSIS — K21.9 GASTROESOPHAGEAL REFLUX DISEASE WITHOUT ESOPHAGITIS: Primary | ICD-10-CM

## 2024-05-28 DIAGNOSIS — R80.9 TYPE 2 DIABETES MELLITUS WITH MICROALBUMINURIA, WITHOUT LONG-TERM CURRENT USE OF INSULIN (H): ICD-10-CM

## 2024-05-28 DIAGNOSIS — M79.604 LEG PAIN, BILATERAL: ICD-10-CM

## 2024-05-28 DIAGNOSIS — E11.9 TYPE 2 DIABETES MELLITUS WITHOUT COMPLICATION, WITHOUT LONG-TERM CURRENT USE OF INSULIN (H): ICD-10-CM

## 2024-05-28 DIAGNOSIS — E78.5 HYPERLIPIDEMIA, UNSPECIFIED HYPERLIPIDEMIA TYPE: ICD-10-CM

## 2024-05-28 DIAGNOSIS — E11.29 TYPE 2 DIABETES MELLITUS WITH DIABETIC MICROALBUMINURIA, WITHOUT LONG-TERM CURRENT USE OF INSULIN (H): ICD-10-CM

## 2024-05-28 DIAGNOSIS — K59.00 CONSTIPATION, UNSPECIFIED CONSTIPATION TYPE: ICD-10-CM

## 2024-05-28 DIAGNOSIS — M79.605 LEG PAIN, BILATERAL: ICD-10-CM

## 2024-05-28 DIAGNOSIS — I25.810 CORONARY ARTERY DISEASE INVOLVING AUTOLOGOUS ARTERY CORONARY BYPASS GRAFT WITHOUT ANGINA PECTORIS: ICD-10-CM

## 2024-05-28 DIAGNOSIS — H57.9 ITCHY EYES: ICD-10-CM

## 2024-05-28 DIAGNOSIS — M54.2 NECK PAIN: ICD-10-CM

## 2024-05-28 DIAGNOSIS — F41.9 ANXIETY: ICD-10-CM

## 2024-05-28 DIAGNOSIS — I25.118 CORONARY ARTERY DISEASE OF NATIVE HEART WITH STABLE ANGINA PECTORIS, UNSPECIFIED VESSEL OR LESION TYPE (H): ICD-10-CM

## 2024-05-28 DIAGNOSIS — E11.29 TYPE 2 DIABETES MELLITUS WITH MICROALBUMINURIA, WITHOUT LONG-TERM CURRENT USE OF INSULIN (H): ICD-10-CM

## 2024-05-28 DIAGNOSIS — R80.9 TYPE 2 DIABETES MELLITUS WITH DIABETIC MICROALBUMINURIA, WITHOUT LONG-TERM CURRENT USE OF INSULIN (H): ICD-10-CM

## 2024-05-28 DIAGNOSIS — N18.31 CHRONIC KIDNEY DISEASE, STAGE 3A (H): ICD-10-CM

## 2024-05-28 DIAGNOSIS — I10 ESSENTIAL HYPERTENSION, BENIGN: ICD-10-CM

## 2024-05-28 DIAGNOSIS — L90.5 SCAR PAIN: ICD-10-CM

## 2024-05-28 LAB
ANION GAP SERPL CALCULATED.3IONS-SCNC: 11 MMOL/L (ref 7–15)
BUN SERPL-MCNC: 20.6 MG/DL (ref 8–23)
CALCIUM SERPL-MCNC: 9.5 MG/DL (ref 8.8–10.2)
CHLORIDE SERPL-SCNC: 106 MMOL/L (ref 98–107)
CHOLEST SERPL-MCNC: 136 MG/DL
CREAT SERPL-MCNC: 1.16 MG/DL (ref 0.51–0.95)
DEPRECATED HCO3 PLAS-SCNC: 22 MMOL/L (ref 22–29)
EGFRCR SERPLBLD CKD-EPI 2021: 54 ML/MIN/1.73M2
FASTING STATUS PATIENT QL REPORTED: YES
FASTING STATUS PATIENT QL REPORTED: YES
GLUCOSE SERPL-MCNC: 113 MG/DL (ref 70–99)
HBA1C MFR BLD: 6.4 % (ref 0–5.6)
HDLC SERPL-MCNC: 55 MG/DL
HGB BLD-MCNC: 13.4 G/DL (ref 11.7–15.7)
LDLC SERPL CALC-MCNC: 58 MG/DL
NONHDLC SERPL-MCNC: 81 MG/DL
POTASSIUM SERPL-SCNC: 4.1 MMOL/L (ref 3.4–5.3)
SODIUM SERPL-SCNC: 139 MMOL/L (ref 135–145)
TRIGL SERPL-MCNC: 115 MG/DL

## 2024-05-28 PROCEDURE — 80048 BASIC METABOLIC PNL TOTAL CA: CPT | Performed by: STUDENT IN AN ORGANIZED HEALTH CARE EDUCATION/TRAINING PROGRAM

## 2024-05-28 PROCEDURE — 80061 LIPID PANEL: CPT | Performed by: STUDENT IN AN ORGANIZED HEALTH CARE EDUCATION/TRAINING PROGRAM

## 2024-05-28 PROCEDURE — 85018 HEMOGLOBIN: CPT | Performed by: STUDENT IN AN ORGANIZED HEALTH CARE EDUCATION/TRAINING PROGRAM

## 2024-05-28 PROCEDURE — 99214 OFFICE O/P EST MOD 30 MIN: CPT | Performed by: STUDENT IN AN ORGANIZED HEALTH CARE EDUCATION/TRAINING PROGRAM

## 2024-05-28 PROCEDURE — 83036 HEMOGLOBIN GLYCOSYLATED A1C: CPT | Performed by: STUDENT IN AN ORGANIZED HEALTH CARE EDUCATION/TRAINING PROGRAM

## 2024-05-28 PROCEDURE — 36415 COLL VENOUS BLD VENIPUNCTURE: CPT | Performed by: STUDENT IN AN ORGANIZED HEALTH CARE EDUCATION/TRAINING PROGRAM

## 2024-05-28 RX ORDER — ATORVASTATIN CALCIUM 80 MG/1
80 TABLET, FILM COATED ORAL DAILY
Qty: 90 TABLET | Refills: 3 | Status: SHIPPED | OUTPATIENT
Start: 2024-05-28

## 2024-05-28 RX ORDER — BLOOD SUGAR DIAGNOSTIC
1 STRIP MISCELLANEOUS DAILY
Qty: 100 EACH | Refills: 5 | Status: SHIPPED | OUTPATIENT
Start: 2024-05-28

## 2024-05-28 RX ORDER — ACETAMINOPHEN 325 MG/1
325-650 TABLET ORAL EVERY 6 HOURS PRN
Qty: 90 TABLET | Refills: 0 | Status: SHIPPED | OUTPATIENT
Start: 2024-05-28

## 2024-05-28 RX ORDER — GLIPIZIDE 5 MG/1
TABLET ORAL
Qty: 270 TABLET | Refills: 1 | COMMUNITY
Start: 2024-05-28

## 2024-05-28 RX ORDER — POLYVINYL ALCOHOL, POVIDONE 14; 6 MG/ML; MG/ML
1-2 SOLUTION/ DROPS OPHTHALMIC
Qty: 50 EACH | Refills: 11 | Status: SHIPPED | OUTPATIENT
Start: 2024-05-28

## 2024-05-28 RX ORDER — ASPIRIN 81 MG/1
81 TABLET ORAL DAILY
Qty: 30 TABLET | Refills: 3 | Status: SHIPPED | OUTPATIENT
Start: 2024-05-28

## 2024-05-28 RX ORDER — AMOXICILLIN 250 MG
1 CAPSULE ORAL 2 TIMES DAILY PRN
Qty: 60 TABLET | Refills: 3 | Status: SHIPPED | OUTPATIENT
Start: 2024-05-28 | End: 2024-09-11

## 2024-05-28 RX ORDER — SITAGLIPTIN 100 MG/1
100 TABLET, FILM COATED ORAL DAILY
Qty: 90 TABLET | Refills: 3 | Status: SHIPPED | OUTPATIENT
Start: 2024-05-28

## 2024-05-28 RX ORDER — HYDROXYZINE PAMOATE 25 MG/1
CAPSULE ORAL
Qty: 90 CAPSULE | Refills: 1 | Status: SHIPPED | OUTPATIENT
Start: 2024-05-28 | End: 2024-09-11

## 2024-05-28 RX ORDER — CLOPIDOGREL BISULFATE 75 MG/1
75 TABLET ORAL DAILY
Qty: 90 TABLET | Refills: 1 | Status: SHIPPED | OUTPATIENT
Start: 2024-05-28 | End: 2024-09-11

## 2024-05-28 RX ORDER — LISINOPRIL 5 MG/1
5 TABLET ORAL DAILY
Qty: 90 TABLET | Refills: 1 | Status: SHIPPED | OUTPATIENT
Start: 2024-05-28 | End: 2024-09-11

## 2024-05-28 RX ORDER — CALCIUM CARBONATE 500 MG/1
2 TABLET, CHEWABLE ORAL 2 TIMES DAILY PRN
Qty: 100 TABLET | Refills: 3 | Status: SHIPPED | OUTPATIENT
Start: 2024-05-28 | End: 2024-09-11

## 2024-05-28 RX ORDER — TROLAMINE SALICYLATE 10 G/100G
CREAM TOPICAL PRN
Qty: 100 G | Refills: 0 | Status: SHIPPED | OUTPATIENT
Start: 2024-05-28

## 2024-05-28 RX ORDER — METFORMIN HCL 500 MG
1000 TABLET, EXTENDED RELEASE 24 HR ORAL 2 TIMES DAILY WITH MEALS
Qty: 360 TABLET | Refills: 3 | Status: SHIPPED | OUTPATIENT
Start: 2024-05-28

## 2024-05-28 NOTE — PROGRESS NOTES
There are no exam notes on file for this visit.    ASSESSMENT AND PLAN:      Rachael was seen today for diabetes.    Diagnoses and all orders for this visit:    Gastroesophageal reflux disease without esophagitis  Refill Tums for gastritis/GERD symptoms.  -     calcium carbonate (TUMS) 500 MG chewable tablet; Take 2 tablets (1,000 mg) by mouth 2 times daily as needed for heartburn    Coronary artery disease involving autologous artery coronary bypass graft without angina pectoris  We reviewed and refilled all of her medicines for coronary artery disease.  No current chest pain.  Not needing any nitroglycerin.  Walking more regularly as the weather is feeling better and is doing well  -     Basic metabolic panel  -     Hemoglobin A1c  -     Lipid Profile  -     Hemoglobin  -     atorvastatin (LIPITOR) 80 MG tablet; Take 1 tablet (80 mg) by mouth daily  -     lisinopril (ZESTRIL) 5 MG tablet; Take 1 tablet (5 mg) by mouth daily  -     clopidogrel (PLAVIX) 75 MG tablet; Take 1 tablet (75 mg) by mouth daily  -     aspirin 81 MG EC tablet; Take 1 tablet (81 mg) by mouth daily    Chronic kidney disease, stage 3a (H)  Mild elevation in creatinine.  Remains on lisinopril and empagliflozin  -     Basic metabolic panel  -     Hemoglobin    Type 2 diabetes mellitus with diabetic microalbuminuria, without long-term current use of insulin (H)  Given frequent low blood sugars, we will stop her NPH insulin.  She has been only taking this occasionally anyway.  Additionally I will decrease her glipizide.  -Metformin 2000 mg daily  -Empagliflozin 25 mg daily  -Sitagliptin 100 mg daily  -Glipizide 10 mg in the morning and 5 mg in the evening.  -     Basic metabolic panel  -     Hemoglobin A1c  -     Lipid Profile  -     Continuous Glucose Sensor (FREESTYLE RENAN 2 SENSOR) [facility] Inject 1 each Subcutaneous every 14 days Use 1 sensor every 14 days. Use to read blood sugars per 's instructions.  -     JANUVIA 100 MG tablet;  Take 1 tablet (100 mg) by mouth daily  -     Alcohol Swabs (ADVOCATE ALCOHOL PREP PADS) 70 % PADS; 1 pad. daily    Essential hypertension, benign  Pressure on the lower end of normal.  Encouraged regular water intake.  I do think there is good benefits from the lisinopril and would like to keep this if she can tolerate it  -     Basic metabolic panel    Hyperlipidemia, unspecified hyperlipidemia type  Lipids are excellent.  -     Lipid Profile    Anxiety  Overall well-controlled.  Improved with a proving weather and ability to get outside.  Still using hydroxyzine as needed.  This was refilled today.  -     hydrOXYzine marko (VISTARIL) 25 MG capsule; TAKE 1 CAPSULE BY MOUTH AS NEEDED FOR PANIC, ANXIETY **MAY TAKE 1-2 CAPSULES AT NIGHT TO HELP WITH SLEEP.**    Constipation, unspecified constipation type  Refilled constipation medications  -     senna-docusate (STIMULANT LAXATIVE) 8.6-50 MG tablet; Take 1 tablet by mouth 2 times daily as needed for constipation As needed.    Leg pain, bilateral  Scar pain  -     trolamine salicylate (ASPERCREME) 10 % external cream; Apply topically as needed for moderate pain  -     diclofenac (VOLTAREN) 1 % topical gel; Apply 2 g topically 4 times daily  -   Acetaminophen, 325-650 3 times daily as needed for knee pain.    Itchy eyes  -     polyvinyl alcohol-povidone PF (REFRESH) 1.4-0.6 % ophthalmic solution; Place 1-2 drops into both eyes every hour as needed for irritation    Follow-up in 6 weeks    Patient Instructions   STOP taking the insulin  DECREASE the Glipizide - take 10mg in AM and 5mg in PM  This should help improve the blood sugars.     Refill TUMS to help with stomach.      Refilled all of the other medications.     Amrita Carballo MD    SUBJECTIVE  Rachael Ch is a 58 year old female with past medical history significant for    Patient Active Problem List   Diagnosis    Essential hypertension, benign    Diabetes mellitus, type 2 (H)    Hyperlipidemia    Health Care Home     Helicobacter pylori gastritis    PTSD (post-traumatic stress disorder)    Moderate episode of recurrent major depressive disorder (H)    Cognitive complaints    Screening for depression    Microalbuminuria    Plantar fasciitis    Left knee pain    Right knee pain, unspecified chronicity    Chronic kidney disease, stage 3a (H)    Coronary artery disease involving autologous artery coronary bypass graft without angina pectoris     Others present at the visit:  Patient's daughter, and Radha  Silvino Banks, present in person    Presents for   Chief Complaint   Patient presents with    Diabetes     Follow-up dm     Patient presents today for follow-up of hypertension and diabetes.    Her first concern is of acid reflux.  She has had some increased symptoms.  Some burning in her upper chest and pain in her upper abdomen.  Had a medication she took for this in the past and would like to restart this again.  Describes a multicolored chalky pill that she used to take.  This works well.  Has some old medicine at home and took it and it was helpful.    Also here to review her diabetes.  She has noticed increased lows.  Notes that her glucometer is going off frequently, especially in the early morning when she is trying to sleep.  She gets occasional dizziness and sweatiness.  Because of this she is not taking her insulin.  Is doing the NPH 7030 2-3 times per week.  If her blood sugars are lower she does not take it.  Is taking the other medications consistently which include 2000 mg metformin, 100 mg Sitagliptin, 25 mg of empagliflozin, and 10 mg twice daily of glipizide.  Has been eating and drinking fine.    Wonders to if her blood pressure could be going low when she gets dizzy.  Is taking the lisinopril regularly.  Is not always good at drinking water.  No chest pain, no lower extremity edema.  She has not taken a nitroglycerin in a while.    Is glad the weather is improving so she can go outside more regularly.   "Continues to have some pain in her knees but it is tolerable.    She is requesting refills on all of her medications.    OBJECTIVE:  Vitals: /64 (BP Location: Left arm, Patient Position: Sitting, Cuff Size: Adult Regular)   Pulse 69   Temp 98  F (36.7  C) (Oral)   Resp 20   Ht 1.448 m (4' 9\")   Wt 59.7 kg (131 lb 9.6 oz)   SpO2 98%   BMI 28.48 kg/m    BMI= Body mass index is 28.48 kg/m .  Objective:    Vitals:  Vitals are reviewed and are within the normal range, pressure on the low end of normal  Gen:  Alert, pleasant, no acute distress  Cardiac:  Regular rate and rhythm, no murmurs, rubs or gallops  Respiratory:  Lungs clear to auscultation bilaterally  Abdomen:  Soft, non-tender, non-distended, bowel sounds positive  Extremities: No lower extremity edema.  Pulses are strong.  Some crepitus bilaterally in knees, but range of motion is normal and gait is normal as well.    I reviewed her freestyle emerita.  She is having frequent sugars in the 60s, especially overnight.  This typically happens between midnight and 4 AM.  Some in the afternoons as well.  Some blood sugars up to the 250s but none higher than that.    Results for orders placed or performed in visit on 05/28/24   Hemoglobin A1c     Status: Abnormal   Result Value Ref Range    Hemoglobin A1C 6.4 (H) 0.0 - 5.6 %   Hemoglobin     Status: Normal   Result Value Ref Range    Hemoglobin 13.4 11.7 - 15.7 g/dL           Patient Instructions   STOP taking the insulin  DECREASE the Glipizide - take 10mg in AM and 5mg in PM  This should help improve the blood sugars.     Refill TUMS to help with stomach.      Refilled all of the other medications.     Amrita Carballo MD    "

## 2024-05-28 NOTE — PATIENT INSTRUCTIONS
STOP taking the insulin  DECREASE the Glipizide - take 10mg in AM and 5mg in PM  This should help improve the blood sugars.     Refill TUMS to help with stomach.      Refilled all of the other medications.

## 2024-05-28 NOTE — LETTER
May 29, 2024      Rachael Ch  [address] [place] [address]        Dear ,    We are writing to inform you of your test results.    It was nice to see you in clinic today.  I am glad your blood sugars are better and that we are taking you off of the insulin.  Here are the rest of your lab results.  THey look good.  Make sure you are drinking enough water.  Your cholesterol is excellent - great job!  A1c diabetes test is too good - so we will stop the insulin.  Please call the clinic at [phone] if you have any questions.     Resulted Orders   Basic metabolic panel   Result Value Ref Range    Sodium 139 135 - 145 mmol/L      Comment:      Reference intervals for this test were updated on 09/26/2023 to more accurately reflect our healthy population. There may be differences in the flagging of prior results with similar values performed with this method. Interpretation of those prior results can be made in the context of the updated reference intervals.     Potassium 4.1 3.4 - 5.3 mmol/L    Chloride 106 98 - 107 mmol/L    Carbon Dioxide (CO2) 22 22 - 29 mmol/L    Anion Gap 11 7 - 15 mmol/L    Urea Nitrogen 20.6 8.0 - 23.0 mg/dL    Creatinine 1.16 (H) 0.51 - 0.95 mg/dL    GFR Estimate 54 (L) >60 mL/min/1.73m2    Calcium 9.5 8.8 - 10.2 mg/dL    Glucose 113 (H) 70 - 99 mg/dL    Patient Fasting > 8hrs? Yes    Hemoglobin A1c   Result Value Ref Range    Hemoglobin A1C 6.4 (H) 0.0 - 5.6 %      Comment:      Normal <5.7%   Prediabetes 5.7-6.4%    Diabetes 6.5% or higher     Note: Adopted from ADA consensus guidelines.   Lipid Profile   Result Value Ref Range    Cholesterol 136 <200 mg/dL    Triglycerides 115 <150 mg/dL    Direct Measure HDL 55 >=50 mg/dL    LDL Cholesterol Calculated 58 <=100 mg/dL    Non HDL Cholesterol 81 <130 mg/dL    Patient Fasting > 8hrs? Yes     Narrative    Cholesterol  Desirable:  <200 mg/dL    Triglycerides  Normal:  Less than 150 mg/dL  Borderline High:  150-199 mg/dL  High:  200-499  mg/dL  Very High:  Greater than or equal to 500 mg/dL    Direct Measure HDL  Female:  Greater than or equal to 50 mg/dL   Male:  Greater than or equal to 40 mg/dL    LDL Cholesterol  Desirable:  <100mg/dL  Above Desirable:  100-129 mg/dL   Borderline High:  130-159 mg/dL   High:  160-189 mg/dL   Very High:  >= 190 mg/dL    Non HDL Cholesterol  Desirable:  130 mg/dL  Above Desirable:  130-159 mg/dL  Borderline High:  160-189 mg/dL  High:  190-219 mg/dL  Very High:  Greater than or equal to 220 mg/dL   Hemoglobin   Result Value Ref Range    Hemoglobin 13.4 11.7 - 15.7 g/dL       If you have any questions or concerns, please call the clinic at the number listed above.       Sincerely,      Amrita Carballo MD

## 2024-05-29 NOTE — RESULT ENCOUNTER NOTE
Rachael Ch-    It was nice to see you in clinic today.  I am glad your blood sugars are better and that we are taking you off of the insulin.  Here are the rest of your lab results.  THey look good.  Make sure you are drinking enough water.  Your cholesterol is excellent - great job!  A1c diabetes test is too good - so we will stop the insulin.  Please call the clinic at [phone] if you have any questions.      Amrita Carballo MD    Please send results to patient.

## 2024-09-10 DIAGNOSIS — R80.9 TYPE 2 DIABETES MELLITUS WITH DIABETIC MICROALBUMINURIA, WITHOUT LONG-TERM CURRENT USE OF INSULIN (H): Primary | ICD-10-CM

## 2024-09-10 DIAGNOSIS — E11.29 TYPE 2 DIABETES MELLITUS WITH DIABETIC MICROALBUMINURIA, WITHOUT LONG-TERM CURRENT USE OF INSULIN (H): Primary | ICD-10-CM

## 2024-09-10 DIAGNOSIS — I10 ESSENTIAL HYPERTENSION, BENIGN: ICD-10-CM

## 2024-09-11 ENCOUNTER — OFFICE VISIT (OUTPATIENT)
Dept: FAMILY MEDICINE | Facility: CLINIC | Age: 59
End: 2024-09-11
Payer: COMMERCIAL

## 2024-09-11 VITALS
RESPIRATION RATE: 20 BRPM | TEMPERATURE: 98.3 F | OXYGEN SATURATION: 98 % | DIASTOLIC BLOOD PRESSURE: 78 MMHG | SYSTOLIC BLOOD PRESSURE: 135 MMHG | HEIGHT: 57 IN | WEIGHT: 131.2 LBS | BODY MASS INDEX: 28.3 KG/M2 | HEART RATE: 74 BPM

## 2024-09-11 DIAGNOSIS — R80.9 TYPE 2 DIABETES MELLITUS WITH DIABETIC MICROALBUMINURIA, WITHOUT LONG-TERM CURRENT USE OF INSULIN (H): Primary | ICD-10-CM

## 2024-09-11 DIAGNOSIS — I25.118 CORONARY ARTERY DISEASE OF NATIVE HEART WITH STABLE ANGINA PECTORIS, UNSPECIFIED VESSEL OR LESION TYPE (H): ICD-10-CM

## 2024-09-11 DIAGNOSIS — E11.29 TYPE 2 DIABETES MELLITUS WITH DIABETIC MICROALBUMINURIA, WITHOUT LONG-TERM CURRENT USE OF INSULIN (H): Primary | ICD-10-CM

## 2024-09-11 DIAGNOSIS — Z12.31 VISIT FOR SCREENING MAMMOGRAM: ICD-10-CM

## 2024-09-11 DIAGNOSIS — I25.810 CORONARY ARTERY DISEASE INVOLVING AUTOLOGOUS ARTERY CORONARY BYPASS GRAFT WITHOUT ANGINA PECTORIS: ICD-10-CM

## 2024-09-11 DIAGNOSIS — I10 ESSENTIAL HYPERTENSION, BENIGN: ICD-10-CM

## 2024-09-11 DIAGNOSIS — F41.9 ANXIETY: ICD-10-CM

## 2024-09-11 DIAGNOSIS — K59.00 CONSTIPATION, UNSPECIFIED CONSTIPATION TYPE: ICD-10-CM

## 2024-09-11 DIAGNOSIS — M65.4 DE QUERVAIN'S DISEASE (TENOSYNOVITIS): ICD-10-CM

## 2024-09-11 DIAGNOSIS — K21.9 GASTROESOPHAGEAL REFLUX DISEASE WITHOUT ESOPHAGITIS: ICD-10-CM

## 2024-09-11 LAB
ANION GAP SERPL CALCULATED.3IONS-SCNC: 12 MMOL/L (ref 3–14)
BUN SERPL-MCNC: 20 MG/DL (ref 7–30)
CALCIUM SERPL-MCNC: 9.8 MG/DL (ref 8.5–10.1)
CHLORIDE BLD-SCNC: 100 MMOL/L (ref 94–109)
CO2 SERPL-SCNC: 31 MMOL/L (ref 20–32)
CREAT SERPL-MCNC: 1.2 MG/DL (ref 0.52–1.04)
EGFRCR SERPLBLD CKD-EPI 2021: 51 ML/MIN/1.73M2
GLUCOSE BLD-MCNC: 142 MG/DL (ref 70–99)
HBA1C MFR BLD: 6.9 % (ref 0–5.6)
POTASSIUM BLD-SCNC: 3.8 MMOL/L (ref 3.4–5.3)
SODIUM SERPL-SCNC: 143 MMOL/L (ref 135–145)

## 2024-09-11 PROCEDURE — 80048 BASIC METABOLIC PNL TOTAL CA: CPT | Performed by: STUDENT IN AN ORGANIZED HEALTH CARE EDUCATION/TRAINING PROGRAM

## 2024-09-11 PROCEDURE — 83036 HEMOGLOBIN GLYCOSYLATED A1C: CPT | Performed by: STUDENT IN AN ORGANIZED HEALTH CARE EDUCATION/TRAINING PROGRAM

## 2024-09-11 PROCEDURE — 36415 COLL VENOUS BLD VENIPUNCTURE: CPT | Performed by: STUDENT IN AN ORGANIZED HEALTH CARE EDUCATION/TRAINING PROGRAM

## 2024-09-11 PROCEDURE — 99214 OFFICE O/P EST MOD 30 MIN: CPT | Performed by: STUDENT IN AN ORGANIZED HEALTH CARE EDUCATION/TRAINING PROGRAM

## 2024-09-11 PROCEDURE — G2211 COMPLEX E/M VISIT ADD ON: HCPCS | Performed by: STUDENT IN AN ORGANIZED HEALTH CARE EDUCATION/TRAINING PROGRAM

## 2024-09-11 RX ORDER — CALCIUM CARBONATE 500 MG/1
2 TABLET, CHEWABLE ORAL 2 TIMES DAILY PRN
Qty: 100 TABLET | Refills: 3 | Status: SHIPPED | OUTPATIENT
Start: 2024-09-11

## 2024-09-11 RX ORDER — AMOXICILLIN 250 MG
2 CAPSULE ORAL 2 TIMES DAILY
Qty: 360 TABLET | Refills: 3 | Status: SHIPPED | OUTPATIENT
Start: 2024-09-11

## 2024-09-11 RX ORDER — LISINOPRIL 10 MG/1
10 TABLET ORAL DAILY
Qty: 90 TABLET | Refills: 1 | Status: SHIPPED | OUTPATIENT
Start: 2024-09-11

## 2024-09-11 RX ORDER — FAMOTIDINE 20 MG/1
20 TABLET, FILM COATED ORAL 2 TIMES DAILY
Qty: 90 TABLET | Refills: 3 | Status: SHIPPED | OUTPATIENT
Start: 2024-09-11

## 2024-09-11 RX ORDER — HYDROXYZINE PAMOATE 25 MG/1
CAPSULE ORAL
Qty: 120 CAPSULE | Refills: 3 | Status: SHIPPED | OUTPATIENT
Start: 2024-09-11

## 2024-09-11 RX ORDER — CLOPIDOGREL BISULFATE 75 MG/1
75 TABLET ORAL DAILY
Qty: 90 TABLET | Refills: 3 | Status: SHIPPED | OUTPATIENT
Start: 2024-09-11

## 2024-09-11 ASSESSMENT — ANXIETY QUESTIONNAIRES: GAD7 TOTAL SCORE: INCOMPLETE

## 2024-09-11 NOTE — PATIENT INSTRUCTIONS
For stomach:    Restart taking famotidine 1 pill daily  Continue using TUMS as needed  Increase the stool softener pills (sennacot) to 2 pills in AM and 2 pills at night.     Refill sensors    Start using voltaren pain cream on the thumb and tendon that goes to the thumb.     Come back in 6 weeks.  If not better, we can do an injection.     Diabetes, blood pressure, all look good.      Come back tomorrow for mammogram at 9:30.

## 2024-09-11 NOTE — LETTER
September 12, 2024      Rachael [facility]  [address] [place] [address]        Dear ,    We are writing to inform you of your test results.    It was nice to see you in clinic.  We discussed these results there - this is just a copy for your records.  Your kidney and diabetes tests remain stable.  This is good news.  Make sure you are drinking enough water!  Please call the clinic at [phone] if you have any questions.     Resulted Orders   Hemoglobin A1c   Result Value Ref Range    Hemoglobin A1C 6.9 (H) 0.0 - 5.6 %      Comment:      Normal <5.7%   Prediabetes 5.7-6.4%    Diabetes 6.5% or higher     Note: Adopted from ADA consensus guidelines.   Basic metabolic panel   Result Value Ref Range    Sodium 143 135 - 145 mmol/L    Potassium 3.8 3.4 - 5.3 mmol/L    Chloride 100 94 - 109 mmol/L    Carbon Dioxide (CO2) 31 20 - 32 mmol/L    Anion Gap 12 3 - 14 mmol/L    Urea Nitrogen 20 7 - 30 mg/dL    Creatinine 1.20 (H) 0.52 - 1.04 mg/dL    GFR Estimate 51 (L) >60 mL/min/1.73m2    Calcium 9.8 8.5 - 10.1 mg/dL    Glucose 142 (H) 70 - 99 mg/dL       If you have any questions or concerns, please call the clinic at the number listed above.       Sincerely,      Amrita Carballo MD

## 2024-09-11 NOTE — PROGRESS NOTES
There are no exam notes on file for this visit.    ASSESSMENT AND PLAN:      Rachael was seen today for diabetes.    Diagnoses and all orders for this visit:    Type 2 diabetes mellitus with diabetic microalbuminuria, without long-term current use of insulin (H)  Diabetes overall well-controlled.  We reviewed placement of freestyle renan sensor and put this on today.  A1c is at goal.  She feels good about her diabetes regimen.  I am still concerned she is having lows, but she denies symptoms.  Will plan to follow-up in 6 weeks.  -     Hemoglobin A1c  -     Basic metabolic panel  -     Continuous Glucose Sensor (FREESTYLE RENAN 2 SENSOR) [facility] [place]; Inject 1 each subcutaneously every 14 days. Use 1 sensor every 14 days. Use to read blood sugars per 's instructions.    Essential hypertension, benign  Coronary artery disease involving autologous artery coronary bypass graft without angina pectoris  Blood pressure is good today.  Given improved blood pressure will slightly increase her lisinopril from 5 to 10 mg.  -     Basic metabolic panel  -     lisinopril (ZESTRIL) 10 MG tablet; Take 1 tablet (10 mg) by mouth daily.  -     clopidogrel (PLAVIX) 75 MG tablet; Take 1 tablet (75 mg) by mouth daily.    Visit for screening mammogram  She was scheduled for the mammo bus at [place] for tomorrow.  -     MA Screening Bilateral w/ Fuentes; Future    Constipation, unspecified constipation type  She is having some constipation.  We will increase her stool softeners to 2 pills twice daily.  -     senna-docusate (STIMULANT LAXATIVE) 8.6-50 MG tablet; Take 2 tablets by mouth 2 times daily. As needed.    Gastroesophageal reflux disease without esophagitis  Increased chest and upper abdominal discomfort.  I think this is reflux.  She has previously been on famotidine.  We will restart this today.  Can take Tums as needed as well.  -     calcium carbonate (TUMS) 500 MG chewable tablet; Take 2 tablets (1,000 mg) by mouth 2 times  daily as needed for heartburn.  -     famotidine (PEPCID) 20 MG tablet; Take 1 tablet (20 mg) by mouth 2 times daily.    Anxiety  Refill hydroxyzine.  This has been helpful  -     hydrOXYzine marko (VISTARIL) 25 MG capsule; TAKE 1 CAPSULE BY MOUTH AS NEEDED FOR PANIC, ANXIETY **MAY TAKE 1-2 CAPSULES AT NIGHT TO HELP WITH SLEEP.**    De Quervain's disease (tenosynovitis)  New right thumb and wrist pain consistent with de Quervain's tenosynovitis on exam.  Will start with Voltaren gel and stretching.  If not improving this would be amenable for injection.  -     diclofenac (VOLTAREN) 1 % topical gel; Apply 2 g topically 4 times daily. Apply to right thumb        Patient Instructions   For stomach:    Restart taking famotidine 1 pill daily  Continue using TUMS as needed  Increase the stool softener pills (sennacot) to 2 pills in AM and 2 pills at night.     Refill sensors    Start using voltaren pain cream on the thumb and tendon that goes to the thumb.     Come back in 6 weeks.  If not better, we can do an injection.     Diabetes, blood pressure, all look good.      Come back tomorrow for mammogram at 9:30.      Amrita Carballo MD    SUBJECTIVE  Rachael Ch is a 59 year old female with past medical history significant for    Patient Active Problem List   Diagnosis    Essential hypertension, benign    Diabetes mellitus, type 2 (H)    Hyperlipidemia    Helicobacter pylori gastritis    PTSD (post-traumatic stress disorder)    Moderate episode of recurrent major depressive disorder (H)    Cognitive complaints    Screening for depression    Microalbuminuria    Plantar fasciitis    Left knee pain    Right knee pain, unspecified chronicity    Chronic kidney disease, stage 3a (H)    Coronary artery disease involving autologous artery coronary bypass graft without angina pectoris       Others present at the visit:  patient's daughter.   Silvino Banks    Presents for   Chief Complaint   Patient presents with    Diabetes  "    Follow-up dm     Patient presents today for follow-up of multiple issues.  Overall this summer has been good.  She did have a fall 3 to 4 weeks ago and has had some abdominal pain since then.  She tripped on the curb and fell forward.  Did not have any pain right away, did not hit her head, but she has noticed the pain now.    She is also noticing increased pain in her right thumb.  Uses her hand as a spoon to eat her food in the hand and specifically thumb hurts with doing this.  This has been going on for a couple of weeks.    She does get some occasional chest wall pain.  Some pain over her surgical scar.  No lower extremity edema.  No shortness of breath.  Has been walking some but cannot go too far because \"she gets lazy\".  Sometimes she notices some heaviness in her legs or in her head that slow her down as well.    She has been taking her diabetes medications regularly.  One of her daughter sets up the meds that she takes pills twice a day and then uses her freestyle emerita to check sugars.  Her last emerita did not work and she is wondering if the sensors are faulty or .    She has been having this abdominal pain.  Complains of pain in multiple areas, including upper mid epigastric, right flank, and mid abdominal area.  Describes nausea that is present most days.  Occasional vomiting.  It is affecting her appetite as well.  Has been constipated and stools sometimes 2-3 times per week, and sometimes once.  She is taking senna docusate 1 pill twice daily.  Urination has been normal.  No dysuria.    She is due for mammogram and willing to do this at the mammogram bus.    She denies any symptoms of low sugars.  Has been eating regularly.  No numbness or tingling in extremities.  We reviewed her freestyle emerita which has values up until the end of July.  At that time she was having quite a few sugars in the 60s 70s and 80s.  Denies symptoms at that time.    Taking hydroxyzine for sleep and this helps some " "days but does not help others.    OBJECTIVE:  Vitals: /78   Pulse 74   Temp 98.3  F (36.8  C) (Oral)   Resp 20   Ht 1.45 m (4' 9.1\")   Wt 59.5 kg (131 lb 3.2 oz)   SpO2 98%   BMI 28.29 kg/m    BMI= Body mass index is 28.29 kg/m .  Objective:    Vitals:  Vitals are reviewed and are within the normal range  Gen:  Alert, pleasant, no acute distress  Cardiac:  Regular rate and rhythm, no murmurs, rubs or gallops  Respiratory:  Lungs clear to auscultation bilaterally  Abdomen: Soft.  She has some mild tenderness in the left upper quadrant, midepigastric region, and the right flank.  No rebound or guarding.  Bowel sounds are present.  No CVA tenderness.  Tenderness does appear somewhat superficial in the muscle.  Extremities:  Warm, well-perfused, pulses 2+/4, no lower extremity edema, monofilament testing was completed and this was normal.  Examination of right hand shows some tenderness over her thumb, but pain is most noticeable with Finklestein's test.  No swelling or erythema present.  Tenderness and some limited range of motion.  Psych: Mood is bright, engaged, positive thought process logical.  Good sense of humor.        10/6/2023     4:45 PM 2/28/2024    11:25 AM 3/5/2024     9:57 AM   PHQ   PHQ-9 Total Score 7 8 14   Q9: Thoughts of better off dead/self-harm past 2 weeks Not at all Not at all Several days   F/U: Thoughts of suicide or self-harm   No   F/U: Safety concerns   No          8/25/2023    11:07 AM 10/6/2023     4:45 PM 9/11/2024     1:07 PM   LAYNE-7 SCORE   Total Score   Incomplete   Total Score 4 5           Results for orders placed or performed in visit on 09/11/24   Hemoglobin A1c     Status: Abnormal   Result Value Ref Range    Hemoglobin A1C 6.9 (H) 0.0 - 5.6 %   Basic metabolic panel     Status: Abnormal   Result Value Ref Range    Sodium 143 135 - 145 mmol/L    Potassium 3.8 3.4 - 5.3 mmol/L    Chloride 100 94 - 109 mmol/L    Carbon Dioxide (CO2) 31 20 - 32 mmol/L    Anion Gap 12 3 " - 14 mmol/L    Urea Nitrogen 20 7 - 30 mg/dL    Creatinine 1.20 (H) 0.52 - 1.04 mg/dL    GFR Estimate 51 (L) >60 mL/min/1.73m2    Calcium 9.8 8.5 - 10.1 mg/dL    Glucose 142 (H) 70 - 99 mg/dL           Patient Instructions   For stomach:    Restart taking famotidine 1 pill daily  Continue using TUMS as needed  Increase the stool softener pills (sennacot) to 2 pills in AM and 2 pills at night.     Refill sensors    Start using voltaren pain cream on the thumb and tendon that goes to the thumb.     Come back in 6 weeks.  If not better, we can do an injection.     Diabetes, blood pressure, all look good.      Come back tomorrow for mammogram at 9:30.      Amrita Carballo MD    Answers submitted by the patient for this visit:  Patient Health Questionnaire (Submitted on 9/11/2024)  PHQ9 TOTAL SCORE: Incomplete  Patient Health Questionnaire (G7) (Submitted on 9/11/2024)  LAYNE 7 TOTAL SCORE: Incomplete

## 2024-09-12 ENCOUNTER — ANCILLARY PROCEDURE (OUTPATIENT)
Dept: MAMMOGRAPHY | Facility: CLINIC | Age: 59
End: 2024-09-12
Attending: STUDENT IN AN ORGANIZED HEALTH CARE EDUCATION/TRAINING PROGRAM
Payer: COMMERCIAL

## 2024-09-12 DIAGNOSIS — Z12.31 VISIT FOR SCREENING MAMMOGRAM: ICD-10-CM

## 2024-09-12 PROCEDURE — 77067 SCR MAMMO BI INCL CAD: CPT | Mod: TC | Performed by: RADIOLOGY

## 2024-09-12 PROCEDURE — 77063 BREAST TOMOSYNTHESIS BI: CPT | Mod: TC | Performed by: RADIOLOGY

## 2024-09-12 NOTE — RESULT ENCOUNTER NOTE
Rachael Ch-    It was nice to see you in clinic.  We discussed these results there - this is just a copy for your records.  Your kidney and diabetes tests remain stable.  This is good news.  Make sure you are drinking enough water!  Please call the clinic at [phone] if you have any questions.      Amrita Carballo MD    Please send results to patient.

## 2024-10-16 ENCOUNTER — OFFICE VISIT (OUTPATIENT)
Dept: FAMILY MEDICINE | Facility: CLINIC | Age: 59
End: 2024-10-16
Payer: COMMERCIAL

## 2024-10-16 VITALS
SYSTOLIC BLOOD PRESSURE: 109 MMHG | HEART RATE: 66 BPM | BODY MASS INDEX: 28.35 KG/M2 | WEIGHT: 131.4 LBS | RESPIRATION RATE: 20 BRPM | OXYGEN SATURATION: 99 % | TEMPERATURE: 98 F | DIASTOLIC BLOOD PRESSURE: 62 MMHG | HEIGHT: 57 IN

## 2024-10-16 DIAGNOSIS — E11.9 TYPE 2 DIABETES MELLITUS WITHOUT COMPLICATION, WITHOUT LONG-TERM CURRENT USE OF INSULIN (H): Primary | ICD-10-CM

## 2024-10-16 DIAGNOSIS — H57.9 ITCHY EYES: ICD-10-CM

## 2024-10-16 DIAGNOSIS — F41.9 ANXIETY: ICD-10-CM

## 2024-10-16 DIAGNOSIS — M65.4 DE QUERVAIN'S DISEASE (TENOSYNOVITIS): ICD-10-CM

## 2024-10-16 DIAGNOSIS — I25.810 CORONARY ARTERY DISEASE INVOLVING AUTOLOGOUS ARTERY CORONARY BYPASS GRAFT WITHOUT ANGINA PECTORIS: ICD-10-CM

## 2024-10-16 DIAGNOSIS — I25.118 CORONARY ARTERY DISEASE OF NATIVE HEART WITH STABLE ANGINA PECTORIS, UNSPECIFIED VESSEL OR LESION TYPE (H): ICD-10-CM

## 2024-10-16 DIAGNOSIS — K21.9 GASTROESOPHAGEAL REFLUX DISEASE WITHOUT ESOPHAGITIS: ICD-10-CM

## 2024-10-16 DIAGNOSIS — K59.00 CONSTIPATION, UNSPECIFIED CONSTIPATION TYPE: ICD-10-CM

## 2024-10-16 PROCEDURE — 99213 OFFICE O/P EST LOW 20 MIN: CPT | Performed by: STUDENT IN AN ORGANIZED HEALTH CARE EDUCATION/TRAINING PROGRAM

## 2024-10-16 RX ORDER — METFORMIN HYDROCHLORIDE 500 MG/1
500 TABLET, EXTENDED RELEASE ORAL 2 TIMES DAILY WITH MEALS
Qty: 180 TABLET | Refills: 3 | Status: SHIPPED | OUTPATIENT
Start: 2024-10-16

## 2024-10-16 RX ORDER — ATORVASTATIN CALCIUM 80 MG/1
80 TABLET, FILM COATED ORAL DAILY
Qty: 90 TABLET | Refills: 3 | Status: SHIPPED | OUTPATIENT
Start: 2024-10-16

## 2024-10-16 RX ORDER — ASPIRIN 81 MG/1
81 TABLET ORAL DAILY
Qty: 30 TABLET | Refills: 3 | Status: SHIPPED | OUTPATIENT
Start: 2024-10-16

## 2024-10-16 RX ORDER — LISINOPRIL 10 MG/1
10 TABLET ORAL DAILY
Qty: 90 TABLET | Refills: 1 | Status: SHIPPED | OUTPATIENT
Start: 2024-10-16

## 2024-10-16 RX ORDER — AMOXICILLIN 250 MG
1 CAPSULE ORAL 2 TIMES DAILY
Qty: 180 TABLET | Refills: 3 | Status: SHIPPED | OUTPATIENT
Start: 2024-10-16

## 2024-10-16 RX ORDER — CLOPIDOGREL BISULFATE 75 MG/1
75 TABLET ORAL DAILY
Qty: 90 TABLET | Refills: 3 | Status: SHIPPED | OUTPATIENT
Start: 2024-10-16

## 2024-10-16 RX ORDER — HYDROXYZINE PAMOATE 25 MG/1
CAPSULE ORAL
Qty: 120 CAPSULE | Refills: 3 | Status: SHIPPED | OUTPATIENT
Start: 2024-10-16

## 2024-10-16 RX ORDER — POLYVINYL ALCOHOL, POVIDONE 14; 6 MG/ML; MG/ML
1-2 SOLUTION/ DROPS OPHTHALMIC
Qty: 50 EACH | Refills: 11 | Status: SHIPPED | OUTPATIENT
Start: 2024-10-16

## 2024-10-16 RX ORDER — CALCIUM CARBONATE 500 MG/1
2 TABLET, CHEWABLE ORAL 2 TIMES DAILY PRN
Qty: 100 TABLET | Refills: 3 | Status: SHIPPED | OUTPATIENT
Start: 2024-10-16

## 2024-10-16 RX ORDER — FAMOTIDINE 20 MG/1
20 TABLET, FILM COATED ORAL 2 TIMES DAILY
Qty: 180 TABLET | Refills: 3 | Status: SHIPPED | OUTPATIENT
Start: 2024-10-16

## 2024-10-16 RX ORDER — SITAGLIPTIN 100 MG/1
100 TABLET, FILM COATED ORAL DAILY
Qty: 90 TABLET | Refills: 3 | Status: SHIPPED | OUTPATIENT
Start: 2024-10-16

## 2024-10-16 ASSESSMENT — ANXIETY QUESTIONNAIRES: GAD7 TOTAL SCORE: INCOMPLETE

## 2024-10-16 ASSESSMENT — PATIENT HEALTH QUESTIONNAIRE - PHQ9
SUM OF ALL RESPONSES TO PHQ QUESTIONS 1-9: 8
SUM OF ALL RESPONSES TO PHQ QUESTIONS 1-9: 8
10. IF YOU CHECKED OFF ANY PROBLEMS, HOW DIFFICULT HAVE THESE PROBLEMS MADE IT FOR YOU TO DO YOUR WORK, TAKE CARE OF THINGS AT HOME, OR GET ALONG WITH OTHER PEOPLE: SOMEWHAT DIFFICULT

## 2024-10-16 NOTE — PATIENT INSTRUCTIONS
Forms completed for [employer]    Medications refilled today    Follow up in 6 weeks to recheck diabetes.

## 2024-10-16 NOTE — PROGRESS NOTES
There are no exam notes on file for this visit.    ASSESSMENT AND PLAN:      Rachael was seen today for diabetes.    Diagnoses and all orders for this visit:    Type 2 diabetes mellitus without complication, without long-term current use of insulin (H)  Blood sugars previously have been well-controlled.  I am concerned that she has run out of her medications.  It is also unclear if she is taking Glipizide or not.  As of now I will discontinue this from the list.  Refills recent on her metformin, Januvia, and Jardiance.  Also sent new freestyle renan sensors.  She will come back in 6 weeks for a review of her diabetes, to check an A1c, and make sure she has all the necessary medications.  -     metFORMIN (GLUCOPHAGE XR) 500 MG 24 hr tablet; Take 1 tablet (500 mg) by mouth 2 times daily (with meals).  -     JANUVIA 100 MG tablet; Take 1 tablet (100 mg) by mouth daily.  -     empagliflozin (JARDIANCE) 25 MG TABS tablet; Take 1 tablet (25 mg) by mouth daily.  -     Continuous Glucose Sensor (FREESTYLE RENAN 2 SENSOR) [facility] [place]; Inject 1 each subcutaneously every 14 days. Use 1 sensor every 14 days. Use to read blood sugars per 's instructions.    Constipation, unspecified constipation type  Refills provided.  This has been helpful.  -     senna-docusate (STIMULANT LAXATIVE) 8.6-50 MG tablet; Take 1 tablet by mouth 2 times daily. As needed.    Gastroesophageal reflux disease without esophagitis  Symptoms are improved.  -     famotidine (PEPCID) 20 MG tablet; Take 1 tablet (20 mg) by mouth 2 times daily.  -     calcium carbonate (TUMS) 500 MG chewable tablet; Take 2 tablets (1,000 mg) by mouth 2 times daily as needed for heartburn.    Coronary artery disease of native heart with stable angina pectoris, unspecified vessel or lesion type (H)  Asymptomatic currently.  Blood pressure well-controlled.  Refilled her statin, and will continue on Plavix and aspirin.  -     clopidogrel (PLAVIX) 75 MG tablet; Take 1 tablet  (75 mg) by mouth daily.  -     aspirin 81 MG EC tablet; Take 1 tablet (81 mg) by mouth daily.  -     lisinopril (ZESTRIL) 10 MG tablet; Take 1 tablet (10 mg) by mouth daily.  -     atorvastatin (LIPITOR) 80 MG tablet; Take 1 tablet (80 mg) by mouth daily.    Anxiety  Patient is comfortable using this as needed as needed.  -     hydrOXYzine marko (VISTARIL) 25 MG capsule; TAKE 1 CAPSULE BY MOUTH AS NEEDED FOR PANIC, ANXIETY **MAY TAKE 1-2 CAPSULES AT NIGHT TO HELP WITH SLEEP.**    De Quervain's disease (tenosynovitis)  Improving.  -     diclofenac (VOLTAREN) 1 % topical gel; Apply 2 g topically 4 times daily. Apply to right thumb    Itchy eyes  Stable  -     polyvinyl alcohol-povidone PF (REFRESH) 1.4-0.6 % ophthalmic solution; Place 1-2 drops into both eyes every hour as needed for irritation.    Follow-up in 6 weeks for repeat diabetes visit.      Patient Instructions   Forms completed for [employer]    Medications refilled today    Follow up in 6 weeks to recheck diabetes.      Amrita Carballo MD    RAYSA Ch is a 59 year old female with past medical history significant for    Patient Active Problem List   Diagnosis    Essential hypertension, benign    Diabetes mellitus, type 2 (H)    Hyperlipidemia    Helicobacter pylori gastritis    PTSD (post-traumatic stress disorder)    Moderate episode of recurrent major depressive disorder (H)    Cognitive complaints    Screening for depression    Microalbuminuria    Plantar fasciitis    Left knee pain    Right knee pain, unspecified chronicity    Chronic kidney disease, stage 3a (H)    Coronary artery disease involving autologous artery coronary bypass graft without angina pectoris     Others present at the visit:  Patient's daughter and grandson.  Radha Banks also present in person.      Presents for   Chief Complaint   Patient presents with    Diabetes     Follow-up dm      Patient presents for follow-up of multiple issues.    First concern is for  "form completion.  She has multiple forms from [facility] for preventative recommendations and diabetes management that she would like me to complete.  Lab results and outcomes were reviewed in the chart and added to these forms.    She would also like to review diabetes.  Sugars have been in general good, but they have been spiking, most notably with meals over the last few weeks.  She is concerned and trying to watch her diet because of this.  We reviewed her medications that she has with her and she is missing a number of her medications, including all the diabetes medications today.  She is not sure if she still taking glipizide.  Think she is taking metformin but is not sure.  The daughter this with her is not the one that sets up her medications.    We reviewed her freestyle emerita results.  This matches her history of increased blood sugars over the last few weeks.  Prior to that average blood sugars were in the 150s.    She endorses improvement in her acid reflux was taking the famotidine.  This has been helpful.  She denies chest pain, shortness of breath, leg pain, or lower extremity edema.    She has been using the pain cream both on her hands and on her knees and legs.  She feels like it is helpful.  Walking has been okay.  No recent falls.  Has been stable on her feet.    We reviewed her medications together and refilled those as necessary.    She does endorse continued thoughts of being better off dead but no thoughts of self-harm, and PHQ-9 has been stable.  Gets good support from family and this has been important for her.     OBJECTIVE:  Vitals: /62 (BP Location: Left arm, Patient Position: Sitting, Cuff Size: Adult Regular)   Pulse 66   Temp 98  F (36.7  C) (Oral)   Resp 20   Ht 1.45 m (4' 9.1\")   Wt 59.6 kg (131 lb 6.4 oz)   SpO2 99%   BMI 28.34 kg/m    BMI= Body mass index is 28.34 kg/m .  Objective:    Vitals:  Vitals are reviewed and are within the normal range  Gen:  Alert, pleasant, no " acute distress  Psych: Mood and affect are bright.  She answers questions appropriately and thoughtfully.  No distress.  No further physical exam was completed today.        Answers submitted by the patient for this visit:  Patient Health Questionnaire (Submitted on 10/16/2024)  If you checked off any problems, how difficult have these problems made it for you to do your work, take care of things at home, or get along with other people?: Somewhat difficult  PHQ9 TOTAL SCORE: 8  Patient Health Questionnaire (G7) (Submitted on 10/16/2024)  LAYNE 7 TOTAL SCORE: Incomplete    Patient Instructions   Forms completed for [employer]    Medications refilled today    Follow up in 6 weeks to recheck diabetes.      Amrita Carballo MD

## 2024-12-11 ENCOUNTER — OFFICE VISIT (OUTPATIENT)
Dept: FAMILY MEDICINE | Facility: CLINIC | Age: 59
End: 2024-12-11
Payer: COMMERCIAL

## 2024-12-11 VITALS
TEMPERATURE: 98.2 F | SYSTOLIC BLOOD PRESSURE: 114 MMHG | DIASTOLIC BLOOD PRESSURE: 69 MMHG | HEART RATE: 72 BPM | RESPIRATION RATE: 18 BRPM | OXYGEN SATURATION: 100 % | BODY MASS INDEX: 27.36 KG/M2 | HEIGHT: 57 IN | WEIGHT: 126.8 LBS

## 2024-12-11 DIAGNOSIS — E11.9 TYPE 2 DIABETES MELLITUS WITHOUT COMPLICATION, WITHOUT LONG-TERM CURRENT USE OF INSULIN (H): ICD-10-CM

## 2024-12-11 DIAGNOSIS — Z23 NEED FOR PROPHYLACTIC VACCINATION AND INOCULATION AGAINST INFLUENZA: ICD-10-CM

## 2024-12-11 DIAGNOSIS — K21.9 GASTROESOPHAGEAL REFLUX DISEASE WITHOUT ESOPHAGITIS: ICD-10-CM

## 2024-12-11 DIAGNOSIS — M79.604 LEG PAIN, BILATERAL: ICD-10-CM

## 2024-12-11 DIAGNOSIS — Z12.4 CERVICAL CANCER SCREENING: Primary | ICD-10-CM

## 2024-12-11 DIAGNOSIS — M79.605 LEG PAIN, BILATERAL: ICD-10-CM

## 2024-12-11 DIAGNOSIS — Z12.11 SCREEN FOR COLON CANCER: ICD-10-CM

## 2024-12-11 DIAGNOSIS — M65.4 DE QUERVAIN'S DISEASE (TENOSYNOVITIS): ICD-10-CM

## 2024-12-11 DIAGNOSIS — H57.9 ITCHY EYES: ICD-10-CM

## 2024-12-11 DIAGNOSIS — E11.9 TYPE 2 DIABETES MELLITUS WITHOUT COMPLICATION, WITHOUT LONG-TERM CURRENT USE OF INSULIN (H): Primary | ICD-10-CM

## 2024-12-11 LAB
EST. AVERAGE GLUCOSE BLD GHB EST-MCNC: 283 MG/DL
HBA1C MFR BLD: 11.5 % (ref 0–5.6)

## 2024-12-11 RX ORDER — TROLAMINE SALICYLATE 10 G/100G
CREAM TOPICAL PRN
Qty: 100 G | Refills: 0 | Status: SHIPPED | OUTPATIENT
Start: 2024-12-11

## 2024-12-11 RX ORDER — CALCIUM CARBONATE 500 MG/1
2 TABLET, CHEWABLE ORAL 2 TIMES DAILY PRN
Qty: 100 TABLET | Refills: 3 | Status: SHIPPED | OUTPATIENT
Start: 2024-12-11

## 2024-12-11 RX ORDER — POLYVINYL ALCOHOL, POVIDONE 14; 6 MG/ML; MG/ML
1-2 SOLUTION/ DROPS OPHTHALMIC
Qty: 50 EACH | Refills: 11 | Status: SHIPPED | OUTPATIENT
Start: 2024-12-11

## 2024-12-11 RX ORDER — METFORMIN HYDROCHLORIDE 500 MG/1
1000 TABLET, EXTENDED RELEASE ORAL 2 TIMES DAILY WITH MEALS
Qty: 360 TABLET | Refills: 3 | Status: SHIPPED | OUTPATIENT
Start: 2024-12-11

## 2024-12-11 NOTE — PROGRESS NOTES
There are no exam notes on file for this visit.    ASSESSMENT AND PLAN:      Rachael was seen today for diabetes and imm/inj.    Diagnoses and all orders for this visit:    Cervical cancer screening  She will schedule follow-up visit to do this.    Type 2 diabetes mellitus without complication, without long-term current use of insulin (H)  She is out of her empagliflozin, and sugars have been quite high.  A1c is now is up to 11.5.  We will increase the metformin to 1000 mg twice daily, continue with the Januvia 100 mg daily, and restart the empagliflozin 25 mg daily.  Refilled her sensors as well.  -     Hemoglobin A1c  -     metFORMIN (GLUCOPHAGE XR) 500 MG 24 hr tablet; Take 2 tablets (1,000 mg) by mouth 2 times daily (with meals).  -     empagliflozin (JARDIANCE) 25 MG TABS tablet; Take 1 tablet (25 mg) by mouth daily.  -     Continuous Glucose Sensor (FREESTYLE RENAN 2 SENSOR) [facility] [place]; Inject 1 each subcutaneously every 14 days. Use 1 sensor every 14 days. Use to read blood sugars per 's instructions.    Screen for colon cancer  Up-to-date on colon cancer screening.  Colonoscopy was in 2016 and was normal so she is not due till 2026.    Need for prophylactic vaccination and inoculation against influenza  Given today  -     INFLUENZA VACCINE,SPLIT VIRUS,TRIVALENT,PF(FLUZONE)    Itchy eyes  Eyedrops refilled  -     polyvinyl alcohol-povidone PF (REFRESH) 1.4-0.6 % ophthalmic solution; Place 1-2 drops into both eyes every hour as needed for irritation.    Leg pain, bilateral  Pain creams refilled  -     trolamine salicylate (ASPERCREME) 10 % external cream; Apply topically as needed for moderate pain.    Gastroesophageal reflux disease without esophagitis  Continue on the famotidine.  Stomach symptoms have been better.  Recent her Tums.  -     calcium carbonate (TUMS) 500 MG chewable tablet; Take 2 tablets (1,000 mg) by mouth 2 times daily as needed for heartburn.    Appointment scheduled on January 22 for  follow-up.    Patient Instructions   Increase the Metformin to 2 pills in AM and 2 pills in PM  Restart taking the empagliflozin or Jardiance  Continue taking the Januvia or Sitagliptan    Refill sensors  Refill pain cream  Refill eye drops  Refill TUMS for heart burn.     Follow up as already schedule.      Amrita Carballo MD    RAYSA Ch is a 59 year old female with past medical history significant for    Patient Active Problem List   Diagnosis    Essential hypertension, benign    Diabetes mellitus, type 2 (H)    Hyperlipidemia    Helicobacter pylori gastritis    PTSD (post-traumatic stress disorder)    Moderate episode of recurrent major depressive disorder (H)    Cognitive complaints    Screening for depression    Microalbuminuria    Plantar fasciitis    Left knee pain    Right knee pain, unspecified chronicity    Chronic kidney disease, stage 3a (H)    Coronary artery disease involving autologous artery coronary bypass graft without angina pectoris     Others present at the visit:   Silvino Banks, present in person    Presents for   Chief Complaint   Patient presents with    Diabetes     Follow-up dm     Imm/Inj     Flu Shot     Patient presents today for diabetes follow-up visit.  She is overall feeling well.  Recent trip to [place] to see family.  She brings her medications with her today.  Has been taking them regularly.  She stopped checking sugars in mid November because she ran out of sensors.  Otherwise her freestyle emerita to monitor is still working well.    Notes that sugars have been high for the last month.  Got quite high when she was in [place].  Often in the upper 200s to 300s.  No symptoms of low sugars.  She is not having chest pain, shortness of breath.    We reviewed her medications.  She is taking metformin 500 mg twice daily, and sitagliptin 100 mg once daily, but does not have the empagliflozin in her set of medications.  She is also out of Tums,  "freestyle emerita sensors, eyedrops, and Aspercreme.  She is taking the rest of her medications regularly.      OBJECTIVE:  Vitals: /69 (BP Location: Left arm, Patient Position: Sitting, Cuff Size: Adult Regular)   Pulse 72   Temp 98.2  F (36.8  C) (Oral)   Resp 18   Ht 1.453 m (4' 9.2\")   Wt 57.5 kg (126 lb 12.8 oz)   SpO2 100%   BMI 27.25 kg/m    BMI= Body mass index is 27.25 kg/m .  Objective:    Vitals:  Vitals are reviewed and are within the normal range  Gen:  Alert, pleasant, no acute distress  Cardiac:  Regular rate and rhythm, no murmurs, rubs or gallops  Respiratory:  Lungs clear to auscultation bilaterally  No further physical exam was completed today.    Results for orders placed or performed in visit on 12/11/24   Hemoglobin A1c     Status: Abnormal   Result Value Ref Range    Estimated Average Glucose 283 (H) <117 mg/dL    Hemoglobin A1C 11.5 (H) 0.0 - 5.6 %           Patient Instructions   Increase the Metformin to 2 pills in AM and 2 pills in PM  Restart taking the empagliflozin or Jardiance  Continue taking the Januvia or Sitagliptan    Refill sensors  Refill pain cream  Refill eye drops  Refill TUMS for heart burn.     Follow up as already schedule.      Amrita Carballo MD    "

## 2024-12-16 NOTE — PATIENT INSTRUCTIONS
For Right knee pain:  Take the Naproxen, 1 pill 2x per day for 1 week.  Take with food.   Use the Ace wrap on the knee  Use ice and keep the knee elevated.      For Fatigue:  Blood counts are low.  Take the iron pills, 1 pill per day to improve energy    STOP taking the Ranitidine.  Just take the famotidine instead.      Okay to take all medications at night time.  Make sure that you are taking 1 pill of the Prazosin at night, and 1 pill of the Hydroxyzine at night.      Please take an extra pill of the Hydrozyxine during the day if you get rapid heart rate and tingling in your chest and shoulder.      I will call the pharmacy.  Please have your daughter call us if they have any questions.      Follow up in 1 month by phone.     English Jed

## 2025-01-22 ENCOUNTER — OFFICE VISIT (OUTPATIENT)
Dept: FAMILY MEDICINE | Facility: CLINIC | Age: 60
End: 2025-01-22
Payer: COMMERCIAL

## 2025-01-22 VITALS
DIASTOLIC BLOOD PRESSURE: 69 MMHG | BODY MASS INDEX: 26.97 KG/M2 | RESPIRATION RATE: 18 BRPM | SYSTOLIC BLOOD PRESSURE: 124 MMHG | HEIGHT: 57 IN | TEMPERATURE: 98.3 F | WEIGHT: 125 LBS | HEART RATE: 63 BPM | OXYGEN SATURATION: 100 %

## 2025-01-22 DIAGNOSIS — Z12.4 CERVICAL CANCER SCREENING: Primary | ICD-10-CM

## 2025-01-22 DIAGNOSIS — G89.29 CHRONIC PAIN OF LEFT KNEE: ICD-10-CM

## 2025-01-22 DIAGNOSIS — M25.562 CHRONIC PAIN OF LEFT KNEE: ICD-10-CM

## 2025-01-22 DIAGNOSIS — Z12.11 SCREEN FOR COLON CANCER: ICD-10-CM

## 2025-01-22 DIAGNOSIS — E11.9 TYPE 2 DIABETES MELLITUS WITHOUT COMPLICATION, WITHOUT LONG-TERM CURRENT USE OF INSULIN (H): ICD-10-CM

## 2025-01-22 RX ORDER — PIOGLITAZONE 15 MG/1
15 TABLET ORAL DAILY
Qty: 90 TABLET | Refills: 1 | Status: SHIPPED | OUTPATIENT
Start: 2025-01-22

## 2025-01-22 NOTE — PATIENT INSTRUCTIONS
Do knee exercises everyday.   Get on the elliptical/exercise machine 4-5x per week    Keep medications the same, just add one new medication:    Pioglitazone, 1 pill daily in the morning.      Pap smear is done today.  If this is normal, only 1 more pap smear.      Dr. Carballo will call if results are abnormal.

## 2025-01-22 NOTE — PROGRESS NOTES
There are no exam notes on file for this visit.    ASSESSMENT AND PLAN:      Rachael was seen today for gyn exam.    Diagnoses and all orders for this visit:    Cervical cancer screening  Pap smear completed today.  Discussed the next testing will be in 5 years if everything is normal.  -     HPV and Gynecologic Cytology Panel - Recommended Age 30 - 65 Years    Screen for colon cancer  She is actually up-to-date on her colon cancer screening.  Had a normal colonoscopy in 2016 with recommended follow-up in 10 years.  Should resume colon cancer screening in 2026, and she would be eligible for FIT testing if desired.    Type 2 diabetes mellitus without complication, without long-term current use of insulin (H)  Blood sugar still above goal.  Will continue with metformin, sitagliptin, empagliflozin, and will add 15 mg of pioglitazone.  She does have a history of coronary artery disease but is tolerated this medication well, and I am worried about hypoglycemia overnight with the use of glipizide.  Would like to remain off of injectable medications if possible.  -     pioglitazone (ACTOS) 15 MG tablet; Take 1 tablet (15 mg) by mouth daily.    Chronic pain of left knee  She has known arthritis in her knees.  Discussed exercises and strengthening to be done at home.  She will increase her physical activity on the elliptical from 2 to 3 days/week to 4 to 5 days/week and do strengthening exercises for her lower extremities.    Follow-up in 6 to 8 weeks.    The longitudinal plan of care for the diagnosis(es)/condition(s) as documented were addressed during this visit. Due to the added complexity in care, I will continue to support Rachael in the subsequent management and with ongoing continuity of care.    Patient Instructions   Do knee exercises everyday.   Get on the elliptical/exercise machine 4-5x per week    Keep medications the same, just add one new medication:    Pioglitazone, 1 pill daily in the morning.      Pap smear  is done today.  If this is normal, only 1 more pap smear.      Dr. Carballo will call if results are abnormal.          Amrita Carballo MD    SUBJECTIVE  Rachael Ch is a 59 year old female with past medical history significant for    Patient Active Problem List   Diagnosis    Essential hypertension, benign    Diabetes mellitus, type 2 (H)    Hyperlipidemia    Helicobacter pylori gastritis    PTSD (post-traumatic stress disorder)    Moderate episode of recurrent major depressive disorder (H)    Cognitive complaints    Screening for depression    Microalbuminuria    Plantar fasciitis    Left knee pain    Right knee pain, unspecified chronicity    Chronic kidney disease, stage 3a (H)    Coronary artery disease involving autologous artery coronary bypass graft without angina pectoris     Others present at the visit:  Silvino Banks, present in person    Presents for   Chief Complaint   Patient presents with    Gyn Exam     papsmear     Patient presents today for follow-up of a couple of concerns.    First she would like to have a Pap smear done today.  She denies any vaginal complaints, no itching or discharge.  Has not had a period since 2007.  Has had 9 babies, all vaginal.    Second she is noticing some increased difficulty with weakness.  She notices this mostly in her legs, specifically her left leg, which can feel shaky with getting up, and she has had some episodes of falling.  Also notices some weakness in her hands, and more frequent dropping of her phone.  Sometimes it feels like her fingers will not work right.  She reports a fall about 1 month ago when she hit her head.  Does not remember what happened that day.  Sometimes she feels dizzy.  Most bothersome however is the leg shakiness and her worried that it will give out on her.  She does walk around at home.  Additionally they have a treadmill and elliptical which she does use about 10 to 20 minutes 2-3 times per week.    Finally we reviewed her  "blood sugars.  She notes sugars are improving, but can be quite high, especially after eating.  She has her medications with her and has been taking her metformin, empagliflozin, and sitagliptin regularly.  Sugars are good overnight and in the morning, but run high after meals.  7-week average is 197.  This has come down nicely from her last visit.  Is open to trying another medication.      OBJECTIVE:  Vitals: /69 (BP Location: Left arm, Patient Position: Sitting, Cuff Size: Adult Regular)   Pulse 63   Temp 98.3  F (36.8  C) (Oral)   Resp 18   Ht 1.455 m (4' 9.3\")   Wt 56.7 kg (125 lb)   SpO2 100%   BMI 26.77 kg/m    BMI= Body mass index is 26.77 kg/m .  Objective:    Vitals:  Vitals are reviewed and are within the normal range  Gen:  Alert, pleasant, no acute distress  Cardiac:  Regular rate and rhythm, no murmurs, rubs or gallops  Respiratory:  Lungs clear to auscultation bilaterally  Abdomen:  Soft, non-tender, non-distended, bowel sounds positive  Extremities: The knees show some crepitus bilaterally, worse on the left, with normal range of motion.  We reviewed some leg exercises, including leg lifts, and squats to do at home.  Failed urinary: Pap smear was completed.  Normal external vaginal genitalia and appropriate discharge.  Nulliparous cervix, which is normal in appearance.    Pap smear is pending.    Patient Instructions   Do knee exercises everyday.   Get on the elliptical/exercise machine 4-5x per week    Keep medications the same, just add one new medication:    Pioglitazone, 1 pill daily in the morning.      Pap smear is done today.  If this is normal, only 1 more pap smear.      Dr. Carballo will call if results are abnormal.          Amrita Carballo MD    "

## 2025-01-27 LAB
HPV HR 12 DNA CVX QL NAA+PROBE: NEGATIVE
HPV16 DNA CVX QL NAA+PROBE: NEGATIVE
HPV18 DNA CVX QL NAA+PROBE: NEGATIVE
HUMAN PAPILLOMA VIRUS FINAL DIAGNOSIS: NORMAL

## 2025-01-28 LAB
BKR AP ASSOCIATED HPV REPORT: NORMAL
BKR LAB AP GYN ADEQUACY: NORMAL
BKR LAB AP GYN INTERPRETATION: NORMAL
BKR LAB AP PREVIOUS ABNORMAL: NORMAL
PATH REPORT.COMMENTS IMP SPEC: NORMAL
PATH REPORT.COMMENTS IMP SPEC: NORMAL
PATH REPORT.RELEVANT HX SPEC: NORMAL

## 2025-02-20 ENCOUNTER — TRANSFERRED RECORDS (OUTPATIENT)
Dept: HEALTH INFORMATION MANAGEMENT | Facility: CLINIC | Age: 60
End: 2025-02-20
Payer: COMMERCIAL

## 2025-03-17 DIAGNOSIS — E11.9 TYPE 2 DIABETES MELLITUS WITHOUT COMPLICATION, WITHOUT LONG-TERM CURRENT USE OF INSULIN (H): Primary | ICD-10-CM

## 2025-03-19 ENCOUNTER — OFFICE VISIT (OUTPATIENT)
Dept: FAMILY MEDICINE | Facility: CLINIC | Age: 60
End: 2025-03-19
Payer: COMMERCIAL

## 2025-03-19 VITALS
SYSTOLIC BLOOD PRESSURE: 101 MMHG | TEMPERATURE: 98.1 F | WEIGHT: 125.2 LBS | HEART RATE: 74 BPM | HEIGHT: 57 IN | OXYGEN SATURATION: 99 % | BODY MASS INDEX: 27.01 KG/M2 | DIASTOLIC BLOOD PRESSURE: 60 MMHG | RESPIRATION RATE: 18 BRPM

## 2025-03-19 DIAGNOSIS — R80.9 TYPE 2 DIABETES MELLITUS WITH MICROALBUMINURIA, WITHOUT LONG-TERM CURRENT USE OF INSULIN (H): ICD-10-CM

## 2025-03-19 DIAGNOSIS — H57.9 ITCHY EYES: ICD-10-CM

## 2025-03-19 DIAGNOSIS — I25.118 CORONARY ARTERY DISEASE OF NATIVE HEART WITH STABLE ANGINA PECTORIS, UNSPECIFIED VESSEL OR LESION TYPE: ICD-10-CM

## 2025-03-19 DIAGNOSIS — E11.29 TYPE 2 DIABETES MELLITUS WITH MICROALBUMINURIA, WITHOUT LONG-TERM CURRENT USE OF INSULIN (H): ICD-10-CM

## 2025-03-19 DIAGNOSIS — F41.9 ANXIETY: ICD-10-CM

## 2025-03-19 DIAGNOSIS — K21.9 GASTROESOPHAGEAL REFLUX DISEASE WITHOUT ESOPHAGITIS: ICD-10-CM

## 2025-03-19 DIAGNOSIS — I25.810 CORONARY ARTERY DISEASE INVOLVING AUTOLOGOUS ARTERY CORONARY BYPASS GRAFT WITHOUT ANGINA PECTORIS: ICD-10-CM

## 2025-03-19 DIAGNOSIS — K59.00 CONSTIPATION, UNSPECIFIED CONSTIPATION TYPE: ICD-10-CM

## 2025-03-19 DIAGNOSIS — M54.2 NECK PAIN: ICD-10-CM

## 2025-03-19 DIAGNOSIS — Z12.11 SCREEN FOR COLON CANCER: Primary | ICD-10-CM

## 2025-03-19 DIAGNOSIS — E11.9 TYPE 2 DIABETES MELLITUS WITHOUT COMPLICATION, WITHOUT LONG-TERM CURRENT USE OF INSULIN (H): ICD-10-CM

## 2025-03-19 LAB
CREAT UR-MCNC: 28.9 MG/DL
EST. AVERAGE GLUCOSE BLD GHB EST-MCNC: 200 MG/DL
HBA1C MFR BLD: 8.6 % (ref 0–5.6)
HGB BLD-MCNC: 11.6 G/DL (ref 11.7–15.7)
MICROALBUMIN UR-MCNC: 55.1 MG/L
MICROALBUMIN/CREAT UR: 190.66 MG/G CR (ref 0–25)

## 2025-03-19 RX ORDER — FAMOTIDINE 20 MG/1
20 TABLET, FILM COATED ORAL 2 TIMES DAILY
Qty: 180 TABLET | Refills: 3 | Status: SHIPPED | OUTPATIENT
Start: 2025-03-19

## 2025-03-19 RX ORDER — POLYVINYL ALCOHOL, POVIDONE 14; 6 MG/ML; MG/ML
1-2 SOLUTION/ DROPS OPHTHALMIC
Qty: 50 EACH | Refills: 11 | Status: SHIPPED | OUTPATIENT
Start: 2025-03-19

## 2025-03-19 RX ORDER — GLIPIZIDE 2.5 MG/1
2.5 TABLET, EXTENDED RELEASE ORAL
Qty: 30 TABLET | Refills: 1 | Status: SHIPPED | OUTPATIENT
Start: 2025-03-19

## 2025-03-19 RX ORDER — CLOPIDOGREL BISULFATE 75 MG/1
75 TABLET ORAL DAILY
Qty: 90 TABLET | Refills: 3 | Status: SHIPPED | OUTPATIENT
Start: 2025-03-19

## 2025-03-19 RX ORDER — BLOOD SUGAR DIAGNOSTIC
1 STRIP MISCELLANEOUS DAILY
Qty: 100 EACH | Refills: 5 | Status: SHIPPED | OUTPATIENT
Start: 2025-03-19

## 2025-03-19 RX ORDER — PIOGLITAZONE 30 MG/1
30 TABLET ORAL DAILY
Qty: 90 TABLET | Refills: 0 | Status: SHIPPED | OUTPATIENT
Start: 2025-03-19

## 2025-03-19 RX ORDER — ATORVASTATIN CALCIUM 80 MG/1
80 TABLET, FILM COATED ORAL DAILY
Qty: 90 TABLET | Refills: 3 | Status: SHIPPED | OUTPATIENT
Start: 2025-03-19

## 2025-03-19 RX ORDER — METFORMIN HYDROCHLORIDE 500 MG/1
1000 TABLET, EXTENDED RELEASE ORAL 2 TIMES DAILY WITH MEALS
Qty: 360 TABLET | Refills: 3 | Status: SHIPPED | OUTPATIENT
Start: 2025-03-19

## 2025-03-19 RX ORDER — HYDROXYZINE PAMOATE 25 MG/1
CAPSULE ORAL
Qty: 120 CAPSULE | Refills: 3 | Status: SHIPPED | OUTPATIENT
Start: 2025-03-19

## 2025-03-19 RX ORDER — ACETAMINOPHEN 325 MG/1
325-650 TABLET ORAL EVERY 6 HOURS PRN
Qty: 90 TABLET | Refills: 0 | Status: SHIPPED | OUTPATIENT
Start: 2025-03-19

## 2025-03-19 RX ORDER — SITAGLIPTIN 100 MG/1
100 TABLET, FILM COATED ORAL DAILY
Qty: 90 TABLET | Refills: 3 | Status: SHIPPED | OUTPATIENT
Start: 2025-03-19

## 2025-03-19 RX ORDER — ASPIRIN 81 MG/1
81 TABLET ORAL DAILY
Qty: 90 TABLET | Refills: 3 | Status: SHIPPED | OUTPATIENT
Start: 2025-03-19

## 2025-03-19 RX ORDER — AMOXICILLIN 250 MG
1 CAPSULE ORAL 2 TIMES DAILY
Qty: 180 TABLET | Refills: 3 | Status: SHIPPED | OUTPATIENT
Start: 2025-03-19

## 2025-03-19 RX ORDER — LISINOPRIL 10 MG/1
10 TABLET ORAL DAILY
Qty: 90 TABLET | Refills: 1 | Status: SHIPPED | OUTPATIENT
Start: 2025-03-19

## 2025-03-19 ASSESSMENT — PATIENT HEALTH QUESTIONNAIRE - PHQ9
5. POOR APPETITE OR OVEREATING: NOT AT ALL
SUM OF ALL RESPONSES TO PHQ QUESTIONS 1-9: 14

## 2025-03-19 ASSESSMENT — ANXIETY QUESTIONNAIRES
5. BEING SO RESTLESS THAT IT IS HARD TO SIT STILL: NOT AT ALL
2. NOT BEING ABLE TO STOP OR CONTROL WORRYING: NOT AT ALL
GAD7 TOTAL SCORE: 0
IF YOU CHECKED OFF ANY PROBLEMS ON THIS QUESTIONNAIRE, HOW DIFFICULT HAVE THESE PROBLEMS MADE IT FOR YOU TO DO YOUR WORK, TAKE CARE OF THINGS AT HOME, OR GET ALONG WITH OTHER PEOPLE: NOT DIFFICULT AT ALL
3. WORRYING TOO MUCH ABOUT DIFFERENT THINGS: NOT AT ALL
1. FEELING NERVOUS, ANXIOUS, OR ON EDGE: NOT AT ALL
7. FEELING AFRAID AS IF SOMETHING AWFUL MIGHT HAPPEN: NOT AT ALL
6. BECOMING EASILY ANNOYED OR IRRITABLE: NOT AT ALL
GAD7 TOTAL SCORE: 0

## 2025-03-19 NOTE — PATIENT INSTRUCTIONS
If the weather is good you have to go outside.  Dr's orders!  It can be short, but getting outside will help you, even if you don't want to go.     Okay to watch the cooking shows, but not more than 3 hours a day.   Use your eye drops!    Diabetes:  Increase the strength on the pioglitazone from 15mg to 30mg.  They will send you a new bottle - but it'll still be 1 pill per day.     New pill - Glipizide - take in the morning to help with noon high sugars.     Call the clinic if your mood is getting worse.     Follow up in 6 weeks.

## 2025-03-19 NOTE — LETTER
March 20, 2025      Rachael Bhatt  [address] [place] [address]        Dear ,    We are writing to inform you of your test results.    Here is a copy of your lab results.  Your hemoglobin A1c is much better.  Great work!  Your hemoglobin is a little low.  We should look into this more next time.  Please call the clinic at [phone] if you have any questions.       Resulted Orders   Hemoglobin A1c   Result Value Ref Range    Estimated Average Glucose 200 (H) <117 mg/dL    Hemoglobin A1C 8.6 (H) 0.0 - 5.6 %      Comment:      Normal <5.7%   Prediabetes 5.7-6.4%    Diabetes 6.5% or higher     Note: Adopted from ADA consensus guidelines.   Hemoglobin   Result Value Ref Range    Hemoglobin 11.6 (L) 11.7 - 15.7 g/dL       If you have any questions or concerns, please call the clinic at the number listed above.       Sincerely,      Amrita Carballo MD    Electronically signed

## 2025-03-20 NOTE — PROGRESS NOTES
There are no exam notes on file for this visit.    ASSESSMENT AND PLAN:      Diagnoses and all orders for this visit:    Screen for colon cancer    Type 2 diabetes mellitus without complication, without long-term current use of insulin (H)  Diabetes numbers have been improving.  Her A1c has come down to 8.6.  She is having quite significant spikes at noon.  We will add a small dose of glipizide with her morning breakfast to hopefully decrease the spikes.  She is very opposed to any type of injectable medication.  I refilled her freestyle renan sensors, alcohol swabs, and all of her diabetes medications.  She will continue on metformin, empagliflozin, and sitagliptin.  -     Hemoglobin A1c  -     Hemoglobin  -     Albumin Random Urine Quantitative with Creat Ratio  -     pioglitazone (ACTOS) 30 MG tablet; Take 1 tablet (30 mg) by mouth daily.  -     glipiZIDE (GLUCOTROL XL) 2.5 MG 24 hr tablet; Take 1 tablet (2.5 mg) by mouth daily (with breakfast).  -     Alcohol Swabs (ADVOCATE ALCOHOL PREP PADS) 70 % PADS; 1 pad. daily.  -     aspirin 81 MG EC tablet; Take 1 tablet (81 mg) by mouth daily.  -     Continuous Glucose Sensor (FREESTYLE RENAN 2 SENSOR) [facility] [place]; Inject 1 each subcutaneously every 14 days. Use 1 sensor every 14 days. Use to read blood sugars per 's instructions.  -     empagliflozin (JARDIANCE) 25 MG TABS tablet; Take 1 tablet (25 mg) by mouth daily.  -     JANUVIA 100 MG tablet; Take 1 tablet (100 mg) by mouth daily.  -     metFORMIN (GLUCOPHAGE XR) 500 MG 24 hr tablet; Take 2 tablets (1,000 mg) by mouth 2 times daily (with meals).    Neck pain  Refill Tylenol.  This has been helpful for pain.  -     acetaminophen (TYLENOL) 325 MG tablet; Take 1-2 tablets (325-650 mg) by mouth every 6 hours as needed for mild pain.    Coronary artery disease involving autologous artery coronary bypass graft without angina pectoris  Emphasized the importance of taking her heart medications, including her  lisinopril, clopidogrel, aspirin, and atorvastatin.  These were all refilled.  -     atorvastatin (LIPITOR) 80 MG tablet; Take 1 tablet (80 mg) by mouth daily.  -     lisinopril (ZESTRIL) 10 MG tablet; Take 1 tablet (10 mg) by mouth daily.  -     clopidogrel (PLAVIX) 75 MG tablet; Take 1 tablet (75 mg) by mouth daily.    Gastroesophageal reflux disease without esophagitis  Famotidine has helped with GERD symptoms.  We refilled this  -     famotidine (PEPCID) 20 MG tablet; Take 1 tablet (20 mg) by mouth 2 times daily.    Anxiety  Reminded her to use hydroxyzine as needed for episodes of panic.  Discussed the importance of getting outside and being in nature for her mental health.  She does not want to start a daily antidepressant and declines therapy today.  Does have thoughts of wanting to be better off dead, but tells these the family and feels comfortable asking for support as needed.  -     hydrOXYzine marok (VISTARIL) 25 MG capsule; TAKE 1 CAPSULE BY MOUTH AS NEEDED FOR PANIC, ANXIETY **MAY TAKE 1-2 CAPSULES AT NIGHT TO HELP WITH SLEEP.**    Constipation, unspecified constipation type  Refilled stool softeners  -     senna-docusate (STIMULANT LAXATIVE) 8.6-50 MG tablet; Take 1 tablet by mouth 2 times daily. As needed.    Itchy eyes  Dry eyes.  Prescribed eyedrops  -     polyvinyl alcohol-povidone PF (REFRESH) 1.4-0.6 % ophthalmic solution; Place 1-2 drops into both eyes every hour as needed for irritation.    Follow-up in 6 weeks    The longitudinal plan of care for the diagnosis(es)/condition(s) as documented were addressed during this visit. Due to the added complexity in care, I will continue to support Rachael in the subsequent management and with ongoing continuity of care.    Patient Instructions   If the weather is good you have to go outside.  Dr's orders!  It can be short, but getting outside will help you, even if you don't want to go.     Okay to watch the cooking shows, but not more than 3 hours a day.    Use your eye drops!    Diabetes:  Increase the strength on the pioglitazone from 15mg to 30mg.  They will send you a new bottle - but it'll still be 1 pill per day.     New pill - Glipizide - take in the morning to help with noon high sugars.     Call the clinic if your mood is getting worse.     Follow up in 6 weeks.     Amrita Carballo MD    RAYSA Ch is a 59 year old female with past medical history significant for    Patient Active Problem List   Diagnosis    Essential hypertension, benign    Diabetes mellitus, type 2 (H)    Hyperlipidemia    Helicobacter pylori gastritis    PTSD (post-traumatic stress disorder)    Moderate episode of recurrent major depressive disorder (H)    Cognitive complaints    Screening for depression    Microalbuminuria    Plantar fasciitis    Left knee pain    Right knee pain, unspecified chronicity    Chronic kidney disease, stage 3a (H)    Coronary artery disease involving autologous artery coronary bypass graft without angina pectoris     Others present at the visit:  Patient's daughter.   Silvino Banks, present in person.      Presents for No chief complaint on file.    Patient presents for follow-up of multiple chronic concerns today.    Both she and her daughter shared that her mood has been more down.  She has been feeling more tired, is not eating well.  Is not wanting to do things.  She is sleeping fine.  No big life changes.  She has been on antidepressants in the past but is taking no antidepressants now.  Has seen a therapist in the past but is not seeing one now.  She does spend time with family and gets good response to being with animals as well as being outside.    We reviewed her diabetes.  She notes that sugars are high in general around noon.  She eats breakfast between 9 and 10 AM.  Will then eat some snacks and smaller meals throughout the day.  Her average glucose however has been improving.  For the last 30 days it was 250, then 235, then  "220.  She however feels like her sugars are all over the place.  Feels like she cannot predict further going to be, and that the medication is not appropriately helping.    Her eyes have been bothering her.  They are more dry and irritated.  Daughter says she watches too many cooking shows on her phone.    Reviewed the medications that she has with her today.  They include only her aspirin, empagliflozin, sitagliptin, and famotidine.  Daughter shares that these are the only meds that she has x-rays of.  The rest of them are in her med box.  A different daughter helps with medication set up.      OBJECTIVE:  Vitals: /60 (BP Location: Left arm, Patient Position: Sitting, Cuff Size: Adult Regular)   Pulse 74   Temp 98.1  F (36.7  C) (Oral)   Resp 18   Ht 1.453 m (4' 9.2\")   Wt 56.8 kg (125 lb 3.2 oz)   SpO2 99%   BMI 26.90 kg/m    BMI= Body mass index is 26.9 kg/m .  Objective:    Vitals:  Vitals are reviewed and are within the normal range  Gen:  Alert, pleasant, no acute distress  Cardiac:  Regular rate and rhythm, no murmurs, rubs or gallops  Respiratory:  Lungs clear to auscultation bilaterally  Abdomen:  Soft, non-tender, non-distended, bowel sounds positive  Extremities:  Warm, well-perfused, pulses 2+/4, no lower extremity edema    Results for orders placed or performed in visit on 03/19/25   Hemoglobin A1c     Status: Abnormal   Result Value Ref Range    Estimated Average Glucose 200 (H) <117 mg/dL    Hemoglobin A1C 8.6 (H) 0.0 - 5.6 %   Hemoglobin     Status: Abnormal   Result Value Ref Range    Hemoglobin 11.6 (L) 11.7 - 15.7 g/dL   Albumin Random Urine Quantitative with Creat Ratio     Status: Abnormal   Result Value Ref Range    Creatinine Urine mg/dL 28.9 mg/dL    Albumin Urine mg/L 55.1 mg/L    Albumin Urine mg/g Cr 190.66 (H) 0.00 - 25.00 mg/g Cr           Patient Instructions   If the weather is good you have to go outside.  's orders!  It can be short, but getting outside will help you, " even if you don't want to go.     Okay to watch the cooking shows, but not more than 3 hours a day.   Use your eye drops!    Diabetes:  Increase the strength on the pioglitazone from 15mg to 30mg.  They will send you a new bottle - but it'll still be 1 pill per day.     New pill - Glipizide - take in the morning to help with noon high sugars.     Call the clinic if your mood is getting worse.     Follow up in 6 weeks.     Amrita Carballo MD

## 2025-05-14 ENCOUNTER — OFFICE VISIT (OUTPATIENT)
Dept: FAMILY MEDICINE | Facility: CLINIC | Age: 60
End: 2025-05-14
Payer: COMMERCIAL

## 2025-05-14 VITALS
SYSTOLIC BLOOD PRESSURE: 122 MMHG | HEIGHT: 57 IN | BODY MASS INDEX: 28 KG/M2 | RESPIRATION RATE: 20 BRPM | DIASTOLIC BLOOD PRESSURE: 70 MMHG | TEMPERATURE: 97.5 F | HEART RATE: 78 BPM | OXYGEN SATURATION: 98 % | WEIGHT: 129.8 LBS

## 2025-05-14 DIAGNOSIS — I25.118 CORONARY ARTERY DISEASE OF NATIVE HEART WITH STABLE ANGINA PECTORIS, UNSPECIFIED VESSEL OR LESION TYPE: ICD-10-CM

## 2025-05-14 DIAGNOSIS — E11.29 TYPE 2 DIABETES MELLITUS WITH MICROALBUMINURIA, WITHOUT LONG-TERM CURRENT USE OF INSULIN (H): ICD-10-CM

## 2025-05-14 DIAGNOSIS — K21.9 GASTROESOPHAGEAL REFLUX DISEASE WITHOUT ESOPHAGITIS: ICD-10-CM

## 2025-05-14 DIAGNOSIS — R80.9 TYPE 2 DIABETES MELLITUS WITH MICROALBUMINURIA, WITHOUT LONG-TERM CURRENT USE OF INSULIN (H): ICD-10-CM

## 2025-05-14 DIAGNOSIS — I25.810 CORONARY ARTERY DISEASE INVOLVING AUTOLOGOUS ARTERY CORONARY BYPASS GRAFT WITHOUT ANGINA PECTORIS: ICD-10-CM

## 2025-05-14 DIAGNOSIS — I20.0 UNSTABLE ANGINA (H): ICD-10-CM

## 2025-05-14 DIAGNOSIS — E11.65 TYPE 2 DIABETES MELLITUS WITH HYPERGLYCEMIA, WITHOUT LONG-TERM CURRENT USE OF INSULIN (H): ICD-10-CM

## 2025-05-14 DIAGNOSIS — M65.4 DE QUERVAIN'S DISEASE (TENOSYNOVITIS): ICD-10-CM

## 2025-05-14 DIAGNOSIS — E11.9 TYPE 2 DIABETES MELLITUS WITHOUT COMPLICATION, WITHOUT LONG-TERM CURRENT USE OF INSULIN (H): Primary | ICD-10-CM

## 2025-05-14 DIAGNOSIS — N18.31 CHRONIC KIDNEY DISEASE, STAGE 3A (H): ICD-10-CM

## 2025-05-14 DIAGNOSIS — F33.1 MODERATE EPISODE OF RECURRENT MAJOR DEPRESSIVE DISORDER (H): ICD-10-CM

## 2025-05-14 DIAGNOSIS — H57.9 ITCHY EYES: ICD-10-CM

## 2025-05-14 RX ORDER — NITROGLYCERIN 0.4 MG/1
TABLET SUBLINGUAL
Qty: 12 TABLET | Refills: 1 | Status: SHIPPED | OUTPATIENT
Start: 2025-05-14

## 2025-05-14 RX ORDER — CLOPIDOGREL BISULFATE 75 MG/1
75 TABLET ORAL DAILY
Qty: 90 TABLET | Refills: 3 | Status: SHIPPED | OUTPATIENT
Start: 2025-05-14

## 2025-05-14 RX ORDER — POLYVINYL ALCOHOL, POVIDONE 14; 6 MG/ML; MG/ML
1-2 SOLUTION/ DROPS OPHTHALMIC
Qty: 50 EACH | Refills: 11 | Status: SHIPPED | OUTPATIENT
Start: 2025-05-14

## 2025-05-14 RX ORDER — GLIPIZIDE 2.5 MG/1
2.5 TABLET, EXTENDED RELEASE ORAL
Qty: 90 TABLET | Refills: 3 | Status: SHIPPED | OUTPATIENT
Start: 2025-05-14

## 2025-05-14 RX ORDER — CALCIUM CARBONATE 500 MG/1
2 TABLET, CHEWABLE ORAL 2 TIMES DAILY PRN
Qty: 100 TABLET | Refills: 3 | Status: SHIPPED | OUTPATIENT
Start: 2025-05-14

## 2025-05-14 RX ORDER — ASPIRIN 81 MG/1
81 TABLET ORAL DAILY
Qty: 90 TABLET | Refills: 3 | Status: SHIPPED | OUTPATIENT
Start: 2025-05-14

## 2025-05-14 RX ORDER — LISINOPRIL 10 MG/1
10 TABLET ORAL DAILY
Qty: 90 TABLET | Refills: 3 | Status: SHIPPED | OUTPATIENT
Start: 2025-05-14

## 2025-05-14 ASSESSMENT — PATIENT HEALTH QUESTIONNAIRE - PHQ9
SUM OF ALL RESPONSES TO PHQ QUESTIONS 1-9: 2
SUM OF ALL RESPONSES TO PHQ QUESTIONS 1-9: 2
10. IF YOU CHECKED OFF ANY PROBLEMS, HOW DIFFICULT HAVE THESE PROBLEMS MADE IT FOR YOU TO DO YOUR WORK, TAKE CARE OF THINGS AT HOME, OR GET ALONG WITH OTHER PEOPLE: NOT DIFFICULT AT ALL

## 2025-05-14 NOTE — PROGRESS NOTES
There are no exam notes on file for this visit.    ASSESSMENT AND PLAN:      Rachael was seen today for diabetes.    Diagnoses and all orders for this visit:    Type 2 diabetes mellitus without complication, without long-term current use of insulin (H)  Blood sugars improved, but still slightly above goal.  Per daughter, she is not taking the pioglitazone.  We will stop this.  We will reincrease the metformin to 1000 mg twice daily, continue on the Januvia 100 mg daily, Jardiance 25 mg daily, and the glipizide 2.5 mg daily.  Pills were sent in for testing supplies.  -     Continuous Glucose Sensor (FREESTYLE RENAN 2 SENSOR) [facility] [place]; Inject 1 each subcutaneously every 14 days. Use 1 sensor every 14 days. Use to read blood sugars per 's instructions.  -     blood glucose monitoring (NO BRAND SPECIFIED) meter device kit; Use to test blood sugar 1 times daily or as directed.  -     glipiZIDE (GLUCOTROL XL) 2.5 MG 24 hr tablet; Take 1 tablet (2.5 mg) by mouth daily (with breakfast).  -     blood glucose (NO BRAND SPECIFIED) test strip; Use to test blood sugar once or twice times weekly Patient has Contour test machine, please send [employer] CONTOUR test strips.  -     blood glucose (NO BRAND SPECIFIED) lancets standard; Use to test blood sugar 2 times daily or as directed.    Coronary artery disease involving autologous artery coronary bypass graft without angina pectoris  Kidney disease stage III.  Does get some occasional chest pain.  No change in pattern.  Likely stable angina.  Refilled nitroglycerin.  We refilled her clopidogrel and aspirin, and she is doing well on the current dose of lisinopril.  Could consider adding metoprolol next visit if blood pressure can tolerate.  -     lisinopril (ZESTRIL) 10 MG tablet; Take 1 tablet (10 mg) by mouth daily.  -     clopidogrel (PLAVIX) 75 MG tablet; Take 1 tablet (75 mg) by mouth daily.  -     nitroGLYcerin (NITROSTAT) 0.4 MG sublingual tablet; For chest pain place  1 tablet under the tongue every 5 minutes for 3 doses. If symptoms persist 5 minutes after 1st dose call 911.  -    Aspirin 81 mg daily    Gastroesophageal reflux disease without esophagitis  Does not been well-controlled.  Not taking famotidine.  We discontinued this and she will just use Tums as needed.  -     calcium carbonate (TUMS) 500 MG chewable tablet; Take 2 tablets (1,000 mg) by mouth 2 times daily as needed for heartburn.    De Quervain's disease (tenosynovitis)  Right hip pain  Refilled Tylenol and Voltaren gel  -     diclofenac (VOLTAREN) 1 % topical gel; Apply 2 g topically 4 times daily. Apply to right thumb    Itchy eyes  Refill provided  -     polyvinyl alcohol-povidone PF (REFRESH) 1.4-0.6 % ophthalmic solution; Place 1-2 drops into both eyes every hour as needed for irritation.    Major depression:  Symptoms stable.  PHQ-9 stable.  Getting outside really helps her.  -Continue hydroxyzine as needed as night for sleep.    Will have her follow-up in 6 to 8 weeks, for A1c, recheck of blood pressure, and will do lipid panel at that time.    The longitudinal plan of care for the diagnosis(es)/condition(s) as documented were addressed during this visit. Due to the added complexity in care, I will continue to support Rachael in the subsequent management and with ongoing continuity of care.      Patient Instructions   Keep up the good work!  Keep moving your body and make sure you are getting outside.    Do stretches for your hip everyday.    Keep checking your blood sugars.      STOP taking the pioglitazone  With your METFORMIN take TWO (2) pills TWO times per day.  Keep all the other diabetes medications - jardiance, januvia, and glipizide.     Follow up in 2 months.    Amrita Carballo MD    SUBJECTIVE  Rachael Ch is a 59 year old female with past medical history significant for    Patient Active Problem List   Diagnosis    Essential hypertension, benign    Diabetes mellitus, type 2 (H)    Hyperlipidemia     Helicobacter pylori gastritis    PTSD (post-traumatic stress disorder)    Moderate episode of recurrent major depressive disorder (H)    Cognitive complaints    Screening for depression    Microalbuminuria    Plantar fasciitis    Left knee pain    Right knee pain, unspecified chronicity    Chronic kidney disease, stage 3a (H)    Coronary artery disease involving autologous artery coronary bypass graft without angina pectoris     Others present at the visit:  Patient's daughter, who also sets up meds at home.   Silvino Banks, present in person .     Presents for   Chief Complaint   Patient presents with    Diabetes     Dm follow-up      Presenting for diabetes and blood pressure follow-up today.  Overall feeling well.  Mood has been better in the good weather.  She has been outside a lot.  [place]  She has been exerting her body more, and feels good.  Does get some mild chest pain at times.  Also has some pain in her right low back and hip when she has been walking a lot.  Is using pain cream for this and this helps.    For her diabetes, she has been taking metformin 1 pill twice daily, Jardiance 1 pill daily, Januvia 1 pill daily, and glipizide 1 pill daily.  She has not been taking the pioglitazone, and is not taking 2 pills of metformin morning and night at this time.  She is open to going up to 2 pills twice daily of the metformin.  Checks her sugars regularly with her freestyle emerita 2.  Sugars on average are 130s in the morning, 180s in the afternoon, and 120s in the evening.  She reports eating well and watching her rice intake.  She feels like she may have lost some weight.    No hypoglycemia.  No shortness of breath.  No lower extremity edema.  No numbness or tingling.    Mood has been good.  Feeling better with getting outside.  Takes hydroxyzine only as needed for sleep and has not been needing this recently.    OBJECTIVE:  Vitals: /70   Pulse 78   Temp  "97.5  F (36.4  C) (Tympanic)   Resp 20   Ht 1.448 m (4' 9\")   Wt 58.9 kg (129 lb 12.8 oz)   SpO2 98%   BMI 28.09 kg/m    BMI= Body mass index is 28.09 kg/m .  Objective:    Vitals:  Vitals are reviewed and are within the normal range  Gen:  Alert, pleasant, no acute distress  Cardiac:  Regular rate and rhythm, no murmurs, rubs or gallops  Respiratory:  Lungs clear to auscultation bilaterally  Extremities:  Warm, well-perfused, pulses 2+/4, no lower extremity edema.  Monofilament testing was completed and was normal.  Pulses are strong, no lower extremity edema    Most recent A1c done 2 months ago was 8.7    Patient Instructions   Keep up the good work!  Keep moving your body and make sure you are getting outside.    Do stretches for your hip everyday.    Keep checking your blood sugars.      STOP taking the pioglitazone  With your METFORMIN take TWO (2) pills TWO times per day.  Keep all the other diabetes medications - jardiance, januvia, and glipizide.     Follow up in 2 months.    Amrita Carballo MD    Answers submitted by the patient for this visit:  Patient Health Questionnaire (Submitted on 5/14/2025)  If you checked off any problems, how difficult have these problems made it for you to do your work, take care of things at home, or get along with other people?: Not difficult at all  PHQ9 TOTAL SCORE: 2    "

## 2025-05-14 NOTE — PATIENT INSTRUCTIONS
Keep up the good work!  Keep moving your body and make sure you are getting outside.    Do stretches for your hip everyday.    Keep checking your blood sugars.      STOP taking the pioglitazone  With your METFORMIN take TWO (2) pills TWO times per day.  Keep all the other diabetes medications - jardiance, januvia, and glipizide.     Follow up in 2 months.

## 2025-07-15 DIAGNOSIS — E11.9 TYPE 2 DIABETES MELLITUS WITHOUT COMPLICATION, WITHOUT LONG-TERM CURRENT USE OF INSULIN (H): Primary | ICD-10-CM

## 2025-07-16 ENCOUNTER — OFFICE VISIT (OUTPATIENT)
Dept: FAMILY MEDICINE | Facility: CLINIC | Age: 60
End: 2025-07-16
Payer: COMMERCIAL

## 2025-07-16 VITALS
HEART RATE: 72 BPM | HEIGHT: 57 IN | DIASTOLIC BLOOD PRESSURE: 76 MMHG | WEIGHT: 126 LBS | SYSTOLIC BLOOD PRESSURE: 123 MMHG | BODY MASS INDEX: 27.18 KG/M2 | TEMPERATURE: 98.3 F | RESPIRATION RATE: 18 BRPM | OXYGEN SATURATION: 98 %

## 2025-07-16 DIAGNOSIS — M25.551 HIP PAIN, RIGHT: ICD-10-CM

## 2025-07-16 DIAGNOSIS — I25.810 CORONARY ARTERY DISEASE INVOLVING AUTOLOGOUS ARTERY CORONARY BYPASS GRAFT WITHOUT ANGINA PECTORIS: ICD-10-CM

## 2025-07-16 DIAGNOSIS — E11.9 TYPE 2 DIABETES MELLITUS WITHOUT COMPLICATION, WITHOUT LONG-TERM CURRENT USE OF INSULIN (H): Primary | ICD-10-CM

## 2025-07-16 LAB
ANION GAP SERPL CALCULATED.3IONS-SCNC: 6 MMOL/L (ref 3–14)
BUN SERPL-MCNC: 23 MG/DL (ref 7–30)
CALCIUM SERPL-MCNC: 10.3 MG/DL (ref 8.5–10.1)
CHLORIDE BLD-SCNC: 108 MMOL/L (ref 94–109)
CHOLEST SERPL-MCNC: 217 MG/DL
CO2 SERPL-SCNC: 30 MMOL/L (ref 20–32)
CREAT SERPL-MCNC: 1.1 MG/DL (ref 0.52–1.04)
EGFRCR SERPLBLD CKD-EPI 2021: 57 ML/MIN/1.73M2
EST. AVERAGE GLUCOSE BLD GHB EST-MCNC: 206 MG/DL
FASTING STATUS PATIENT QL REPORTED: ABNORMAL
GLUCOSE BLD-MCNC: 238 MG/DL (ref 70–99)
HBA1C MFR BLD: 8.8 % (ref 0–5.6)
HDLC SERPL-MCNC: 52 MG/DL
LDLC SERPL CALC-MCNC: 129 MG/DL
NONHDLC SERPL-MCNC: 165 MG/DL
POTASSIUM BLD-SCNC: 5.3 MMOL/L (ref 3.4–5.3)
SODIUM SERPL-SCNC: 144 MMOL/L (ref 135–145)
TRIGL SERPL-MCNC: 182 MG/DL

## 2025-07-16 RX ORDER — METFORMIN HYDROCHLORIDE 500 MG/1
1000 TABLET, EXTENDED RELEASE ORAL 2 TIMES DAILY WITH MEALS
Qty: 360 TABLET | Refills: 3 | Status: SHIPPED | OUTPATIENT
Start: 2025-07-16

## 2025-07-16 RX ORDER — ATORVASTATIN CALCIUM 80 MG/1
80 TABLET, FILM COATED ORAL DAILY
Qty: 90 TABLET | Refills: 3 | Status: SHIPPED | OUTPATIENT
Start: 2025-07-16

## 2025-07-16 RX ORDER — NITROGLYCERIN 0.4 MG/1
TABLET SUBLINGUAL
Qty: 10 TABLET | Refills: 1 | Status: SHIPPED | OUTPATIENT
Start: 2025-07-16

## 2025-07-16 RX ORDER — PIOGLITAZONE 15 MG/1
15 TABLET ORAL DAILY
Qty: 90 TABLET | Refills: 0 | Status: SHIPPED | OUTPATIENT
Start: 2025-07-16

## 2025-07-16 NOTE — PROGRESS NOTES
There are no exam notes on file for this visit.    ASSESSMENT AND PLAN:      Rachael was seen today for follow up.    Diagnoses and all orders for this visit:    Type 2 diabetes mellitus without complication, without long-term current use of insulin (H)  Blood sugars back in May and June look good, slightly above goal 150s to 180s, but sugars over the last 2 to 3 weeks have been in the 200s to 300s.  She denies any symptoms from this.  I suspect this could be secondary to the pioglitazone, however she may be running low on some of her other medications given she only has a few of them with her today.  I resent her metformin, restarted pioglitazone at 15 mg.  She should take the empagliflozin, 25 mg daily, and the sitagliptin, 100 mg daily.  Will continue to check home sugars.  Like her to follow-up in 6 weeks to review her medications and bring them all with her at that time.  -     Basic metabolic panel  -     Lipid Profile  -     Hemoglobin A1c  -     pioglitazone (ACTOS) 15 MG tablet; Take 1 tablet (15 mg) by mouth daily.  -     metFORMIN (GLUCOPHAGE XR) 500 MG 24 hr tablet; Take 2 tablets (1,000 mg) by mouth 2 times daily (with meals).    Coronary artery disease involving autologous artery coronary bypass graft without angina pectoris  She has been feeling good, without chest pain, shortness of breath, and tolerating exercise well.  Has not seen her cardiologist for a number of years.  She was on Plavix and had a CABG.  Recommended she follow-up with cardiology to determine if the Plavix is needed.  No evidence of heart failure or angina today.  -     atorvastatin (LIPITOR) 80 MG tablet; Take 1 tablet (80 mg) by mouth daily.  -     nitroGLYcerin (NITROSTAT) 0.4 MG sublingual tablet; For chest pain place 1 tablet under the tongue every 5 minutes for 3 doses. If symptoms persist 5 minutes after 1st dose call 911.    Follow-up in 6 weeks.    The longitudinal plan of care for the diagnosis(es)/condition(s) as  documented were addressed during this visit. Due to the added complexity in care, I will continue to support Rachael in the subsequent management and with ongoing continuity of care.    Patient Instructions   Keep taking all of your old medications.      START taking the Pioglitazone 15mg once daily (Diabetes)    Use the nitroglycerin only if you have severe chest pain.  Below the tongue.  Call 911 if you have to take it.      Come back in 6 weeks and bring your meds.      Schedule follow up with the cardiologist to check your heart.      Amrita Carballo MD    SUBJECTIVE  Rachael Ch is a 60 year old female with past medical history significant for    Patient Active Problem List   Diagnosis    Essential hypertension, benign    Diabetes mellitus, type 2 (H)    Hyperlipidemia    Helicobacter pylori gastritis    PTSD (post-traumatic stress disorder)    Moderate episode of recurrent major depressive disorder (H)    Cognitive complaints    Screening for depression    Microalbuminuria    Plantar fasciitis    Left knee pain    Right knee pain, unspecified chronicity    Chronic kidney disease, stage 3a (H)    Coronary artery disease involving autologous artery coronary bypass graft without angina pectoris     Others present at the visit:  Radha , Silvino Banks, present in person.  Patient's daughter, Tommy    Presents for   Chief Complaint   Patient presents with    Follow Up     Diabetes      Patient presents today for diabetes follow-up.  She has no complaints today.  Has been feeling good.    Hip pain is feeling better and is not bothering her.    She does not have any chest pain, shortness of breath with exertion, cough or wheezing, lower extremity edema, numbness and tingling.    Some of her medications with her today.  This includes the Tums, Jardiance, Januvia, hydroxyzine, and glucometer.  She does not have her other medications with her today.    Endorses taking her medications regularly but cannot remember  "all the names.  Family helps her remember to take them.    On review blood sugars have been typically in the low to mid 200s with some up to the 300s and some that are even just registering as high.  Significantly higher over the last 2 to 3 weeks then in May or June.  At her last visit, we had stopped the pioglitazone, and this may be contributing.  She cannot remember when she ran out of that medication.    Not seen the cardiologist in a number of years.  Is needing a refill on nitroglycerin.  Is not sure if she is taking Plavix, but is definitely taking aspirin.  Needs a refill on her atorvastatin      OBJECTIVE:  Vitals: /76 (BP Location: Left arm, Patient Position: Sitting, Cuff Size: Adult Regular)   Pulse 72   Temp 98.3  F (36.8  C) (Oral)   Resp 18   Ht 1.448 m (4' 9\")   Wt 57.2 kg (126 lb)   SpO2 98%   BMI 27.27 kg/m    BMI= Body mass index is 27.27 kg/m .  Objective:    Vitals:  Vitals are reviewed and are within the normal range  Gen:  Alert, pleasant, no acute distress  Cardiac:  Regular rate and rhythm, no murmurs, rubs or gallops  Respiratory:  Lungs clear to auscultation bilaterally  Extremities:  Warm, well-perfused, pulses 2+/4, no lower extremity edema    Results for orders placed or performed in visit on 07/16/25   Basic metabolic panel     Status: Abnormal   Result Value Ref Range    Sodium 144 135 - 145 mmol/L    Potassium 5.3 3.4 - 5.3 mmol/L    Chloride 108 94 - 109 mmol/L    Carbon Dioxide (CO2) 30 20 - 32 mmol/L    Anion Gap 6 3 - 14 mmol/L    Urea Nitrogen 23 7 - 30 mg/dL    Creatinine 1.10 (H) 0.52 - 1.04 mg/dL    GFR Estimate 57 (L) >60 mL/min/1.73m2    Calcium 10.3 (H) 8.5 - 10.1 mg/dL    Glucose 238 (H) 70 - 99 mg/dL   Hemoglobin A1c     Status: Abnormal   Result Value Ref Range    Estimated Average Glucose 206 (H) <117 mg/dL    Hemoglobin A1C 8.8 (H) 0.0 - 5.6 %    Narrative    Results consistent with previous, repeat testing unnecessary            Patient Instructions "   Keep taking all of your old medications.      START taking the Pioglitazone 15mg once daily (Diabetes)    Use the nitroglycerin only if you have severe chest pain.  Below the tongue.  Call 911 if you have to take it.      Come back in 6 weeks and bring your meds.      Schedule follow up with the cardiologist to check your heart.      Amrita Carballo MD

## 2025-07-16 NOTE — PATIENT INSTRUCTIONS
Keep taking all of your old medications.      START taking the Pioglitazone 15mg once daily (Diabetes)    Use the nitroglycerin only if you have severe chest pain.  Below the tongue.  Call 911 if you have to take it.      Come back in 6 weeks and bring your meds.      Schedule follow up with the cardiologist to check your heart.

## 2025-09-03 ENCOUNTER — OFFICE VISIT (OUTPATIENT)
Dept: FAMILY MEDICINE | Facility: CLINIC | Age: 60
End: 2025-09-03
Payer: COMMERCIAL

## 2025-09-03 VITALS
TEMPERATURE: 98.9 F | HEIGHT: 57 IN | DIASTOLIC BLOOD PRESSURE: 69 MMHG | HEART RATE: 72 BPM | WEIGHT: 127.2 LBS | OXYGEN SATURATION: 97 % | BODY MASS INDEX: 27.44 KG/M2 | SYSTOLIC BLOOD PRESSURE: 113 MMHG | RESPIRATION RATE: 18 BRPM

## 2025-09-03 DIAGNOSIS — E11.9 TYPE 2 DIABETES MELLITUS WITHOUT COMPLICATION, WITHOUT LONG-TERM CURRENT USE OF INSULIN (H): ICD-10-CM

## 2025-09-03 DIAGNOSIS — I10 ESSENTIAL HYPERTENSION, BENIGN: ICD-10-CM

## 2025-09-03 DIAGNOSIS — Z12.11 SCREEN FOR COLON CANCER: Primary | ICD-10-CM

## 2025-09-03 DIAGNOSIS — I25.810 CORONARY ARTERY DISEASE INVOLVING AUTOLOGOUS ARTERY CORONARY BYPASS GRAFT WITHOUT ANGINA PECTORIS: ICD-10-CM

## 2025-09-03 RX ORDER — KETOROLAC TROMETHAMINE 30 MG/ML
1 INJECTION, SOLUTION INTRAMUSCULAR; INTRAVENOUS ONCE
Qty: 1 EACH | Refills: 0 | Status: SHIPPED | OUTPATIENT
Start: 2025-09-03 | End: 2025-09-03

## 2025-09-03 RX ORDER — ROSUVASTATIN CALCIUM 40 MG/1
40 TABLET, COATED ORAL DAILY
Qty: 90 TABLET | Refills: 3 | Status: SHIPPED | OUTPATIENT
Start: 2025-09-03

## 2025-09-03 RX ORDER — HYDROCHLOROTHIAZIDE 12.5 MG/1
CAPSULE ORAL
Qty: 6 EACH | Refills: 3 | Status: SHIPPED | OUTPATIENT
Start: 2025-09-03